# Patient Record
Sex: MALE | Race: WHITE | NOT HISPANIC OR LATINO | Employment: OTHER | URBAN - METROPOLITAN AREA
[De-identification: names, ages, dates, MRNs, and addresses within clinical notes are randomized per-mention and may not be internally consistent; named-entity substitution may affect disease eponyms.]

---

## 2017-03-24 ENCOUNTER — OFFICE VISIT (OUTPATIENT)
Dept: WOUND CARE | Facility: MEDICAL CENTER | Age: 82
End: 2017-03-24
Attending: UROLOGY
Payer: MEDICARE

## 2017-03-24 VITALS
HEART RATE: 84 BPM | DIASTOLIC BLOOD PRESSURE: 65 MMHG | OXYGEN SATURATION: 91 % | TEMPERATURE: 97.8 F | SYSTOLIC BLOOD PRESSURE: 131 MMHG

## 2017-03-24 DIAGNOSIS — N30.40 RADIATION CYSTITIS: ICD-10-CM

## 2017-03-24 DIAGNOSIS — E11.22 TYPE 2 DIABETES MELLITUS WITH STAGE 3 CHRONIC KIDNEY DISEASE, WITH LONG-TERM CURRENT USE OF INSULIN (HCC): ICD-10-CM

## 2017-03-24 DIAGNOSIS — Z90.79 HISTORY OF PROSTATECTOMY: ICD-10-CM

## 2017-03-24 DIAGNOSIS — L57.0 AK (ACTINIC KERATOSIS): ICD-10-CM

## 2017-03-24 DIAGNOSIS — R31.9 HEMATURIA: ICD-10-CM

## 2017-03-24 DIAGNOSIS — Z79.4 TYPE 2 DIABETES MELLITUS WITH STAGE 3 CHRONIC KIDNEY DISEASE, WITH LONG-TERM CURRENT USE OF INSULIN (HCC): ICD-10-CM

## 2017-03-24 DIAGNOSIS — N18.30 CHRONIC KIDNEY DISEASE (CKD), STAGE III (MODERATE) (HCC): ICD-10-CM

## 2017-03-24 DIAGNOSIS — N18.30 TYPE 2 DIABETES MELLITUS WITH STAGE 3 CHRONIC KIDNEY DISEASE, WITH LONG-TERM CURRENT USE OF INSULIN (HCC): ICD-10-CM

## 2017-03-24 DIAGNOSIS — Z85.46 HISTORY OF PROSTATE CANCER: ICD-10-CM

## 2017-03-24 DIAGNOSIS — N39.45 CONTINUOUS LEAKAGE OF URINE: ICD-10-CM

## 2017-03-24 DIAGNOSIS — K92.1 BLOOD IN STOOL: ICD-10-CM

## 2017-03-24 DIAGNOSIS — G62.9 NEUROPATHY: ICD-10-CM

## 2017-03-24 DIAGNOSIS — I10 ESSENTIAL HYPERTENSION: ICD-10-CM

## 2017-03-24 DIAGNOSIS — Z86.718 HISTORY OF DVT (DEEP VEIN THROMBOSIS): ICD-10-CM

## 2017-03-24 DIAGNOSIS — L59.8 OTHER SPECIFIED DISORDERS OF THE SKIN AND SUBCUTANEOUS TISSUE RELATED TO RADIATION: Primary | ICD-10-CM

## 2017-03-24 DIAGNOSIS — Z92.3 HISTORY OF RADIATION THERAPY: ICD-10-CM

## 2017-03-24 DIAGNOSIS — M48.061 SPINAL STENOSIS, LUMBAR REGION, WITHOUT NEUROGENIC CLAUDICATION: ICD-10-CM

## 2017-03-24 PROCEDURE — 1101F PT FALLS ASSESS-DOCD LE1/YR: CPT | Performed by: FAMILY MEDICINE

## 2017-03-24 PROCEDURE — 4040F PNEUMOC VAC/ADMIN/RCVD: CPT | Performed by: FAMILY MEDICINE

## 2017-03-24 PROCEDURE — G8432 DEP SCR NOT DOC, RNG: HCPCS | Performed by: FAMILY MEDICINE

## 2017-03-24 PROCEDURE — 99202 OFFICE O/P NEW SF 15 MIN: CPT | Performed by: FAMILY MEDICINE

## 2017-03-24 PROCEDURE — G8417 CALC BMI ABV UP PARAM F/U: HCPCS | Performed by: FAMILY MEDICINE

## 2017-03-24 PROCEDURE — G8482 FLU IMMUNIZE ORDER/ADMIN: HCPCS | Performed by: FAMILY MEDICINE

## 2017-03-24 PROCEDURE — 1036F TOBACCO NON-USER: CPT | Performed by: FAMILY MEDICINE

## 2017-03-24 PROCEDURE — 99205 OFFICE O/P NEW HI 60 MIN: CPT | Performed by: FAMILY MEDICINE

## 2017-03-24 ASSESSMENT — ENCOUNTER SYMPTOMS
BLOOD IN STOOL: 1
ABDOMINAL PAIN: 0
DEPRESSION: 1
SEIZURES: 0
DIARRHEA: 1
NAUSEA: 0
BRUISES/BLEEDS EASILY: 0
CONSTIPATION: 1
HEARTBURN: 1
ROS GI COMMENTS: +GERD
BACK PAIN: 1
SORE THROAT: 0
SPUTUM PRODUCTION: 0
SHORTNESS OF BREATH: 0
EYE DISCHARGE: 0
DOUBLE VISION: 0
POLYDIPSIA: 0
FEVER: 0
VOMITING: 0
CHILLS: 0
MYALGIAS: 0
COUGH: 0
FALLS: 1
PALPITATIONS: 0
NERVOUS/ANXIOUS: 0
FOCAL WEAKNESS: 0
TINGLING: 1
BLURRED VISION: 0

## 2017-03-24 ASSESSMENT — PAIN SCALES - GENERAL: PAINLEVEL_OUTOF13: 0

## 2017-03-24 NOTE — PATIENT INSTRUCTIONS
After hyperbaric oxygen therapy treatment, it is normal to experience some congestion to the ears, muffling of sounds, or abnormal sounds to one or both ears.    If you experience pain or discomfort to one or both ears that does not resolve spontaneously, please contact the hyperbaric department at [phone].

## 2017-03-24 NOTE — PROGRESS NOTES
Subjective:      Selvin Braga is a 81 y.o. male who presents with No chief complaint on file.            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      Patient's initial insult was a T3a adenocarcinoma of the prostate (Shannan 7-10), diagnosed in 2001.  For this, he underwent an open radical prostatectomy 2/17/2001, as well as 6.5 weeks of external beam radiation starting  9/17/2001.  He started on LHRH implant x 2 years total beginning 2/13/2003 .  He states that his PSA has been undetectable since surgery, and his urologist states that there is no recurrence of his cancer.  Last PSA noted to be <0.1 ng/ml on 3/2/2016      Due to a dilated urethral stricture on recent cystoscopy, the patient suffers from continual urinary incontinence.  For this, he had an artificial urinary sphincter placed in 2001, which was subsequently removed by Dr. Thomas in 2010 due to leakage and erosion, subsequently replaced by Dr. Car in 2011 and then finally removed again due to failure.    His hematuria, which started in the summer of 2016,  has been worked up by a CT Abdomen & Urogram on 7/2016 which was normal and Cytology at the time was negative for recurrent cancer.   To manage his urinary incontinence, He states that he has a Cunningham clamp behind the head of the penis 12 hours per day, and uses a condom catheter the other 12 hours of the day.    Additional complicating factors include hematochezia, which he attributes also to the radiation from his prostate cancer treatment.  He notes that his bowels are irregular, with alternating constipation and diarrhea.  Additionally, he did have a small bowel resection for obstruction and subsequent re-anastamosis.      Due to the chronic hematuria and  hematochezia, he states that he always has a low level of anemia, for which he takes iron supplements.     Patient's current complaints include hematuria with clots, urinary incontinence, incontinence and impotence.      DERM - He states that he also gets some itches on his back - for which he sees Dermatology - Dr. Stas CAPUTO - In terms of his diabetic control, he is maintained on nightly NPH insulin (10 units), as well as oral medications.  He notes that his Last A1c was 7.0  Blood sugars in the AM are generally > 120     GI - In terms of his GERD, he states that this is well managed with PPI        Consults:  PCP -Tia Ferguson MD - [address], [place] [address]  Urology - Tuan Marie MD - [facility] - [address], [place] [address]  Of note, the patient does have an additional PCP and [facility], although he does not remember who they are.  [facility]  Podiatrist - Zane Bar DPM - [address], [place] [address]  Optho - Kavon Mcintosh MD & Clayton Noonan MD - [facility] - [address] [place] [address]  GI - Alek Barker MD - [facility] - [address] [place] [address]      Radiology   7/13/16 CT ABD & PELVIS Urogram  Impression        There are no enhancing renal masses. There are too small to characterize left cortical hypodensities that statistically most likely represent cysts    Punctate left calyx calculus not excluded although I suspect this is more likely vascular    Bladder wall trabeculation following prostatectomy. This indicates chronic outlet obstruction although no abnormal dilatation is seen    Cholelithiasis without biliary dilatation     8/3/15 CXR  Impression        1.  No evidence of acute cardiopulmonary process.  2.  Elevated right hemidiaphragm is stable.       Laboratory:  Results for KARINA SKAGGS (MRN [id number]) as of 3/24/2017 13:57   Ref. Range 8/22/2016 07:00   WBC Latest Ref Range:  4.8-10.8 K/uL 7.3   RBC Latest Ref Range: 4.70-6.10 M/uL 4.02 (L)   Hemoglobin Latest Ref Range: 14.0-18.0 g/dL 11.9 (L)   Hematocrit Latest Ref Range: 42.0-52.0 % 36.8 (L)   MCV Latest Ref Range: 81.4-97.8 fL 91.5   MCH Latest Ref Range: 27.0-33.0 pg 29.6   MCHC Latest Ref Range: 33.7-35.3 g/dL 32.3 (L)   RDW Latest Ref Range: 35.9-50.0 fL 50.5 (H)   Platelet Count Latest Ref Range: 164-446 K/uL 171   MPV Latest Ref Range: 9.0-12.9 fL 9.9   Neutrophils-Polys Latest Ref Range: 44.00-72.00 % 65.30   Neutrophils (Absolute) Latest Ref Range: 1.82-7.42 K/uL 4.79   Lymphocytes Latest Ref Range: 22.00-41.00 % 19.50 (L)   Lymphs (Absolute) Latest Ref Range: 1.00-4.80 K/uL 1.43   Monocytes Latest Ref Range: 0.00-13.40 % 8.70   Monos (Absolute) Latest Ref Range: 0.00-0.85 K/uL 0.64   Eosinophils Latest Ref Range: 0.00-6.90 % 5.40   Eos (Absolute) Latest Ref Range: 0.00-0.51 K/uL 0.40   Basophils Latest Ref Range: 0.00-1.80 % 0.70   Baso (Absolute) Latest Ref Range: 0.00-0.12 K/uL 0.05   Immature Granulocytes Latest Ref Range: 0.00-0.90 % 0.40   Immature Granulocytes (abs) Latest Ref Range: 0.00-0.11 K/uL 0.03   Nucleated RBC Latest Units: /100 WBC 0.00   NRBC (Absolute) Latest Units: K/uL 0.00   Sodium Latest Ref Range: 135-145 mmol/L 138   Potassium Latest Ref Range: 3.6-5.5 mmol/L 4.0   Chloride Latest Ref Range:  mmol/L 105   Co2 Latest Ref Range: 20-33 mmol/L 26   Anion Gap Latest Ref Range: 0.0-11.9  7.0   Glucose Latest Ref Range: 65-99 mg/dL 161 (H)   Bun Latest Ref Range: 8-22 mg/dL 21   Creatinine Latest Ref Range: 0.50-1.40 mg/dL 1.16   GFR If  Latest Ref Range: >60 mL/min/1.73 m 2 >60   GFR If Non  Latest Ref Range: >60 mL/min/1.73 m 2 >60   Calcium Latest Ref Range: 8.5-10.5 mg/dL 9.9   AST(SGOT) Latest Ref Range: 12-45 U/L 12   ALT(SGPT) Latest Ref Range: 2-50 U/L 9   Alkaline Phosphatase Latest Ref Range: 30-99 U/L 76   Total Bilirubin Latest Ref Range: 0.1-1.5 mg/dL 0.6    Albumin Latest Ref Range: 3.2-4.9 g/dL 3.8   Total Protein Latest Ref Range: 6.0-8.2 g/dL 7.2   Globulin Latest Ref Range: 1.9-3.5 g/dL 3.4   A-G Ratio Latest Units: g/dL 1.1   Glycohemoglobin Latest Ref Range: 0.0-5.6 % 7.4 (H)   Estim. Avg Glu Latest Units: mg/dL 166   Cholesterol,Tot Latest Ref Range: 100-199 mg/dL 141   Triglycerides Latest Ref Range: 0-149 mg/dL 131   HDL Latest Ref Range: >=40 mg/dL 47   LDL Latest Ref Range: <100 mg/dL 68     Past Medical History   Diagnosis Date   • Heart burn    • Dental disorder      dentures   • Snoring    • Psychiatric problem      taking prozac   • Cancer (CMS-HCC) 2001     prostate   • CATARACT    • Arrhythmia      PVC's   • Indigestion    • Other specified disorder of intestines      alters between normal and diarrhea since radiation tx   • Arthritis    • Colon polyps    • Anemia    • Diabetes 1984   • Lombardi esophagus    • Personal history of venous thrombosis and embolism 2010/1995     leg   • GERD (gastroesophageal reflux disease)    • Cholesterol blood decreased    • Unspecified urinary incontinence      spill from artifical urinary sphincter   • Urinary bladder disorder      artificial urinary sphincter /catheter   • Sleep apnea      no cpap   • Hypertension      well controlled on meds   • Chronic kidney disease (CKD), stage III (moderate) 7/21/2016   • Neuropathy (CMS-[facility]) 7/21/2016   • Depression 7/21/2016   • AK (actinic keratosis) 7/21/2016   • Hyperlipidemia 7/21/2016     Past Surgical History   Procedure Laterality Date   • Angiogram  1/25/2010     Performed by PETER NORMAN at SURGERY [facility]   • Aortogram  1/25/2010     Performed by EPTER NORMAN at SURGERY [facility]   • Prostatectomy robotic  2/2001   • Pr insert,inflatable sphincter  2001   • Sphincter prosthesis removal  6/3/2010     Performed by JOSH APARICIO at SURGERY [facility]   • Cystoscopy  2/2/2011     Performed by GERSON SEGUNDO at [facility]   • Sphincter prosthesis placement  2/24/2011     Performed by GERSON CAR at SURGERY [facility]   • Cystoscopy  2/24/2011     Performed by GERSON CAR at SURGERY [facility]   • Other abdominal surgery     • Groin exploration  3/13/2012     Performed by DEEPA PATRICIA at [facility]   • Sphincter prosthesis placement  9/26/2012     Performed by GERSON Car M.D. at SURGERY [facility]   • Cataract phaco with iol  2/13/2013     Performed by Clayton Noonan M.D. at SURGERY [facility] DAY [facility]   • Cataract phaco with iol  2/27/2013     Performed by Clayton Noonan M.D. at SURGERY SAME DAY [facility]   • Colonoscopy with apc  6/5/2013     Performed by Deepa Vela M.D. at [facility]   • Bowel resection  5/21/2014     Performed by Graham Peña M.D. at SURGERY [facility]   • Laparoscopy  5/21/2014     Performed by Graham Peña M.D. at [facility]   • Lumbar fusion posterior  12/13/2012     Procedure: L5-S1 removal of Jennings Fragment NON-INSTRUMENTAL;  Surgeon: Wil Martinez M.D.;  Location: [facility];  Service:    • Lumbar decompression  12/13/2012     Procedure: 4-5;  Surgeon: Wil Martinez M.D.;  Location: [facility];  Service:    • Other orthopedic surgery       hip & femur fx   • Sphincter prosthesis removal  8/10/2015     Procedure: SPHINCTER PROSTHESIS REMOVAL;  Surgeon: Tuan Marie M.D.;  Location: [facility];  Service:    • Ankle orif Right 10/28/2015     Procedure: ANKLE ORIF;  Surgeon: Venu Elizabeth M.D.;  Location: [facility];  Service:      Allergies   Allergen Reactions   • Augmentin Unspecified     Bleeding  RXN=5 years ago   • Latex Unspecified     Blisters  RXN=ongoing   • Tape Rash     Rash-blisters-paper tape okay  RXN=ongoing       Current outpatient prescriptions:   •  insulin NPH (NOVOLIN N) 100 UNIT/ML Suspension, Inject 10 Units  as instructed Once. Before dinner, Disp: , Rfl:   •  Cyanocobalamin (B-12) 1000 MCG Tab CR, Take  by mouth., Disp: , Rfl:   •  clobetasol (TEMOVATE) 0.05 % Cream, , Disp: , Rfl:   •  pantoprazole (PROTONIX) 40 MG Tablet Delayed Response, Take 40 mg by mouth every day., Disp: , Rfl:   •  glipiZIDE (GLUCOTROL) 10 MG Tab, Take 10 mg by mouth 2 times a day. One po am, 1/2 po pm, Disp: , Rfl:   •  hydrochlorothiazide (HYDRODIURIL) 12.5 MG tablet, Take 12.5 mg by mouth every day., Disp: , Rfl:   •  Saxagliptin HCl 5 MG Tab, Take 5 mg by mouth every day., Disp: , Rfl:   •  lisinopril (PRINIVIL) 5 MG Tab, Take 2.5 mg by mouth every day. Indications: High Blood Pressure, Disp: , Rfl:   •  ferrous sulfate 325 (65 FE) MG tablet, Take 325 mg by mouth 2 Times a Day., Disp: , Rfl:   •  Cholecalciferol (VITAMIN D) 2000 UNIT TABS, Take 2,000 Units by mouth every day., Disp: , Rfl:   •  ascorbic acid (ASCORBIC ACID) 500 MG TABS, Take 500 mg by mouth every day., Disp: , Rfl:   •  metformin (GLUCOPHAGE) 500 MG TABS, Take 500 mg by mouth 2 times a day, with meals., Disp: , Rfl:   •  fluoxetine (PROZAC) 20 MG CAPS, Take 20 mg by mouth every day., Disp: , Rfl:   •  simvastatin (ZOCOR) 40 MG TABS, Take 40 mg by mouth every evening. Continue as per Dr. Mckeon. Continue as per Dr. Bobo., Disp: , Rfl:     Social History     Social History   • Marital Status:      Spouse Name: N/A   • Number of Children: 5   • Years of Education: N/A     Occupational History   • Not on file.     Social History Main Topics   • Smoking status: Former Smoker -- 2.00 packs/day for 30 years     Types: Cigarettes     Quit date: 03/05/1995   • Smokeless tobacco: Never Used      Comment: 1.5 ppd 25 yrs,quit 1995   • Alcohol Use: No   • Drug Use: No   • Sexual Activity: Not on file     Other Topics Concern   • Not on file     Social History Narrative     Family History   Problem Relation Age of Onset   • Hypertension     • Cancer       multiple cousins with  prostate cancer.    • Cancer Mother      ovarian   • Heart Disease Father    • Diabetes Father    • Diabetes Sister        Review of Systems   Constitutional: Negative for fever, chills and malaise/fatigue.   HENT: Negative for congestion, ear pain, nosebleeds, sore throat and tinnitus.    Eyes: Negative for blurred vision, double vision and discharge.   Respiratory: Negative for cough, sputum production and shortness of breath.    Cardiovascular: Negative for chest pain and palpitations.   Gastrointestinal: Positive for heartburn, diarrhea, constipation and blood in stool. Negative for nausea, vomiting and abdominal pain.        +GERD   Genitourinary: Positive for frequency.        Incontinence   Musculoskeletal: Positive for back pain, joint pain and falls. Negative for myalgias.        Chronic lumbago due to OA.  Prior surgery for lumbar spinal stenosis   Skin: Positive for itching and rash.        Patient does have occasional rashes to his upper back, and states that he cannot resist scratching and picking at them.  He does see a dermatologist and use clobetasol cream for this.    Neurological: Positive for tingling. Negative for focal weakness and seizures.   Endo/Heme/Allergies: Negative for polydipsia. Does not bruise/bleed easily.   Psychiatric/Behavioral: Positive for depression. The patient is not nervous/anxious.      Hyperbaric specific ROS    CLOTHING  I spoke to the patient about prohibited clothing and items within the chamber. Specifically, we discussed 100% cotton clothing only.  Patient will remove/omit cosmetics and hair products (including wigs, hair pieces and weaves), jewelry, prosthetics (such as dentures and hearing aides) and contact lenses, dentures, hearing aids, medication patches.    HEENT  The patient denies recent dental work or infected teeth.  In terms of dentures, he has a partial lower plate and a full upper plate. There is no history of recent sinus infection or surgeries.  The  "patient does not have a current URI.  The patient reports that they are able to clear their ears with pressure changes without difficulty.    PSYCH  The patient denies any history of confinement anxiety or claustrophobia in the past - and states that as long as he can see out, he'll be \"fine\".  He does have a history of depression.      GI  The patient will avoid carbonated beverages and caffeinated substances for 24 hours prior to HBOT treatment    SKIN  The patient has avoided the use of skin creams / lotions, cosmetics, hair products, and medication patches    PULMONARY  The patient denies history of untreated pneumothorax or spontaneous pneumothorax.      MEDICATIONS  Patient is not currently taking chemotherapy medications (Adriamycin, Bleomycin, Cis Platinum) or Antabuse / Disulfiram         Objective:     /65 mmHg  Pulse 84  Temp(Src) 36.6 °C (97.8 °F)  SpO2 91%     Physical Exam   Constitutional: He is oriented to person, place, and time. He appears well-developed and well-nourished. He is active and cooperative.  Non-toxic appearance. He does not have a sickly appearance. He does not appear ill. No distress. He is not intubated.   HENT:   Head: Normocephalic and atraumatic.   Right Ear: Tympanic membrane, external ear and ear canal normal.   Left Ear: Tympanic membrane, external ear and ear canal normal.   Nose: Nose normal. No rhinorrhea. Right sinus exhibits no maxillary sinus tenderness and no frontal sinus tenderness. Left sinus exhibits no maxillary sinus tenderness and no frontal sinus tenderness.   Mouth/Throat: Oropharynx is clear and moist and mucous membranes are normal. No oral lesions. No oropharyngeal exudate, posterior oropharyngeal edema, posterior oropharyngeal erythema or tonsillar abscesses.   Eyes: Conjunctivae and EOM are normal. Pupils are equal, round, and reactive to light. Right eye exhibits no discharge and no hordeolum. Left eye exhibits no discharge and no hordeolum. No " scleral icterus.   Neck: Normal range of motion. Carotid bruit is not present. No tracheal deviation present. No thyroid mass and no thyromegaly present.   Cardiovascular: Normal rate, regular rhythm, S1 normal, S2 normal and normal heart sounds.   No extrasystoles are present. Exam reveals no gallop.    No murmur heard.  Pulmonary/Chest: Effort normal and breath sounds normal. No accessory muscle usage. No apnea, no tachypnea and no bradypnea. He is not intubated. No respiratory distress. He has no decreased breath sounds. He has no wheezes. He has no rhonchi. He has no rales.   Abdominal: Soft. Normal appearance and bowel sounds are normal. He exhibits no pulsatile liver and no ascites. There is no hepatosplenomegaly, splenomegaly or hepatomegaly. There is no tenderness.   Genitourinary: Testes normal and penis normal. Circumcised.   Becerril clamp in place   Musculoskeletal: He exhibits no edema.   Lymphadenopathy:        Head (right side): No submental and no submandibular adenopathy present.        Head (left side): No submental and no submandibular adenopathy present.     He has no cervical adenopathy.        Right: No supraclavicular adenopathy present.        Left: No supraclavicular adenopathy present.   Neurological: He is alert and oriented to person, place, and time. He has normal reflexes. No cranial nerve deficit.   Skin: Skin is warm and dry. No rash noted. He is not diaphoretic. No erythema. No pallor.   Psychiatric: He has a normal mood and affect. His speech is normal and behavior is normal.   Vitals reviewed.              Assessment/Plan:   Patient is an appropriate candidate for HBOT, given his late effects of radiation manifesting in hematuria.      Encounter Diagnoses   Name Primary?   • Other specified disorders of the skin and subcutaneous tissue related to radiation - start with HBOT @ 2.4 HANNA of pressure, 90 minutes each treatment, 40 treatments on a 5x per week, M-F basis.  Q 30 minute  air breaks.  Yes   • Radiation cystitis - continue to follow with Dr. Marie    • Continuous leakage of urine - Cunningham clamp / condom cathether management    • Hematuria - should improve with HBOT - onm Fe replacement    • Blood in stool - should improve with HBOT - on Fe replacement    • History of prostate cancer - in remission per Dr. 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  They have been informed of the possible worsening of cataracts and of the transient myopia associated with long term hyperbaric treatment.  They have been informed of the possibility of O2 seizures and O2 toxicity. In addition, I have recommended Vitamin E, 400 IU PO daily for prophylaxis of O2 toxicity.   They have been informed of the possibility of Pulmonary toxicity and CNS toxicity, including N/V, Seizures, Muscle twitches, Vertigo, and other mental status changes.

## 2017-03-29 ENCOUNTER — OFFICE VISIT (OUTPATIENT)
Dept: WOUND CARE | Facility: MEDICAL CENTER | Age: 82
End: 2017-03-29
Attending: UROLOGY
Payer: MEDICARE

## 2017-03-29 VITALS
TEMPERATURE: 98.1 F | DIASTOLIC BLOOD PRESSURE: 72 MMHG | HEART RATE: 84 BPM | SYSTOLIC BLOOD PRESSURE: 160 MMHG | OXYGEN SATURATION: 95 %

## 2017-03-29 DIAGNOSIS — N18.30 TYPE 2 DIABETES MELLITUS WITH STAGE 3 CHRONIC KIDNEY DISEASE, WITH LONG-TERM CURRENT USE OF INSULIN (HCC): ICD-10-CM

## 2017-03-29 DIAGNOSIS — L59.8 OTHER SPECIFIED DISORDERS OF THE SKIN AND SUBCUTANEOUS TISSUE RELATED TO RADIATION: ICD-10-CM

## 2017-03-29 DIAGNOSIS — E11.22 TYPE 2 DIABETES MELLITUS WITH STAGE 3 CHRONIC KIDNEY DISEASE, WITH LONG-TERM CURRENT USE OF INSULIN (HCC): ICD-10-CM

## 2017-03-29 DIAGNOSIS — R31.9 HEMATURIA: ICD-10-CM

## 2017-03-29 DIAGNOSIS — G62.9 NEUROPATHY: ICD-10-CM

## 2017-03-29 DIAGNOSIS — N39.45 CONTINUOUS LEAKAGE OF URINE: ICD-10-CM

## 2017-03-29 DIAGNOSIS — I15.8 OTHER SECONDARY HYPERTENSION: ICD-10-CM

## 2017-03-29 DIAGNOSIS — Z79.4 TYPE 2 DIABETES MELLITUS WITH STAGE 3 CHRONIC KIDNEY DISEASE, WITH LONG-TERM CURRENT USE OF INSULIN (HCC): ICD-10-CM

## 2017-03-29 DIAGNOSIS — N30.40 RADIATION CYSTITIS: ICD-10-CM

## 2017-03-29 DIAGNOSIS — K92.1 BLOOD IN STOOL: ICD-10-CM

## 2017-03-29 DIAGNOSIS — N18.30 CHRONIC KIDNEY DISEASE (CKD), STAGE III (MODERATE) (HCC): ICD-10-CM

## 2017-03-29 LAB — GLUCOSE BLD-MCNC: 234 MG/DL (ref 70–100)

## 2017-03-29 PROCEDURE — 82948 REAGENT STRIP/BLOOD GLUCOSE: CPT | Performed by: FAMILY MEDICINE

## 2017-03-29 PROCEDURE — G8417 CALC BMI ABV UP PARAM F/U: HCPCS | Performed by: FAMILY MEDICINE

## 2017-03-29 PROCEDURE — 82962 GLUCOSE BLOOD TEST: CPT | Performed by: FAMILY MEDICINE

## 2017-03-29 PROCEDURE — G0277 HBOT, FULL BODY CHAMBER, 30M: HCPCS | Performed by: FAMILY MEDICINE

## 2017-03-29 PROCEDURE — 99183 HYPERBARIC OXYGEN THERAPY: CPT | Performed by: FAMILY MEDICINE

## 2017-03-29 ASSESSMENT — PAIN SCALES - GENERAL: PAINLEVEL_OUTOF13: 0

## 2017-03-29 ASSESSMENT — ENCOUNTER SYMPTOMS
TINGLING: 1
BLURRED VISION: 0
HEADACHES: 0
CONSTIPATION: 1
DOUBLE VISION: 0
BACK PAIN: 1
DIARRHEA: 1
PALPITATIONS: 0
CHILLS: 0
COUGH: 0
BLOOD IN STOOL: 1
HEARTBURN: 1
FEVER: 0
FALLS: 1
SHORTNESS OF BREATH: 0
DEPRESSION: 1

## 2017-03-29 NOTE — PATIENT INSTRUCTIONS
After hyperbaric oxygen therapy treatment, it is normal to experience some congestion to the ears, muffling of sounds, or abnormal sounds to one or both ears.    If you experience pain or discomfort to one or both ears that does not resolve spontaneously, please contact the hyperbaric department at [phone].

## 2017-03-29 NOTE — PROGRESS NOTES
Subjective:      Selvin Braga is a 81 y.o. 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      TOBIN Montalvo presents for his first HBOT treatment today.  He is being treated for late effects of radiation and   radiation cystitis, manifesting in hematuria and the passage of urinary clots.  He tolerated treatment well although he did have some issues clearing his left ear.    Pressure was staged and he was then able to clear his ear and complete the rest of the treatment without any issues.  On post-treatment exam his left ear did have some congestion and erythema on the entire TM.  Will stage pt's descent again tomorrow but may need myringotomy tubes if he continues to have issues.    His glucose was adequate to prevent hypoglycemia.    Review of Systems   Constitutional: Negative for fever and chills.   HENT: Negative for ear discharge, ear pain and hearing loss.    Eyes: Negative for blurred vision and double vision.   Respiratory: Negative for cough and shortness of breath.    Cardiovascular: Negative for chest pain and palpitations.   Gastrointestinal: Positive for heartburn, diarrhea, constipation and blood in stool.   Genitourinary: Positive for frequency.   Musculoskeletal: Positive for back pain, joint pain and falls.   Skin: Positive for itching and rash.   Neurological: Positive for tingling. Negative for headaches.   Psychiatric/Behavioral: Positive for depression.          Objective:   Blood pressure 160/72, pulse 84, temperature 36.7 °C (98.1 °F), SpO2 95 %.   Glucose 234    Physical Exam   Constitutional: He is oriented to person, place, and time. He appears well-developed and well-nourished.   HENT:   Head: Normocephalic and atraumatic.   Right Ear: Tympanic membrane, external ear and ear canal normal.   Left Ear: External ear and ear canal normal. There is swelling. Tympanic membrane is erythematous.    Mouth/Throat: He has dentures.   Post treatment- congestion and erythema on the entire TM.   Pulmonary/Chest: Effort normal.   Neurological: He is alert and oriented to person, place, and time.   Psychiatric: He has a normal mood and affect.   Vitals reviewed.      Assessment/Plan:     Encounter Diagnoses    Name  Primary?    •  Other specified disorders of the skin and subcutaneous tissue related to radiation -  Appropriate candidate for hyperbaric oxygen therapy. Patient completed his first HBOT treatment at a pressure of 2.0 HANNA x 90 minutes at a descent and ascent rate of 1.0 PSI/minute. Will continue the patient with HBOT at a pressure of 2.4 HANNA x 90 minutes x  40 treatments on a 5x per week, M-F basis.  Q 30 minute air breaks.   Yes    •  Radiation cystitis - continue to follow with Dr. Marie      •  Continuous leakage of urine - Cunningham clamp / condom cathether management      •  Hematuria - should improve with HBOT - onm Fe replacement      •  Blood in stool - should improve with HBOT - on Fe replacement      •  History of prostate cancer - in remission per Dr. 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

## 2017-03-29 NOTE — MR AVS SNAPSHOT
Selvin Braga   3/29/2017 9:30 AM   Office Visit   MRN: [id number]    Department:  [facility]   Dept Phone:  [phone]    Description:  Male : 1935   Provider:  Ofelia Reed M.D.           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   [7609412]       Radiation cystitis   [465081]       Continuous leakage of urine   [037034]       Hematuria   [8559677]       Blood in stool   [578.1.ICD-9-CM]       Type 2 diabetes mellitus with stage 3 chronic kidney disease, with long-term current use of insulin (CMS-HCC)   [9701898]       Other secondary hypertension   [1038753]       Neuropathy (CMS-HCC)   [447267]       Chronic kidney disease (CKD), stage III (moderate)   [988580]         Vital Signs     Blood Pressure Pulse Temperature Oxygen Saturation Smoking Status       160/72 mmHg 84 36.7 °C (98.1 °F) 95% Former Smoker       Basic Information     Date Of Birth Sex Race Ethnicity Preferred Language    1935 Male White Non- English      Your appointments     Mar 30, 2017  9:30 AM   HBOT Supervision with HYPERBARIC CHAMBER 2   [facility] ([address] [place] [address])    [address] [facility] [address]  [facility] [address]   [phone]            Mar 31, 2017  9:30 AM   HBOT Supervision with HYPERBARIC CHAMBER 2   [facility] ([address] [place] [address])    [address]  [facility] [address]   [phone]            2017  9:30 AM   HBOT Supervision with HYPERBARIC CHAMBER 2   [facility] ([address] [place] [address])    [address] [facility] [address]  [place] [address]   [phone]            2017  9:30 AM      HBOT Supervision with HYPERBARIC CHAMBER 2   [facility] ([address])    [address]  [place] [address]   [phone]            Apr 05, 2017  9:30 AM   HBOT Supervision with HYPERBARIC CHAMBER 2   [facility] ([address])    [address]  [place] [address]   [phone]            Apr 06, 2017  9:30 AM   HBOT Supervision with HYPERBARIC CHAMBER 2   [facility] ([address])    [address]  [place] [address]   [phone]            Apr 07, 2017  9:30 AM   HBOT Supervision with HYPERBARIC CHAMBER 2   [facility] ([address])    [address]  [place] [address]   [phone]            Apr 10, 2017  9:30 AM   HBOT Supervision with HYPERBARIC CHAMBER 2   [facility] ([address])    [address]  [place] [address]   [phone]            Apr 11, 2017  9:30 AM   HBOT Supervision with HYPERBARIC CHAMBER 2   [facility] ([address])    [address]  [place] [address]   [phone]            Apr 12, 2017  9:30 AM   HBOT Supervision with HYPERBARIC CHAMBER 2   [facility] ([address])    [address]  [place] [address]   [phone]            Apr 13, 2017  9:30 AM   HBOT Supervision with HYPERBARIC CHAMBER 2   [facility] ([address])    [address]  [place] [address]   [phone]            Apr 14, 2017  9:30 AM   HBOT Supervision with HYPERBARIC CHAMBER 2   [facility] ([address])    [address]  [place] [address]   [phone]            Apr 17, 2017  9:30 AM   HBOT Supervision with HYPERBARIC CHAMBER 2   [facility] ([address])    [address]  [place] [address]   [phone]            Apr 18, 2017  9:30 AM    HBOT Supervision with HYPERBARIC CHAMBER 2   [facility] ([address])    [address]  [place] [address]   [phone]            Apr 19, 2017  9:30 AM   HBOT Supervision with HYPERBARIC CHAMBER 2   [facility] ([address])    [address]  [place] [address]   [phone]            Apr 20, 2017  9:30 AM   HBOT Supervision with HYPERBARIC CHAMBER 2   [facility] ([address])    [address]  [place] [address]   [phone]            Apr 21, 2017  9:30 AM   HBOT Supervision with HYPERBARIC CHAMBER 2   [facility] ([address])    [address]  [place] [address]   [phone]            Apr 24, 2017  9:30 AM   HBOT Supervision with HYPERBARIC CHAMBER 2   [facility] ([address])    [address]  [place] [address]   [phone]            Apr 25, 2017  9:30 AM   HBOT Supervision with HYPERBARIC CHAMBER 2   [facility] ([address])    [address]  [place] [address]   [phone]            Apr 26, 2017  9:30 AM   HBOT Supervision with HYPERBARIC CHAMBER 2   [facility] ([address])    [address]  [place] [address]   [phone]            Apr 27, 2017  9:30 AM   HBOT Supervision with HYPERBARIC CHAMBER 2   [facility] ([address])    [address]  [place] [address]   [phone]            Apr 28, 2017  9:30 AM   HBOT Supervision with HYPERBARIC CHAMBER 2   [facility] ([address])    [address]  [place] [address]   [phone]            May 01, 2017  9:30 AM   HBOT Supervision with HYPERBARIC CHAMBER 2   [facility] ([address])    [address]  [place] [address]   [phone]            May 02, 2017  9:30 AM    HBOT Supervision with HYPERBARIC CHAMBER 2   [facility] ([address])    [address]  [place] [address]   [phone]            May 03, 2017  9:30 AM   HBOT Supervision with HYPERBARIC CHAMBER 2   [facility] ([address])    [address]  [place] [address]   [phone]            May 04, 2017  9:30 AM   HBOT Supervision with HYPERBARIC CHAMBER 2   [facility] ([address])    [address]  [place] [address]   [phone]            May 05, 2017  9:30 AM   HBOT Supervision with HYPERBARIC CHAMBER 2   [facility] ([address])    [address]  [place] [address]   [phone]            May 08, 2017  9:30 AM   HBOT Supervision with HYPERBARIC CHAMBER 2   [facility] ([address])    [address]  [place] [address]   [phone]            May 09, 2017  9:30 AM   HBOT Supervision with HYPERBARIC CHAMBER 2   [facility] ([address])    [address]  [place] [address]   [phone]            May 10, 2017  9:30 AM   HBOT Supervision with HYPERBARIC CHAMBER 2   [facility] ([address])    [address]  [place] [address]   [phone]            May 11, 2017  9:30 AM   HBOT Supervision with HYPERBARIC CHAMBER 2   [facility] ([address])    [address]  [place] [address]   [phone]            May 12, 2017  9:30 AM   HBOT Supervision with HYPERBARIC CHAMBER 2   [facility] ([address])    [address]  [place] [address]   [phone]            May 15, 2017  9:30 AM   HBOT Supervision with HYPERBARIC CHAMBER 2   [facility] ([address])    [address]  [place] [address]   [phone]            May 16, 2017  9:30 AM    HBOT Supervision with HYPERBARIC CHAMBER 2   [facility] ([address])    [address]  [place] [address]   [phone]            May 17, 2017  9:30 AM   HBOT Supervision with HYPERBARIC CHAMBER 2   [facility] ([address])    [address]  [place] [address]   [phone]            May 18, 2017  9:30 AM   HBOT Supervision with HYPERBARIC CHAMBER 2   [facility] ([address])    [address]  [place] [address]   [phone]            May 19, 2017  9:30 AM   HBOT Supervision with HYPERBARIC CHAMBER 2   [facility] ([address])    [address]  [place] [address]   [phone]            May 22, 2017  9:30 AM   HBOT Supervision with HYPERBARIC CHAMBER 2   [facility] ([address])    [address]  [place] [address]   [phone]            May 23, 2017  9:30 AM   HBOT Supervision with HYPERBARIC CHAMBER 2   [facility] ([address])    [address]  [place] [address]   [phone]              Problem List              ICD-10-CM Priority Class Noted - Resolved    DVT of lower extremity (deep venous thrombosis) (CMS-[facility]) I82.409   9/2/2010 - Present    DM (diabetes mellitus) (CMS-[facility]) E11.9 Medium  9/2/2010 - Present    HTN (hypertension) I10 Medium  9/2/2010 - Present    Spinal stenosis, lumbar region, without neurogenic claudication M48.06 Low  12/13/2012 - Present    Senile nuclear sclerosis H25.10   2/13/2013 - Present    Blood in stool K92.1   6/5/2013 - Present    Diarrhea following gastrointestinal surgery R19.7, Z98.890   6/23/2014 - Present    History of prostatectomy Z90.79   5/8/2015 - Present    Ankle fracture, right S82.891A   10/28/2015 - Present    Chronic kidney disease (CKD), stage III (moderate) N18.3   7/21/2016 - Present    Neuropathy (CMS-[facility]) G62.9    7/21/2016 - Present    Depression F32.9   7/21/2016 - Present    AK (actinic keratosis) L57.0   7/21/2016 - Present    Hyperlipidemia E78.5   7/21/2016 - Present    Health care maintenance Z00.00   8/30/2016 - Present    History of prostate cancer Z85.46   3/24/2017 - Present    History of radiation therapy Z92.3   3/24/2017 - Present    Type 2 diabetes mellitus with stage 3 chronic kidney disease, with long-term current use of insulin (HCC) E11.22, N18.3, Z79.4   3/24/2017 - Present    Continuous leakage of urine N39.45   3/24/2017 - Present    History of DVT (deep vein thrombosis) Z86.718   3/24/2017 - Present    Other specified disorders of the skin and subcutaneous tissue related to radiation L59.8   3/29/2017 - Present    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   3/29/2017 - Present    Hematuria R31.9   3/29/2017 - Present      Health Maintenance        Date Due Completion Dates    IMM ZOSTER VACCINE 11/28/1996 ---    IMM DTaP/Tdap/Td Vaccine (1 - Tdap) 8/8/2010 8/7/2010    URINE ACR / MICROALBUMIN 1/16/2013 1/16/2012    IMM PNEUMOCOCCAL 65+ (ADULT) LOW/MEDIUM RISK SERIES (2 of 2 - PCV13) 12/13/2013 12/13/2012    A1C SCREENING 2/22/2017 8/22/2016, 1/16/2015, 6/23/2014, 1/16/2012, 9/2/2010    FASTING LIPID PROFILE 8/22/2017 8/22/2016, 1/16/2012, 9/2/2010    SERUM CREATININE 8/22/2017 8/22/2016, 7/12/2016, 8/3/2015, 7/22/2015, 1/16/2015, 6/25/2014, 6/24/2014, 6/23/2014, 5/29/2014, 5/27/2014, 5/26/2014, 5/22/2014, 5/14/2014, 4/12/2014, 3/24/2014, 3/17/2014, 6/5/2013, 5/31/2013, 12/26/2012, 12/22/2012, 12/17/2012, 12/14/2012, 12/3/2012, 9/17/2012, 3/5/2012, 1/16/2012, 1/26/2011, 9/18/2010, 9/17/2010, 9/2/2010, 9/1/2010, 5/24/2010, 1/15/2010, 2/8/2005    DIABETES MONOFILAMENT / LE EXAM 8/30/2017 8/30/2016 (N/S)    Override on 8/30/2016: (N/S)    RETINAL SCREENING 10/10/2017 10/10/2016    COLONOSCOPY 6/5/2023 6/5/2013, 6/5/2013 (N/S)    Override on 6/5/2013: (N/S) ([facility]IC)            Current Immunizations     Influenza TIV (IM)  9/25/2016, 10/21/2013, 10/1/2012    Influenza Vaccine Quad Inj (Preserved) 9/21/2015    Pneumococcal polysaccharide vaccine (PPSV-23) 12/13/2012  7:30 PM    TD Vaccine 8/7/2010 12:38 PM      Below and/or attached are the medications your provider expects you to take. Review all of your home medications and newly ordered medications with your provider and/or pharmacist. Follow medication instructions as directed by your provider and/or pharmacist. Please keep your medication list with you and share with your provider. Update the information when medications are discontinued, doses are changed, or new medications (including over-the-counter products) are added; and carry medication information at all times in the event of emergency situations     Allergies:  AUGMENTIN - Unspecified     LATEX - Unspecified     TAPE - Rash               Medications  Valid as of: March 29, 2017 - 11:18 AM    Generic Name Brand Name Tablet Size Instructions for use    Ascorbic Acid (Tab) ascorbic acid 500 MG Take 500 mg by mouth every day.        Cholecalciferol (Tab) vitamin D 2000 UNIT Take 2,000 Units by mouth every day.        Clobetasol Propionate (Cream) TEMOVATE 0.05 %         Cyanocobalamin (Tab CR) B-12 1000 MCG Take  by mouth.        Ferrous Sulfate (Tab) ferrous sulfate 325 (65 FE) MG Take 325 mg by mouth 2 Times a Day.        FLUoxetine HCl (Cap) PROZAC 20 MG Take 20 mg by mouth every day.        GlipiZIDE (Tab) GLUCOTROL 10 MG Take 10 mg by mouth 2 times a day. One po am, 1/2 po pm        HydroCHLOROthiazide (Tab) HYDRODIURIL 12.5 MG Take 12.5 mg by mouth every day.        Insulin NPH Human (Isophane) (Suspension) HUMULIN,NOVOLIN 100 UNIT/ML Inject 10 Units as instructed Once. Before dinner        Lisinopril (Tab) PRINIVIL 5 MG Take 2.5 mg by mouth every day. Indications: High Blood Pressure        MetFORMIN HCl (Tab) GLUCOPHAGE 500 MG Take 500 mg by mouth 2 times a day, with meals.        Pantoprazole Sodium (Tablet Delayed  Response) PROTONIX 40 MG Take 40 mg by mouth every day.        SAXagliptin HCl (Tab) SAXagliptin HCl 5 MG Take 5 mg by mouth every day.        Simvastatin (Tab) ZOCOR 40 MG Take 40 mg by mouth every evening. Continue as per Dr. Mckeon. Continue as per Dr. Bobo.        .                 Medicines prescribed today were sent to:     [id number] - [place], [place] - [address] [facility]    [address] [facility] [place] [address]    Phone: [phone] Fax: [phone]    Open 24 Hours?: No      Medication refill instructions:       If your prescription bottle indicates you have medication refills left, it is not necessary to call your provider’s office. Please contact your pharmacy and they will refill your medication.    If your prescription bottle indicates you do not have any refills left, you may request refills at any time through one of the following ways: The online [employer] system (except Urgent Care), by calling your provider’s office, or by asking your pharmacy to contact your provider’s office with a refill request. Medication refills are processed only during regular business hours and may not be available until the next business day. Your provider may request additional information or to have a follow-up visit with you prior to refilling your medication.   *Please Note: Medication refills are assigned a new Rx number when refilled electronically. Your pharmacy may indicate that no refills were authorized even though a new prescription for the same medication is available at the pharmacy. Please request the medicine by name with the pharmacy before contacting your provider for a refill.        Instructions    After hyperbaric oxygen therapy treatment, it is normal to experience some congestion to the ears, muffling of sounds, or abnormal sounds to one or both ears.    If you experience pain or discomfort to one or both ears that does not resolve spontaneously, please contact the hyperbaric department at  [phone].            20lines Access Code: Activation code not generated  Current 20lines Status: Active

## 2017-03-30 ENCOUNTER — OFFICE VISIT (OUTPATIENT)
Dept: WOUND CARE | Facility: MEDICAL CENTER | Age: 82
End: 2017-03-30
Attending: UROLOGY
Payer: MEDICARE

## 2017-03-30 VITALS
OXYGEN SATURATION: 93 % | HEART RATE: 86 BPM | DIASTOLIC BLOOD PRESSURE: 64 MMHG | TEMPERATURE: 98.2 F | SYSTOLIC BLOOD PRESSURE: 151 MMHG

## 2017-03-30 DIAGNOSIS — Z79.4 TYPE 2 DIABETES MELLITUS WITH STAGE 3 CHRONIC KIDNEY DISEASE, WITH LONG-TERM CURRENT USE OF INSULIN (HCC): ICD-10-CM

## 2017-03-30 DIAGNOSIS — N18.30 TYPE 2 DIABETES MELLITUS WITH STAGE 3 CHRONIC KIDNEY DISEASE, WITH LONG-TERM CURRENT USE OF INSULIN (HCC): ICD-10-CM

## 2017-03-30 DIAGNOSIS — I15.8 OTHER SECONDARY HYPERTENSION: ICD-10-CM

## 2017-03-30 DIAGNOSIS — Z85.46 HISTORY OF PROSTATE CANCER: ICD-10-CM

## 2017-03-30 DIAGNOSIS — Z92.3 HISTORY OF RADIATION THERAPY: ICD-10-CM

## 2017-03-30 DIAGNOSIS — Z90.79 HISTORY OF PROSTATECTOMY: ICD-10-CM

## 2017-03-30 DIAGNOSIS — N39.45 CONTINUOUS LEAKAGE OF URINE: ICD-10-CM

## 2017-03-30 DIAGNOSIS — N30.40 RADIATION CYSTITIS: ICD-10-CM

## 2017-03-30 DIAGNOSIS — L59.8 OTHER SPECIFIED DISORDERS OF THE SKIN AND SUBCUTANEOUS TISSUE RELATED TO RADIATION: Primary | ICD-10-CM

## 2017-03-30 DIAGNOSIS — R31.9 HEMATURIA: ICD-10-CM

## 2017-03-30 DIAGNOSIS — E11.22 TYPE 2 DIABETES MELLITUS WITH STAGE 3 CHRONIC KIDNEY DISEASE, WITH LONG-TERM CURRENT USE OF INSULIN (HCC): ICD-10-CM

## 2017-03-30 PROCEDURE — 82962 GLUCOSE BLOOD TEST: CPT | Performed by: FAMILY MEDICINE

## 2017-03-30 PROCEDURE — G0277 HBOT, FULL BODY CHAMBER, 30M: HCPCS | Performed by: FAMILY MEDICINE

## 2017-03-30 PROCEDURE — 82948 REAGENT STRIP/BLOOD GLUCOSE: CPT | Performed by: FAMILY MEDICINE

## 2017-03-30 PROCEDURE — G8417 CALC BMI ABV UP PARAM F/U: HCPCS | Performed by: FAMILY MEDICINE

## 2017-03-30 PROCEDURE — 99183 HYPERBARIC OXYGEN THERAPY: CPT | Performed by: FAMILY MEDICINE

## 2017-03-30 ASSESSMENT — ENCOUNTER SYMPTOMS
HEADACHES: 0
FEVER: 0
DEPRESSION: 1
BLOOD IN STOOL: 1
COUGH: 0
PALPITATIONS: 0
FALLS: 1
TINGLING: 1
DOUBLE VISION: 0
DIARRHEA: 1
BLURRED VISION: 0
BACK PAIN: 1
SHORTNESS OF BREATH: 0
CONSTIPATION: 1
HEARTBURN: 1
CHILLS: 0

## 2017-03-30 ASSESSMENT — PAIN SCALES - GENERAL: PAINLEVEL_OUTOF13: 0

## 2017-03-30 NOTE — PATIENT INSTRUCTIONS
After hyperbaric oxygen therapy treatment, it is normal to experience some congestion to the ears, muffling of sounds, or abnormal sounds to one or both ears.    If you experience pain or discomfort to one or both ears that does not resolve spontaneously, please contact the hyperbaric department at [phone].

## 2017-03-30 NOTE — MR AVS SNAPSHOT
Selvin Braga   3/30/2017 9:30 AM   Office Visit   MRN: [id number]    Department:  [facility]   Dept Phone:  [phone]    Description:  Male : 1935   Provider:  Fernando Brown M.D.           Allergies as of 3/30/2017     Allergen Noted Reactions    Augmentin 10/28/2015   Unspecified    Bleeding  RXN=5 years ago    Latex 10/28/2015   Unspecified    Blisters  RXN=ongoing    Tape 10/28/2015   Rash    Rash-blisters-paper tape okay  RXN=ongoing      You were diagnosed with     Other specified disorders of the skin and subcutaneous tissue related to radiation   [4550543]  -  Primary     Radiation cystitis   [566733]       Hematuria   [4629307]       Continuous leakage of urine   [326139]       History of prostatectomy   [438956]       History of prostate cancer   [425280]       History of radiation therapy   [235028]       Other secondary hypertension   [0579725]       Type 2 diabetes mellitus with stage 3 chronic kidney disease, with long-term current use of insulin (CMS-[facility])   [2228433]         Vital Signs     Blood Pressure Pulse Temperature Oxygen Saturation Smoking Status       151/64 mmHg 86 36.8 °C (98.2 °F) 93% Former Smoker       Basic Information     Date Of Birth Sex Race Ethnicity Preferred Language    1935 Male White Non- English      Your appointments     Mar 31, 2017  9:30 AM   HBOT Supervision with HYPERBARIC CHAMBER 2   [facility] ([address] [place] [address])    [address]  [place] [address]   [phone]            2017  9:30 AM   HBOT Supervision with HYPERBARIC CHAMBER 2   [facility] ([address] [place] [address])    [address]  [place] [address]   [phone]            2017  9:30 AM   HBOT Supervision with HYPERBARIC CHAMBER 2   [facility] ([address] [place] [address])    [address]  [place] [address]   [phone]            2017  9:30 AM   HBOT  Supervision with HYPERBARIC CHAMBER 2   [facility] ([address])    [address]  [place] [address]   [phone]            Apr 06, 2017  9:30 AM   HBOT Supervision with HYPERBARIC CHAMBER 2   [facility] ([address])    [address]  [place] [address]   [phone]            Apr 07, 2017  9:30 AM   HBOT Supervision with HYPERBARIC CHAMBER 2   [facility] ([address])    [address]  [place] [address]   [phone]            Apr 10, 2017  9:30 AM   HBOT Supervision with HYPERBARIC CHAMBER 2   [facility] ([address])    [address]  [place] [address]   [phone]            Apr 11, 2017  9:30 AM   HBOT Supervision with HYPERBARIC CHAMBER 2   [facility] ([address])    [address]  [place] [address]   [phone]            Apr 12, 2017  9:30 AM   HBOT Supervision with HYPERBARIC CHAMBER 2   [facility] ([address])    [address]  [place] [address]   [phone]            Apr 13, 2017  9:30 AM   HBOT Supervision with HYPERBARIC CHAMBER 2   [facility] ([address])    [address]  [place] [address]   [phone]            Apr 14, 2017  9:30 AM   HBOT Supervision with HYPERBARIC CHAMBER 2   [facility] ([address])    [address]  [place] [address]   [phone]            Apr 17, 2017  9:30 AM   HBOT Supervision with HYPERBARIC CHAMBER 2   [facility] ([address])    [address]  [place] [address]   [phone]            Apr 18, 2017  9:30 AM   HBOT Supervision with HYPERBARIC CHAMBER 2   [facility] ([address])    [address]  [place] [address]   [phone]            Apr 19, 2017  9:30 AM   HBOT  Supervision with HYPERBARIC CHAMBER 2   [facility] ([address])    [address]  [place] [address]   [phone]            Apr 20, 2017  9:30 AM   HBOT Supervision with HYPERBARIC CHAMBER 2   [facility] ([address])    [address]  [place] [address]   [phone]            Apr 21, 2017  9:30 AM   HBOT Supervision with HYPERBARIC CHAMBER 2   [facility] ([address])    [address]  [place] [address]   [phone]            Apr 24, 2017  9:30 AM   HBOT Supervision with HYPERBARIC CHAMBER 2   [facility] ([address])    [address]  [place] [address]   [phone]            Apr 25, 2017  9:30 AM   HBOT Supervision with HYPERBARIC CHAMBER 2   [facility] ([address])    [address]  [place] [address]   [phone]            Apr 26, 2017  9:30 AM   HBOT Supervision with HYPERBARIC CHAMBER 2   [facility] ([address])    [address]  [place] [address]   [phone]            Apr 27, 2017  9:30 AM   HBOT Supervision with HYPERBARIC CHAMBER 2   [facility] ([address])    [address]  [place] [address]   [phone]            Apr 28, 2017  9:30 AM   HBOT Supervision with HYPERBARIC CHAMBER 2   [facility] ([address])    [address]  [place] [address]   [phone]            May 01, 2017  9:30 AM   HBOT Supervision with HYPERBARIC CHAMBER 2   [facility] ([address])    [address]  [place] [address]   [phone]            May 02, 2017  9:30 AM   HBOT Supervision with HYPERBARIC CHAMBER 2   [facility] ([address])    [address]  [place] [address]   [phone]            May 03, 2017  9:30 AM   HBOT  Supervision with HYPERBARIC CHAMBER 2   [facility] ([address])    [address]  [place] [address]   [phone]            May 04, 2017  9:30 AM   HBOT Supervision with HYPERBARIC CHAMBER 2   [facility] ([address])    [address]  [place] [address]   [phone]            May 05, 2017  9:30 AM   HBOT Supervision with HYPERBARIC CHAMBER 2   [facility] ([address])    [address]  [place] [address]   [phone]            May 08, 2017  9:30 AM   HBOT Supervision with HYPERBARIC CHAMBER 2   [facility] ([address])    [address]  [place] [address]   [phone]            May 09, 2017  9:30 AM   HBOT Supervision with HYPERBARIC CHAMBER 2   [facility] ([address])    [address]  [place] [address]   [phone]            May 10, 2017  9:30 AM   HBOT Supervision with HYPERBARIC CHAMBER 2   [facility] ([address])    [address]  [place] [address]   [phone]            May 11, 2017  9:30 AM   HBOT Supervision with HYPERBARIC CHAMBER 2   [facility] ([address])    [address]  [place] [address]   [phone]            May 12, 2017  9:30 AM   HBOT Supervision with HYPERBARIC CHAMBER 2   [facility] ([address])    [address]  [place] [address]   [phone]            May 15, 2017  9:30 AM   HBOT Supervision with HYPERBARIC CHAMBER 2   [facility] ([address])    [address]  [place] [address]   [phone]            May 16, 2017  9:30 AM   HBOT Supervision with HYPERBARIC CHAMBER 2   [facility] ([address])    [address]  [place] [address]   [phone]            May 17, 2017  9:30 AM   HBOT  Supervision with HYPERBARIC CHAMBER 2   [facility] ([address])    [address]  [place] [address]   [phone]            May 18, 2017  9:30 AM   HBOT Supervision with HYPERBARIC CHAMBER 2   [facility] ([address])    [address]  [place] [address]   [phone]            May 19, 2017  9:30 AM   HBOT Supervision with HYPERBARIC CHAMBER 2   [facility] ([facility] [address])    [address]  [place] [address]   [phone]            May 22, 2017  9:30 AM   HBOT Supervision with HYPERBARIC CHAMBER 2   [facility] ([facility] [address])    [address]  [place] [address]   [phone]            May 23, 2017  9:30 AM   HBOT Supervision with HYPERBARIC CHAMBER 2   [facility] ([facility] [address])    [address]  [place] [address]   [phone]              Problem List              ICD-10-CM Priority Class Noted - Resolved    DVT of lower extremity (deep venous thrombosis) (CMS-HCC) I82.409   9/2/2010 - Present    DM (diabetes mellitus) (CMS-HCC) E11.9 Medium  9/2/2010 - Present    HTN (hypertension) I10 Medium  9/2/2010 - Present    Spinal stenosis, lumbar region, without neurogenic claudication M48.06 Low  12/13/2012 - Present    Senile nuclear sclerosis H25.10   2/13/2013 - Present    Blood in stool K92.1   6/5/2013 - Present    Diarrhea following gastrointestinal surgery R19.7, Z98.890   6/23/2014 - Present    History of prostatectomy Z90.79   5/8/2015 - Present    Ankle fracture, right S82.891A   10/28/2015 - Present    Chronic kidney disease (CKD), stage III (moderate) N18.3   7/21/2016 - Present    Neuropathy (CMS-HCC) G62.9   7/21/2016 - Present    Depression F32.9   7/21/2016 - Present    AK (actinic keratosis) L57.0   7/21/2016 - Present    Hyperlipidemia E78.5   7/21/2016 - Present    Health care maintenance Z00.00   8/30/2016  - Present    History of prostate cancer Z85.46   3/24/2017 - Present    History of radiation therapy Z92.3   3/24/2017 - Present    Type 2 diabetes mellitus with stage 3 chronic kidney disease, with long-term current use of insulin (HCC) E11.22, N18.3, Z79.4   3/24/2017 - Present    Continuous leakage of urine N39.45   3/24/2017 - Present    History of DVT (deep vein thrombosis) Z86.718   3/24/2017 - Present    Other specified disorders of the skin and subcutaneous tissue related to radiation L59.8   3/29/2017 - Present    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   3/29/2017 - Present    Hematuria R31.9   3/29/2017 - Present      Health Maintenance        Date Due Completion Dates    IMM ZOSTER VACCINE 11/28/1996 ---    IMM DTaP/Tdap/Td Vaccine (1 - Tdap) 8/8/2010 8/7/2010    URINE ACR / MICROALBUMIN 1/16/2013 1/16/2012    IMM PNEUMOCOCCAL 65+ (ADULT) LOW/MEDIUM RISK SERIES (2 of 2 - PCV13) 12/13/2013 12/13/2012    A1C SCREENING 2/22/2017 8/22/2016, 1/16/2015, 6/23/2014, 1/16/2012, 9/2/2010    FASTING LIPID PROFILE 8/22/2017 8/22/2016, 1/16/2012, 9/2/2010    SERUM CREATININE 8/22/2017 8/22/2016, 7/12/2016, 8/3/2015, 7/22/2015, 1/16/2015, 6/25/2014, 6/24/2014, 6/23/2014, 5/29/2014, 5/27/2014, 5/26/2014, 5/22/2014, 5/14/2014, 4/12/2014, 3/24/2014, 3/17/2014, 6/5/2013, 5/31/2013, 12/26/2012, 12/22/2012, 12/17/2012, 12/14/2012, 12/3/2012, 9/17/2012, 3/5/2012, 1/16/2012, 1/26/2011, 9/18/2010, 9/17/2010, 9/2/2010, 9/1/2010, 5/24/2010, 1/15/2010, 2/8/2005    DIABETES MONOFILAMENT / LE EXAM 8/30/2017 8/30/2016 (N/S)    Override on 8/30/2016: (N/S)    RETINAL SCREENING 10/10/2017 10/10/2016    COLONOSCOPY 6/5/2023 6/5/2013, 6/5/2013 (N/S)    Override on 6/5/2013: (N/S) ([facility])            Current Immunizations     Influenza TIV (IM) 9/25/2016, 10/21/2013, 10/1/2012    Influenza Vaccine Quad Inj (Preserved) 9/21/2015    Pneumococcal polysaccharide vaccine (PPSV-23) 12/13/2012  7:30 PM    TD Vaccine 8/7/2010 12:38 PM      Below and/or attached  are the medications your provider expects you to take. Review all of your home medications and newly ordered medications with your provider and/or pharmacist. Follow medication instructions as directed by your provider and/or pharmacist. Please keep your medication list with you and share with your provider. Update the information when medications are discontinued, doses are changed, or new medications (including over-the-counter products) are added; and carry medication information at all times in the event of emergency situations     Allergies:  AUGMENTIN - Unspecified     LATEX - Unspecified     TAPE - Rash               Medications  Valid as of: March 30, 2017 - 12:10 PM    Generic Name Brand Name Tablet Size Instructions for use    Ascorbic Acid (Tab) ascorbic acid 500 MG Take 500 mg by mouth every day.        Cholecalciferol (Tab) vitamin D 2000 UNIT Take 2,000 Units by mouth every day.        Clobetasol Propionate (Cream) TEMOVATE 0.05 %         Cyanocobalamin (Tab CR) B-12 1000 MCG Take  by mouth.        Ferrous Sulfate (Tab) ferrous sulfate 325 (65 FE) MG Take 325 mg by mouth 2 Times a Day.        FLUoxetine HCl (Cap) PROZAC 20 MG Take 20 mg by mouth every day.        GlipiZIDE (Tab) GLUCOTROL 10 MG Take 10 mg by mouth 2 times a day. One po am, 1/2 po pm        HydroCHLOROthiazide (Tab) HYDRODIURIL 12.5 MG Take 12.5 mg by mouth every day.        Insulin NPH Human (Isophane) (Suspension) HUMULIN,NOVOLIN 100 UNIT/ML Inject 10 Units as instructed Once. Before dinner        Lisinopril (Tab) PRINIVIL 5 MG Take 2.5 mg by mouth every day. Indications: High Blood Pressure        MetFORMIN HCl (Tab) GLUCOPHAGE 500 MG Take 500 mg by mouth 2 times a day, with meals.        Pantoprazole Sodium (Tablet Delayed Response) PROTONIX 40 MG Take 40 mg by mouth every day.        SAXagliptin HCl (Tab) SAXagliptin HCl 5 MG Take 5 mg by mouth every day.        Simvastatin (Tab) ZOCOR 40 MG Take 40 mg by mouth every evening.  Continue as per Dr. Mckeon. Continue as per Dr. Bobo.        .                 Medicines prescribed today were sent to:     [id number] - [place], [place] - [address]    [address] [place] [address]    Phone: [phone] Fax: [phone]    Open 24 Hours?: No      Medication refill instructions:       If your prescription bottle indicates you have medication refills left, it is not necessary to call your provider’s office. Please contact your pharmacy and they will refill your medication.    If your prescription bottle indicates you do not have any refills left, you may request refills at any time through one of the following ways: The online [employer] system (except Urgent Care), by calling your provider’s office, or by asking your pharmacy to contact your provider’s office with a refill request. Medication refills are processed only during regular business hours and may not be available until the next business day. Your provider may request additional information or to have a follow-up visit with you prior to refilling your medication.   *Please Note: Medication refills are assigned a new Rx number when refilled electronically. Your pharmacy may indicate that no refills were authorized even though a new prescription for the same medication is available at the pharmacy. Please request the medicine by name with the pharmacy before contacting your provider for a refill.        Instructions    After hyperbaric oxygen therapy treatment, it is normal to experience some congestion to the ears, muffling of sounds, or abnormal sounds to one or both ears.    If you experience pain or discomfort to one or both ears that does not resolve spontaneously, please contact the hyperbaric department at [phone].              [employer] Access Code: Activation code not generated  Current [employer] Status: Active

## 2017-03-30 NOTE — PROGRESS NOTES
Subjective:      Selvin Braga is a 81 y.o. 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      HPI    Selvin returns to HBOT.  He did have some issues with L TM pressure changes yesterday.   Today he states that his hearing is not back to normal, but there is no pain to the L ear.  He is happy to retry HBOT.  No medication changes.    His glucose was adequate to prevent hypoglycemia.    Review of Systems   Constitutional: Negative for fever and chills.   HENT: Negative for ear discharge, ear pain and hearing loss.    Eyes: Negative for blurred vision and double vision.   Respiratory: Negative for cough and shortness of breath.    Cardiovascular: Negative for chest pain and palpitations.   Gastrointestinal: Positive for heartburn, diarrhea, constipation and blood in stool.   Genitourinary: Positive for frequency.   Musculoskeletal: Positive for back pain, joint pain and falls.   Skin: Positive for itching and rash.   Neurological: Positive for tingling. Negative for headaches.   Psychiatric/Behavioral: Positive for depression.          Objective:   Blood pressure 151/64, pulse 86, temperature 36.8 °C (98.2 °F), SpO2 93 %.     Physical Exam   Constitutional: He is oriented to person, place, and time. He appears well-developed and well-nourished.   HENT:   Head: Normocephalic and atraumatic.   Right Ear: Tympanic membrane, external ear and ear canal normal.   Left Ear: External ear and ear canal normal. There is swelling. Tympanic membrane is erythematous.   Mouth/Throat: He has dentures.   Pre and post treatment - slight erythema to TM - unchanged through treatment.    Pulmonary/Chest: Effort normal.   Neurological: He is alert and oriented to person, place, and time.   Psychiatric: He has a normal mood and affect.   Vitals reviewed.      Assessment/Plan:     Encounter Diagnoses    Name  Primary?    •  Other specified disorders  of the skin and subcutaneous tissue related to radiation -  Appropriate candidate for hyperbaric oxygen therapy. Patient completed his 2nd HBOT treatment at a pressure of 2.4 HANNA x 90 minutes at a descent rate of 1.0 PSI / min and ascent rate of 1.0 PSI/minute. Will continue the patient with HBOT at a pressure of 2.4 HANNA x 90 minutes x  40 treatments on a 5x per week, M-F basis.  Q 30 minute air breaks.   3/30 - held at a travel rate of 1.0 for ear discomfort.  No worsening of L ear during treatment Yes    •  Radiation cystitis - continue to follow with Dr. Marie      •  Continuous leakage of urine - Cunningham clamp / condom cathether management      •  Hematuria - should improve with HBOT - onm Fe replacement      •  Blood in stool - should improve with HBOT - on Fe replacement      •  History of prostate cancer - in remission per Dr. 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

## 2017-03-31 ENCOUNTER — OFFICE VISIT (OUTPATIENT)
Dept: WOUND CARE | Facility: MEDICAL CENTER | Age: 82
End: 2017-03-31
Attending: UROLOGY
Payer: MEDICARE

## 2017-03-31 VITALS
DIASTOLIC BLOOD PRESSURE: 76 MMHG | TEMPERATURE: 98.8 F | SYSTOLIC BLOOD PRESSURE: 160 MMHG | HEART RATE: 83 BPM | OXYGEN SATURATION: 97 %

## 2017-03-31 DIAGNOSIS — N18.30 TYPE 2 DIABETES MELLITUS WITH STAGE 3 CHRONIC KIDNEY DISEASE, WITH LONG-TERM CURRENT USE OF INSULIN (HCC): ICD-10-CM

## 2017-03-31 DIAGNOSIS — E11.22 TYPE 2 DIABETES MELLITUS WITH STAGE 3 CHRONIC KIDNEY DISEASE, WITH LONG-TERM CURRENT USE OF INSULIN (HCC): ICD-10-CM

## 2017-03-31 DIAGNOSIS — L59.8 OTHER SPECIFIED DISORDERS OF THE SKIN AND SUBCUTANEOUS TISSUE RELATED TO RADIATION: Primary | ICD-10-CM

## 2017-03-31 DIAGNOSIS — Z85.46 HISTORY OF PROSTATE CANCER: ICD-10-CM

## 2017-03-31 DIAGNOSIS — Z92.3 HISTORY OF RADIATION THERAPY: ICD-10-CM

## 2017-03-31 DIAGNOSIS — R31.9 HEMATURIA: ICD-10-CM

## 2017-03-31 DIAGNOSIS — T70.0XXD: ICD-10-CM

## 2017-03-31 DIAGNOSIS — N39.45 CONTINUOUS LEAKAGE OF URINE: ICD-10-CM

## 2017-03-31 DIAGNOSIS — Z79.4 TYPE 2 DIABETES MELLITUS WITH STAGE 3 CHRONIC KIDNEY DISEASE, WITH LONG-TERM CURRENT USE OF INSULIN (HCC): ICD-10-CM

## 2017-03-31 DIAGNOSIS — N30.40 RADIATION CYSTITIS: ICD-10-CM

## 2017-03-31 LAB
GLUCOSE BLD-MCNC: 235 MG/DL (ref 70–100)
GLUCOSE BLD-MCNC: 235 MG/DL (ref 70–100)

## 2017-03-31 PROCEDURE — G0277 HBOT, FULL BODY CHAMBER, 30M: HCPCS | Performed by: FAMILY MEDICINE

## 2017-03-31 PROCEDURE — 82948 REAGENT STRIP/BLOOD GLUCOSE: CPT | Performed by: FAMILY MEDICINE

## 2017-03-31 PROCEDURE — 99183 HYPERBARIC OXYGEN THERAPY: CPT | Performed by: FAMILY MEDICINE

## 2017-03-31 PROCEDURE — 82962 GLUCOSE BLOOD TEST: CPT | Performed by: FAMILY MEDICINE

## 2017-03-31 PROCEDURE — G8417 CALC BMI ABV UP PARAM F/U: HCPCS | Performed by: FAMILY MEDICINE

## 2017-03-31 ASSESSMENT — ENCOUNTER SYMPTOMS
HEARTBURN: 1
SHORTNESS OF BREATH: 0
FEVER: 0
BLURRED VISION: 0
BLOOD IN STOOL: 1
COUGH: 0
DEPRESSION: 1
BACK PAIN: 1
TINGLING: 1
FALLS: 1
DIARRHEA: 1
PALPITATIONS: 0
CHILLS: 0
HEADACHES: 0
DOUBLE VISION: 0
CONSTIPATION: 1

## 2017-03-31 ASSESSMENT — PAIN SCALES - GENERAL: PAINLEVEL_OUTOF13: 0

## 2017-03-31 NOTE — PROGRESS NOTES
Subjective:      Selvin Braga is a 81 y.o. 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      HPI    Selvin returns to HBOT.  He states that his L ear feels muffled / like there is water in it.  No ear pain.   No medication changes.  Tolerating HBOT otherwise well.     His glucose was adequate to prevent hypoglycemia.    Review of Systems   Constitutional: Negative for fever and chills.   HENT: Negative for ear discharge, ear pain and hearing loss.    Eyes: Negative for blurred vision and double vision.   Respiratory: Negative for cough and shortness of breath.    Cardiovascular: Negative for chest pain and palpitations.   Gastrointestinal: Positive for heartburn, diarrhea, constipation and blood in stool.   Genitourinary: Positive for frequency.   Musculoskeletal: Positive for back pain, joint pain and falls.   Skin: Positive for itching and rash.   Neurological: Positive for tingling. Negative for headaches.   Psychiatric/Behavioral: Positive for depression.          Objective:   Blood pressure 160/76, pulse 83, temperature 37.1 °C (98.8 °F), SpO2 97 %.   Glucose 235    Physical Exam   Constitutional: He is oriented to person, place, and time. He appears well-developed and well-nourished.   HENT:   Head: Normocephalic and atraumatic.   Right Ear: Tympanic membrane, external ear and ear canal normal.   Left Ear: External ear and ear canal normal. There is swelling. Tympanic membrane is erythematous.   Mouth/Throat: He has dentures.   Pre and post treatment - slight erythema to TM - unchanged through treatment.    Pulmonary/Chest: Effort normal.   Neurological: He is alert and oriented to person, place, and time.   Psychiatric: He has a normal mood and affect.   Vitals reviewed.      Assessment/Plan:     Encounter Diagnoses    Name  Primary?    •  Other specified disorders of the skin and subcutaneous tissue related to  radiation -  Appropriate candidate for hyperbaric oxygen therapy. Patient completed his 3rd HBOT treatment at a pressure of 2.4 HANNA x 90 minutes at a descent rate of 1.5 PSI / min and ascent rate of 2.0 PSI/minute. Will continue the patient with HBOT at a pressure of 2.4 HANNA x 90 minutes x  40 treatments on a 5x per week, M-F basis.  Q 30 minute air breaks.   3/30 - held at a travel rate of 1.0 for ear discomfort.  No worsening of L ear during treatment  3/31 - No significant change to L ear during treatment.  Still with Teed 1 erythema throughout L ear Yes    •  Radiation cystitis - continue to follow with Dr. Marie      •  Continuous leakage of urine - Cunningham clamp / condom cathether management      •  Hematuria - should improve with HBOT - onm Fe replacement      •  Blood in stool - should improve with HBOT - on Fe replacement      •  History of prostate cancer - in remission per Dr. Marie      •  History of radiation therapy       •  History of prostatectomy      •  Chronic kidney disease (CKD), stage III (moderate) - d/t DM, medication management only      •  Type 2 diabetes mellitus with stage 3 chronic kidney disease, with long-term current use of insulin (HCC) - on combined oral (glipizie and metformin) and insulin therapy      •  Essential hypertension - Stable on ACE-I - Lisinopril 40mg qd      •  Spinal stenosis, lumbar region, without neurogenic claudication - stable      •  Neuropathy (CMS-HCC) - due to DM, stable      •  AK (actinic keratosis) - on topical steroid cream, continue with Dermatology      •  History of DVT (deep vein thrombosis) - trauma related, not on chronic anticoagulation      • Pressure related ear pain - 3/29 patient developed teed 1-2 changes to L TM.  Has continued to experience muffling to that ear, but no further pain.  Will continue to monitor and refer to ENT as needed.

## 2017-03-31 NOTE — MR AVS SNAPSHOT
Selvin Braga   3/31/2017 9:30 AM   Office Visit   MRN: [id number]    Department:  [facility]   Dept Phone:  [phone]    Description:  Male : 1935   Provider:  Fernando Brown M.D.           Allergies as of 3/31/2017     Allergen Noted Reactions    Augmentin 10/28/2015   Unspecified    Bleeding  RXN=5 years ago    Latex 10/28/2015   Unspecified    Blisters  RXN=ongoing    Tape 10/28/2015   Rash    Rash-blisters-paper tape okay  RXN=ongoing      You were diagnosed with     Other specified disorders of the skin and subcutaneous tissue related to radiation   [7890462]  -  Primary     Radiation cystitis   [128213]       Type 2 diabetes mellitus with stage 3 chronic kidney disease, with long-term current use of insulin (CMS-[facility])   [7440421]       Hematuria   [7886575]       History of prostate cancer   [052966]       History of radiation therapy   [543110]       Continuous leakage of urine   [374089]       Pressure-related ear pain, subsequent encounter   [023379]         Vital Signs     Blood Pressure Pulse Temperature Oxygen Saturation Smoking Status       160/76 mmHg 83 37.1 °C (98.8 °F) 97% Former Smoker       Basic Information     Date Of Birth Sex Race Ethnicity Preferred Language    1935 Male White Non- English      Your appointments     2017  9:30 AM   HBOT Supervision with HYPERBARIC CHAMBER 2   [facility] ([address] [place] [address])    [address]  [place] [address]   [phone]            2017  9:30 AM   HBOT Supervision with HYPERBARIC CHAMBER 2   [facility] ([address] [place] [address])    [address]  [facility] [address]   [phone]            2017  9:30 AM   HBOT Supervision with HYPERBARIC CHAMBER 2   [facility] ([address] [place] [address])    [address]  [facility] [address]   [phone]            2017  9:30 AM   HBOT Supervision with HYPERBARIC  CHAMBER 2   [facility] ([address])    [address]  [place] [address]   [phone]            Apr 07, 2017  9:30 AM   HBOT Supervision with HYPERBARIC CHAMBER 2   [facility] ([address])    [address]  [place] [address]   [phone]            Apr 10, 2017  9:30 AM   HBOT Supervision with HYPERBARIC CHAMBER 2   [facility] ([address])    [address]  [place] [address]   [phone]            Apr 11, 2017  9:30 AM   HBOT Supervision with HYPERBARIC CHAMBER 2   [facility] ([address])    [address]  [place] [address]   [phone]            Apr 12, 2017  9:30 AM   HBOT Supervision with HYPERBARIC CHAMBER 2   [facility] ([address])    [address]  [place] [address]   [phone]            Apr 13, 2017  9:30 AM   HBOT Supervision with HYPERBARIC CHAMBER 2   [facility] ([address])    [address]  [place] [address]   [phone]            Apr 14, 2017  9:30 AM   HBOT Supervision with HYPERBARIC CHAMBER 2   [facility] ([address])    [address]  [place] [address]   [phone]            Apr 17, 2017  9:30 AM   HBOT Supervision with HYPERBARIC CHAMBER 2   [facility] ([address])    [address]  [place] [address]   [phone]            Apr 18, 2017  9:30 AM   HBOT Supervision with HYPERBARIC CHAMBER 2   [facility] ([address])    [address]  [place] [address]   [phone]            Apr 19, 2017  9:30 AM   HBOT Supervision with HYPERBARIC CHAMBER 2   [facility] ([address])    [address]  [place] [address]   [phone]            Apr 20, 2017  9:30 AM   HBOT Supervision with HYPERBARIC  CHAMBER 2   [facility] ([address])    [address]  [place] [address]   [phone]            Apr 21, 2017  9:30 AM   HBOT Supervision with HYPERBARIC CHAMBER 2   [facility] ([address])    [address]  [place] [address]   [phone]            Apr 24, 2017  9:30 AM   HBOT Supervision with HYPERBARIC CHAMBER 2   [facility] ([address])    [address]  [place] [address]   [phone]            Apr 25, 2017  9:30 AM   HBOT Supervision with HYPERBARIC CHAMBER 2   [facility] ([address])    [address]  [place] [address]   [phone]            Apr 26, 2017  9:30 AM   HBOT Supervision with HYPERBARIC CHAMBER 2   [facility] ([address])    [address]  [place] [address]   [phone]            Apr 27, 2017  9:30 AM   HBOT Supervision with HYPERBARIC CHAMBER 2   [facility] ([address])    [address]  [place] [address]   [phone]            Apr 28, 2017  9:30 AM   HBOT Supervision with HYPERBARIC CHAMBER 2   [facility] ([address])    [address]  [place] [address]   [phone]            May 01, 2017  9:30 AM   HBOT Supervision with HYPERBARIC CHAMBER 2   [facility] ([address])    [address]  [place] [address]   [phone]            May 02, 2017  9:30 AM   HBOT Supervision with HYPERBARIC CHAMBER 2   [facility] ([address])    [address]  [place] [address]   [phone]            May 03, 2017  9:30 AM   HBOT Supervision with HYPERBARIC CHAMBER 2   [facility] ([address])    [address]  [place] [address]   [phone]            May 04, 2017  9:30 AM   HBOT Supervision with HYPERBARIC  CHAMBER 2   [facility] ([address])    [address]  [place] [address]   [phone]            May 05, 2017  9:30 AM   HBOT Supervision with HYPERBARIC CHAMBER 2   [facility] ([address])    [address]  [place] [address]   [phone]            May 08, 2017  9:30 AM   HBOT Supervision with HYPERBARIC CHAMBER 2   [facility] ([address])    [address]  [place] [address]   [phone]            May 09, 2017  9:30 AM   HBOT Supervision with HYPERBARIC CHAMBER 2   [facility] ([address])    [address]  [place] [address]   [phone]            May 10, 2017  9:30 AM   HBOT Supervision with HYPERBARIC CHAMBER 2   [facility] ([address])    [address]  [place] [address]   [phone]            May 11, 2017  9:30 AM   HBOT Supervision with HYPERBARIC CHAMBER 2   [facility] ([address])    [address]  [place] [address]   [phone]            May 12, 2017  9:30 AM   HBOT Supervision with HYPERBARIC CHAMBER 2   [facility] ([address])    [address]  [place] [address]   [phone]            May 15, 2017  9:30 AM   HBOT Supervision with HYPERBARIC CHAMBER 2   [facility] ([address])    [address]  [place] [address]   [phone]            May 16, 2017  9:30 AM   HBOT Supervision with HYPERBARIC CHAMBER 2   [facility] ([address])    [address]  [place] [address]   [phone]            May 17, 2017  9:30 AM   HBOT Supervision with HYPERBARIC CHAMBER 2   [facility] ([address])    [address]  [place] [address]   [phone]            May 18, 2017  9:30 AM   HBOT Supervision with HYPERBARIC  CHAMBER 2   [facility] ([facility] [address])    [address]  [place] [address]   [phone]            May 19, 2017  9:30 AM   HBOT Supervision with HYPERBARIC CHAMBER 2   [facility] ([facility] [address])    [address]  [place] [address]   [phone]            May 22, 2017  9:30 AM   HBOT Supervision with HYPERBARIC CHAMBER 2   [facility] ([facility] [address])    [address]  [place] [address]   [phone]            May 23, 2017  9:30 AM   HBOT Supervision with HYPERBARIC CHAMBER 2   [facility] ([facility] [address])    [address]  [place] [address]   [phone]              Problem List              ICD-10-CM Priority Class Noted - Resolved    DVT of lower extremity (deep venous thrombosis) (CMS-HCC) I82.409   9/2/2010 - Present    DM (diabetes mellitus) (CMS-HCC) E11.9 Medium  9/2/2010 - Present    HTN (hypertension) I10 Medium  9/2/2010 - Present    Spinal stenosis, lumbar region, without neurogenic claudication M48.06 Low  12/13/2012 - Present    Senile nuclear sclerosis H25.10   2/13/2013 - Present    Blood in stool K92.1   6/5/2013 - Present    Diarrhea following gastrointestinal surgery R19.7, Z98.890   6/23/2014 - Present    History of prostatectomy Z90.79   5/8/2015 - Present    Ankle fracture, right S82.891A   10/28/2015 - Present    Chronic kidney disease (CKD), stage III (moderate) N18.3   7/21/2016 - Present    Neuropathy (CMS-HCC) G62.9   7/21/2016 - Present    Depression F32.9   7/21/2016 - Present    AK (actinic keratosis) L57.0   7/21/2016 - Present    Hyperlipidemia E78.5   7/21/2016 - Present    Health care maintenance Z00.00   8/30/2016 - Present    History of prostate cancer Z85.46   3/24/2017 - Present    History of radiation therapy Z92.3   3/24/2017 - Present    Type 2 diabetes mellitus with stage 3 chronic kidney disease, with long-term current use of  insulin (HCC) E11.22, N18.3, Z79.4   3/24/2017 - Present    Continuous leakage of urine N39.45   3/24/2017 - Present    History of DVT (deep vein thrombosis) Z86.718   3/24/2017 - Present    Other specified disorders of the skin and subcutaneous tissue related to radiation L59.8   3/29/2017 - Present    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   3/29/2017 - Present    Hematuria R31.9   3/29/2017 - Present      Health Maintenance        Date Due Completion Dates    IMM ZOSTER VACCINE 11/28/1996 ---    IMM DTaP/Tdap/Td Vaccine (1 - Tdap) 8/8/2010 8/7/2010    URINE ACR / MICROALBUMIN 1/16/2013 1/16/2012    IMM PNEUMOCOCCAL 65+ (ADULT) LOW/MEDIUM RISK SERIES (2 of 2 - PCV13) 12/13/2013 12/13/2012    A1C SCREENING 2/22/2017 8/22/2016, 1/16/2015, 6/23/2014, 1/16/2012, 9/2/2010    FASTING LIPID PROFILE 8/22/2017 8/22/2016, 1/16/2012, 9/2/2010    SERUM CREATININE 8/22/2017 8/22/2016, 7/12/2016, 8/3/2015, 7/22/2015, 1/16/2015, 6/25/2014, 6/24/2014, 6/23/2014, 5/29/2014, 5/27/2014, 5/26/2014, 5/22/2014, 5/14/2014, 4/12/2014, 3/24/2014, 3/17/2014, 6/5/2013, 5/31/2013, 12/26/2012, 12/22/2012, 12/17/2012, 12/14/2012, 12/3/2012, 9/17/2012, 3/5/2012, 1/16/2012, 1/26/2011, 9/18/2010, 9/17/2010, 9/2/2010, 9/1/2010, 5/24/2010, 1/15/2010, 2/8/2005    DIABETES MONOFILAMENT / LE EXAM 8/30/2017 8/30/2016 (N/S)    Override on 8/30/2016: (N/S)    RETINAL SCREENING 10/10/2017 10/10/2016    COLONOSCOPY 6/5/2023 6/5/2013, 6/5/2013 (N/S)    Override on 6/5/2013: (N/S) (GIC)            Results     POCT Glucose      Component Value Standard Range & Units    Glucose - Accu-Ck 235 70 - 100 mg/dL                        Current Immunizations     Influenza TIV (IM) 9/25/2016, 10/21/2013, 10/1/2012    Influenza Vaccine Quad Inj (Preserved) 9/21/2015    Pneumococcal polysaccharide vaccine (PPSV-23) 12/13/2012  7:30 PM    TD Vaccine 8/7/2010 12:38 PM      Below and/or attached are the medications your provider expects you to take. Review all of your home medications  and newly ordered medications with your provider and/or pharmacist. Follow medication instructions as directed by your provider and/or pharmacist. Please keep your medication list with you and share with your provider. Update the information when medications are discontinued, doses are changed, or new medications (including over-the-counter products) are added; and carry medication information at all times in the event of emergency situations     Allergies:  AUGMENTIN - Unspecified     LATEX - Unspecified     TAPE - Rash               Medications  Valid as of: March 31, 2017 -  2:07 PM    Generic Name Brand Name Tablet Size Instructions for use    Ascorbic Acid (Tab) ascorbic acid 500 MG Take 500 mg by mouth every day.        Cholecalciferol (Tab) vitamin D 2000 UNIT Take 2,000 Units by mouth every day.        Clobetasol Propionate (Cream) TEMOVATE 0.05 %         Cyanocobalamin (Tab CR) B-12 1000 MCG Take  by mouth.        Ferrous Sulfate (Tab) ferrous sulfate 325 (65 FE) MG Take 325 mg by mouth 2 Times a Day.        FLUoxetine HCl (Cap) PROZAC 20 MG Take 20 mg by mouth every day.        GlipiZIDE (Tab) GLUCOTROL 10 MG Take 10 mg by mouth 2 times a day. One po am, 1/2 po pm        HydroCHLOROthiazide (Tab) HYDRODIURIL 12.5 MG Take 12.5 mg by mouth every day.        Insulin NPH Human (Isophane) (Suspension) HUMULIN,NOVOLIN 100 UNIT/ML Inject 10 Units as instructed Once. Before dinner        Lisinopril (Tab) PRINIVIL 5 MG Take 2.5 mg by mouth every day. Indications: High Blood Pressure        MetFORMIN HCl (Tab) GLUCOPHAGE 500 MG Take 500 mg by mouth 2 times a day, with meals.        Pantoprazole Sodium (Tablet Delayed Response) PROTONIX 40 MG Take 40 mg by mouth every day.        SAXagliptin HCl (Tab) SAXagliptin HCl 5 MG Take 5 mg by mouth every day.        Simvastatin (Tab) ZOCOR 40 MG Take 40 mg by mouth every evening. Continue as per Dr. Mckeon. Continue as per Dr. Bobo.        .                 Medicines  prescribed today were sent to:     [id number] - [place], [place] - [address]    [address] [place] [address]    Phone: [phone] Fax: [phone]    Open 24 Hours?: No      Medication refill instructions:       If your prescription bottle indicates you have medication refills left, it is not necessary to call your provider’s office. Please contact your pharmacy and they will refill your medication.    If your prescription bottle indicates you do not have any refills left, you may request refills at any time through one of the following ways: The online [facility] system (except Urgent Care), by calling your provider’s office, or by asking your pharmacy to contact your provider’s office with a refill request. Medication refills are processed only during regular business hours and may not be available until the next business day. Your provider may request additional information or to have a follow-up visit with you prior to refilling your medication.   *Please Note: Medication refills are assigned a new Rx number when refilled electronically. Your pharmacy may indicate that no refills were authorized even though a new prescription for the same medication is available at the pharmacy. Please request the medicine by name with the pharmacy before contacting your provider for a refill.        Instructions    After hyperbaric oxygen therapy treatment, it is normal to experience some congestion to the ears, muffling of sounds, or abnormal sounds to one or both ears.    If you experience pain or discomfort to one or both ears that does not resolve spontaneously, please contact the hyperbaric department at [phone].              [facility] Access Code: Activation code not generated  Current [facility] Status: Active

## 2017-03-31 NOTE — PATIENT INSTRUCTIONS
After hyperbaric oxygen therapy treatment, it is normal to experience some congestion to the ears, muffling of sounds, or abnormal sounds to one or both ears.    If you experience pain or discomfort to one or both ears that does not resolve spontaneously, please contact the hyperbaric department at [phone].

## 2017-04-03 ENCOUNTER — OFFICE VISIT (OUTPATIENT)
Dept: WOUND CARE | Facility: MEDICAL CENTER | Age: 82
End: 2017-04-03
Attending: UROLOGY
Payer: MEDICARE

## 2017-04-03 VITALS
DIASTOLIC BLOOD PRESSURE: 77 MMHG | OXYGEN SATURATION: 95 % | HEART RATE: 80 BPM | TEMPERATURE: 98.3 F | SYSTOLIC BLOOD PRESSURE: 138 MMHG

## 2017-04-03 DIAGNOSIS — N30.40 RADIATION CYSTITIS: ICD-10-CM

## 2017-04-03 DIAGNOSIS — R31.9 HEMATURIA: ICD-10-CM

## 2017-04-03 DIAGNOSIS — Z79.4 TYPE 2 DIABETES MELLITUS WITH STAGE 3 CHRONIC KIDNEY DISEASE, WITH LONG-TERM CURRENT USE OF INSULIN (HCC): ICD-10-CM

## 2017-04-03 DIAGNOSIS — N18.30 TYPE 2 DIABETES MELLITUS WITH STAGE 3 CHRONIC KIDNEY DISEASE, WITH LONG-TERM CURRENT USE OF INSULIN (HCC): ICD-10-CM

## 2017-04-03 DIAGNOSIS — E11.22 TYPE 2 DIABETES MELLITUS WITH STAGE 3 CHRONIC KIDNEY DISEASE, WITH LONG-TERM CURRENT USE OF INSULIN (HCC): ICD-10-CM

## 2017-04-03 DIAGNOSIS — L59.8 OTHER SPECIFIED DISORDERS OF THE SKIN AND SUBCUTANEOUS TISSUE RELATED TO RADIATION: ICD-10-CM

## 2017-04-03 DIAGNOSIS — G62.9 NEUROPATHY: ICD-10-CM

## 2017-04-03 DIAGNOSIS — N39.45 CONTINUOUS LEAKAGE OF URINE: ICD-10-CM

## 2017-04-03 LAB — GLUCOSE BLD-MCNC: 155 MG/DL (ref 70–100)

## 2017-04-03 PROCEDURE — 82948 REAGENT STRIP/BLOOD GLUCOSE: CPT | Performed by: FAMILY MEDICINE

## 2017-04-03 PROCEDURE — G0277 HBOT, FULL BODY CHAMBER, 30M: HCPCS | Performed by: FAMILY MEDICINE

## 2017-04-03 PROCEDURE — 82962 GLUCOSE BLOOD TEST: CPT | Performed by: FAMILY MEDICINE

## 2017-04-03 PROCEDURE — 99183 HYPERBARIC OXYGEN THERAPY: CPT | Performed by: FAMILY MEDICINE

## 2017-04-03 PROCEDURE — G8417 CALC BMI ABV UP PARAM F/U: HCPCS | Performed by: FAMILY MEDICINE

## 2017-04-03 ASSESSMENT — ENCOUNTER SYMPTOMS
FALLS: 1
CHILLS: 0
FEVER: 0
BLOOD IN STOOL: 1
DIARRHEA: 1
HEARTBURN: 1
TINGLING: 1
DEPRESSION: 1
BACK PAIN: 1
CONSTIPATION: 1

## 2017-04-03 ASSESSMENT — PAIN SCALES - GENERAL: PAINLEVEL_OUTOF13: 0

## 2017-04-03 NOTE — MR AVS SNAPSHOT
Selvin Braga   4/3/2017 9:30 AM   Office Visit   MRN: [id number]    Department:  [facility]   Dept Phone:  [phone]    Description:  Male : 1935   Provider:  Ofelia Reed M.D.           Allergies as of 4/3/2017     Allergen Noted Reactions    Augmentin 10/28/2015   Unspecified    Bleeding  RXN=5 years ago    Latex 10/28/2015   Unspecified    Blisters  RXN=ongoing    Tape 10/28/2015   Rash    Rash-blisters-paper tape okay  RXN=ongoing      You were diagnosed with     Other specified disorders of the skin and subcutaneous tissue related to radiation   [6290691]       Radiation cystitis   [141447]       Hematuria   [7686797]       Continuous leakage of urine   [202688]       Type 2 diabetes mellitus with stage 3 chronic kidney disease, with long-term current use of insulin (CMS-HCC)   [7385244]       Neuropathy (CMS-HCC)   [493740]         Vital Signs     Blood Pressure Pulse Temperature Oxygen Saturation Smoking Status       138/77 mmHg 80 36.8 °C (98.3 °F) 95% Former Smoker       Basic Information     Date Of Birth Sex Race Ethnicity Preferred Language    1935 Male White Non- English      Your appointments     2017  9:30 AM   HBOT Supervision with HYPERBARIC CHAMBER 2   [facility] ([address] [place] [address])    [address] [facility] [facility] [address] [facility] [address]  [place] [address]   [phone]            2017  9:30 AM   HBOT Supervision with HYPERBARIC CHAMBER 2   [facility] ([address] [place] [address])    [address]  [place] [address]   [phone]            2017  9:30 AM   HBOT Supervision with HYPERBARIC CHAMBER 2   [facility] ([address] [place] [address])    [address]  [place] [address]   [phone]            2017  9:30 AM   HBOT Supervision with HYPERBARIC CHAMBER 2   [facility] ([address] [place] [address])    [address] [facility] [address]  [place] [address]      [phone]            Apr 10, 2017  9:30 AM   HBOT Supervision with HYPERBARIC CHAMBER 2   [facility] ([address])    [address]  [place] [address]   [phone]            Apr 11, 2017  9:30 AM   HBOT Supervision with HYPERBARIC CHAMBER 2   [facility] ([address])    [address]  [place] [address]   [phone]            Apr 12, 2017  9:30 AM   HBOT Supervision with HYPERBARIC CHAMBER 2   [facility] ([address])    [address]  [place] [address]   [phone]            Apr 13, 2017  9:30 AM   HBOT Supervision with HYPERBARIC CHAMBER 2   [facility] ([address])    [address]  [place] [address]   [phone]            Apr 14, 2017  9:30 AM   HBOT Supervision with HYPERBARIC CHAMBER 2   [facility] ([address])    [address]  [place] [address]   [phone]            Apr 17, 2017  9:30 AM   HBOT Supervision with HYPERBARIC CHAMBER 2   [facility] ([address])    [address]  [place] [address]   [phone]            Apr 18, 2017  9:30 AM   HBOT Supervision with HYPERBARIC CHAMBER 2   [facility] ([address])    [address]  [place] [address]   [phone]            Apr 19, 2017  9:30 AM   HBOT Supervision with HYPERBARIC CHAMBER 2   [facility] ([address])    [address]  [place] [address]   [phone]            Apr 20, 2017  9:30 AM   HBOT Supervision with HYPERBARIC CHAMBER 2   [facility] ([address])    [address]  [place] [address]   [phone]            Apr 21, 2017  9:30 AM   HBOT Supervision with HYPERBARIC CHAMBER 2   [facility] ([address])    [address]  [place] [address]    [phone]            Apr 24, 2017  9:30 AM   HBOT Supervision with HYPERBARIC CHAMBER 2   [facility] ([address])    [address]  [place] [address]   [phone]            Apr 25, 2017  9:30 AM   HBOT Supervision with HYPERBARIC CHAMBER 2   [facility] ([address])    [address]  [place] [address]   [phone]            Apr 26, 2017  9:30 AM   HBOT Supervision with HYPERBARIC CHAMBER 2   [facility] ([address])    [address]  [place] [address]   [phone]            Apr 27, 2017  9:30 AM   HBOT Supervision with HYPERBARIC CHAMBER 2   [facility] ([address])    [address]  [place] [address]   [phone]            Apr 28, 2017  9:30 AM   HBOT Supervision with HYPERBARIC CHAMBER 2   [facility] ([address])    [address]  [place] [address]   [phone]            May 01, 2017  9:30 AM   HBOT Supervision with HYPERBARIC CHAMBER 2   [facility] ([address])    [address]  [place] [address]   [phone]            May 02, 2017  9:30 AM   HBOT Supervision with HYPERBARIC CHAMBER 2   [facility] ([address])    [address]  [place] [address]   [phone]            May 03, 2017  9:30 AM   HBOT Supervision with HYPERBARIC CHAMBER 2   [facility] ([address])    [address]  [place] [address]   [phone]            May 04, 2017  9:30 AM   HBOT Supervision with HYPERBARIC CHAMBER 2   [facility] ([address])    [address]  [place] [address]   [phone]            May 05, 2017  9:30 AM   HBOT Supervision with HYPERBARIC CHAMBER 2   [facility] ([address])    [address]  [place] [address]    [phone]            May 08, 2017  9:30 AM   HBOT Supervision with HYPERBARIC CHAMBER 2   [facility] ([address])    [address]  [place] [address]   [phone]            May 09, 2017  9:30 AM   HBOT Supervision with HYPERBARIC CHAMBER 2   [facility] ([address])    [address]  [place] [address]   [phone]            May 10, 2017  9:30 AM   HBOT Supervision with HYPERBARIC CHAMBER 2   [facility] ([address])    [address]  [place] [address]   [phone]            May 11, 2017  9:30 AM   HBOT Supervision with HYPERBARIC CHAMBER 2   [facility] ([address])    [address]  [place] [address]   [phone]            May 12, 2017  9:30 AM   HBOT Supervision with HYPERBARIC CHAMBER 2   [facility] ([address])    [address]  [place] [address]   [phone]            May 15, 2017  9:30 AM   HBOT Supervision with HYPERBARIC CHAMBER 2   [facility] ([address])    [address]  [place] [address]   [phone]            May 16, 2017  9:30 AM   HBOT Supervision with HYPERBARIC CHAMBER 2   [facility] ([address])    [address]  [place] [address]   [phone]            May 17, 2017  9:30 AM   HBOT Supervision with HYPERBARIC CHAMBER 2   [facility] ([address])    [address]  [place] [address]   [phone]            May 18, 2017  9:30 AM   HBOT Supervision with HYPERBARIC CHAMBER 2   [facility] ([address])    [address]  [place] [address]   [phone]            May 19, 2017  9:30 AM   HBOT Supervision with HYPERBARIC CHAMBER 2   [facility] ([address])    [address]  [place] [address]    [phone]            May 22, 2017  9:30 AM   HBOT Supervision with HYPERBARIC CHAMBER 2   [facility] ([address])    [address]  [place] [address]   [phone]            May 23, 2017  9:30 AM   HBOT Supervision with HYPERBARIC CHAMBER 2   [facility] ([address])    [address]  [place] [address]   [phone]              Problem List              ICD-10-CM Priority Class Noted - Resolved    DVT of lower extremity (deep venous thrombosis) (CMS-HCC) I82.409   9/2/2010 - Present    DM (diabetes mellitus) (CMS-HCC) E11.9 Medium  9/2/2010 - Present    HTN (hypertension) I10 Medium  9/2/2010 - Present    Spinal stenosis, lumbar region, without neurogenic claudication M48.06 Low  12/13/2012 - Present    Senile nuclear sclerosis H25.10   2/13/2013 - Present    Blood in stool K92.1   6/5/2013 - Present    Diarrhea following gastrointestinal surgery R19.7, Z98.890   6/23/2014 - Present    History of prostatectomy Z90.79   5/8/2015 - Present    Ankle fracture, right S82.891A   10/28/2015 - Present    Chronic kidney disease (CKD), stage III (moderate) N18.3   7/21/2016 - Present    Neuropathy (CMS-HCC) G62.9   7/21/2016 - Present    Depression F32.9   7/21/2016 - Present    AK (actinic keratosis) L57.0   7/21/2016 - Present    Hyperlipidemia E78.5   7/21/2016 - Present    Health care maintenance Z00.00   8/30/2016 - Present    History of prostate cancer Z85.46   3/24/2017 - Present    History of radiation therapy Z92.3   3/24/2017 - Present    Type 2 diabetes mellitus with stage 3 chronic kidney disease, with long-term current use of insulin (HCC) E11.22, N18.3, Z79.4   3/24/2017 - Present    Continuous leakage of urine N39.45   3/24/2017 - Present    History of DVT (deep vein thrombosis) Z86.718   3/24/2017 - Present    Other specified disorders of the skin and subcutaneous tissue related to radiation L59.8   3/29/2017 - Present     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   3/29/2017 - Present    Hematuria R31.9   3/29/2017 - Present      Health Maintenance        Date Due Completion Dates    IMM ZOSTER VACCINE 11/28/1996 ---    IMM DTaP/Tdap/Td Vaccine (1 - Tdap) 8/8/2010 8/7/2010    URINE ACR / MICROALBUMIN 1/16/2013 1/16/2012    IMM PNEUMOCOCCAL 65+ (ADULT) LOW/MEDIUM RISK SERIES (2 of 2 - PCV13) 12/13/2013 12/13/2012    A1C SCREENING 2/22/2017 8/22/2016, 1/16/2015, 6/23/2014, 1/16/2012, 9/2/2010    FASTING LIPID PROFILE 8/22/2017 8/22/2016, 1/16/2012, 9/2/2010    SERUM CREATININE 8/22/2017 8/22/2016, 7/12/2016, 8/3/2015, 7/22/2015, 1/16/2015, 6/25/2014, 6/24/2014, 6/23/2014, 5/29/2014, 5/27/2014, 5/26/2014, 5/22/2014, 5/14/2014, 4/12/2014, 3/24/2014, 3/17/2014, 6/5/2013, 5/31/2013, 12/26/2012, 12/22/2012, 12/17/2012, 12/14/2012, 12/3/2012, 9/17/2012, 3/5/2012, 1/16/2012, 1/26/2011, 9/18/2010, 9/17/2010, 9/2/2010, 9/1/2010, 5/24/2010, 1/15/2010, 2/8/2005    DIABETES MONOFILAMENT / LE EXAM 8/30/2017 8/30/2016 (N/S)    Override on 8/30/2016: (N/S)    RETINAL SCREENING 10/10/2017 10/10/2016    COLONOSCOPY 6/5/2023 6/5/2013, 6/5/2013 (N/S)    Override on 6/5/2013: (N/S) (GIC)            Results     POCT Glucose      Component Value Standard Range & Units    Glucose - Accu-Ck 155 70 - 100 mg/dL                        Current Immunizations     Influenza TIV (IM) 9/25/2016, 10/21/2013, 10/1/2012    Influenza Vaccine Quad Inj (Preserved) 9/21/2015    Pneumococcal polysaccharide vaccine (PPSV-23) 12/13/2012  7:30 PM    TD Vaccine 8/7/2010 12:38 PM      Below and/or attached are the medications your provider expects you to take. Review all of your home medications and newly ordered medications with your provider and/or pharmacist. Follow medication instructions as directed by your provider and/or pharmacist. Please keep your medication list with you and share with your provider. Update the information when medications are discontinued, doses are changed, or new  medications (including over-the-counter products) are added; and carry medication information at all times in the event of emergency situations     Allergies:  AUGMENTIN - Unspecified     LATEX - Unspecified     TAPE - Rash               Medications  Valid as of: April 03, 2017 -  3:01 PM    Generic Name Brand Name Tablet Size Instructions for use    Ascorbic Acid (Tab) ascorbic acid 500 MG Take 500 mg by mouth every day.        Cholecalciferol (Tab) vitamin D 2000 UNIT Take 2,000 Units by mouth every day.        Clobetasol Propionate (Cream) TEMOVATE 0.05 %         Cyanocobalamin (Tab CR) B-12 1000 MCG Take  by mouth.        Ferrous Sulfate (Tab) ferrous sulfate 325 (65 FE) MG Take 325 mg by mouth 2 Times a Day.        FLUoxetine HCl (Cap) PROZAC 20 MG Take 20 mg by mouth every day.        GlipiZIDE (Tab) GLUCOTROL 10 MG Take 10 mg by mouth 2 times a day. One po am, 1/2 po pm        HydroCHLOROthiazide (Tab) HYDRODIURIL 12.5 MG Take 12.5 mg by mouth every day.        Insulin NPH Human (Isophane) (Suspension) HUMULIN,NOVOLIN 100 UNIT/ML Inject 10 Units as instructed Once. Before dinner        Lisinopril (Tab) PRINIVIL 5 MG Take 2.5 mg by mouth every day. Indications: High Blood Pressure        MetFORMIN HCl (Tab) GLUCOPHAGE 500 MG Take 500 mg by mouth 2 times a day, with meals.        Pantoprazole Sodium (Tablet Delayed Response) PROTONIX 40 MG Take 40 mg by mouth every day.        SAXagliptin HCl (Tab) SAXagliptin HCl 5 MG Take 5 mg by mouth every day.        Simvastatin (Tab) ZOCOR 40 MG Take 40 mg by mouth every evening. Continue as per Dr. Mckeon. Continue as per Dr. Bobo.        .                 Medicines prescribed today were sent to:     [id number] - [place] - [address]    [address] [place] [address]    Phone: [phone] Fax: [phone]    Open 24 Hours?: No      Medication refill instructions:       If your prescription bottle indicates you have medication refills left, it  is not necessary to call your provider’s office. Please contact your pharmacy and they will refill your medication.    If your prescription bottle indicates you do not have any refills left, you may request refills at any time through one of the following ways: The online [employer] system (except Urgent Care), by calling your provider’s office, or by asking your pharmacy to contact your provider’s office with a refill request. Medication refills are processed only during regular business hours and may not be available until the next business day. Your provider may request additional information or to have a follow-up visit with you prior to refilling your medication.   *Please Note: Medication refills are assigned a new Rx number when refilled electronically. Your pharmacy may indicate that no refills were authorized even though a new prescription for the same medication is available at the pharmacy. Please request the medicine by name with the pharmacy before contacting your provider for a refill.           E[employer] Access Code: Activation code not generated  Current E[employer] Status: Active

## 2017-04-03 NOTE — PROGRESS NOTES
Subjective:      Selvin Braga is a 81 y.o. 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     HPI  Selvin presents HBOT treatment today. He tolerated treatment well and was able to clear his ears.   On post-treatment exam his left ear did have mild congestion and erythema but improved from previous exam.    His glucose was adequate to prevent hypoglycemia.    Review of Systems   Constitutional: Negative for fever and chills.   Gastrointestinal: Positive for heartburn, diarrhea, constipation and blood in stool.   Genitourinary: Positive for frequency.   Musculoskeletal: Positive for back pain, joint pain and falls.   Skin: Positive for itching and rash.   Neurological: Positive for tingling.   Psychiatric/Behavioral: Positive for depression.          Objective:     /77 mmHg  Pulse 80  Temp(Src) 36.8 °C (98.3 °F)  SpO2 95%     Physical Exam   Constitutional: He is oriented to person, place, and time. He appears well-developed and well-nourished.   HENT:   Head: Normocephalic and atraumatic.   Right Ear: Tympanic membrane, external ear and ear canal normal.   Left Ear: External ear and ear canal normal. Tympanic membrane is erythematous.   Mouth/Throat: He has dentures.   Pulmonary/Chest: Effort normal.   Neurological: He is alert and oriented to person, place, and time.   Vitals reviewed.      Assessment/Plan:     •   Other specified disorders of the skin and subcutaneous tissue related to radiation -  Appropriate candidate for hyperbaric oxygen therapy. Patient completed his 4th HBOT treatment at a pressure of 2.4 HANNA x 90 minutes at a descent rate of 1.5 PSI / min and ascent rate of 3.0 PSI/minute. Will continue the patient with HBOT at a pressure of 2.4 HANNA x 90 minutes x  40 treatments on a 5x per week, M-F basis.  Q 30 minute air breaks.    3/30 - held at a travel rate of 1.0 for ear discomfort.  No worsening of L  ear during treatment  3/31 - No significant change to L ear during treatment.  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 Marie        •   Continuous leakage of urine - Cunningham clamp / condom cathether management        •   Hematuria - should improve with HBOT - onm Fe replacement        •   Blood in stool - should improve with HBOT - on Fe replacement        •   History of prostate cancer - in remission per Dr. Marie        •   History of radiation therapy         •   History of prostatectomy        •   Chronic kidney disease (CKD), stage III (moderate) - d/t DM, medication management only        •   Type 2 diabetes mellitus with stage 3 chronic kidney disease, with long-term current use of insulin (HCC) - on combined oral (glipizie and metformin) and insulin therapy        •   Essential hypertension - Stable on ACE-I - Lisinopril 40mg qd        •   Spinal stenosis, lumbar region, without neurogenic claudication - stable        •   Neuropathy (CMS-HCC) - due to DM, stable        •   AK (actinic keratosis) - on topical steroid cream, continue with Dermatology        •   History of DVT (deep vein thrombosis) - trauma related, not on chronic anticoagulation        •  Pressure related ear pain - 3/29 patient developed teed 1-2 changes to L TM.  Has continued to experience muffling to that ear, but no further pain.  Will continue to monitor and refer to ENT as needed.

## 2017-04-04 ENCOUNTER — OFFICE VISIT (OUTPATIENT)
Dept: WOUND CARE | Facility: MEDICAL CENTER | Age: 82
End: 2017-04-04
Attending: UROLOGY
Payer: MEDICARE

## 2017-04-04 VITALS
TEMPERATURE: 98.5 F | DIASTOLIC BLOOD PRESSURE: 68 MMHG | HEART RATE: 81 BPM | OXYGEN SATURATION: 96 % | SYSTOLIC BLOOD PRESSURE: 129 MMHG

## 2017-04-04 DIAGNOSIS — E11.22 TYPE 2 DIABETES MELLITUS WITH STAGE 3 CHRONIC KIDNEY DISEASE, WITH LONG-TERM CURRENT USE OF INSULIN (HCC): ICD-10-CM

## 2017-04-04 DIAGNOSIS — K92.1 BLOOD IN STOOL: ICD-10-CM

## 2017-04-04 DIAGNOSIS — L59.8 OTHER SPECIFIED DISORDERS OF THE SKIN AND SUBCUTANEOUS TISSUE RELATED TO RADIATION: ICD-10-CM

## 2017-04-04 DIAGNOSIS — N18.30 TYPE 2 DIABETES MELLITUS WITH STAGE 3 CHRONIC KIDNEY DISEASE, WITH LONG-TERM CURRENT USE OF INSULIN (HCC): ICD-10-CM

## 2017-04-04 DIAGNOSIS — T70.0XXS: ICD-10-CM

## 2017-04-04 DIAGNOSIS — N39.45 CONTINUOUS LEAKAGE OF URINE: ICD-10-CM

## 2017-04-04 DIAGNOSIS — R31.9 HEMATURIA: ICD-10-CM

## 2017-04-04 DIAGNOSIS — G62.9 NEUROPATHY: ICD-10-CM

## 2017-04-04 DIAGNOSIS — N30.40 RADIATION CYSTITIS: ICD-10-CM

## 2017-04-04 DIAGNOSIS — Z79.4 TYPE 2 DIABETES MELLITUS WITH STAGE 3 CHRONIC KIDNEY DISEASE, WITH LONG-TERM CURRENT USE OF INSULIN (HCC): ICD-10-CM

## 2017-04-04 PROBLEM — T70.0XXA PRESSURE-RELATED EAR PAIN: Status: ACTIVE | Noted: 2017-04-04

## 2017-04-04 LAB — GLUCOSE BLD-MCNC: 163 MG/DL (ref 70–100)

## 2017-04-04 PROCEDURE — 82962 GLUCOSE BLOOD TEST: CPT | Performed by: FAMILY MEDICINE

## 2017-04-04 PROCEDURE — G0277 HBOT, FULL BODY CHAMBER, 30M: HCPCS | Performed by: FAMILY MEDICINE

## 2017-04-04 PROCEDURE — G8417 CALC BMI ABV UP PARAM F/U: HCPCS | Performed by: FAMILY MEDICINE

## 2017-04-04 PROCEDURE — 82948 REAGENT STRIP/BLOOD GLUCOSE: CPT | Performed by: FAMILY MEDICINE

## 2017-04-04 PROCEDURE — 99183 HYPERBARIC OXYGEN THERAPY: CPT | Performed by: FAMILY MEDICINE

## 2017-04-04 ASSESSMENT — ENCOUNTER SYMPTOMS
CONSTIPATION: 1
BLOOD IN STOOL: 1
DEPRESSION: 1
FEVER: 0
HEADACHES: 0
FALLS: 1
BACK PAIN: 1
TINGLING: 1
DIARRHEA: 1
HEARTBURN: 1
CHILLS: 0

## 2017-04-04 ASSESSMENT — PAIN SCALES - GENERAL: PAINLEVEL_OUTOF13: 0

## 2017-04-04 NOTE — PATIENT INSTRUCTIONS
After hyperbaric oxygen therapy treatment, it is normal to experience some congestion to the ears, muffling of sounds, or abnormal sounds to one or both ears.    If you experience pain or discomfort to one or both ears that does not resolve spontaneously, please contact the hyperbaric department at [phone].

## 2017-04-04 NOTE — PROGRESS NOTES
Subjective:   Selvin Braga is a 81 y.o. 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       HPI  Selvin presents HBOT treatment today. He tolerated treatment well and was able to clear his ears, however he complains of pressure in the left ear.  On post-treatment exam his left ear did has increased congestion,erythema and visible ear bubbles. Given these findings we will hold his HBOT treatments until he is seen by ENT.  He has an appt with Dr. Sanchez on 4/6/17.    His glucose was adequate to prevent hypoglycemia.    Review of Systems   Constitutional: Negative for fever and chills.   HENT: Positive for ear pain (Left ear pressure). Negative for hearing loss.    Gastrointestinal: Positive for heartburn, diarrhea, constipation and blood in stool.   Genitourinary: Positive for frequency.   Musculoskeletal: Positive for back pain, joint pain and falls.   Skin: Positive for itching and rash.   Neurological: Positive for tingling. Negative for headaches.   Psychiatric/Behavioral: Positive for depression.          Objective:     /68 mmHg  Pulse 81  Temp(Src) 36.9 °C (98.5 °F)  SpO2 96%     Physical Exam   Constitutional: He is oriented to person, place, and time. He appears well-developed and well-nourished.   HENT:   Head: Normocephalic and atraumatic.   Right Ear: Tympanic membrane, external ear and ear canal normal.   Left Ear: External ear normal. There is swelling. Tympanic membrane is erythematous.   TEED, Grade 3 with congestion and erythema of the enitire TM. +blood bubbles visible behind TM.   Pulmonary/Chest: Effort normal.   Neurological: He is alert and oriented to person, place, and time.   Psychiatric: He has a normal mood and affect.   Vitals reviewed.         Assessment/Plan:     Other specified disorders of the skin and subcutaneous tissue related to radiation -   Appropriate candidate for hyperbaric oxygen  therapy. Patient completed his 5th HBOT treatment at a pressure of 2.4 HANNA x 90 minutes at a descent rate of 1.5 PSI / min and ascent rate of 3.0 PSI/minute. Will continue the patient with HBOT at a pressure of 2.4 HANNA x 90 minutes x 40 treatments on a 5x per week, M-F basis. Q 30 minute air breaks.   3/30 - held at a travel rate of 1.0 for ear discomfort. No worsening of L ear during treatment   3/31 - No significant change to L ear during treatment. 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 Has continued to experience muffling to that ear, but no further pain. Will continue to monitor and refer to ENT as needed.

## 2017-04-04 NOTE — MR AVS SNAPSHOT
Selvin Braga   2017 9:30 AM   Office Visit   MRN: [id number]    Department:  [facility]   Dept Phone:  [phone]    Description:  Male : 1935   Provider:  Ofelia Reed M.D.           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   [4258823]       Radiation cystitis   [455130]       Hematuria   [6605225]       Type 2 diabetes mellitus with stage 3 chronic kidney disease, with long-term current use of insulin (CMS-HCC)   [1929500]       Continuous leakage of urine   [172432]       Neuropathy (CMS-HCC)   [987584]       Blood in stool   [578.1.ICD-9-CM]       Pressure-related ear pain, sequela   [118340]         Vital Signs     Blood Pressure Pulse Temperature Oxygen Saturation Smoking Status       129/68 mmHg 81 36.9 °C (98.5 °F) 96% Former Smoker       Basic Information     Date Of Birth Sex Race Ethnicity Preferred Language    1935 Male White Non- English      Your appointments     2017  9:30 AM   HBOT Supervision with HYPERBARIC CHAMBER 2   [facility] ([address] [place] [address])    [address]  [facility] [address]   [phone]            2017  9:30 AM   HBOT Supervision with HYPERBARIC CHAMBER 2   [facility] ([address] [place] [address])    [address]  [facility] [address]   [phone]            2017  9:30 AM   HBOT Supervision with HYPERBARIC CHAMBER 2   [facility] ([address] [place] [address])    [address]  [facility] [address]   [phone]            Apr 10, 2017  9:30 AM   HBOT Supervision with HYPERBARIC CHAMBER 2   [facility] ([address])    [address]  [place] [address]   [phone]            Apr 11, 2017  9:30 AM   HBOT Supervision with HYPERBARIC CHAMBER 2   [facility] ([address])    [address]  [place] [address]   [phone]            Apr 12, 2017  9:30 AM   HBOT Supervision with HYPERBARIC CHAMBER 2   [facility] ([address])    [address]  [place] [address]   [phone]            Apr 13, 2017  9:30 AM   HBOT Supervision with HYPERBARIC CHAMBER 2   [facility] ([address])    [address]  [place] [address]   [phone]            Apr 14, 2017  9:30 AM   HBOT Supervision with HYPERBARIC CHAMBER 2   [facility] ([address])    [address]  [place] [address]   [phone]            Apr 17, 2017  9:30 AM   HBOT Supervision with HYPERBARIC CHAMBER 2   [facility] ([address])    [address]  [place] [address]   [phone]            Apr 18, 2017  9:30 AM   HBOT Supervision with HYPERBARIC CHAMBER 2   [facility] ([address])    [address]  [place] [address]   [phone]            Apr 19, 2017  9:30 AM   HBOT Supervision with HYPERBARIC CHAMBER 2   [facility] ([address])    [address]  [place] [address]   [phone]            Apr 20, 2017  9:30 AM   HBOT Supervision with HYPERBARIC CHAMBER 2   [facility] ([address])    [address]  [place] [address]   [phone]            Apr 21, 2017  9:30 AM   HBOT Supervision with HYPERBARIC CHAMBER 2   [facility] ([address])    [address]  [place] [address]   [phone]            Apr 24, 2017  9:30 AM   HBOT Supervision with HYPERBARIC CHAMBER 2   [facility] ([address])    [address]  [place] [address]   [phone]            Apr 25, 2017  9:30 AM   HBOT Supervision with HYPERBARIC CHAMBER 2   [facility] ([address])    [address]  [place] [address]   [phone]            Apr 26, 2017  9:30 AM   HBOT Supervision with HYPERBARIC CHAMBER 2   [facility] ([address])    [address]  [place] [address]   [phone]            Apr 27, 2017  9:30 AM   HBOT Supervision with HYPERBARIC CHAMBER 2   [facility] ([address])    [address]  [place] [address]   [phone]            Apr 28, 2017  9:30 AM   HBOT Supervision with HYPERBARIC CHAMBER 2   [facility] ([address])    [address]  [place] [address]   [phone]            May 01, 2017  9:30 AM   HBOT Supervision with HYPERBARIC CHAMBER 2   [facility] ([address])    [address]  [place] [address]   [phone]            May 02, 2017  9:30 AM   HBOT Supervision with HYPERBARIC CHAMBER 2   [facility] ([address])    [address]  [place] [address]   [phone]            May 03, 2017  9:30 AM   HBOT Supervision with HYPERBARIC CHAMBER 2   [facility] ([address])    [address]  [place] [address]   [phone]            May 04, 2017  9:30 AM   HBOT Supervision with HYPERBARIC CHAMBER 2   [facility] ([address])    [address]  [place] [address]   [phone]            May 05, 2017  9:30 AM   HBOT Supervision with HYPERBARIC CHAMBER 2   [facility] ([address])    [address]  [place] [address]   [phone]            May 08, 2017  9:30 AM   HBOT Supervision with HYPERBARIC CHAMBER 2   [facility] ([address])    [address]  [place] [address]   [phone]            May 09, 2017  9:30 AM   HBOT Supervision with HYPERBARIC CHAMBER 2   [facility] ([address])    [address]  [place] [address]   [phone]            May 10, 2017  9:30 AM   HBOT Supervision with HYPERBARIC CHAMBER 2   [facility] ([address])    [address]  [place] [address]   [phone]            May 11, 2017  9:30 AM   HBOT Supervision with HYPERBARIC CHAMBER 2   [facility] ([address])    [address]  [place] [address]   [phone]            May 12, 2017  9:30 AM   HBOT Supervision with HYPERBARIC CHAMBER 2   [facility] ([address])    [address]  [place] [address]   [phone]            May 15, 2017  9:30 AM   HBOT Supervision with HYPERBARIC CHAMBER 2   [facility] ([address])    [address]  [place] [address]   [phone]            May 16, 2017  9:30 AM   HBOT Supervision with HYPERBARIC CHAMBER 2   [facility] ([address])    [address]  [place] [address]   [phone]            May 17, 2017  9:30 AM   HBOT Supervision with HYPERBARIC CHAMBER 2   [facility] ([address])    [address]  [place] [address]   [phone]            May 18, 2017  9:30 AM   HBOT Supervision with HYPERBARIC CHAMBER 2   [facility] ([address])    [address]  [place] [address]   [phone]            May 19, 2017  9:30 AM   HBOT Supervision with HYPERBARIC CHAMBER 2   [facility] ([address])    [address]  [place] [address]   [phone]            May 22, 2017  9:30 AM   HBOT Supervision with HYPERBARIC CHAMBER 2   [facility] ([address] [place] [address])    [address]  [place] [address]   [phone]            May 23, 2017  9:30 AM   HBOT Supervision with HYPERBARIC CHAMBER 2   [facility] ([address] [place] [address])    [address]  [place] [address]   [phone]              Problem List              ICD-10-CM Priority Class Noted - Resolved    DVT of lower extremity (deep venous thrombosis) (CMS-HCC) I82.409   9/2/2010 - Present    DM (diabetes mellitus) (CMS-HCC) E11.9 Medium  9/2/2010 - Present    HTN (hypertension) I10 Medium  9/2/2010 - Present    Spinal stenosis, lumbar region, without neurogenic claudication M48.06 Low  12/13/2012 - Present    Senile nuclear sclerosis H25.10   2/13/2013 - Present    Blood in stool K92.1   6/5/2013 - Present    Diarrhea following gastrointestinal surgery R19.7, Z98.890   6/23/2014 - Present    History of prostatectomy Z90.79   5/8/2015 - Present    Ankle fracture, right S82.891A   10/28/2015 - Present    Chronic kidney disease (CKD), stage III (moderate) N18.3   7/21/2016 - Present    Neuropathy (CMS-HCC) G62.9   7/21/2016 - Present    Depression F32.9   7/21/2016 - Present    AK (actinic keratosis) L57.0   7/21/2016 - Present    Hyperlipidemia E78.5   7/21/2016 - Present    Health care maintenance Z00.00   8/30/2016 - Present    History of prostate cancer Z85.46   3/24/2017 - Present    History of radiation therapy Z92.3   3/24/2017 - Present    Type 2 diabetes mellitus with stage 3 chronic kidney disease, with long-term current use of insulin ([facility]) E11.22, N18.3, Z79.4   3/24/2017 - Present    Continuous leakage of urine N39.45   3/24/2017 - Present    History of DVT (deep vein thrombosis) Z86.718   3/24/2017 - Present    Other specified disorders of the skin and subcutaneous tissue related to radiation L59.8   3/29/2017 - Present    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   3/29/2017 - Present    Hematuria R31.9   3/29/2017 - Present    Pressure-related ear  pain T70.0XXA   4/4/2017 - Present      Health Maintenance        Date Due Completion Dates    IMM ZOSTER VACCINE 11/28/1996 ---    IMM DTaP/Tdap/Td Vaccine (1 - Tdap) 8/8/2010 8/7/2010    URINE ACR / MICROALBUMIN 1/16/2013 1/16/2012    IMM PNEUMOCOCCAL 65+ (ADULT) LOW/MEDIUM RISK SERIES (2 of 2 - PCV13) 12/13/2013 12/13/2012    A1C SCREENING 2/22/2017 8/22/2016, 1/16/2015, 6/23/2014, 1/16/2012, 9/2/2010    FASTING LIPID PROFILE 8/22/2017 8/22/2016, 1/16/2012, 9/2/2010    SERUM CREATININE 8/22/2017 8/22/2016, 7/12/2016, 8/3/2015, 7/22/2015, 1/16/2015, 6/25/2014, 6/24/2014, 6/23/2014, 5/29/2014, 5/27/2014, 5/26/2014, 5/22/2014, 5/14/2014, 4/12/2014, 3/24/2014, 3/17/2014, 6/5/2013, 5/31/2013, 12/26/2012, 12/22/2012, 12/17/2012, 12/14/2012, 12/3/2012, 9/17/2012, 3/5/2012, 1/16/2012, 1/26/2011, 9/18/2010, 9/17/2010, 9/2/2010, 9/1/2010, 5/24/2010, 1/15/2010, 2/8/2005    DIABETES MONOFILAMENT / LE EXAM 8/30/2017 8/30/2016 (N/S)    Override on 8/30/2016: (N/S)    RETINAL SCREENING 10/10/2017 10/10/2016    COLONOSCOPY 6/5/2023 6/5/2013, 6/5/2013 (N/S)    Override on 6/5/2013: (N/S) ([facility])            Current Immunizations     Influenza TIV (IM) 9/25/2016, 10/21/2013, 10/1/2012    Influenza Vaccine Quad Inj (Preserved) 9/21/2015    Pneumococcal polysaccharide vaccine (PPSV-23) 12/13/2012  7:30 PM    TD Vaccine 8/7/2010 12:38 PM      Below and/or attached are the medications your provider expects you to take. Review all of your home medications and newly ordered medications with your provider and/or pharmacist. Follow medication instructions as directed by your provider and/or pharmacist. Please keep your medication list with you and share with your provider. Update the information when medications are discontinued, doses are changed, or new medications (including over-the-counter products) are added; and carry medication information at all times in the event of emergency situations     Allergies:  AUGMENTIN - Unspecified      LATEX - Unspecified     TAPE - Rash               Medications  Valid as of: April 04, 2017 - 12:31 PM    Generic Name Brand Name Tablet Size Instructions for use    Ascorbic Acid (Tab) ascorbic acid 500 MG Take 500 mg by mouth every day.        Cholecalciferol (Tab) vitamin D 2000 UNIT Take 2,000 Units by mouth every day.        Clobetasol Propionate (Cream) TEMOVATE 0.05 %         Cyanocobalamin (Tab CR) B-12 1000 MCG Take  by mouth.        Ferrous Sulfate (Tab) ferrous sulfate 325 (65 FE) MG Take 325 mg by mouth 2 Times a Day.        FLUoxetine HCl (Cap) PROZAC 20 MG Take 20 mg by mouth every day.        GlipiZIDE (Tab) GLUCOTROL 10 MG Take 10 mg by mouth 2 times a day. One po am, 1/2 po pm        HydroCHLOROthiazide (Tab) HYDRODIURIL 12.5 MG Take 12.5 mg by mouth every day.        Insulin NPH Human (Isophane) (Suspension) HUMULIN,NOVOLIN 100 UNIT/ML Inject 10 Units as instructed Once. Before dinner        Lisinopril (Tab) PRINIVIL 5 MG Take 2.5 mg by mouth every day. Indications: High Blood Pressure        MetFORMIN HCl (Tab) GLUCOPHAGE 500 MG Take 500 mg by mouth 2 times a day, with meals.        Pantoprazole Sodium (Tablet Delayed Response) PROTONIX 40 MG Take 40 mg by mouth every day.        SAXagliptin HCl (Tab) SAXagliptin HCl 5 MG Take 5 mg by mouth every day.        Simvastatin (Tab) ZOCOR 40 MG Take 40 mg by mouth every evening. Continue as per Dr. Mckeon. Continue as per Dr. Bobo.        .                 Medicines prescribed today were sent to:     [id number] - [place] - [address]    [address] [place] [address]    Phone: [phone] Fax: [phone]    Open 24 Hours?: No      Medication refill instructions:       If your prescription bottle indicates you have medication refills left, it is not necessary to call your provider’s office. Please contact your pharmacy and they will refill your medication.    If your prescription bottle indicates you do not have any refills  left, you may request refills at any time through one of the following ways: The online Gr[facility] system (except Urgent Care), by calling your provider’s office, or by asking your pharmacy to contact your provider’s office with a refill request. Medication refills are processed only during regular business hours and may not be available until the next business day. Your provider may request additional information or to have a follow-up visit with you prior to refilling your medication.   *Please Note: Medication refills are assigned a new Rx number when refilled electronically. Your pharmacy may indicate that no refills were authorized even though a new prescription for the same medication is available at the pharmacy. Please request the medicine by name with the pharmacy before contacting your provider for a refill.        Instructions    After hyperbaric oxygen therapy treatment, it is normal to experience some congestion to the ears, muffling of sounds, or abnormal sounds to one or both ears.    If you experience pain or discomfort to one or both ears that does not resolve spontaneously, please contact the hyperbaric department at [phone].            [facility] Access Code: Activation code not generated  Current [facility] Status: Active

## 2017-04-05 ENCOUNTER — APPOINTMENT (OUTPATIENT)
Dept: WOUND CARE | Facility: MEDICAL CENTER | Age: 82
End: 2017-04-05
Attending: UROLOGY
Payer: MEDICARE

## 2017-04-06 ENCOUNTER — APPOINTMENT (OUTPATIENT)
Dept: WOUND CARE | Facility: MEDICAL CENTER | Age: 82
End: 2017-04-06
Attending: UROLOGY
Payer: MEDICARE

## 2017-04-07 ENCOUNTER — APPOINTMENT (OUTPATIENT)
Dept: WOUND CARE | Facility: MEDICAL CENTER | Age: 82
End: 2017-04-07
Attending: UROLOGY
Payer: MEDICARE

## 2017-04-10 ENCOUNTER — OFFICE VISIT (OUTPATIENT)
Dept: WOUND CARE | Facility: MEDICAL CENTER | Age: 82
End: 2017-04-10
Attending: UROLOGY
Payer: MEDICARE

## 2017-04-10 VITALS
SYSTOLIC BLOOD PRESSURE: 155 MMHG | HEART RATE: 83 BPM | TEMPERATURE: 98.2 F | DIASTOLIC BLOOD PRESSURE: 74 MMHG | OXYGEN SATURATION: 96 %

## 2017-04-10 DIAGNOSIS — E11.22 TYPE 2 DIABETES MELLITUS WITH STAGE 3 CHRONIC KIDNEY DISEASE, WITH LONG-TERM CURRENT USE OF INSULIN (HCC): ICD-10-CM

## 2017-04-10 DIAGNOSIS — N18.30 TYPE 2 DIABETES MELLITUS WITH STAGE 3 CHRONIC KIDNEY DISEASE, WITH LONG-TERM CURRENT USE OF INSULIN (HCC): ICD-10-CM

## 2017-04-10 DIAGNOSIS — Z79.4 TYPE 2 DIABETES MELLITUS WITH STAGE 3 CHRONIC KIDNEY DISEASE, WITH LONG-TERM CURRENT USE OF INSULIN (HCC): ICD-10-CM

## 2017-04-10 DIAGNOSIS — M48.061 SPINAL STENOSIS, LUMBAR REGION, WITHOUT NEUROGENIC CLAUDICATION: ICD-10-CM

## 2017-04-10 DIAGNOSIS — K92.1 BLOOD IN STOOL: ICD-10-CM

## 2017-04-10 DIAGNOSIS — L59.8 OTHER SPECIFIED DISORDERS OF THE SKIN AND SUBCUTANEOUS TISSUE RELATED TO RADIATION: ICD-10-CM

## 2017-04-10 DIAGNOSIS — N30.40 RADIATION CYSTITIS: ICD-10-CM

## 2017-04-10 DIAGNOSIS — R31.9 HEMATURIA: ICD-10-CM

## 2017-04-10 DIAGNOSIS — N39.45 CONTINUOUS LEAKAGE OF URINE: ICD-10-CM

## 2017-04-10 LAB — GLUCOSE BLD-MCNC: 173 MG/DL (ref 70–100)

## 2017-04-10 PROCEDURE — 99183 HYPERBARIC OXYGEN THERAPY: CPT | Performed by: FAMILY MEDICINE

## 2017-04-10 PROCEDURE — G0277 HBOT, FULL BODY CHAMBER, 30M: HCPCS | Performed by: FAMILY MEDICINE

## 2017-04-10 PROCEDURE — G8417 CALC BMI ABV UP PARAM F/U: HCPCS | Performed by: FAMILY MEDICINE

## 2017-04-10 PROCEDURE — 82962 GLUCOSE BLOOD TEST: CPT | Performed by: FAMILY MEDICINE

## 2017-04-10 ASSESSMENT — ENCOUNTER SYMPTOMS
CONSTIPATION: 1
FALLS: 1
DOUBLE VISION: 0
DEPRESSION: 1
COUGH: 0
HEARTBURN: 1
BLURRED VISION: 0
DIARRHEA: 1
BLOOD IN STOOL: 1
SHORTNESS OF BREATH: 0
CHILLS: 0
HEADACHES: 0
FEVER: 0
BACK PAIN: 1

## 2017-04-10 ASSESSMENT — PAIN SCALES - GENERAL: PAINLEVEL_OUTOF13: 0

## 2017-04-10 NOTE — PROGRESS NOTES
Subjective:   Selvin Braga is a 81 y.o. 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     TOBIN Montalvo presents HBOT treatment today. He had a left ear tube placed by Dr. Sanchez on 4/7.    He tolerated the procedure well and tolerated treatment well and today.    He was able to clear his ears and denied any pain or pressure.    His glucose was adequate to prevent hypoglycemia.    Review of Systems   Constitutional: Negative for fever and chills.   HENT: Negative for ear discharge, ear pain and hearing loss.    Eyes: Negative for blurred vision and double vision.   Respiratory: Negative for cough and shortness of breath.    Cardiovascular: Negative for chest pain and leg swelling.   Gastrointestinal: Positive for heartburn, diarrhea, constipation and blood in stool.   Genitourinary: Positive for frequency.   Musculoskeletal: Positive for back pain, joint pain and falls.   Skin: Positive for itching and rash.   Neurological: Negative for headaches.   Psychiatric/Behavioral: Positive for depression.          Objective:     /74 mmHg  Pulse 83  Temp(Src) 36.8 °C (98.2 °F)  SpO2 96%     Physical Exam   Constitutional: He is oriented to person, place, and time. He appears well-developed and well-nourished.   HENT:   Right Ear: Tympanic membrane and ear canal normal.   Left Ear: Tympanic membrane and ear canal normal.   Left myringotomy tube.   Pulmonary/Chest: Effort normal.   Neurological: He is alert and oriented to person, place, and time.   Psychiatric: He has a normal mood and affect.   Vitals reviewed.      Assessment/Plan:     Other specified disorders of the skin and subcutaneous tissue related to radiation -    Appropriate candidate for hyperbaric oxygen therapy. Patient completed his 6th HBOT treatment at a pressure of 2.4 HANNA x 90 minutes at a descent rate of 1.5 PSI / min and ascent rate of 3.0 PSI/minute. Will  continue the patient with HBOT at a pressure of 2.4 HANNA x 90 minutes x 40 treatments on a 5x per week, M-F basis. Q 30 minute air breaks.    3/30 - held at a travel rate of 1.0 for ear discomfort. No worsening of L ear during treatment    3/31 - No significant change to L ear during treatment. 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 Marie     History of radiation therapy     History of prostatectomy     Chronic kidney disease (CKD), stage III (moderate) - d/t DM, medication management only     Type 2 diabetes mellitus with stage 3 chronic kidney disease, with long-term current use of insulin (HCC) - on combined oral (glipizie and metformin) and insulin therapy     Essential hypertension - Stable on ACE-I - Lisinopril 40mg qd     Spinal stenosis, lumbar region, without neurogenic claudication - stable     Neuropathy (CMS-HCC) - due to DM, stable     AK (actinic keratosis) - on topical steroid cream, continue with Dermatology     History of DVT (deep vein thrombosis) - trauma related, not on chronic anticoagulation     Pressure related ear pain - 3/29 patient developed teed 1-2 changes to L TM. Has continued to experience muffling to that ear, but no further pain. Will continue to monitor and refer to ENT as needed.

## 2017-04-10 NOTE — MR AVS SNAPSHOT
Selvin Braga   4/10/2017 9:30 AM   Office Visit   MRN: [id number]    Department:  [facility]   Dept Phone:  [phone]    Description:  Male : 1935   Provider:  Ofelia Reed M.D.           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   [9190744]       Radiation cystitis   [247394]       Continuous leakage of urine   [256702]       Hematuria   [8410278]       Blood in stool   [578.1.ICD-9-CM]       Type 2 diabetes mellitus with stage 3 chronic kidney disease, with long-term current use of insulin (CMS-HCC)   [3521800]       Spinal stenosis, lumbar region, without neurogenic claudication   [724.02.ICD-9-CM]         Vital Signs     Blood Pressure Pulse Temperature Oxygen Saturation Smoking Status       155/74 mmHg 83 36.8 °C (98.2 °F) 96% Former Smoker       Basic Information     Date Of Birth Sex Race Ethnicity Preferred Language    1935 Male White Non- English      Your appointments     2017  9:30 AM   HBOT Supervision with HYPERBARIC CHAMBER 2   [facility] ([address] [place] [address])    [address]  [facility] [address]   [phone]            2017  9:30 AM   HBOT Supervision with HYPERBARIC CHAMBER 2   [facility] ([address] [place] [address])    [address]  [facility] [address]   [phone]            2017  9:30 AM   HBOT Supervision with HYPERBARIC CHAMBER 2   [facility] ([address] [place] [address])    [address]  [facility] [address]   [phone]            2017  9:30 AM   HBOT Supervision with HYPERBARIC CHAMBER 2   [facility] ([address])    [address]  [place] [address]   [phone]            Apr 17, 2017  9:30 AM   HBOT Supervision with HYPERBARIC CHAMBER 2   [facility] ([address])    [address]  [place] [address]   [phone]            Apr 18, 2017  9:30 AM   HBOT Supervision with HYPERBARIC CHAMBER 2   [facility] ([address])    [address]  [place] [address]   [phone]            Apr 19, 2017  9:30 AM   HBOT Supervision with HYPERBARIC CHAMBER 2   [facility] ([address])    [address]  [place] [address]   [phone]            Apr 20, 2017  9:30 AM   HBOT Supervision with HYPERBARIC CHAMBER 2   [facility] ([address])    [address]  [place] [address]   [phone]            Apr 21, 2017  9:30 AM   HBOT Supervision with HYPERBARIC CHAMBER 2   [facility] ([address])    [address]  [place] [address]   [phone]            Apr 24, 2017  9:30 AM   HBOT Supervision with HYPERBARIC CHAMBER 2   [facility] ([address])    [address]  [place] [address]   [phone]            Apr 25, 2017  9:30 AM   HBOT Supervision with HYPERBARIC CHAMBER 2   [facility] ([address])    [address]  [place] [address]   [phone]            Apr 26, 2017  9:30 AM   HBOT Supervision with HYPERBARIC CHAMBER 2   [facility] ([address])    [address]  [place] [address]   [phone]            Apr 27, 2017  9:30 AM   HBOT Supervision with HYPERBARIC CHAMBER 2   [facility] ([address])    [address]  [place] [address]   [phone]            Apr 28, 2017  9:30 AM   HBOT Supervision with HYPERBARIC CHAMBER 2   [facility] ([address])    [address]  [place] [address]   [phone]            May 01, 2017  9:30 AM   HBOT Supervision with HYPERBARIC CHAMBER 2   [facility] ([address])    [address]  [place] [address]   [phone]            May 02, 2017  9:30 AM   HBOT Supervision with HYPERBARIC CHAMBER 2   [facility] ([address])    [address]  [place] [address]   [phone]            May 03, 2017  9:30 AM   HBOT Supervision with HYPERBARIC CHAMBER 2   [facility] ([address])    [address]  [place] [address]   [phone]            May 04, 2017  9:30 AM   HBOT Supervision with HYPERBARIC CHAMBER 2   [facility] ([address])    [address]  [place] [address]   [phone]            May 05, 2017  9:30 AM   HBOT Supervision with HYPERBARIC CHAMBER 2   [facility] ([address])    [address]  [place] [address]   [phone]            May 08, 2017  9:30 AM   HBOT Supervision with HYPERBARIC CHAMBER 2   [facility] ([address])    [address]  [place] [address]   [phone]            May 09, 2017  9:30 AM   HBOT Supervision with HYPERBARIC CHAMBER 2   [facility] ([address])    [address]  [place] [address]   [phone]            May 10, 2017  9:30 AM   HBOT Supervision with HYPERBARIC CHAMBER 2   [facility] ([address])    [address]  [place] [address]   [phone]            May 11, 2017  9:30 AM   HBOT Supervision with HYPERBARIC CHAMBER 2   [facility] ([address])    [address]  [place] [address]   [phone]            May 12, 2017  9:30 AM   HBOT Supervision with HYPERBARIC CHAMBER 2   [facility] ([address])    [address]  [place] [address]   [phone]            May 15, 2017  9:30 AM   HBOT Supervision with HYPERBARIC CHAMBER 2   [facility] ([address])    [address]  [place] [address]   [phone]            May 16, 2017  9:30 AM   HBOT Supervision with HYPERBARIC CHAMBER 2   [facility] ([address])    [address]  [place] [address]   [phone]            May 17, 2017  9:30 AM   HBOT Supervision with HYPERBARIC CHAMBER 2   [facility] ([address])    [address]  [place] [address]   [phone]            May 18, 2017  9:30 AM   HBOT Supervision with HYPERBARIC CHAMBER 2   [facility] ([address])    [address]  [place] [address]   [phone]            May 19, 2017  9:30 AM   HBOT Supervision with HYPERBARIC CHAMBER 2   [facility] ([address])    [address]  [place] [address]   [phone]            May 22, 2017  9:30 AM   HBOT Supervision with HYPERBARIC CHAMBER 2   [facility] ([address])    [address]  [place] [address]   [phone]            May 23, 2017  9:30 AM   HBOT Supervision with HYPERBARIC CHAMBER 2   [facility] ([address])    [address]  [place] [address]   [phone]            May 24, 2017  8:00 AM   HBOT Supervision with HYPERBARIC CHAMBER 1   [facility] ([address])    [address]  [place] [address]   [phone]            May 25, 2017  8:00 AM   HBOT Supervision with HYPERBARIC CHAMBER 1   [facility] ([address])    [address]  [place] [address]   [phone]            May 26, 2017  8:00 AM   HBOT Supervision with HYPERBARIC CHAMBER 1   [facility] ([address])    [address]  [place] [address]   [phone]              Problem List              ICD-10-CM Priority Class Noted - Resolved    DVT of lower extremity (deep venous thrombosis) (CMS-HCC) I82.409   9/2/2010 - Present    DM (diabetes mellitus) (CMS-HCC) E11.9 Medium  9/2/2010 - Present    HTN (hypertension) I10 Medium  9/2/2010 - Present    Spinal stenosis, lumbar region, without neurogenic claudication M48.06 Low  12/13/2012 - Present    Senile nuclear sclerosis H25.10   2/13/2013 - Present    Blood in stool K92.1   6/5/2013 - Present    Diarrhea following gastrointestinal surgery R19.7, Z98.890   6/23/2014 - Present    History of prostatectomy Z90.79   5/8/2015 - Present    Ankle fracture, right S82.891A   10/28/2015 - Present    Chronic kidney disease (CKD), stage III (moderate) N18.3   7/21/2016 - Present    Neuropathy (CMS-HCC) G62.9   7/21/2016 - Present    Depression F32.9   7/21/2016 - Present    AK (actinic keratosis) L57.0   7/21/2016 - Present    Hyperlipidemia E78.5   7/21/2016 - Present    Health care maintenance Z00.00   8/30/2016 - Present    History of prostate cancer Z85.46   3/24/2017 - Present    History of radiation therapy Z92.3   3/24/2017 - Present    Type 2 diabetes mellitus with stage 3 chronic kidney disease, with long-term current use of insulin ([facility], [facility]) E11.22, N18.3, Z79.4   3/24/2017 - Present    Continuous leakage of urine N39.45   3/24/2017 - Present    History of DVT (deep vein thrombosis) Z86.718   3/24/2017 - Present    Other specified disorders of the skin and subcutaneous tissue related to radiation L59.8   3/29/2017 - Present    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   3/29/2017 - Present    Hematuria R31.9   3/29/2017 - Present    Pressure-related ear pain T70.0XXA   4/4/2017 - Present      Health Maintenance        Date Due Completion Dates    IMM ZOSTER VACCINE 11/28/1996 ---    IMM DTaP/Tdap/Td Vaccine (1 - Tdap) 8/8/2010 8/7/2010    URINE  ACR / MICROALBUMIN 1/16/2013 1/16/2012    IMM PNEUMOCOCCAL 65+ (ADULT) LOW/MEDIUM RISK SERIES (2 of 2 - PCV13) 12/13/2013 12/13/2012    A1C SCREENING 2/22/2017 8/22/2016, 1/16/2015, 6/23/2014, 1/16/2012, 9/2/2010    FASTING LIPID PROFILE 8/22/2017 8/22/2016, 1/16/2012, 9/2/2010    SERUM CREATININE 8/22/2017 8/22/2016, 7/12/2016, 8/3/2015, 7/22/2015, 1/16/2015, 6/25/2014, 6/24/2014, 6/23/2014, 5/29/2014, 5/27/2014, 5/26/2014, 5/22/2014, 5/14/2014, 4/12/2014, 3/24/2014, 3/17/2014, 6/5/2013, 5/31/2013, 12/26/2012, 12/22/2012, 12/17/2012, 12/14/2012, 12/3/2012, 9/17/2012, 3/5/2012, 1/16/2012, 1/26/2011, 9/18/2010, 9/17/2010, 9/2/2010, 9/1/2010, 5/24/2010, 1/15/2010, 2/8/2005    DIABETES MONOFILAMENT / LE EXAM 8/30/2017 8/30/2016 (N/S)    Override on 8/30/2016: (N/S)    RETINAL SCREENING 10/10/2017 10/10/2016    COLONOSCOPY 6/5/2023 6/5/2013, 6/5/2013 (N/S)    Override on 6/5/2013: (N/S) (GIC)            Results     POCT Glucose      Component Value Standard Range & Units    Glucose - Accu-Ck 173 70 - 100 mg/dL                        Current Immunizations     Influenza TIV (IM) 9/25/2016, 10/21/2013, 10/1/2012    Influenza Vaccine Quad Inj (Preserved) 9/21/2015    Pneumococcal polysaccharide vaccine (PPSV-23) 12/13/2012  7:30 PM    TD Vaccine 8/7/2010 12:38 PM      Below and/or attached are the medications your provider expects you to take. Review all of your home medications and newly ordered medications with your provider and/or pharmacist. Follow medication instructions as directed by your provider and/or pharmacist. Please keep your medication list with you and share with your provider. Update the information when medications are discontinued, doses are changed, or new medications (including over-the-counter products) are added; and carry medication information at all times in the event of emergency situations     Allergies:  AUGMENTIN - Unspecified     LATEX - Unspecified     TAPE - Rash               Medications   Valid as of: April 10, 2017 -  5:16 PM    Generic Name Brand Name Tablet Size Instructions for use    Ascorbic Acid (Tab) ascorbic acid 500 MG Take 500 mg by mouth every day.        Cholecalciferol (Tab) vitamin D 2000 UNIT Take 2,000 Units by mouth every day.        Clobetasol Propionate (Cream) TEMOVATE 0.05 %         Cyanocobalamin (Tab CR) B-12 1000 MCG Take  by mouth.        Ferrous Sulfate (Tab) ferrous sulfate 325 (65 FE) MG Take 325 mg by mouth 2 Times a Day.        FLUoxetine HCl (Cap) PROZAC 20 MG Take 20 mg by mouth every day.        GlipiZIDE (Tab) GLUCOTROL 10 MG Take 10 mg by mouth 2 times a day. One po am, 1/2 po pm        HydroCHLOROthiazide (Tab) HYDRODIURIL 12.5 MG Take 12.5 mg by mouth every day.        Insulin NPH Human (Isophane) (Suspension) HUMULIN,NOVOLIN 100 UNIT/ML Inject 10 Units as instructed Once. Before dinner        Lisinopril (Tab) PRINIVIL 5 MG Take 2.5 mg by mouth every day. Indications: High Blood Pressure        MetFORMIN HCl (Tab) GLUCOPHAGE 500 MG Take 500 mg by mouth 2 times a day, with meals.        Pantoprazole Sodium (Tablet Delayed Response) PROTONIX 40 MG Take 40 mg by mouth every day.        SAXagliptin HCl (Tab) SAXagliptin HCl 5 MG Take 5 mg by mouth every day.        Simvastatin (Tab) ZOCOR 40 MG Take 40 mg by mouth every evening. Continue as per Dr. Mckeon. Continue as per Dr. Bobo.        .                 Medicines prescribed today were sent to:     [id number] - [place] - [address]    [address] [place] [address]    Phone: [phone] Fax: [phone]    Open 24 Hours?: No      Medication refill instructions:       If your prescription bottle indicates you have medication refills left, it is not necessary to call your provider’s office. Please contact your pharmacy and they will refill your medication.    If your prescription bottle indicates you do not have any refills left, you may request refills at any time through one of the  following ways: The online [facility] ([facility]) system (except Urgent Care), by calling your provider’s office, or by asking your pharmacy to contact your provider’s office with a refill request. Medication refills are processed only during regular business hours and may not be available until the next business day. Your provider may request additional information or to have a follow-up visit with you prior to refilling your medication.   *Please Note: Medication refills are assigned a new Rx number when refilled electronically. Your pharmacy may indicate that no refills were authorized even though a new prescription for the same medication is available at the pharmacy. Please request the medicine by name with the pharmacy before contacting your provider for a refill.        Instructions    After hyperbaric oxygen therapy treatment, it is normal to experience some congestion to the ears, muffling of sounds, or abnormal sounds to one or both ears.    If you experience pain or discomfort to one or both ears that does not resolve spontaneously, please contact the hyperbaric department at [phone].            [facility] ([facility]) Access Code: Activation code not generated  Current [facility] ([facility]) Status: Active

## 2017-04-11 ENCOUNTER — OFFICE VISIT (OUTPATIENT)
Dept: WOUND CARE | Facility: MEDICAL CENTER | Age: 82
End: 2017-04-11
Attending: UROLOGY
Payer: MEDICARE

## 2017-04-11 VITALS
DIASTOLIC BLOOD PRESSURE: 71 MMHG | TEMPERATURE: 98 F | SYSTOLIC BLOOD PRESSURE: 159 MMHG | HEART RATE: 91 BPM | OXYGEN SATURATION: 92 %

## 2017-04-11 DIAGNOSIS — E11.22 TYPE 2 DIABETES MELLITUS WITH STAGE 3 CHRONIC KIDNEY DISEASE, WITH LONG-TERM CURRENT USE OF INSULIN (HCC): ICD-10-CM

## 2017-04-11 DIAGNOSIS — Z79.4 TYPE 2 DIABETES MELLITUS WITH STAGE 3 CHRONIC KIDNEY DISEASE, WITH LONG-TERM CURRENT USE OF INSULIN (HCC): ICD-10-CM

## 2017-04-11 DIAGNOSIS — I15.8 OTHER SECONDARY HYPERTENSION: ICD-10-CM

## 2017-04-11 DIAGNOSIS — N39.45 CONTINUOUS LEAKAGE OF URINE: ICD-10-CM

## 2017-04-11 DIAGNOSIS — K92.1 BLOOD IN STOOL: ICD-10-CM

## 2017-04-11 DIAGNOSIS — N18.30 TYPE 2 DIABETES MELLITUS WITH STAGE 3 CHRONIC KIDNEY DISEASE, WITH LONG-TERM CURRENT USE OF INSULIN (HCC): ICD-10-CM

## 2017-04-11 DIAGNOSIS — R31.9 HEMATURIA: ICD-10-CM

## 2017-04-11 DIAGNOSIS — N30.40 RADIATION CYSTITIS: ICD-10-CM

## 2017-04-11 DIAGNOSIS — L59.8 OTHER SPECIFIED DISORDERS OF THE SKIN AND SUBCUTANEOUS TISSUE RELATED TO RADIATION: ICD-10-CM

## 2017-04-11 LAB — GLUCOSE BLD-MCNC: 173 MG/DL (ref 70–100)

## 2017-04-11 PROCEDURE — 82948 REAGENT STRIP/BLOOD GLUCOSE: CPT | Performed by: FAMILY MEDICINE

## 2017-04-11 PROCEDURE — 82962 GLUCOSE BLOOD TEST: CPT | Performed by: FAMILY MEDICINE

## 2017-04-11 PROCEDURE — 99183 HYPERBARIC OXYGEN THERAPY: CPT | Performed by: FAMILY MEDICINE

## 2017-04-11 PROCEDURE — G0277 HBOT, FULL BODY CHAMBER, 30M: HCPCS | Performed by: FAMILY MEDICINE

## 2017-04-11 PROCEDURE — G8417 CALC BMI ABV UP PARAM F/U: HCPCS | Performed by: FAMILY MEDICINE

## 2017-04-11 ASSESSMENT — ENCOUNTER SYMPTOMS
PALPITATIONS: 0
CONSTIPATION: 1
HEARTBURN: 1
COUGH: 0
BLOOD IN STOOL: 1
DIARRHEA: 1
BLURRED VISION: 0
CHILLS: 0
FALLS: 1
DOUBLE VISION: 0
FEVER: 0
HEADACHES: 0
BACK PAIN: 1
DEPRESSION: 1

## 2017-04-11 ASSESSMENT — PAIN SCALES - GENERAL: PAINLEVEL_OUTOF13: 0

## 2017-04-11 NOTE — MR AVS SNAPSHOT
Selvin Braga   2017 9:30 AM   Office Visit   MRN: [id number]    Department:  [facility]   Dept Phone:  [phone]    Description:  Male : 1935   Provider:  Ofelia Reed M.D.           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   [7396345]       Radiation cystitis   [661271]       Hematuria   [9541760]       Continuous leakage of urine   [173114]       Blood in stool   [578.1.ICD-9-CM]       Type 2 diabetes mellitus with stage 3 chronic kidney disease, with long-term current use of insulin ([facility], [facility])   [3126818]       Other secondary hypertension   [9534242]         Vital Signs     Blood Pressure Pulse Temperature Oxygen Saturation Smoking Status       159/71 mmHg 91 36.7 °C (98 °F) 92% Former Smoker       Basic Information     Date Of Birth Sex Race Ethnicity Preferred Language    1935 Male White Non- English      Your appointments     2017  9:30 AM   HBOT Supervision with HYPERBARIC CHAMBER 2   [facility] ([address] [place] [address])    [address]  [place] [address]   [phone]            2017  9:30 AM   HBOT Supervision with HYPERBARIC CHAMBER 2   [facility] ([address] [place] [address])    [address]  [place] [address]   [phone]            2017  9:30 AM   HBOT Supervision with HYPERBARIC CHAMBER 2   [facility] ([address] [place] [address])    [address]  [facility] [address]   [phone]            2017  9:30 AM   HBOT Supervision with HYPERBARIC CHAMBER 2   [facility] ([address] [place] [address])    [address]  [place] [address]   [phone]            Apr 18, 2017  9:30 AM   HBOT Supervision with HYPERBARIC CHAMBER 2   [facility] ([address])    [address]  [place] [address]   [phone]            Apr 19, 2017  9:30 AM   HBOT Supervision with HYPERBARIC CHAMBER 2   [facility] ([address])    [address]  [place] [address]   [phone]            Apr 20, 2017  9:30 AM   HBOT Supervision with HYPERBARIC CHAMBER 2   [facility] ([address])    [address]  [place] [address]   [phone]            Apr 21, 2017  9:30 AM   HBOT Supervision with HYPERBARIC CHAMBER 2   [facility] ([address])    [address]  [place] [address]   [phone]            Apr 24, 2017  9:30 AM   HBOT Supervision with HYPERBARIC CHAMBER 2   [facility] ([address])    [address]  [place] [address]   [phone]            Apr 25, 2017  9:30 AM   HBOT Supervision with HYPERBARIC CHAMBER 2   [facility] ([address])    [address]  [place] [address]   [phone]            Apr 26, 2017  9:30 AM   HBOT Supervision with HYPERBARIC CHAMBER 2   [facility] ([address])    [address]  [place] [address]   [phone]            Apr 27, 2017  9:30 AM   HBOT Supervision with HYPERBARIC CHAMBER 2   [facility] ([address])    [address]  [place] [address]   [phone]            Apr 28, 2017  9:30 AM   HBOT Supervision with HYPERBARIC CHAMBER 2   [facility] ([address])    [address]  [place] [address]   [phone]            May 01, 2017  9:30 AM   HBOT Supervision with HYPERBARIC CHAMBER 2   [facility] ([address])    [address]  [place] [address]   [phone]            May 02, 2017  9:30 AM   HBOT Supervision with HYPERBARIC CHAMBER 2   [facility] ([address])    [address]  [place] [address]   [phone]            May 03, 2017  9:30 AM   HBOT Supervision with HYPERBARIC CHAMBER 2   [facility] ([address])    [address]  [place] [address]   [phone]            May 04, 2017  9:30 AM   HBOT Supervision with HYPERBARIC CHAMBER 2   [facility] ([address])    [address]  [place] [address]   [phone]            May 05, 2017  9:30 AM   HBOT Supervision with HYPERBARIC CHAMBER 2   [facility] ([address])    [address]  [place] [address]   [phone]            May 08, 2017  9:30 AM   HBOT Supervision with HYPERBARIC CHAMBER 2   [facility] ([address])    [address]  [place] [address]   [phone]            May 09, 2017  9:30 AM   HBOT Supervision with HYPERBARIC CHAMBER 2   [facility] ([address])    [address]  [place] [address]   [phone]            May 10, 2017  9:30 AM   HBOT Supervision with HYPERBARIC CHAMBER 2   [facility] ([address])    [address]  [place] [address]   [phone]            May 11, 2017  9:30 AM   HBOT Supervision with HYPERBARIC CHAMBER 2   [facility] ([address])    [address]  [place] [address]   [phone]            May 12, 2017  9:30 AM   HBOT Supervision with HYPERBARIC CHAMBER 2   [facility] ([address])    [address]  [place] [address]   [phone]            May 15, 2017  9:30 AM   HBOT Supervision with HYPERBARIC CHAMBER 2   [facility] ([address])    [address]  [place] [address]   [phone]            May 16, 2017  9:30 AM   HBOT Supervision with HYPERBARIC CHAMBER 2   [facility] ([facility] [address])    [address]  [place] [address]   [phone]            May 17, 2017  9:30 AM   HBOT Supervision with HYPERBARIC CHAMBER 2   [facility] ([facility] [address])    [address]  [place] [address]   [phone]            May 18, 2017  9:30 AM   HBOT Supervision with HYPERBARIC CHAMBER 2   [facility] ([facility] [address])    [address]  [place] [address]   [phone]            May 19, 2017  9:30 AM   HBOT Supervision with HYPERBARIC CHAMBER 2   [facility] ([facility] [address])    [address]  [place] [address]   [phone]            May 22, 2017  9:30 AM   HBOT Supervision with HYPERBARIC CHAMBER 2   [facility] ([facility] [address])    [address]  [place] [address]   [phone]            May 23, 2017  9:30 AM   HBOT Supervision with HYPERBARIC CHAMBER 2   [facility] ([facility] [address])    [address]  [place] [address]   [phone]            May 24, 2017  8:00 AM   HBOT Supervision with HYPERBARIC CHAMBER 1   [facility] ([facility] [address])    [address]  [place] [address]   [phone]            May 25, 2017  8:00 AM   HBOT Supervision with HYPERBARIC CHAMBER 1   [facility] ([facility] [address])    [address]  [place] [address]   [phone]            May 26, 2017  8:00 AM   HBOT Supervision with HYPERBARIC CHAMBER 1   [facility] ([facility] [address])    [address]  [place] [address]   [phone]              Problem List              ICD-10-CM Priority Class Noted - Resolved    DVT of lower extremity (deep venous thrombosis) (CMS-HCC)  I82.409   9/2/2010 - Present    DM (diabetes mellitus) (CMS-HCC) E11.9 Medium  9/2/2010 - Present    HTN (hypertension) I10 Medium  9/2/2010 - Present    Spinal stenosis, lumbar region, without neurogenic claudication M48.06 Low  12/13/2012 - Present    Senile nuclear sclerosis H25.10   2/13/2013 - Present    Blood in stool K92.1   6/5/2013 - Present    Diarrhea following gastrointestinal surgery R19.7, Z98.890   6/23/2014 - Present    History of prostatectomy Z90.79   5/8/2015 - Present    Ankle fracture, right S82.891A   10/28/2015 - Present    Chronic kidney disease (CKD), stage III (moderate) N18.3   7/21/2016 - Present    Neuropathy (CMS-HCC) G62.9   7/21/2016 - Present    Depression F32.9   7/21/2016 - Present    AK (actinic keratosis) L57.0   7/21/2016 - Present    Hyperlipidemia E78.5   7/21/2016 - Present    Health care maintenance Z00.00   8/30/2016 - Present    History of prostate cancer Z85.46   3/24/2017 - Present    History of radiation therapy Z92.3   3/24/2017 - Present    Type 2 diabetes mellitus with stage 3 chronic kidney disease, with long-term current use of insulin ([facility]) E11.22, N18.3, Z79.4   3/24/2017 - Present    Continuous leakage of urine N39.45   3/24/2017 - Present    History of DVT (deep vein thrombosis) Z86.718   3/24/2017 - Present    Other specified disorders of the skin and subcutaneous tissue related to radiation L59.8   3/29/2017 - Present    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   3/29/2017 - Present    Hematuria R31.9   3/29/2017 - Present    Pressure-related ear pain T70.0XXA   4/4/2017 - Present      Health Maintenance        Date Due Completion Dates    IMM ZOSTER VACCINE 11/28/1996 ---    IMM DTaP/Tdap/Td Vaccine (1 - Tdap) 8/8/2010 8/7/2010    URINE ACR / MICROALBUMIN 1/16/2013 1/16/2012    IMM PNEUMOCOCCAL 65+ (ADULT) LOW/MEDIUM RISK SERIES (2 of 2 - PCV13) 12/13/2013 12/13/2012    A1C SCREENING 2/22/2017 8/22/2016, 1/16/2015, 6/23/2014, 1/16/2012, 9/2/2010    FASTING LIPID PROFILE  8/22/2017 8/22/2016, 1/16/2012, 9/2/2010    SERUM CREATININE 8/22/2017 8/22/2016, 7/12/2016, 8/3/2015, 7/22/2015, 1/16/2015, 6/25/2014, 6/24/2014, 6/23/2014, 5/29/2014, 5/27/2014, 5/26/2014, 5/22/2014, 5/14/2014, 4/12/2014, 3/24/2014, 3/17/2014, 6/5/2013, 5/31/2013, 12/26/2012, 12/22/2012, 12/17/2012, 12/14/2012, 12/3/2012, 9/17/2012, 3/5/2012, 1/16/2012, 1/26/2011, 9/18/2010, 9/17/2010, 9/2/2010, 9/1/2010, 5/24/2010, 1/15/2010, 2/8/2005    DIABETES MONOFILAMENT / LE EXAM 8/30/2017 8/30/2016 (N/S)    Override on 8/30/2016: (N/S)    RETINAL SCREENING 10/10/2017 10/10/2016    COLONOSCOPY 6/5/2023 6/5/2013, 6/5/2013 (N/S)    Override on 6/5/2013: (N/S) (GIC)            Results     POCT Glucose      Component Value Standard Range & Units    Glucose - Accu-Ck 173 70 - 100 mg/dL                        Current Immunizations     Influenza TIV (IM) 9/25/2016, 10/21/2013, 10/1/2012    Influenza Vaccine Quad Inj (Preserved) 9/21/2015    Pneumococcal polysaccharide vaccine (PPSV-23) 12/13/2012  7:30 PM    TD Vaccine 8/7/2010 12:38 PM      Below and/or attached are the medications your provider expects you to take. Review all of your home medications and newly ordered medications with your provider and/or pharmacist. Follow medication instructions as directed by your provider and/or pharmacist. Please keep your medication list with you and share with your provider. Update the information when medications are discontinued, doses are changed, or new medications (including over-the-counter products) are added; and carry medication information at all times in the event of emergency situations     Allergies:  AUGMENTIN - Unspecified     LATEX - Unspecified     TAPE - Rash               Medications  Valid as of: April 11, 2017 - 11:56 AM    Generic Name Brand Name Tablet Size Instructions for use    Ascorbic Acid (Tab) ascorbic acid 500 MG Take 500 mg by mouth every day.        Cholecalciferol (Tab) vitamin D 2000 UNIT Take 2,000  Units by mouth every day.        Clobetasol Propionate (Cream) TEMOVATE 0.05 %         Cyanocobalamin (Tab CR) B-12 1000 MCG Take  by mouth.        Ferrous Sulfate (Tab) ferrous sulfate 325 (65 FE) MG Take 325 mg by mouth 2 Times a Day.        FLUoxetine HCl (Cap) PROZAC 20 MG Take 20 mg by mouth every day.        GlipiZIDE (Tab) GLUCOTROL 10 MG Take 10 mg by mouth 2 times a day. One po am, 1/2 po pm        HydroCHLOROthiazide (Tab) HYDRODIURIL 12.5 MG Take 12.5 mg by mouth every day.        Insulin NPH Human (Isophane) (Suspension) HUMULIN,NOVOLIN 100 UNIT/ML Inject 10 Units as instructed Once. Before dinner        Lisinopril (Tab) PRINIVIL 5 MG Take 2.5 mg by mouth every day. Indications: High Blood Pressure        MetFORMIN HCl (Tab) GLUCOPHAGE 500 MG Take 500 mg by mouth 2 times a day, with meals.        Pantoprazole Sodium (Tablet Delayed Response) PROTONIX 40 MG Take 40 mg by mouth every day.        SAXagliptin HCl (Tab) SAXagliptin HCl 5 MG Take 5 mg by mouth every day.        Simvastatin (Tab) ZOCOR 40 MG Take 40 mg by mouth every evening. Continue as per Dr. Mckeon. Continue as per Dr. Bobo.        .                 Medicines prescribed today were sent to:     [id number] - [place] - [address] [facility]    [address] [place] [address]    Phone: [phone] Fax: [phone]    Open 24 Hours?: No      Medication refill instructions:       If your prescription bottle indicates you have medication refills left, it is not necessary to call your provider’s office. Please contact your pharmacy and they will refill your medication.    If your prescription bottle indicates you do not have any refills left, you may request refills at any time through one of the following ways: The online [employer] system (except Urgent Care), by calling your provider’s office, or by asking your pharmacy to contact your provider’s office with a refill request. Medication refills are processed only during regular  business hours and may not be available until the next business day. Your provider may request additional information or to have a follow-up visit with you prior to refilling your medication.   *Please Note: Medication refills are assigned a new Rx number when refilled electronically. Your pharmacy may indicate that no refills were authorized even though a new prescription for the same medication is available at the pharmacy. Please request the medicine by name with the pharmacy before contacting your provider for a refill.        Instructions    After hyperbaric oxygen therapy treatment, it is normal to experience some congestion to the ears, muffling of sounds, or abnormal sounds to one or both ears.    If you experience pain or discomfort to one or both ears that does not resolve spontaneously, please contact the hyperbaric department at [phone].            Referly Access Code: Activation code not generated  Current Referly Status: Active

## 2017-04-11 NOTE — PATIENT INSTRUCTIONS
After hyperbaric oxygen therapy treatment, it is normal to experience some congestion to the ears, muffling of sounds, or abnormal sounds to one or both ears.    If you experience pain or discomfort to one or both ears that does not resolve spontaneously, please contact the hyperbaric department at [phone].

## 2017-04-11 NOTE — PATIENT INSTRUCTIONS
After hyperbaric oxygen therapy treatment, it is normal to experience some congestion to the ears, muffling of sounds, or abnormal sounds to one or both ears.    If you experience pain or discomfort to one or both ears that does not resolve spontaneously, please contact the hyperbaric department at [phone].

## 2017-04-11 NOTE — PROGRESS NOTES
Subjective:   Selvin Braga is a 81 y.o. 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 He is tolerating treatments well now with his left myringotomy tube.  He was able to clear his ears and denied any pain or pressure.     His glucose was adequate to prevent hypoglycemia.    Review of Systems   Constitutional: Negative for fever and chills.   HENT: Negative for ear discharge, ear pain and hearing loss.    Eyes: Negative for blurred vision and double vision.   Respiratory: Negative for cough.    Cardiovascular: Negative for chest pain and palpitations.   Gastrointestinal: Positive for heartburn, diarrhea, constipation and blood in stool.   Genitourinary: Positive for frequency.   Musculoskeletal: Positive for back pain, joint pain and falls.   Skin: Positive for itching and rash.   Neurological: Negative for headaches.   Psychiatric/Behavioral: Positive for depression.          Objective:     /71 mmHg  Pulse 91  Temp(Src) 36.7 °C (98 °F)  SpO2 92%     Physical Exam   Constitutional: He is oriented to person, place, and time. He appears well-developed and well-nourished.   HENT:   Head: Normocephalic and atraumatic.   Right Ear: Tympanic membrane, external ear and ear canal normal.   Left Ear: Tympanic membrane, external ear and ear canal normal.   Left myringotomy tube.    Pulmonary/Chest: Effort normal.   Neurological: He is alert and oriented to person, place, and time.   Psychiatric: He has a normal mood and affect.   Vitals reviewed.         Assessment/Plan:     Other specified disorders of the skin and subcutaneous tissue related to radiation -   Appropriate candidate for hyperbaric oxygen therapy. Patient completed his 7th HBOT treatment at a pressure of 2.4 HANNA x 90 minutes at a descent rate of 1.5 PSI / min and ascent rate of 3.0 PSI/minute. Will continue the  patient with HBOT at a pressure of 2.4 HANNA x 90 minutes x 40 treatments on a 5x per week, M-F basis. Q 30 minute air breaks.   3/30 - held at a travel rate of 1.0 for ear discomfort. No worsening of L ear during treatment   3/31 - No significant change to L ear during treatment. 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 Has continued to experience muffling to that ear, but no further pain. Will continue to monitor and refer to ENT as needed.

## 2017-04-12 ENCOUNTER — OFFICE VISIT (OUTPATIENT)
Dept: WOUND CARE | Facility: MEDICAL CENTER | Age: 82
End: 2017-04-12
Attending: UROLOGY
Payer: MEDICARE

## 2017-04-12 VITALS
TEMPERATURE: 98.3 F | HEART RATE: 82 BPM | DIASTOLIC BLOOD PRESSURE: 70 MMHG | SYSTOLIC BLOOD PRESSURE: 138 MMHG | OXYGEN SATURATION: 96 %

## 2017-04-12 DIAGNOSIS — K92.1 BLOOD IN STOOL: ICD-10-CM

## 2017-04-12 DIAGNOSIS — E11.22 TYPE 2 DIABETES MELLITUS WITH STAGE 3 CHRONIC KIDNEY DISEASE, WITH LONG-TERM CURRENT USE OF INSULIN (HCC): ICD-10-CM

## 2017-04-12 DIAGNOSIS — G62.9 NEUROPATHY: ICD-10-CM

## 2017-04-12 DIAGNOSIS — R31.9 HEMATURIA: ICD-10-CM

## 2017-04-12 DIAGNOSIS — N18.30 TYPE 2 DIABETES MELLITUS WITH STAGE 3 CHRONIC KIDNEY DISEASE, WITH LONG-TERM CURRENT USE OF INSULIN (HCC): ICD-10-CM

## 2017-04-12 DIAGNOSIS — L59.8 OTHER SPECIFIED DISORDERS OF THE SKIN AND SUBCUTANEOUS TISSUE RELATED TO RADIATION: ICD-10-CM

## 2017-04-12 DIAGNOSIS — N39.45 CONTINUOUS LEAKAGE OF URINE: ICD-10-CM

## 2017-04-12 DIAGNOSIS — I15.8 OTHER SECONDARY HYPERTENSION: ICD-10-CM

## 2017-04-12 DIAGNOSIS — N30.40 RADIATION CYSTITIS: ICD-10-CM

## 2017-04-12 DIAGNOSIS — Z79.4 TYPE 2 DIABETES MELLITUS WITH STAGE 3 CHRONIC KIDNEY DISEASE, WITH LONG-TERM CURRENT USE OF INSULIN (HCC): ICD-10-CM

## 2017-04-12 LAB
GLUCOSE BLD-MCNC: 187 MG/DL (ref 70–100)
GLUCOSE BLD-MCNC: 187 MG/DL (ref 70–100)

## 2017-04-12 PROCEDURE — 82962 GLUCOSE BLOOD TEST: CPT | Performed by: FAMILY MEDICINE

## 2017-04-12 PROCEDURE — 99183 HYPERBARIC OXYGEN THERAPY: CPT | Performed by: FAMILY MEDICINE

## 2017-04-12 PROCEDURE — 82948 REAGENT STRIP/BLOOD GLUCOSE: CPT | Performed by: FAMILY MEDICINE

## 2017-04-12 PROCEDURE — G0277 HBOT, FULL BODY CHAMBER, 30M: HCPCS | Performed by: FAMILY MEDICINE

## 2017-04-12 PROCEDURE — G8417 CALC BMI ABV UP PARAM F/U: HCPCS | Performed by: FAMILY MEDICINE

## 2017-04-12 ASSESSMENT — ENCOUNTER SYMPTOMS
FALLS: 1
DOUBLE VISION: 0
DEPRESSION: 1
HEADACHES: 0
CHILLS: 0
BACK PAIN: 1
COUGH: 0
HEARTBURN: 1
DIARRHEA: 1
FEVER: 0
PALPITATIONS: 0
SHORTNESS OF BREATH: 0
CONSTIPATION: 1
BLURRED VISION: 0

## 2017-04-12 ASSESSMENT — PAIN SCALES - GENERAL: PAINLEVEL_OUTOF13: 0

## 2017-04-12 NOTE — PATIENT INSTRUCTIONS
After hyperbaric oxygen therapy treatment, it is normal to experience some congestion to the ears, muffling of sounds, or abnormal sounds to one or both ears.    If you experience pain or discomfort to one or both ears that does not resolve spontaneously, please contact the hyperbaric department at [phone].

## 2017-04-12 NOTE — PROGRESS NOTES
Subjective:   Selvin Braga is a 81 y.o. 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 He continues to tolerate treatment   well and is able to clear his ears and denied any pain or pressure.     His glucose was adequate to prevent hypoglycemia.    Review of Systems   Constitutional: Negative for fever and chills.   HENT: Negative for ear discharge, ear pain and hearing loss.    Eyes: Negative for blurred vision and double vision.   Respiratory: Negative for cough and shortness of breath.    Cardiovascular: Negative for chest pain and palpitations.   Gastrointestinal: Positive for heartburn, diarrhea and constipation.   Genitourinary: Positive for frequency.   Musculoskeletal: Positive for back pain, joint pain and falls.   Skin: Positive for itching and rash.   Neurological: Negative for headaches.   Psychiatric/Behavioral: Positive for depression.          Objective:     /70 mmHg  Pulse 82  Temp(Src) 36.8 °C (98.3 °F)  SpO2 96%     Physical Exam   Constitutional: He is oriented to person, place, and time. He appears well-developed and well-nourished.   HENT:   Head: Normocephalic and atraumatic.   Right Ear: Tympanic membrane and ear canal normal.   Left Ear: Tympanic membrane and ear canal normal.   Left myringotomy tube.    Pulmonary/Chest: Effort normal.   Neurological: He is oriented to person, place, and time.   Psychiatric: He has a normal mood and affect.   Vitals reviewed.        Assessment/Plan:     Other specified disorders of the skin and subcutaneous tissue related to radiation -   Appropriate candidate for hyperbaric oxygen therapy. Patient completed his 8th HBOT treatment at a pressure of 2.4 HANNA x 90 minutes at a descent rate of 1.5 PSI / min and ascent rate of 3.0 PSI/minute. Will continue the patient with HBOT at a pressure of 2.4 HANNA x 90 minutes x 40  treatments on a 5x per week, M-F basis. Q 30 minute air breaks.     3/30 - held at a travel rate of 1.0 for ear discomfort. No worsening of L ear during treatment   3/31 - No significant change to L ear during treatment. 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 Has continued to experience muffling to that ear, but no further pain. Will continue to monitor and refer to ENT as needed.

## 2017-04-12 NOTE — MR AVS SNAPSHOT
Selvin Braga   2017 9:30 AM   Office Visit   MRN: [id number]    Department:  [facility]   Dept Phone:  [phone]    Description:  Male : 1935   Provider:  Ofelia Reed M.D.           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   [4776056]       Radiation cystitis   [095185]       Hematuria   [8482394]       Continuous leakage of urine   [331131]       Blood in stool   [578.1.ICD-9-CM]       Type 2 diabetes mellitus with stage 3 chronic kidney disease, with long-term current use of insulin (CMS-HCC)   [1957557]       Neuropathy (CMS-HCC)   [164569]       Other secondary hypertension   [5565480]         Vital Signs     Blood Pressure Pulse Temperature Oxygen Saturation Smoking Status       138/70 mmHg 82 36.8 °C (98.3 °F) 96% Former Smoker       Basic Information     Date Of Birth Sex Race Ethnicity Preferred Language    1935 Male White Non- English      Your appointments     2017  9:30 AM   HBOT Supervision with HYPERBARIC CHAMBER 2   [facility] ([address] [place] [address])    [address]  [facility] [address]   [phone]            2017  9:30 AM   HBOT Supervision with HYPERBARIC CHAMBER 2   [facility] ([address] [place] [address])    [address]  [facility] [address]   [phone]            2017  9:30 AM   HBOT Supervision with HYPERBARIC CHAMBER 2   [facility] ([address] [place] [address])    [address]  [facility] [address]   [phone]            2017  9:30 AM   HBOT Supervision with Ofelia Reed M.D.   [facility] ([address])    [address]  [place] [address]   [phone]            Apr 19, 2017  9:30 AM   HBOT Supervision with HYPERBARIC CHAMBER 2   [facility] ([address])    [address]  [place] [address]   [phone]            Apr 20, 2017  9:30 AM   HBOT Supervision with HYPERBARIC CHAMBER 2   [facility] ([address])    [address]  [place] [address]   [phone]            Apr 21, 2017  9:30 AM   HBOT Supervision with HYPERBARIC CHAMBER 2   [facility] ([address])    [address]  [place] [address]   [phone]            Apr 24, 2017  9:30 AM   HBOT Supervision with HYPERBARIC CHAMBER 2   [facility] ([address])    [address]  [place] [address]   [phone]            Apr 25, 2017  9:30 AM   HBOT Supervision with HYPERBARIC CHAMBER 2   [facility] ([address])    [address]  [place] [address]   [phone]            Apr 26, 2017  9:30 AM   HBOT Supervision with HYPERBARIC CHAMBER 2   [facility] ([address])    [address]  [place] [address]   [phone]            Apr 27, 2017  9:30 AM   HBOT Supervision with HYPERBARIC CHAMBER 2   [facility] ([address])    [address]  [place] [address]   [phone]            Apr 28, 2017  9:30 AM   HBOT Supervision with HYPERBARIC CHAMBER 2   [facility] ([address])    [address]  [place] [address]   [phone]            May 01, 2017  9:30 AM   HBOT Supervision with HYPERBARIC CHAMBER 2   [facility] ([address])    [address]  [place] [address]   [phone]            May 02, 2017  9:30 AM   HBOT Supervision with HYPERBARIC CHAMBER 2   [facility] ([address])    [address]  [place] [address]   [phone]            May 03, 2017  9:30 AM   HBOT Supervision with HYPERBARIC CHAMBER 2   [facility] ([address])    [address]  [place] [address]   [phone]            May 04, 2017  9:30 AM   HBOT Supervision with HYPERBARIC CHAMBER 2   [facility] ([address])    [address]  [place] [address]   [phone]            May 05, 2017  9:30 AM   HBOT Supervision with HYPERBARIC CHAMBER 2   [facility] ([address])    [address]  [place] [address]   [phone]            May 08, 2017  9:30 AM   HBOT Supervision with HYPERBARIC CHAMBER 2   [facility] ([address])    [address]  [place] [address]   [phone]            May 09, 2017  9:30 AM   HBOT Supervision with HYPERBARIC CHAMBER 2   [facility] ([address])    [address]  [place] [address]   [phone]            May 10, 2017  9:30 AM   HBOT Supervision with HYPERBARIC CHAMBER 2   [facility] ([address])    [address]  [place] [address]   [phone]            May 11, 2017  9:30 AM   HBOT Supervision with HYPERBARIC CHAMBER 2   [facility] ([address])    [address]  [place] [address]   [phone]            May 12, 2017  9:30 AM   HBOT Supervision with HYPERBARIC CHAMBER 2   [facility] ([address])    [address]  [place] [address]   [phone]            May 15, 2017  9:30 AM   HBOT Supervision with HYPERBARIC CHAMBER 2   [facility] ([address])    [address]  [place] [address]   [phone]            May 16, 2017  9:30 AM   HBOT Supervision with HYPERBARIC CHAMBER 2   [facility] ([facility] [address] [facility] [address])    [address]  [place] [address]   [phone]            May 17, 2017  9:30 AM   HBOT Supervision with HYPERBARIC CHAMBER 2   [facility] ([facility] [address])    [address]  [place] [address]   [phone]            May 18, 2017  9:30 AM   HBOT Supervision with HYPERBARIC CHAMBER 2   [facility] ([facility] [address])    [address]  [place] [address]   [phone]            May 19, 2017  9:30 AM   HBOT Supervision with HYPERBARIC CHAMBER 2   [facility] ([facility] [address])    [address]  [place] [address]   [phone]            May 22, 2017  9:30 AM   HBOT Supervision with HYPERBARIC CHAMBER 2   [facility] ([facility] [address])    [address]  [place] [address]   [phone]            May 23, 2017  9:30 AM   HBOT Supervision with HYPERBARIC CHAMBER 2   [facility] ([facility] [address])    [address]  [place] [address]   [phone]            May 24, 2017  8:00 AM   HBOT Supervision with HYPERBARIC CHAMBER 1   [facility] ([facility] [address])    [address]  [place] [address]   [phone]            May 25, 2017  8:00 AM   HBOT Supervision with HYPERBARIC CHAMBER 1   [facility] ([facility] [address])    [address]  [place] [address]   [phone]            May 26, 2017  8:00 AM   HBOT Supervision with HYPERBARIC CHAMBER 1   [facility] ([facility] [address])    [address]  [place] [address]   [phone]              Problem List              ICD-10-CM Priority Class Noted - Resolved    DVT of lower extremity (deep venous thrombosis) (CMS-HCC) I82.409   9/2/2010 - Present    DM (diabetes mellitus) (CMS-HCC) E11.9 Medium  9/2/2010 - Present    HTN (hypertension) I10 Medium  9/2/2010 - Present     Spinal stenosis, lumbar region, without neurogenic claudication M48.06 Low  12/13/2012 - Present    Senile nuclear sclerosis H25.10   2/13/2013 - Present    Blood in stool K92.1   6/5/2013 - Present    Diarrhea following gastrointestinal surgery R19.7, Z98.890   6/23/2014 - Present    History of prostatectomy Z90.79   5/8/2015 - Present    Ankle fracture, right S82.891A   10/28/2015 - Present    Chronic kidney disease (CKD), stage III (moderate) N18.3   7/21/2016 - Present    Neuropathy (CMS-HCC) G62.9   7/21/2016 - Present    Depression F32.9   7/21/2016 - Present    AK (actinic keratosis) L57.0   7/21/2016 - Present    Hyperlipidemia E78.5   7/21/2016 - Present    Health care maintenance Z00.00   8/30/2016 - Present    History of prostate cancer Z85.46   3/24/2017 - Present    History of radiation therapy Z92.3   3/24/2017 - Present    Type 2 diabetes mellitus with stage 3 chronic kidney disease, with long-term current use of insulin ([facility]) E11.22, N18.3, Z79.4   3/24/2017 - Present    Continuous leakage of urine N39.45   3/24/2017 - Present    History of DVT (deep vein thrombosis) Z86.718   3/24/2017 - Present    Other specified disorders of the skin and subcutaneous tissue related to radiation L59.8   3/29/2017 - Present    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   3/29/2017 - Present    Hematuria R31.9   3/29/2017 - Present    Pressure-related ear pain T70.0XXA   4/4/2017 - Present      Health Maintenance        Date Due Completion Dates    IMM ZOSTER VACCINE 11/28/1996 ---    IMM DTaP/Tdap/Td Vaccine (1 - Tdap) 8/8/2010 8/7/2010    URINE ACR / MICROALBUMIN 1/16/2013 1/16/2012    IMM PNEUMOCOCCAL 65+ (ADULT) LOW/MEDIUM RISK SERIES (2 of 2 - PCV13) 12/13/2013 12/13/2012    A1C SCREENING 2/22/2017 8/22/2016, 1/16/2015, 6/23/2014, 1/16/2012, 9/2/2010    FASTING LIPID PROFILE 8/22/2017 8/22/2016, 1/16/2012, 9/2/2010    SERUM CREATININE 8/22/2017 8/22/2016, 7/12/2016, 8/3/2015, 7/22/2015, 1/16/2015, 6/25/2014, 6/24/2014,  6/23/2014, 5/29/2014, 5/27/2014, 5/26/2014, 5/22/2014, 5/14/2014, 4/12/2014, 3/24/2014, 3/17/2014, 6/5/2013, 5/31/2013, 12/26/2012, 12/22/2012, 12/17/2012, 12/14/2012, 12/3/2012, 9/17/2012, 3/5/2012, 1/16/2012, 1/26/2011, 9/18/2010, 9/17/2010, 9/2/2010, 9/1/2010, 5/24/2010, 1/15/2010, 2/8/2005    DIABETES MONOFILAMENT / LE EXAM 8/30/2017 8/30/2016 (N/S)    Override on 8/30/2016: (N/S)    RETINAL SCREENING 10/10/2017 10/10/2016    COLONOSCOPY 6/5/2023 6/5/2013, 6/5/2013 (N/S)    Override on 6/5/2013: (N/S) (GIC)            Results     POCT Glucose      Component Value Standard Range & Units    Glucose - Accu-Ck 187 70 - 100 mg/dL                        Current Immunizations     Influenza TIV (IM) 9/25/2016, 10/21/2013, 10/1/2012    Influenza Vaccine Quad Inj (Preserved) 9/21/2015    Pneumococcal polysaccharide vaccine (PPSV-23) 12/13/2012  7:30 PM    TD Vaccine 8/7/2010 12:38 PM      Below and/or attached are the medications your provider expects you to take. Review all of your home medications and newly ordered medications with your provider and/or pharmacist. Follow medication instructions as directed by your provider and/or pharmacist. Please keep your medication list with you and share with your provider. Update the information when medications are discontinued, doses are changed, or new medications (including over-the-counter products) are added; and carry medication information at all times in the event of emergency situations     Allergies:  AUGMENTIN - Unspecified     LATEX - Unspecified     TAPE - Rash               Medications  Valid as of: April 12, 2017 - 11:34 AM    Generic Name Brand Name Tablet Size Instructions for use    Ascorbic Acid (Tab) ascorbic acid 500 MG Take 500 mg by mouth every day.        Cholecalciferol (Tab) vitamin D 2000 UNIT Take 2,000 Units by mouth every day.        Clobetasol Propionate (Cream) TEMOVATE 0.05 %         Cyanocobalamin (Tab CR) B-12 1000 MCG Take  by mouth.         Ferrous Sulfate (Tab) ferrous sulfate 325 (65 FE) MG Take 325 mg by mouth 2 Times a Day.        FLUoxetine HCl (Cap) PROZAC 20 MG Take 20 mg by mouth every day.        GlipiZIDE (Tab) GLUCOTROL 10 MG Take 10 mg by mouth 2 times a day. One po am, 1/2 po pm        HydroCHLOROthiazide (Tab) HYDRODIURIL 12.5 MG Take 12.5 mg by mouth every day.        Insulin NPH Human (Isophane) (Suspension) HUMULIN,NOVOLIN 100 UNIT/ML Inject 10 Units as instructed Once. Before dinner        Lisinopril (Tab) PRINIVIL 5 MG Take 2.5 mg by mouth every day. Indications: High Blood Pressure        MetFORMIN HCl (Tab) GLUCOPHAGE 500 MG Take 500 mg by mouth 2 times a day, with meals.        Pantoprazole Sodium (Tablet Delayed Response) PROTONIX 40 MG Take 40 mg by mouth every day.        SAXagliptin HCl (Tab) SAXagliptin HCl 5 MG Take 5 mg by mouth every day.        Simvastatin (Tab) ZOCOR 40 MG Take 40 mg by mouth every evening. Continue as per Dr. Mckeon. Continue as per Dr. Bobo.        .                 Medicines prescribed today were sent to:     [id number] - [place], [place] - [address]    [address] [place] [address]    Phone: [phone] Fax: [phone]    Open 24 Hours?: No      Medication refill instructions:       If your prescription bottle indicates you have medication refills left, it is not necessary to call your provider’s office. Please contact your pharmacy and they will refill your medication.    If your prescription bottle indicates you do not have any refills left, you may request refills at any time through one of the following ways: The online [employer] system (except Urgent Care), by calling your provider’s office, or by asking your pharmacy to contact your provider’s office with a refill request. Medication refills are processed only during regular business hours and may not be available until the next business day. Your provider may request additional information or to have a follow-up visit with  you prior to refilling your medication.   *Please Note: Medication refills are assigned a new Rx number when refilled electronically. Your pharmacy may indicate that no refills were authorized even though a new prescription for the same medication is available at the pharmacy. Please request the medicine by name with the pharmacy before contacting your provider for a refill.        Instructions    After hyperbaric oxygen therapy treatment, it is normal to experience some congestion to the ears, muffling of sounds, or abnormal sounds to one or both ears.    If you experience pain or discomfort to one or both ears that does not resolve spontaneously, please contact the hyperbaric department at [phone].            [employer] Mobile Access Code: Activation code not generated  Current [employer] Mobile Status: Active

## 2017-04-13 ENCOUNTER — OFFICE VISIT (OUTPATIENT)
Dept: WOUND CARE | Facility: MEDICAL CENTER | Age: 82
End: 2017-04-13
Attending: UROLOGY
Payer: MEDICARE

## 2017-04-13 VITALS
TEMPERATURE: 98.5 F | DIASTOLIC BLOOD PRESSURE: 87 MMHG | OXYGEN SATURATION: 97 % | SYSTOLIC BLOOD PRESSURE: 146 MMHG | HEART RATE: 83 BPM

## 2017-04-13 DIAGNOSIS — Z85.46 HISTORY OF PROSTATE CANCER: ICD-10-CM

## 2017-04-13 DIAGNOSIS — I15.8 OTHER SECONDARY HYPERTENSION: ICD-10-CM

## 2017-04-13 DIAGNOSIS — T70.0XXS: ICD-10-CM

## 2017-04-13 DIAGNOSIS — R31.9 HEMATURIA: ICD-10-CM

## 2017-04-13 DIAGNOSIS — N18.30 TYPE 2 DIABETES MELLITUS WITH STAGE 3 CHRONIC KIDNEY DISEASE, WITH LONG-TERM CURRENT USE OF INSULIN (HCC): ICD-10-CM

## 2017-04-13 DIAGNOSIS — Z79.4 TYPE 2 DIABETES MELLITUS WITH STAGE 3 CHRONIC KIDNEY DISEASE, WITH LONG-TERM CURRENT USE OF INSULIN (HCC): ICD-10-CM

## 2017-04-13 DIAGNOSIS — Z90.79 HISTORY OF PROSTATECTOMY: ICD-10-CM

## 2017-04-13 DIAGNOSIS — N30.40 RADIATION CYSTITIS: ICD-10-CM

## 2017-04-13 DIAGNOSIS — E11.22 TYPE 2 DIABETES MELLITUS WITH STAGE 3 CHRONIC KIDNEY DISEASE, WITH LONG-TERM CURRENT USE OF INSULIN (HCC): ICD-10-CM

## 2017-04-13 DIAGNOSIS — Z92.3 HISTORY OF RADIATION THERAPY: ICD-10-CM

## 2017-04-13 DIAGNOSIS — L59.8 OTHER SPECIFIED DISORDERS OF THE SKIN AND SUBCUTANEOUS TISSUE RELATED TO RADIATION: Primary | ICD-10-CM

## 2017-04-13 DIAGNOSIS — K92.1 BLOOD IN STOOL: ICD-10-CM

## 2017-04-13 DIAGNOSIS — N39.45 CONTINUOUS LEAKAGE OF URINE: ICD-10-CM

## 2017-04-13 LAB — GLUCOSE BLD-MCNC: 167 MG/DL (ref 70–100)

## 2017-04-13 PROCEDURE — 99183 HYPERBARIC OXYGEN THERAPY: CPT | Performed by: FAMILY MEDICINE

## 2017-04-13 PROCEDURE — 82962 GLUCOSE BLOOD TEST: CPT | Performed by: FAMILY MEDICINE

## 2017-04-13 PROCEDURE — 82948 REAGENT STRIP/BLOOD GLUCOSE: CPT | Performed by: FAMILY MEDICINE

## 2017-04-13 PROCEDURE — G8417 CALC BMI ABV UP PARAM F/U: HCPCS | Performed by: FAMILY MEDICINE

## 2017-04-13 PROCEDURE — G0277 HBOT, FULL BODY CHAMBER, 30M: HCPCS | Performed by: FAMILY MEDICINE

## 2017-04-13 ASSESSMENT — ENCOUNTER SYMPTOMS
CONSTIPATION: 1
CHILLS: 0
DEPRESSION: 1
COUGH: 0
FEVER: 0
HEARTBURN: 1
HEADACHES: 0
BLURRED VISION: 0
BACK PAIN: 1
SHORTNESS OF BREATH: 0
FALLS: 1
DOUBLE VISION: 0
PALPITATIONS: 0
DIARRHEA: 1

## 2017-04-13 ASSESSMENT — PAIN SCALES - GENERAL: PAINLEVEL_OUTOF13: 0

## 2017-04-13 NOTE — MR AVS SNAPSHOT
Selvin Braga   2017 9:30 AM   Office Visit   MRN: [id number]    Department:  [facility]   Dept Phone:  [phone]    Description:  Male : 1935   Provider:  Fernando Brown M.D.           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   [5065735]       Radiation cystitis   [342457]       Hematuria   [8716785]       Continuous leakage of urine   [325499]       Blood in stool   [578.1.ICD-9-CM]       History of prostatectomy   [662085]       History of radiation therapy   [994112]       History of prostate cancer   [784029]       Pressure-related ear pain, sequela   [185731]       Type 2 diabetes mellitus with stage 3 chronic kidney disease, with long-term current use of insulin (CMS-[facility])   [6066901]       Other secondary hypertension   [9463864]         Vital Signs     Blood Pressure Pulse Temperature Oxygen Saturation Smoking Status       146/87 mmHg 83 36.9 °C (98.5 °F) 97% Former Smoker       Basic Information     Date Of Birth Sex Race Ethnicity Preferred Language    1935 Male White Non- English      Your appointments     2017  9:30 AM   HBOT Supervision with HYPERBARIC CHAMBER 2   [facility] ([address] [place] [address])    [address] [facility] [address]  [facility] [address]   [phone]            2017  9:30 AM   HBOT Supervision with HYPERBARIC CHAMBER 2   [facility] ([address] [place] [address])    [address]  [facility] [address]   [phone]            2017  9:30 AM   HBOT Supervision with Ofelia Reed M.D.   [facility] ([address] [place] [address])    [address] [facility]  [address]  [place] [address]   [phone]            Apr 19, 2017  9:30 AM   HBOT Supervision with HYPERBARIC CHAMBER 2   [facility] ([address])    [address]  [place] [address]   [phone]            Apr 20, 2017  9:30 AM   HBOT Supervision with HYPERBARIC CHAMBER 2   [facility] ([address])    [address]  [place] [address]   [phone]            Apr 21, 2017  9:30 AM   HBOT Supervision with HYPERBARIC CHAMBER 2   [facility] ([address])    [address]  [place] [address]   [phone]            Apr 24, 2017  9:30 AM   HBOT Supervision with HYPERBARIC CHAMBER 2   [facility] ([address])    [address]  [place] [address]   [phone]            Apr 25, 2017  9:30 AM   HBOT Supervision with HYPERBARIC CHAMBER 2   [facility] ([address])    [address]  [place] [address]   [phone]            Apr 26, 2017  9:30 AM   HBOT Supervision with HYPERBARIC CHAMBER 2   [facility] ([address])    [address]  [place] [address]   [phone]            Apr 27, 2017  9:30 AM   HBOT Supervision with HYPERBARIC CHAMBER 2   [facility] ([address])    [address]  [place] [address]   [phone]            Apr 28, 2017  9:30 AM   HBOT Supervision with HYPERBARIC CHAMBER 2   [facility] ([address])    [address]  [place] [address]   [phone]            May 01, 2017  9:30 AM   HBOT Supervision with HYPERBARIC CHAMBER 2   [facility] ([address])    [address]  [place] [address]   [phone]            May 02, 2017  9:30 AM   HBOT Supervision with HYPERBARIC CHAMBER 2   [facility] ([address])    [address]  [address]  [place] [address]   [phone]            May 03, 2017  9:30 AM   HBOT Supervision with HYPERBARIC CHAMBER 2   [facility] ([address])    [address]  [place] [address]   [phone]            May 04, 2017  9:30 AM   HBOT Supervision with HYPERBARIC CHAMBER 2   [facility] ([address])    [address]  [place] [address]   [phone]            May 05, 2017  9:30 AM   HBOT Supervision with HYPERBARIC CHAMBER 2   [facility] ([address])    [address]  [place] [address]   [phone]            May 08, 2017  9:30 AM   HBOT Supervision with HYPERBARIC CHAMBER 2   [facility] ([address])    [address]  [place] [address]   [phone]            May 09, 2017  9:30 AM   HBOT Supervision with HYPERBARIC CHAMBER 2   [facility] ([address])    [address]  [place] [address]   [phone]            May 10, 2017  9:30 AM   HBOT Supervision with HYPERBARIC CHAMBER 2   [facility] ([address])    [address]  [place] [address]   [phone]            May 11, 2017  9:30 AM   HBOT Supervision with HYPERBARIC CHAMBER 2   [facility] ([address])    [address]  [place] [address]   [phone]            May 12, 2017  9:30 AM   HBOT Supervision with HYPERBARIC CHAMBER 2   [facility] ([address])    [address]  [place] [address]   [phone]            May 15, 2017  9:30 AM   HBOT Supervision with HYPERBARIC CHAMBER 2   [facility] ([address])    [address]  [place] [address]   [phone]            May 16, 2017  9:30 AM   HBOT Supervision with HYPERBARIC CHAMBER 2   [facility] ([address])    [address]  [address]  [place] [address]   [phone]            May 17, 2017  9:30 AM   HBOT Supervision with HYPERBARIC CHAMBER 2   [facility] ([facility] [address])    [address]  [place] [address]   [phone]            May 18, 2017  9:30 AM   HBOT Supervision with HYPERBARIC CHAMBER 2   [facility] ([facility] [address])    [address]  [place] [address]   [phone]            May 19, 2017  9:30 AM   HBOT Supervision with HYPERBARIC CHAMBER 2   [facility] ([facility] [address])    [address] [facility] [address]  [place] [address]   [phone]            May 22, 2017  9:30 AM   HBOT Supervision with HYPERBARIC CHAMBER 2   [facility] ([facility] [address])    [address] [facility] [address]  [place] [address]   [phone]            May 23, 2017  9:30 AM   HBOT Supervision with HYPERBARIC CHAMBER 2   [facility] ([facility] [address])    [address] [facility] [address]  [place] [address]   [phone]            May 24, 2017  8:00 AM   HBOT Supervision with HYPERBARIC CHAMBER 1   [facility] ([facility] [address])    [address] [facility] [address]  [place] [address]   [phone]            May 25, 2017  8:00 AM   HBOT Supervision with HYPERBARIC CHAMBER 1   [facility] ([facility] [address])    [address] [facility] [address]  [place] [address]   [phone]            May 26, 2017  8:00 AM   HBOT Supervision with HYPERBARIC CHAMBER 1   [facility] ([facility] [address])    [address] [facility] [address]  [place] [address]   [phone]              Problem List              ICD-10-CM Priority Class Noted - Resolved    DVT of lower extremity (deep venous thrombosis) (CMS-HCC) I82.409   9/2/2010 - Present    DM (diabetes mellitus) (CMS-HCC) E11.9 Medium  9/2/2010 - Present    HTN (hypertension) I10 Medium  9/2/2010 - Present    Spinal stenosis, lumbar region, without  neurogenic claudication M48.06 Low  12/13/2012 - Present    Senile nuclear sclerosis H25.10   2/13/2013 - Present    Blood in stool K92.1   6/5/2013 - Present    Diarrhea following gastrointestinal surgery R19.7, Z98.890   6/23/2014 - Present    History of prostatectomy Z90.79   5/8/2015 - Present    Ankle fracture, right S82.891A   10/28/2015 - Present    Chronic kidney disease (CKD), stage III (moderate) N18.3   7/21/2016 - Present    Neuropathy (CMS-HCC) G62.9   7/21/2016 - Present    Depression F32.9   7/21/2016 - Present    AK (actinic keratosis) L57.0   7/21/2016 - Present    Hyperlipidemia E78.5   7/21/2016 - Present    Health care maintenance Z00.00   8/30/2016 - Present    History of prostate cancer Z85.46   3/24/2017 - Present    History of radiation therapy Z92.3   3/24/2017 - Present    Type 2 diabetes mellitus with stage 3 chronic kidney disease, with long-term current use of insulin (HCC) E11.22, N18.3, Z79.4   3/24/2017 - Present    Continuous leakage of urine N39.45   3/24/2017 - Present    History of DVT (deep vein thrombosis) Z86.718   3/24/2017 - Present    Other specified disorders of the skin and subcutaneous tissue related to radiation L59.8   3/29/2017 - Present    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   3/29/2017 - Present    Hematuria R31.9   3/29/2017 - Present    Pressure-related ear pain T70.0XXA   4/4/2017 - Present      Health Maintenance        Date Due Completion Dates    IMM ZOSTER VACCINE 11/28/1996 ---    IMM DTaP/Tdap/Td Vaccine (1 - Tdap) 8/8/2010 8/7/2010    URINE ACR / MICROALBUMIN 1/16/2013 1/16/2012    IMM PNEUMOCOCCAL 65+ (ADULT) LOW/MEDIUM RISK SERIES (2 of 2 - PCV13) 12/13/2013 12/13/2012    A1C SCREENING 2/22/2017 8/22/2016, 1/16/2015, 6/23/2014, 1/16/2012, 9/2/2010    FASTING LIPID PROFILE 8/22/2017 8/22/2016, 1/16/2012, 9/2/2010    SERUM CREATININE 8/22/2017 8/22/2016, 7/12/2016, 8/3/2015, 7/22/2015, 1/16/2015, 6/25/2014, 6/24/2014, 6/23/2014, 5/29/2014, 5/27/2014, 5/26/2014,  5/22/2014, 5/14/2014, 4/12/2014, 3/24/2014, 3/17/2014, 6/5/2013, 5/31/2013, 12/26/2012, 12/22/2012, 12/17/2012, 12/14/2012, 12/3/2012, 9/17/2012, 3/5/2012, 1/16/2012, 1/26/2011, 9/18/2010, 9/17/2010, 9/2/2010, 9/1/2010, 5/24/2010, 1/15/2010, 2/8/2005    DIABETES MONOFILAMENT / LE EXAM 8/30/2017 8/30/2016 (N/S)    Override on 8/30/2016: (N/S)    RETINAL SCREENING 10/10/2017 10/10/2016    COLONOSCOPY 6/5/2023 6/5/2013, 6/5/2013 (N/S)    Override on 6/5/2013: (N/S) ([facility])            Results     POCT Glucose      Component Value Standard Range & Units    Glucose - Accu-Ck 167 70 - 100 mg/dL                        Current Immunizations     Influenza TIV (IM) 9/25/2016, 10/21/2013, 10/1/2012    Influenza Vaccine Quad Inj (Preserved) 9/21/2015    Pneumococcal polysaccharide vaccine (PPSV-23) 12/13/2012  7:30 PM    TD Vaccine 8/7/2010 12:38 PM      Below and/or attached are the medications your provider expects you to take. Review all of your home medications and newly ordered medications with your provider and/or pharmacist. Follow medication instructions as directed by your provider and/or pharmacist. Please keep your medication list with you and share with your provider. Update the information when medications are discontinued, doses are changed, or new medications (including over-the-counter products) are added; and carry medication information at all times in the event of emergency situations     Allergies:  AUGMENTIN - Unspecified     LATEX - Unspecified     TAPE - Rash               Medications  Valid as of: April 13, 2017 -  2:26 PM    Generic Name Brand Name Tablet Size Instructions for use    Ascorbic Acid (Tab) ascorbic acid 500 MG Take 500 mg by mouth every day.        Cholecalciferol (Tab) vitamin D 2000 UNIT Take 2,000 Units by mouth every day.        Clobetasol Propionate (Cream) TEMOVATE 0.05 %         Cyanocobalamin (Tab CR) B-12 1000 MCG Take  by mouth.        Ferrous Sulfate (Tab) ferrous sulfate 325 (70  FE) MG Take 325 mg by mouth 2 Times a Day.        FLUoxetine HCl (Cap) PROZAC 20 MG Take 20 mg by mouth every day.        GlipiZIDE (Tab) GLUCOTROL 10 MG Take 10 mg by mouth 2 times a day. One po am, 1/2 po pm        HydroCHLOROthiazide (Tab) HYDRODIURIL 12.5 MG Take 12.5 mg by mouth every day.        Insulin NPH Human (Isophane) (Suspension) HUMULIN,NOVOLIN 100 UNIT/ML Inject 10 Units as instructed Once. Before dinner        Lisinopril (Tab) PRINIVIL 5 MG Take 2.5 mg by mouth every day. Indications: High Blood Pressure        MetFORMIN HCl (Tab) GLUCOPHAGE 500 MG Take 500 mg by mouth 2 times a day, with meals.        Pantoprazole Sodium (Tablet Delayed Response) PROTONIX 40 MG Take 40 mg by mouth every day.        SAXagliptin HCl (Tab) SAXagliptin HCl 5 MG Take 5 mg by mouth every day.        Simvastatin (Tab) ZOCOR 40 MG Take 40 mg by mouth every evening. Continue as per Dr. Mckeon. Continue as per Dr. Bobo.        .                 Medicines prescribed today were sent to:     [facility] #[id number] - [place] - [address] [facility]    [address] [facility] [place] [address]    Phone: [phone] Fax: [phone]    Open 24 Hours?: No      Medication refill instructions:       If your prescription bottle indicates you have medication refills left, it is not necessary to call your provider’s office. Please contact your pharmacy and they will refill your medication.    If your prescription bottle indicates you do not have any refills left, you may request refills at any time through one of the following ways: The online [employer] system (except Urgent Care), by calling your provider’s office, or by asking your pharmacy to contact your provider’s office with a refill request. Medication refills are processed only during regular business hours and may not be available until the next business day. Your provider may request additional information or to have a follow-up visit with you prior to refilling your medication.      *Please Note: Medication refills are assigned a new Rx number when refilled electronically. Your pharmacy may indicate that no refills were authorized even though a new prescription for the same medication is available at the pharmacy. Please request the medicine by name with the pharmacy before contacting your provider for a refill.        Instructions    After hyperbaric oxygen therapy treatment, it is normal to experience some congestion to the ears, muffling of sounds, or abnormal sounds to one or both ears.    If you experience pain or discomfort to one or both ears that does not resolve spontaneously, please contact the hyperbaric department at [phone].              [employer]uc[employer] Access Code: Activation code not generated  Current Blucarat Status: Active

## 2017-04-13 NOTE — PROGRESS NOTES
Subjective:   Selvin Braga is a 81 y.o. 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  NO ear pain - ear feels much better since PE tube L was placed.  No medication changes.  Tolerating HBOT well.  No hematuria recently    Review of Systems   Constitutional: Negative for fever and chills.   HENT: Negative for ear discharge, ear pain and hearing loss.    Eyes: Negative for blurred vision and double vision.   Respiratory: Negative for cough and shortness of breath.    Cardiovascular: Negative for chest pain and palpitations.   Gastrointestinal: Positive for heartburn, diarrhea and constipation.   Genitourinary: Positive for frequency.   Musculoskeletal: Positive for back pain, joint pain and falls.   Skin: Positive for itching and rash.   Neurological: Negative for headaches.   Psychiatric/Behavioral: Positive for depression.          Objective:     /87 mmHg  Pulse 83  Temp(Src) 36.9 °C (98.5 °F)  SpO2 97%   Blood Glucose 167  Physical Exam   Constitutional: He is oriented to person, place, and time. He appears well-developed and well-nourished.   HENT:   Head: Normocephalic and atraumatic.   Right Ear: Tympanic membrane and ear canal normal.   Left Ear: Tympanic membrane and ear canal normal.   Left myringotomy tube.    Pulmonary/Chest: Effort normal.   Neurological: He is oriented to person, place, and time.   Psychiatric: He has a normal mood and affect.   Vitals reviewed.        Assessment/Plan:         •    Other specified disorders of the skin and subcutaneous tissue related to radiation -  Appropriate candidate for hyperbaric oxygen therapy. Patient completed his 9th HBOT treatment at a pressure of 2.4 HANNA x 90 minutes at a descent rate of 1.5 PSI / min and ascent rate of 3.0 PSI/minute. Will continue the patient with HBOT at a pressure of 2.4 HANNA x 90 minutes x  40 treatments  on a 5x per week, M-F basis.  Q 30 minute air breaks.    3/30 - held at a travel rate of 1.0 for ear discomfort.  No worsening of L ear during treatment  3/31 - No significant change to L ear during treatment.  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 Marie          •    Continuous leakage of urine - Cunningham clamp / condom cathether management          •    Hematuria - should improve with HBOT - on Fe replacement          •    Blood in stool - should improve with HBOT - on Fe replacement          •    History of prostate cancer - in remission per Dr. Marie          •    History of radiation therapy           •    History of prostatectomy          •    Chronic kidney disease (CKD), stage III (moderate) - d/t DM, medication management only          •    Type 2 diabetes mellitus with stage 3 chronic kidney disease, with long-term current use of insulin (HCC) - on combined oral (glipizie and metformin) and insulin therapy          •    Essential hypertension - Stable on ACE-I - Lisinopril 40mg qd          •    Spinal stenosis, lumbar region, without neurogenic claudication - stable          •    Neuropathy (CMS-HCC) - due to DM, stable          •    AK (actinic keratosis) - on topical steroid cream, continue with Dermatology          •    History of DVT (deep vein thrombosis) - trauma related, not on chronic anticoagulation          •   Pressure related ear pain - 3/29 patient developed teed 1-2 changes to L TM. Received PE tube to L ear on 4/7

## 2017-04-14 ENCOUNTER — OFFICE VISIT (OUTPATIENT)
Dept: WOUND CARE | Facility: MEDICAL CENTER | Age: 82
End: 2017-04-14
Attending: UROLOGY
Payer: MEDICARE

## 2017-04-14 VITALS
HEART RATE: 81 BPM | OXYGEN SATURATION: 100 % | SYSTOLIC BLOOD PRESSURE: 152 MMHG | DIASTOLIC BLOOD PRESSURE: 69 MMHG | TEMPERATURE: 98.3 F

## 2017-04-14 DIAGNOSIS — L59.8 OTHER SPECIFIED DISORDERS OF THE SKIN AND SUBCUTANEOUS TISSUE RELATED TO RADIATION: Primary | ICD-10-CM

## 2017-04-14 DIAGNOSIS — Z85.46 HISTORY OF PROSTATE CANCER: ICD-10-CM

## 2017-04-14 DIAGNOSIS — Z79.4 TYPE 2 DIABETES MELLITUS WITH STAGE 3 CHRONIC KIDNEY DISEASE, WITH LONG-TERM CURRENT USE OF INSULIN (HCC): ICD-10-CM

## 2017-04-14 DIAGNOSIS — T70.0XXS: ICD-10-CM

## 2017-04-14 DIAGNOSIS — N30.40 RADIATION CYSTITIS: ICD-10-CM

## 2017-04-14 DIAGNOSIS — R31.9 HEMATURIA: ICD-10-CM

## 2017-04-14 DIAGNOSIS — K92.1 BLOOD IN STOOL: ICD-10-CM

## 2017-04-14 DIAGNOSIS — Z92.3 HISTORY OF RADIATION THERAPY: ICD-10-CM

## 2017-04-14 DIAGNOSIS — N18.30 TYPE 2 DIABETES MELLITUS WITH STAGE 3 CHRONIC KIDNEY DISEASE, WITH LONG-TERM CURRENT USE OF INSULIN (HCC): ICD-10-CM

## 2017-04-14 DIAGNOSIS — I15.8 OTHER SECONDARY HYPERTENSION: ICD-10-CM

## 2017-04-14 DIAGNOSIS — N18.30 CHRONIC KIDNEY DISEASE (CKD), STAGE III (MODERATE) (HCC): ICD-10-CM

## 2017-04-14 DIAGNOSIS — E11.22 TYPE 2 DIABETES MELLITUS WITH STAGE 3 CHRONIC KIDNEY DISEASE, WITH LONG-TERM CURRENT USE OF INSULIN (HCC): ICD-10-CM

## 2017-04-14 LAB — GLUCOSE BLD-MCNC: 165 MG/DL (ref 70–100)

## 2017-04-14 PROCEDURE — 99183 HYPERBARIC OXYGEN THERAPY: CPT | Performed by: FAMILY MEDICINE

## 2017-04-14 PROCEDURE — G0277 HBOT, FULL BODY CHAMBER, 30M: HCPCS | Performed by: FAMILY MEDICINE

## 2017-04-14 PROCEDURE — 82962 GLUCOSE BLOOD TEST: CPT | Performed by: FAMILY MEDICINE

## 2017-04-14 PROCEDURE — 82948 REAGENT STRIP/BLOOD GLUCOSE: CPT | Performed by: FAMILY MEDICINE

## 2017-04-14 PROCEDURE — G8417 CALC BMI ABV UP PARAM F/U: HCPCS | Performed by: FAMILY MEDICINE

## 2017-04-14 ASSESSMENT — ENCOUNTER SYMPTOMS
BLURRED VISION: 0
DEPRESSION: 1
CHILLS: 0
FALLS: 1
FEVER: 0
PALPITATIONS: 0
BACK PAIN: 1
HEADACHES: 0
DOUBLE VISION: 0
CONSTIPATION: 1
DIARRHEA: 1
SHORTNESS OF BREATH: 0
COUGH: 0
HEARTBURN: 1

## 2017-04-14 ASSESSMENT — PAIN SCALES - GENERAL: PAINLEVEL_OUTOF13: 0

## 2017-04-14 NOTE — PROGRESS NOTES
Subjective:   Selvin Braga is a 81 y.o. 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  He did see Dr. Sanchez yesterday who examined his ears.  No issues with the PE L tube, although his L ear pops a bit with treatment.   No medication changes.  Tolerating HBOT well.  No medication changes.    Review of Systems   Constitutional: Negative for fever and chills.   HENT: Negative for ear discharge, ear pain and hearing loss.    Eyes: Negative for blurred vision and double vision.   Respiratory: Negative for cough and shortness of breath.    Cardiovascular: Negative for chest pain and palpitations.   Gastrointestinal: Positive for heartburn, diarrhea and constipation.   Genitourinary: Positive for frequency.   Musculoskeletal: Positive for back pain, joint pain and falls.   Skin: Positive for itching and rash.   Neurological: Negative for headaches.   Psychiatric/Behavioral: Positive for depression.          Objective:     /69 mmHg  Pulse 81  Temp(Src) 36.8 °C (98.3 °F)  SpO2 100%   Blood Glucose 165  Physical Exam   Constitutional: He is oriented to person, place, and time. He appears well-developed and well-nourished.   HENT:   Head: Normocephalic and atraumatic.   Right Ear: Tympanic membrane and ear canal normal.   Left Ear: Tympanic membrane and ear canal normal.   Left myringotomy tube, intact   Pulmonary/Chest: Effort normal.   Neurological: He is oriented to person, place, and time.   Psychiatric: He has a normal mood and affect.   Vitals reviewed.        Assessment/Plan:         •    Other specified disorders of the skin and subcutaneous tissue related to radiation -  Appropriate candidate for hyperbaric oxygen therapy. Patient completed his 10th HBOT treatment at a pressure of 2.4 HANNA x 90 minutes at a descent rate of 1.5 PSI / min and ascent rate of 3.0 PSI/minute. Will continue  the patient with HBOT at a pressure of 2.4 HANNA x 90 minutes x  40 treatments on a 5x per week, M-F basis.  Q 30 minute air breaks.    3/30 - held at a travel rate of 1.0 for ear discomfort.  No worsening of L ear during treatment  3/31 - No significant change to L ear during treatment.  Still with Teed 1 erythema throughout L ear    4/5-4/9 - Off HBOT due to ENT visit and L PE tube placed.  No change to treatment count Yes      •    Radiation cystitis - continue to follow with Dr. Marie          •    Continuous leakage of urine - Cunningham clamp / condom cathether management          •    Hematuria - should improve with HBOT - on Fe replacement          •    Blood in stool - should improve with HBOT - on Fe replacement          •    History of prostate cancer - in remission per Dr. Marie          •    History of radiation therapy           •    History of prostatectomy          •    Chronic kidney disease (CKD), stage III (moderate) - d/t DM, medication management only          •    Type 2 diabetes mellitus with stage 3 chronic kidney disease, with long-term current use of insulin (HCC) - on combined oral (glipizie and metformin) and insulin therapy          •    Essential hypertension - Stable on ACE-I - Lisinopril 40mg qd          •    Spinal stenosis, lumbar region, without neurogenic claudication - stable          •    Neuropathy (CMS-HCC) - due to DM, stable          •    AK (actinic keratosis) - on topical steroid cream, continue with Dermatology          •    History of DVT (deep vein thrombosis) - trauma related, not on chronic anticoagulation          •   Pressure related ear pain - 3/29 patient developed teed 1-2 changes to L TM. Received PE tube to L ear on 4/7

## 2017-04-14 NOTE — MR AVS SNAPSHOT
Selvin Braga   2017 9:30 AM   Office Visit   MRN: [id number]    Department:  [facility]   Dept Phone:  [phone]    Description:  Male : 1935   Provider:  Fernando Brown M.D.           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   [8554374]  -  Primary     Radiation cystitis   [635559]       Hematuria   [5987313]       History of prostate cancer   [588615]       History of radiation therapy   [160146]       Type 2 diabetes mellitus with stage 3 chronic kidney disease, with long-term current use of insulin (CMS-[facility])   [9761495]       Other secondary hypertension   [9300710]       Chronic kidney disease (CKD), stage III (moderate)   [939970]       Pressure-related ear pain, sequela   [217715]       Blood in stool   [578.1.ICD-9-CM]         Vital Signs     Blood Pressure Pulse Temperature Oxygen Saturation Smoking Status       152/69 mmHg 81 36.8 °C (98.3 °F) 100% Former Smoker       Basic Information     Date Of Birth Sex Race Ethnicity Preferred Language    1935 Male White Non- English      Your appointments     2017  9:30 AM   HBOT Supervision with [facility] 2   [facility] ([address] [place] [address])    [address]  [facility] [address]   [phone]            2017  9:30 AM   HBOT Supervision with Ofelia Reed M.D.   [facility] ( [place])    [address]  [facility] [address]   [phone]            2017  9:30 AM   HBOT Supervision with HYPERBARIC CHAMBER 2   [facility] ([address] [place] [address])    [address]  [place] [address]   [phone]            Apr 20, 2017  9:30 AM   HBOT Supervision with HYPERBARIC CHAMBER 2   [facility] ([address])    [address]  [place] [address]   [phone]            Apr 21, 2017  9:30 AM   HBOT Supervision with HYPERBARIC CHAMBER 2   [facility] ([address])    [address]  [place] [address]   [phone]            Apr 24, 2017  9:30 AM   HBOT Supervision with HYPERBARIC CHAMBER 2   [facility] ([address])    [address]  [place] [address]   [phone]            Apr 25, 2017  9:30 AM   HBOT Supervision with HYPERBARIC CHAMBER 2   [facility] ([address])    [address]  [place] [address]   [phone]            Apr 26, 2017  9:30 AM   HBOT Supervision with HYPERBARIC CHAMBER 2   [facility] ([address])    [address]  [place] [address]   [phone]            Apr 27, 2017  9:30 AM   HBOT Supervision with HYPERBARIC CHAMBER 2   [facility] ([address])    [address]  [place] [address]   [phone]            Apr 28, 2017  9:30 AM   HBOT Supervision with HYPERBARIC CHAMBER 2   [facility] ([address])    [address]  [place] [address]   [phone]            May 01, 2017  9:30 AM   HBOT Supervision with HYPERBARIC CHAMBER 2   [facility] ([address])    [address]  [place] [address]   [phone]            May 02, 2017  9:30 AM   HBOT Supervision with HYPERBARIC CHAMBER 2   [facility] ([address])    [address]  [place] [address]   [phone]            May 03, 2017  9:30 AM   HBOT Supervision with HYPERBARIC CHAMBER 2   [facility] ([address])    [address]  [place] [address]   [phone]            May 04, 2017  9:30 AM   HBOT Supervision with HYPERBARIC CHAMBER 2   [facility] ([address])    [address]  [place] [address]   [phone]            May 05, 2017  9:30 AM   HBOT Supervision with HYPERBARIC CHAMBER 2   [facility] ([address])    [address]  [place] [address]   [phone]            May 08, 2017  9:30 AM   HBOT Supervision with HYPERBARIC CHAMBER 2   [facility] ([address])    [address]  [place] [address]   [phone]            May 09, 2017  9:30 AM   HBOT Supervision with HYPERBARIC CHAMBER 2   [facility] ([address])    [address]  [place] [address]   [phone]            May 10, 2017  9:30 AM   HBOT Supervision with HYPERBARIC CHAMBER 2   [facility] ([address])    [address]  [place] [address]   [phone]            May 11, 2017  9:30 AM   HBOT Supervision with HYPERBARIC CHAMBER 2   [facility] ([address])    [address]  [place] [address]   [phone]            May 12, 2017  9:30 AM   HBOT Supervision with HYPERBARIC CHAMBER 2   [facility] ([address])    [address]  [place] [address]   [phone]            May 15, 2017  9:30 AM   HBOT Supervision with HYPERBARIC CHAMBER 2   [facility] ([address])    [address]  [place] [address]   [phone]            May 16, 2017  9:30 AM   HBOT Supervision with HYPERBARIC CHAMBER 2   [facility] ([address])    [address]  [place] [address]   [phone]            May 17, 2017  9:30 AM   HBOT Supervision with HYPERBARIC CHAMBER 2   [facility] ([address])    [address]  [place] [address]   [phone]            May 18, 2017  9:30 AM   HBOT Supervision with HYPERBARIC CHAMBER 2   [facility] ([address])    [address]  [place] [address]   [phone]            May 19, 2017  9:30 AM   HBOT Supervision with HYPERBARIC CHAMBER 2   [facility] ([facility] [address])    [address]  [place] [address]   [phone]            May 22, 2017  9:30 AM   HBOT Supervision with HYPERBARIC CHAMBER 2   [facility] ([facility] [address])    [address]  [place] [address]   [phone]            May 23, 2017  9:30 AM   HBOT Supervision with HYPERBARIC CHAMBER 2   [facility] ([facility] [address])    [address]  [place] [address]   [phone]            May 24, 2017  8:00 AM   HBOT Supervision with HYPERBARIC CHAMBER 1   [facility] ([facility] [address])    [address]  [place] [address]   [phone]            May 25, 2017  8:00 AM   HBOT Supervision with HYPERBARIC CHAMBER 1   [facility] ([facility] [address])    [address]  [place] [address]   [phone]            May 26, 2017  8:00 AM   HBOT Supervision with HYPERBARIC CHAMBER 1   [facility] ([facility] [address] [address])    [address]  [place] [address]   [phone]              Problem List              ICD-10-CM Priority Class Noted - Resolved    DVT of lower extremity (deep venous thrombosis) (CMS-HCC) I82.409   9/2/2010 - Present    DM (diabetes mellitus) (CMS-HCC) E11.9 Medium  9/2/2010 - Present    HTN (hypertension) I10 Medium  9/2/2010 - Present    Spinal stenosis, lumbar region, without neurogenic claudication M48.06 Low  12/13/2012 - Present    Senile nuclear sclerosis H25.10   2/13/2013 - Present    Blood in stool K92.1   6/5/2013 - Present    Diarrhea following gastrointestinal surgery  R19.7, Z98.890   6/23/2014 - Present    History of prostatectomy Z90.79   5/8/2015 - Present    Ankle fracture, right S82.891A   10/28/2015 - Present    Chronic kidney disease (CKD), stage III (moderate) N18.3   7/21/2016 - Present    Neuropathy (CMS-HCC) G62.9   7/21/2016 - Present    Depression F32.9   7/21/2016 - Present    AK (actinic keratosis) L57.0   7/21/2016 - Present    Hyperlipidemia E78.5   7/21/2016 - Present    Health care maintenance Z00.00   8/30/2016 - Present    History of prostate cancer Z85.46   3/24/2017 - Present    History of radiation therapy Z92.3   3/24/2017 - Present    Type 2 diabetes mellitus with stage 3 chronic kidney disease, with long-term current use of insulin (HCC) E11.22, N18.3, Z79.4   3/24/2017 - Present    Continuous leakage of urine N39.45   3/24/2017 - Present    History of DVT (deep vein thrombosis) Z86.718   3/24/2017 - Present    Other specified disorders of the skin and subcutaneous tissue related to radiation L59.8   3/29/2017 - Present    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   3/29/2017 - Present    Hematuria R31.9   3/29/2017 - Present    Pressure-related ear pain T70.0XXA   4/4/2017 - Present      Health Maintenance        Date Due Completion Dates    IMM ZOSTER VACCINE 11/28/1996 ---    IMM DTaP/Tdap/Td Vaccine (1 - Tdap) 8/8/2010 8/7/2010    URINE ACR / MICROALBUMIN 1/16/2013 1/16/2012    IMM PNEUMOCOCCAL 65+ (ADULT) LOW/MEDIUM RISK SERIES (2 of 2 - PCV13) 12/13/2013 12/13/2012    A1C SCREENING 2/22/2017 8/22/2016, 1/16/2015, 6/23/2014, 1/16/2012, 9/2/2010    FASTING LIPID PROFILE 8/22/2017 8/22/2016, 1/16/2012, 9/2/2010    SERUM CREATININE 8/22/2017 8/22/2016, 7/12/2016, 8/3/2015, 7/22/2015, 1/16/2015, 6/25/2014, 6/24/2014, 6/23/2014, 5/29/2014, 5/27/2014, 5/26/2014, 5/22/2014, 5/14/2014, 4/12/2014, 3/24/2014, 3/17/2014, 6/5/2013, 5/31/2013, 12/26/2012, 12/22/2012, 12/17/2012, 12/14/2012, 12/3/2012, 9/17/2012, 3/5/2012, 1/16/2012, 1/26/2011, 9/18/2010, 9/17/2010, 9/2/2010,  9/1/2010, 5/24/2010, 1/15/2010, 2/8/2005    DIABETES MONOFILAMENT / LE EXAM 8/30/2017 8/30/2016 (N/S)    Override on 8/30/2016: (N/S)    RETINAL SCREENING 10/10/2017 10/10/2016    COLONOSCOPY 6/5/2023 6/5/2013, 6/5/2013 (N/S)    Override on 6/5/2013: (N/S) (GIC)            Results     POCT Glucose      Component Value Standard Range & Units    Glucose - Accu-Ck 165 70 - 100 mg/dL                        Current Immunizations     Influenza TIV (IM) 9/25/2016, 10/21/2013, 10/1/2012    Influenza Vaccine Quad Inj (Preserved) 9/21/2015    Pneumococcal polysaccharide vaccine (PPSV-23) 12/13/2012  7:30 PM    TD Vaccine 8/7/2010 12:38 PM      Below and/or attached are the medications your provider expects you to take. Review all of your home medications and newly ordered medications with your provider and/or pharmacist. Follow medication instructions as directed by your provider and/or pharmacist. Please keep your medication list with you and share with your provider. Update the information when medications are discontinued, doses are changed, or new medications (including over-the-counter products) are added; and carry medication information at all times in the event of emergency situations     Allergies:  AUGMENTIN - Unspecified     LATEX - Unspecified     TAPE - Rash               Medications  Valid as of: April 14, 2017 - 11:35 AM    Generic Name Brand Name Tablet Size Instructions for use    Ascorbic Acid (Tab) ascorbic acid 500 MG Take 500 mg by mouth every day.        Cholecalciferol (Tab) vitamin D 2000 UNIT Take 2,000 Units by mouth every day.        Clobetasol Propionate (Cream) TEMOVATE 0.05 %         Cyanocobalamin (Tab CR) B-12 1000 MCG Take  by mouth.        Ferrous Sulfate (Tab) ferrous sulfate 325 (65 FE) MG Take 325 mg by mouth 2 Times a Day.        FLUoxetine HCl (Cap) PROZAC 20 MG Take 20 mg by mouth every day.        GlipiZIDE (Tab) GLUCOTROL 10 MG Take 10 mg by mouth 2 times a day. One po am, 1/2 po pm           HydroCHLOROthiazide (Tab) HYDRODIURIL 12.5 MG Take 12.5 mg by mouth every day.        Insulin NPH Human (Isophane) (Suspension) HUMULIN,NOVOLIN 100 UNIT/ML Inject 10 Units as instructed Once. Before dinner        Lisinopril (Tab) PRINIVIL 5 MG Take 2.5 mg by mouth every day. Indications: High Blood Pressure        MetFORMIN HCl (Tab) GLUCOPHAGE 500 MG Take 500 mg by mouth 2 times a day, with meals.        Pantoprazole Sodium (Tablet Delayed Response) PROTONIX 40 MG Take 40 mg by mouth every day.        SAXagliptin HCl (Tab) SAXagliptin HCl 5 MG Take 5 mg by mouth every day.        Simvastatin (Tab) ZOCOR 40 MG Take 40 mg by mouth every evening. Continue as per Dr. Mckeon. Continue as per Dr. Bobo.        .                 Medicines prescribed today were sent to:     [id number] - [place], [place] - [address]    [address] [place] [address]    Phone: [phone] Fax: [phone]    Open 24 Hours?: No      Medication refill instructions:       If your prescription bottle indicates you have medication refills left, it is not necessary to call your provider’s office. Please contact your pharmacy and they will refill your medication.    If your prescription bottle indicates you do not have any refills left, you may request refills at any time through one of the following ways: The online [employer] system (except Urgent Care), by calling your provider’s office, or by asking your pharmacy to contact your provider’s office with a refill request. Medication refills are processed only during regular business hours and may not be available until the next business day. Your provider may request additional information or to have a follow-up visit with you prior to refilling your medication.   *Please Note: Medication refills are assigned a new Rx number when refilled electronically. Your pharmacy may indicate that no refills were authorized even though a new prescription for the same medication is  available at the pharmacy. Please request the medicine by name with the pharmacy before contacting your provider for a refill.        Instructions    After hyperbaric oxygen therapy treatment, it is normal to experience some congestion to the ears, muffling of sounds, or abnormal sounds to one or both ears.    If you experience pain or discomfort to one or both ears that does not resolve spontaneously, please contact the hyperbaric department at [phone].              [employer] Access Code: Activation code not generated  Current [employer] Status: Active

## 2017-04-14 NOTE — PROGRESS NOTES
Subjective:   Selvin Braga is a 81 y.o. 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  NO ear pain - ear feels much better since PE tube L was placed.  No medication changes.  Tolerating HBOT well.  No hematuria recently    Review of Systems   Constitutional: Negative for fever and chills.   HENT: Negative for ear discharge, ear pain and hearing loss.    Eyes: Negative for blurred vision and double vision.   Respiratory: Negative for cough and shortness of breath.    Cardiovascular: Negative for chest pain and palpitations.   Gastrointestinal: Positive for heartburn, diarrhea and constipation.   Genitourinary: Positive for frequency.   Musculoskeletal: Positive for back pain, joint pain and falls.   Skin: Positive for itching and rash.   Neurological: Negative for headaches.   Psychiatric/Behavioral: Positive for depression.          Objective:     There were no vitals taken for this visit.   Blood Glucose 167  Physical Exam   Constitutional: He is oriented to person, place, and time. He appears well-developed and well-nourished.   HENT:   Head: Normocephalic and atraumatic.   Right Ear: Tympanic membrane and ear canal normal.   Left Ear: Tympanic membrane and ear canal normal.   Left myringotomy tube.    Pulmonary/Chest: Effort normal.   Neurological: He is oriented to person, place, and time.   Psychiatric: He has a normal mood and affect.   Vitals reviewed.        Assessment/Plan:         •    Other specified disorders of the skin and subcutaneous tissue related to radiation -  Appropriate candidate for hyperbaric oxygen therapy. Patient completed his 10th HBOT treatment at a pressure of 2.4 HANNA x 90 minutes at a descent rate of 1.5 PSI / min and ascent rate of 3.0 PSI/minute. Will continue the patient with HBOT at a pressure of 2.4 HANNA x 90 minutes x  40 treatments on a 5x per week, M-F  basis.  Q 30 minute air breaks.    3/30 - held at a travel rate of 1.0 for ear discomfort.  No worsening of L ear during treatment  3/31 - No significant change to L ear during treatment.  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 Marie          •    Continuous leakage of urine - Cunningham clamp / condom cathether management          •    Hematuria - should improve with HBOT - on Fe replacement          •    Blood in stool - should improve with HBOT - on Fe replacement          •    History of prostate cancer - in remission per Dr. Marie          •    History of radiation therapy           •    History of prostatectomy          •    Chronic kidney disease (CKD), stage III (moderate) - d/t DM, medication management only          •    Type 2 diabetes mellitus with stage 3 chronic kidney disease, with long-term current use of insulin (HCC) - on combined oral (glipizie and metformin) and insulin therapy          •    Essential hypertension - Stable on ACE-I - Lisinopril 40mg qd          •    Spinal stenosis, lumbar region, without neurogenic claudication - stable          •    Neuropathy (CMS-HCC) - due to DM, stable          •    AK (actinic keratosis) - on topical steroid cream, continue with Dermatology          •    History of DVT (deep vein thrombosis) - trauma related, not on chronic anticoagulation          •   Pressure related ear pain - 3/29 patient developed teed 1-2 changes to L TM. Received PE tube to L ear on 4/7

## 2017-04-14 NOTE — PATIENT INSTRUCTIONS
After hyperbaric oxygen therapy treatment, it is normal to experience some congestion to the ears, muffling of sounds, or abnormal sounds to one or both ears.    If you experience pain or discomfort to one or both ears that does not resolve spontaneously, please contact the hyperbaric department at [phone].

## 2017-04-17 ENCOUNTER — OFFICE VISIT (OUTPATIENT)
Dept: WOUND CARE | Facility: MEDICAL CENTER | Age: 82
End: 2017-04-17
Attending: UROLOGY
Payer: MEDICARE

## 2017-04-17 VITALS
DIASTOLIC BLOOD PRESSURE: 77 MMHG | HEART RATE: 80 BPM | TEMPERATURE: 97.8 F | SYSTOLIC BLOOD PRESSURE: 141 MMHG | OXYGEN SATURATION: 97 %

## 2017-04-17 DIAGNOSIS — K92.1 BLOOD IN STOOL: ICD-10-CM

## 2017-04-17 DIAGNOSIS — G62.9 NEUROPATHY: ICD-10-CM

## 2017-04-17 DIAGNOSIS — N18.30 TYPE 2 DIABETES MELLITUS WITH STAGE 3 CHRONIC KIDNEY DISEASE, WITH LONG-TERM CURRENT USE OF INSULIN (HCC): ICD-10-CM

## 2017-04-17 DIAGNOSIS — N30.40 RADIATION CYSTITIS: ICD-10-CM

## 2017-04-17 DIAGNOSIS — R31.9 HEMATURIA: ICD-10-CM

## 2017-04-17 DIAGNOSIS — N39.45 CONTINUOUS LEAKAGE OF URINE: ICD-10-CM

## 2017-04-17 DIAGNOSIS — L59.8 OTHER SPECIFIED DISORDERS OF THE SKIN AND SUBCUTANEOUS TISSUE RELATED TO RADIATION: ICD-10-CM

## 2017-04-17 DIAGNOSIS — Z79.4 TYPE 2 DIABETES MELLITUS WITH STAGE 3 CHRONIC KIDNEY DISEASE, WITH LONG-TERM CURRENT USE OF INSULIN (HCC): ICD-10-CM

## 2017-04-17 DIAGNOSIS — I15.8 OTHER SECONDARY HYPERTENSION: ICD-10-CM

## 2017-04-17 DIAGNOSIS — E11.22 TYPE 2 DIABETES MELLITUS WITH STAGE 3 CHRONIC KIDNEY DISEASE, WITH LONG-TERM CURRENT USE OF INSULIN (HCC): ICD-10-CM

## 2017-04-17 LAB — GLUCOSE BLD-MCNC: 189 MG/DL (ref 70–100)

## 2017-04-17 PROCEDURE — 82948 REAGENT STRIP/BLOOD GLUCOSE: CPT | Performed by: FAMILY MEDICINE

## 2017-04-17 PROCEDURE — G8417 CALC BMI ABV UP PARAM F/U: HCPCS | Performed by: FAMILY MEDICINE

## 2017-04-17 PROCEDURE — 82962 GLUCOSE BLOOD TEST: CPT | Performed by: FAMILY MEDICINE

## 2017-04-17 PROCEDURE — 99183 HYPERBARIC OXYGEN THERAPY: CPT | Performed by: FAMILY MEDICINE

## 2017-04-17 PROCEDURE — G0277 HBOT, FULL BODY CHAMBER, 30M: HCPCS | Performed by: FAMILY MEDICINE

## 2017-04-17 ASSESSMENT — ENCOUNTER SYMPTOMS
CONSTIPATION: 1
SHORTNESS OF BREATH: 0
DEPRESSION: 1
PALPITATIONS: 0
DIARRHEA: 1
BACK PAIN: 1
FEVER: 0
FALLS: 1
COUGH: 0
CHILLS: 0
BLURRED VISION: 0
HEADACHES: 0
DOUBLE VISION: 0
HEARTBURN: 1

## 2017-04-17 ASSESSMENT — PAIN SCALES - GENERAL: PAINLEVEL_OUTOF13: 0

## 2017-04-17 NOTE — PATIENT INSTRUCTIONS
After hyperbaric oxygen therapy treatment, it is normal to experience some congestion to the ears, muffling of sounds, or abnormal sounds to one or both ears.    If you experience pain or discomfort to one or both ears that does not resolve spontaneously, please contact the hyperbaric department at [phone].

## 2017-04-17 NOTE — MR AVS SNAPSHOT
Selvin Braga   2017 9:30 AM   Office Visit   MRN: [id number]    Department:  [facility]   Dept Phone:  [phone]    Description:  Male : 1935   Provider:  Ofelia Reed M.D.           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   [2756838]       Radiation cystitis   [513599]       Type 2 diabetes mellitus with stage 3 chronic kidney disease, with long-term current use of insulin (CMS-HCC)   [5149365]       Other secondary hypertension   [8840181]       Hematuria   [9320981]       Continuous leakage of urine   [447298]       Neuropathy (CMS-HCC)   [137982]       Blood in stool   [578.1.ICD-9-CM]         Vital Signs     Blood Pressure Pulse Temperature Oxygen Saturation Smoking Status       141/77 mmHg 80 36.6 °C (97.8 °F) 97% Former Smoker       Basic Information     Date Of Birth Sex Race Ethnicity Preferred Language    1935 Male White Non- English      Your appointments     2017  9:30 AM   HBOT Supervision with Ofelia Reed M.D.   [facility] ([address] [place] [address])    [address] [facility] [address]  [facility] [address]   [phone]            2017  9:30 AM   HBOT Supervision with HYPERBARIC CHAMBER 2   [facility] ([address] [place] [address])    [address] [facility] [address]  [facility] [address]   [phone]            2017  9:30 AM   HBOT Supervision with HYPERBARIC CHAMBER 2   [facility] ([address] [place] [address])    [address] [facility] [address]  [facility] [address]   [phone]            2017  9:30 AM   HBOT Supervision with HYPERBARIC CHAMBER 2   [facility] ([address])    [address]  [place] [address]   [phone]            Apr 24, 2017  9:30 AM   HBOT Supervision with HYPERBARIC CHAMBER 2   [facility] ([address])    [address]  [place] [address]   [phone]            Apr 25, 2017  9:30 AM   HBOT Supervision with HYPERBARIC CHAMBER 2   [facility] ([address])    [address]  [place] [address]   [phone]            Apr 26, 2017  9:30 AM   HBOT Supervision with HYPERBARIC CHAMBER 2   [facility] ([address])    [address]  [place] [address]   [phone]            Apr 27, 2017  9:30 AM   HBOT Supervision with HYPERBARIC CHAMBER 2   [facility] ([address])    [address]  [place] [address]   [phone]            Apr 28, 2017  9:30 AM   HBOT Supervision with HYPERBARIC CHAMBER 2   [facility] ([address])    [address]  [place] [address]   [phone]            May 01, 2017  9:30 AM   HBOT Supervision with HYPERBARIC CHAMBER 2   [facility] ([address])    [address]  [place] [address]   [phone]            May 02, 2017  9:30 AM   HBOT Supervision with HYPERBARIC CHAMBER 2   [facility] ([address])    [address]  [place] [address]   [phone]            May 03, 2017  9:30 AM   HBOT Supervision with HYPERBARIC CHAMBER 2   [facility] ([address])    [address]  [place] [address]   [phone]            May 04, 2017  9:30 AM   HBOT Supervision with HYPERBARIC CHAMBER 2   [facility] ([address])    [address]  [place] [address]   [phone]            May 05, 2017  9:30 AM   HBOT Supervision with HYPERBARIC CHAMBER 2   [facility] ([address])    [address]  [place] [address]   [phone]            May 08, 2017  9:30 AM   HBOT Supervision with HYPERBARIC CHAMBER 2   [facility] ([address])    [address]  [place] [address]   [phone]            May 09, 2017  9:30 AM   HBOT Supervision with HYPERBARIC CHAMBER 2   [facility] ([address])    [address]  [place] [address]   [phone]            May 10, 2017  9:30 AM   HBOT Supervision with HYPERBARIC CHAMBER 2   [facility] ([address])    [address]  [place] [address]   [phone]            May 11, 2017  9:30 AM   HBOT Supervision with HYPERBARIC CHAMBER 2   [facility] ([address])    [address]  [place] [address]   [phone]            May 12, 2017  9:30 AM   HBOT Supervision with HYPERBARIC CHAMBER 2   [facility] ([address])    [address]  [place] [address]   [phone]            May 15, 2017  9:30 AM   HBOT Supervision with HYPERBARIC CHAMBER 2   [facility] ([address])    [address]  [place] [address]   [phone]            May 16, 2017  9:30 AM   HBOT Supervision with HYPERBARIC CHAMBER 2   [facility] ([address])    [address]  [place] [address]   [phone]            May 17, 2017  9:30 AM   HBOT Supervision with HYPERBARIC CHAMBER 2   [facility] ([address])    [address]  [place] [address]   [phone]            May 18, 2017  9:30 AM   HBOT Supervision with HYPERBARIC CHAMBER 2   [facility] ([address])    [address]  [place] [address]   [phone]            May 19, 2017  9:30 AM   HBOT Supervision with HYPERBARIC CHAMBER 2   [facility] ([address])    [address]  [place] [address]   [phone]            May 22, 2017  9:30 AM   HBOT Supervision with HYPERBARIC CHAMBER 2   [facility] ([facility] [address])    [address]  [place] [address]   [phone]            May 23, 2017  9:30 AM   HBOT Supervision with HYPERBARIC CHAMBER 2   [facility] ([facility] [address])    [address]  [place] [address]   [phone]            May 24, 2017  8:00 AM   HBOT Supervision with HYPERBARIC CHAMBER 1   [facility] ([facility] [address])    [address]  [place] [address]   [phone]            May 25, 2017  8:00 AM   HBOT Supervision with HYPERBARIC CHAMBER 1   [facility] ([facility] [address])    [address]  [place] [address]   [phone]            May 26, 2017  8:00 AM   HBOT Supervision with HYPERBARIC CHAMBER 1   [facility] ([facility] [address])    [address]  [place] [address]   [phone]              Problem List              ICD-10-CM Priority Class Noted - Resolved    DVT of lower extremity (deep venous thrombosis) (CMS-HCC) I82.409   9/2/2010 - Present    DM (diabetes mellitus) (CMS-HCC) E11.9 Medium  9/2/2010 - Present    HTN (hypertension) I10 Medium  9/2/2010 - Present    Spinal stenosis, lumbar region, without neurogenic claudication M48.06 Low  12/13/2012 - Present    Senile nuclear sclerosis H25.10   2/13/2013 - Present    Blood in stool K92.1   6/5/2013 - Present    Diarrhea following gastrointestinal surgery R19.7, Z98.890   6/23/2014 - Present    History of prostatectomy Z90.79   5/8/2015 - Present    Ankle fracture, right S82.891A   10/28/2015 - Present    Chronic kidney disease (CKD), stage III (moderate) N18.3   7/21/2016 - Present    Neuropathy (CMS-[facility]) G62.9   7/21/2016 - Present    Depression F32.9   7/21/2016 - Present    AK  (actinic keratosis) L57.0   7/21/2016 - Present    Hyperlipidemia E78.5   7/21/2016 - Present    Health care maintenance Z00.00   8/30/2016 - Present    History of prostate cancer Z85.46   3/24/2017 - Present    History of radiation therapy Z92.3   3/24/2017 - Present    Type 2 diabetes mellitus with stage 3 chronic kidney disease, with long-term current use of insulin (HCC) E11.22, N18.3, Z79.4   3/24/2017 - Present    Continuous leakage of urine N39.45   3/24/2017 - Present    History of DVT (deep vein thrombosis) Z86.718   3/24/2017 - Present    Other specified disorders of the skin and subcutaneous tissue related to radiation L59.8   3/29/2017 - Present    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   3/29/2017 - Present    Hematuria R31.9   3/29/2017 - Present    Pressure-related ear pain T70.0XXA   4/4/2017 - Present      Health Maintenance        Date Due Completion Dates    IMM ZOSTER VACCINE 11/28/1996 ---    IMM DTaP/Tdap/Td Vaccine (1 - Tdap) 8/8/2010 8/7/2010    URINE ACR / MICROALBUMIN 1/16/2013 1/16/2012    IMM PNEUMOCOCCAL 65+ (ADULT) LOW/MEDIUM RISK SERIES (2 of 2 - PCV13) 12/13/2013 12/13/2012    A1C SCREENING 2/22/2017 8/22/2016, 1/16/2015, 6/23/2014, 1/16/2012, 9/2/2010    FASTING LIPID PROFILE 8/22/2017 8/22/2016, 1/16/2012, 9/2/2010    SERUM CREATININE 8/22/2017 8/22/2016, 7/12/2016, 8/3/2015, 7/22/2015, 1/16/2015, 6/25/2014, 6/24/2014, 6/23/2014, 5/29/2014, 5/27/2014, 5/26/2014, 5/22/2014, 5/14/2014, 4/12/2014, 3/24/2014, 3/17/2014, 6/5/2013, 5/31/2013, 12/26/2012, 12/22/2012, 12/17/2012, 12/14/2012, 12/3/2012, 9/17/2012, 3/5/2012, 1/16/2012, 1/26/2011, 9/18/2010, 9/17/2010, 9/2/2010, 9/1/2010, 5/24/2010, 1/15/2010, 2/8/2005    DIABETES MONOFILAMENT / LE EXAM 8/30/2017 8/30/2016 (N/S)    Override on 8/30/2016: (N/S)    RETINAL SCREENING 10/10/2017 10/10/2016    COLONOSCOPY 6/5/2023 6/5/2013, 6/5/2013 (N/S)    Override on 6/5/2013: (N/S) (GIC)            Results     POCT Glucose      Component Value Standard  Range & Units    Glucose - Accu-Ck 189 70 - 100 mg/dL                        Current Immunizations     Influenza TIV (IM) 9/25/2016, 10/21/2013, 10/1/2012    Influenza Vaccine Quad Inj (Preserved) 9/21/2015    Pneumococcal polysaccharide vaccine (PPSV-23) 12/13/2012  7:30 PM    TD Vaccine 8/7/2010 12:38 PM      Below and/or attached are the medications your provider expects you to take. Review all of your home medications and newly ordered medications with your provider and/or pharmacist. Follow medication instructions as directed by your provider and/or pharmacist. Please keep your medication list with you and share with your provider. Update the information when medications are discontinued, doses are changed, or new medications (including over-the-counter products) are added; and carry medication information at all times in the event of emergency situations     Allergies:  AUGMENTIN - Unspecified     LATEX - Unspecified     TAPE - Rash               Medications  Valid as of: April 17, 2017 - 11:56 AM    Generic Name Brand Name Tablet Size Instructions for use    Ascorbic Acid (Tab) ascorbic acid 500 MG Take 500 mg by mouth every day.        Cholecalciferol (Tab) vitamin D 2000 UNIT Take 2,000 Units by mouth every day.        Clobetasol Propionate (Cream) TEMOVATE 0.05 %         Cyanocobalamin (Tab CR) B-12 1000 MCG Take  by mouth.        Ferrous Sulfate (Tab) ferrous sulfate 325 (65 FE) MG Take 325 mg by mouth 2 Times a Day.        FLUoxetine HCl (Cap) PROZAC 20 MG Take 20 mg by mouth every day.        GlipiZIDE (Tab) GLUCOTROL 10 MG Take 10 mg by mouth 2 times a day. One po am, 1/2 po pm        HydroCHLOROthiazide (Tab) HYDRODIURIL 12.5 MG Take 12.5 mg by mouth every day.        Insulin NPH Human (Isophane) (Suspension) HUMULIN,NOVOLIN 100 UNIT/ML Inject 10 Units as instructed Once. Before dinner        Lisinopril (Tab) PRINIVIL 5 MG Take 2.5 mg by mouth every day. Indications: High Blood Pressure         MetFORMIN HCl (Tab) GLUCOPHAGE 500 MG Take 500 mg by mouth 2 times a day, with meals.        Pantoprazole Sodium (Tablet Delayed Response) PROTONIX 40 MG Take 40 mg by mouth every day.        SAXagliptin HCl (Tab) SAXagliptin HCl 5 MG Take 5 mg by mouth every day.        Simvastatin (Tab) ZOCOR 40 MG Take 40 mg by mouth every evening. Continue as per Dr. Mckeon. Continue as per Dr. Bobo.        .                 Medicines prescribed today were sent to:     [facility] #[id number] - [place], [place] - [address] [facility]    [address] [facility] [place] [address]    Phone: [phone] Fax: [phone]    Open 24 Hours?: No      Medication refill instructions:       If your prescription bottle indicates you have medication refills left, it is not necessary to call your provider’s office. Please contact your pharmacy and they will refill your medication.    If your prescription bottle indicates you do not have any refills left, you may request refills at any time through one of the following ways: The online [employer] system (except Urgent Care), by calling your provider’s office, or by asking your pharmacy to contact your provider’s office with a refill request. Medication refills are processed only during regular business hours and may not be available until the next business day. Your provider may request additional information or to have a follow-up visit with you prior to refilling your medication.   *Please Note: Medication refills are assigned a new Rx number when refilled electronically. Your pharmacy may indicate that no refills were authorized even though a new prescription for the same medication is available at the pharmacy. Please request the medicine by name with the pharmacy before contacting your provider for a refill.        Instructions    After hyperbaric oxygen therapy treatment, it is normal to experience some congestion to the ears, muffling of sounds, or abnormal sounds to one or both ears.    If you  experience pain or discomfort to one or both ears that does not resolve spontaneously, please contact the hyperbaric department at [phone].            [employer] Access Code: Activation code not generated  Current [employer] Status: Active

## 2017-04-17 NOTE — PROGRESS NOTES
Subjective:   Selvin Braga is a 81 y.o. 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 He continues to tolerate treatment   well and is able to clear his ears and denied any pain or pressure.     His glucose was adequate to prevent hypoglycemia.    Review of Systems   Constitutional: Negative for fever and chills.   HENT: Negative for ear discharge, ear pain and hearing loss.    Eyes: Negative for blurred vision and double vision.   Respiratory: Negative for cough and shortness of breath.    Cardiovascular: Negative for chest pain and palpitations.   Gastrointestinal: Positive for heartburn, diarrhea and constipation.   Genitourinary: Positive for frequency.   Musculoskeletal: Positive for back pain, joint pain and falls.   Skin: Positive for itching and rash.   Neurological: Negative for headaches.   Psychiatric/Behavioral: Positive for depression.          Objective:     /77 mmHg  Pulse 80  Temp(Src) 36.6 °C (97.8 °F)  SpO2 97%     Physical Exam   Constitutional: He is oriented to person, place, and time. He appears well-developed and well-nourished.   HENT:   Head: Normocephalic and atraumatic.   Right Ear: Tympanic membrane, external ear and ear canal normal.   Left Ear: Tympanic membrane, external ear and ear canal normal.   Mouth/Throat: He has dentures.   Left myringotomy tube.   Pulmonary/Chest: Effort normal.   Neurological: He is alert and oriented to person, place, and time.   Psychiatric: He has a normal mood and affect.   Vitals reviewed.      Assessment/Plan:       •     Other specified disorders of the skin and subcutaneous tissue related to radiation -  Appropriate candidate for hyperbaric oxygen therapy. Patient completed his 11th HBOT treatment at a pressure of 2.4 HANNA x 90 minutes at a descent rate of 1.5 PSI / min and ascent rate of 3.0 PSI/minute. Will  continue the patient with HBOT at a pressure of 2.4 HANNA x 90 minutes x  40 treatments on a 5x per week, M-F basis.  Q 30 minute air breaks.    3/30 - held at a travel rate of 1.0 for ear discomfort.  No worsening of L ear during treatment  3/31 - No significant change to L ear during treatment.  Still with Teed 1 erythema throughout L ear    4/5-4/9 - Off HBOT due to ENT visit and L PE tube placed.  No change to treatment count  Yes       •     Radiation cystitis - continue to follow with Dr. Marie            •     Continuous leakage of urine - Cunningham clamp / condom cathether management            •     Hematuria - should improve with HBOT - on Fe replacement            •     Blood in stool - should improve with HBOT - on Fe replacement            •     History of prostate cancer - in remission per Dr. Marie            •     History of radiation therapy             •     History of prostatectomy            •     Chronic kidney disease (CKD), stage III (moderate) - d/t DM, medication management only            •     Type 2 diabetes mellitus with stage 3 chronic kidney disease, with long-term current use of insulin (HCC) - on combined oral (glipizie and metformin) and insulin therapy            •     Essential hypertension - Stable on ACE-I - Lisinopril 40mg qd            •     Spinal stenosis, lumbar region, without neurogenic claudication - stable            •     Neuropathy (CMS-HCC) - due to DM, stable            •     AK (actinic keratosis) - on topical steroid cream, continue with Dermatology            •     History of DVT (deep vein thrombosis) - trauma related, not on chronic anticoagulation            •    Pressure related ear pain - 3/29 patient developed teed 1-2 changes to L TM. Received PE tube to L ear on 4/7

## 2017-04-18 ENCOUNTER — OFFICE VISIT (OUTPATIENT)
Dept: WOUND CARE | Facility: MEDICAL CENTER | Age: 82
End: 2017-04-18
Attending: UROLOGY
Payer: MEDICARE

## 2017-04-18 VITALS
OXYGEN SATURATION: 96 % | TEMPERATURE: 98.2 F | SYSTOLIC BLOOD PRESSURE: 140 MMHG | DIASTOLIC BLOOD PRESSURE: 73 MMHG | HEART RATE: 84 BPM

## 2017-04-18 DIAGNOSIS — K92.1 BLOOD IN STOOL: ICD-10-CM

## 2017-04-18 DIAGNOSIS — R31.9 HEMATURIA: ICD-10-CM

## 2017-04-18 DIAGNOSIS — I15.8 OTHER SECONDARY HYPERTENSION: ICD-10-CM

## 2017-04-18 DIAGNOSIS — Z90.79 HISTORY OF PROSTATECTOMY: ICD-10-CM

## 2017-04-18 DIAGNOSIS — Z85.46 HISTORY OF PROSTATE CANCER: ICD-10-CM

## 2017-04-18 DIAGNOSIS — M48.061 SPINAL STENOSIS, LUMBAR REGION, WITHOUT NEUROGENIC CLAUDICATION: ICD-10-CM

## 2017-04-18 DIAGNOSIS — N18.30 TYPE 2 DIABETES MELLITUS WITH STAGE 3 CHRONIC KIDNEY DISEASE, WITH LONG-TERM CURRENT USE OF INSULIN (HCC): ICD-10-CM

## 2017-04-18 DIAGNOSIS — L57.0 AK (ACTINIC KERATOSIS): ICD-10-CM

## 2017-04-18 DIAGNOSIS — G62.9 NEUROPATHY: ICD-10-CM

## 2017-04-18 DIAGNOSIS — Z86.718 HISTORY OF DVT (DEEP VEIN THROMBOSIS): ICD-10-CM

## 2017-04-18 DIAGNOSIS — Z79.4 TYPE 2 DIABETES MELLITUS WITH STAGE 3 CHRONIC KIDNEY DISEASE, WITH LONG-TERM CURRENT USE OF INSULIN (HCC): ICD-10-CM

## 2017-04-18 DIAGNOSIS — L59.8 OTHER SPECIFIED DISORDERS OF THE SKIN AND SUBCUTANEOUS TISSUE RELATED TO RADIATION: Primary | ICD-10-CM

## 2017-04-18 DIAGNOSIS — N39.45 CONTINUOUS LEAKAGE OF URINE: ICD-10-CM

## 2017-04-18 DIAGNOSIS — Z92.3 HISTORY OF RADIATION THERAPY: ICD-10-CM

## 2017-04-18 DIAGNOSIS — E11.22 TYPE 2 DIABETES MELLITUS WITH STAGE 3 CHRONIC KIDNEY DISEASE, WITH LONG-TERM CURRENT USE OF INSULIN (HCC): ICD-10-CM

## 2017-04-18 DIAGNOSIS — N30.40 RADIATION CYSTITIS: ICD-10-CM

## 2017-04-18 LAB
GLUCOSE BLD-MCNC: 179 MG/DL (ref 70–100)
GLUCOSE BLD-MCNC: 179 MG/DL (ref 70–100)

## 2017-04-18 PROCEDURE — 99183 HYPERBARIC OXYGEN THERAPY: CPT | Performed by: NURSE PRACTITIONER

## 2017-04-18 PROCEDURE — 82962 GLUCOSE BLOOD TEST: CPT | Performed by: NURSE PRACTITIONER

## 2017-04-18 PROCEDURE — 82948 REAGENT STRIP/BLOOD GLUCOSE: CPT | Performed by: NURSE PRACTITIONER

## 2017-04-18 PROCEDURE — G8417 CALC BMI ABV UP PARAM F/U: HCPCS | Performed by: NURSE PRACTITIONER

## 2017-04-18 PROCEDURE — G0277 HBOT, FULL BODY CHAMBER, 30M: HCPCS | Performed by: NURSE PRACTITIONER

## 2017-04-18 ASSESSMENT — ENCOUNTER SYMPTOMS
HEADACHES: 0
BLURRED VISION: 0
FEVER: 0
BACK PAIN: 1
CHILLS: 0
CONSTIPATION: 1
DIARRHEA: 1
FALLS: 1
HEARTBURN: 1
SHORTNESS OF BREATH: 0
DEPRESSION: 1
PALPITATIONS: 0
COUGH: 0
DOUBLE VISION: 0

## 2017-04-18 ASSESSMENT — PAIN SCALES - GENERAL: PAINLEVEL_OUTOF13: 0

## 2017-04-18 NOTE — PATIENT INSTRUCTIONS
After hyperbaric oxygen therapy treatment, it is normal to experience some congestion to the ears, muffling of sounds, or abnormal sounds to one or both ears.    If you experience pain or discomfort to one or both ears that does not resolve spontaneously, please contact the hyperbaric department at [phone].

## 2017-04-18 NOTE — PROGRESS NOTES
Subjective:   Selvin Braga is a 81 y.o. male who presents with late effects of radiation and radiation cystitis,   manifesting in hematuria and the passage of urinary clots    HPI    Selvin presents HBOT treatment today. He continues to tolerate his treatments well and is able to clear his ears. He  denied any pain or pressure. His glucose was adequate to prevent hypoglycemia.    Review of Systems   Constitutional: Negative for fever and chills.   HENT: Negative for ear discharge, ear pain and hearing loss.    Eyes: Negative for blurred vision and double vision.   Respiratory: Negative for cough and shortness of breath.    Cardiovascular: Negative for chest pain and palpitations.   Gastrointestinal: Positive for heartburn, diarrhea and constipation.   Genitourinary: Positive for frequency.   Musculoskeletal: Positive for back pain, joint pain and falls.   Skin: Positive for itching and rash.   Neurological: Negative for headaches.   Psychiatric/Behavioral: Positive for depression.          Objective:     /73 mmHg  Pulse 84  Temp(Src) 36.8 °C (98.2 °F)  SpO2 96%   Blood glucose 179    Physical Exam   Constitutional: He is oriented to person, place, and time. He appears well-developed and well-nourished.   HENT:   Head: Normocephalic and atraumatic.   Right Ear: Tympanic membrane, external ear and ear canal normal.   Left Ear: Tympanic membrane, external ear and ear canal normal.   Mouth/Throat: He has dentures.   Left myringotomy tube.   Pulmonary/Chest: Effort normal.   Neurological: He is alert and oriented to person, place, and time.   Psychiatric: He has a normal mood and affect.   Vitals reviewed.      Assessment/Plan:       •     Other specified disorders of the skin and subcutaneous tissue related to radiation -  Appropriate candidate for hyperbaric oxygen therapy. Patient completed his 12th HBOT treatment at a pressure of 2.4 HANNA x 90 minutes at a descent rate of 1.5 PSI / min and ascent rate of  3.0 PSI/minute. Will continue the patient with HBOT at a pressure of 2.4 HANNA x 90 minutes x  40 treatments on a 5x per week, M-F basis.  Q 30 minute air breaks.    3/30 - held at a travel rate of 1.0 for ear discomfort.  No worsening of L ear during treatment  3/31 - No significant change to L ear during treatment.  Still with Teed 1 erythema throughout L ear    4/5-4/9 - Off HBOT due to ENT visit and L PE tube placed.  No change to treatment count  Yes       •     Radiation cystitis - continue to follow with Dr. Marie            •     Continuous leakage of urine - Cunningham clamp / condom cathether management            •     Hematuria - should improve with HBOT - on Fe replacement            •     Blood in stool - should improve with HBOT - on Fe replacement            •     History of prostate cancer - in remission per Dr. Marie            •     History of radiation therapy             •     History of prostatectomy            •     Chronic kidney disease (CKD), stage III (moderate) - d/t DM, medication management only            •     Type 2 diabetes mellitus with stage 3 chronic kidney disease, with long-term current use of insulin (HCC) - on combined oral (glipizie and metformin) and insulin therapy            •     Essential hypertension - Stable on ACE-I - Lisinopril 40mg qd            •     Spinal stenosis, lumbar region, without neurogenic claudication - stable            •     Neuropathy (CMS-HCC) - due to DM, stable            •     AK (actinic keratosis) - on topical steroid cream, continue with Dermatology            •     History of DVT (deep vein thrombosis) - trauma related, not on chronic anticoagulation            •    Pressure related ear pain - 3/29 patient developed teed 1-2 changes to L TM. Received PE tube to L ear on 4/7

## 2017-04-19 ENCOUNTER — RESOLUTE PROFESSIONAL BILLING HOSPITAL PROF FEE (OUTPATIENT)
Dept: HOSPITALIST | Facility: MEDICAL CENTER | Age: 82
End: 2017-04-19
Payer: MEDICARE

## 2017-04-19 ENCOUNTER — HOSPITAL ENCOUNTER (INPATIENT)
Facility: MEDICAL CENTER | Age: 82
LOS: 2 days | DRG: 872 | End: 2017-04-22
Attending: EMERGENCY MEDICINE | Admitting: FAMILY MEDICINE
Payer: MEDICARE

## 2017-04-19 ENCOUNTER — APPOINTMENT (OUTPATIENT)
Dept: RADIOLOGY | Facility: MEDICAL CENTER | Age: 82
DRG: 872 | End: 2017-04-19
Attending: EMERGENCY MEDICINE
Payer: MEDICARE

## 2017-04-19 ENCOUNTER — OFFICE VISIT (OUTPATIENT)
Dept: WOUND CARE | Facility: MEDICAL CENTER | Age: 82
End: 2017-04-19
Attending: UROLOGY
Payer: MEDICARE

## 2017-04-19 VITALS
OXYGEN SATURATION: 100 % | SYSTOLIC BLOOD PRESSURE: 124 MMHG | DIASTOLIC BLOOD PRESSURE: 63 MMHG | HEART RATE: 93 BPM | TEMPERATURE: 99.5 F

## 2017-04-19 DIAGNOSIS — E11.22 TYPE 2 DIABETES MELLITUS WITH STAGE 3 CHRONIC KIDNEY DISEASE, WITH LONG-TERM CURRENT USE OF INSULIN (HCC): ICD-10-CM

## 2017-04-19 DIAGNOSIS — R09.02 HYPOXIA: ICD-10-CM

## 2017-04-19 DIAGNOSIS — L59.8 OTHER SPECIFIED DISORDERS OF THE SKIN AND SUBCUTANEOUS TISSUE RELATED TO RADIATION: ICD-10-CM

## 2017-04-19 DIAGNOSIS — Z79.4 TYPE 2 DIABETES MELLITUS WITH STAGE 3 CHRONIC KIDNEY DISEASE, WITH LONG-TERM CURRENT USE OF INSULIN (HCC): ICD-10-CM

## 2017-04-19 DIAGNOSIS — K92.1 BLOOD IN STOOL: ICD-10-CM

## 2017-04-19 DIAGNOSIS — N39.45 CONTINUOUS LEAKAGE OF URINE: ICD-10-CM

## 2017-04-19 DIAGNOSIS — N39.0 ACUTE UTI: ICD-10-CM

## 2017-04-19 DIAGNOSIS — R79.89 TROPONIN LEVEL ELEVATED: ICD-10-CM

## 2017-04-19 DIAGNOSIS — N30.40 RADIATION CYSTITIS: ICD-10-CM

## 2017-04-19 DIAGNOSIS — N18.30 TYPE 2 DIABETES MELLITUS WITH STAGE 3 CHRONIC KIDNEY DISEASE, WITH LONG-TERM CURRENT USE OF INSULIN (HCC): ICD-10-CM

## 2017-04-19 DIAGNOSIS — R31.9 HEMATURIA: ICD-10-CM

## 2017-04-19 LAB
ALBUMIN SERPL BCP-MCNC: 3.3 G/DL (ref 3.2–4.9)
ALBUMIN/GLOB SERPL: 1.2 G/DL
ALP SERPL-CCNC: 70 U/L (ref 30–99)
ALT SERPL-CCNC: 11 U/L (ref 2–50)
ANION GAP SERPL CALC-SCNC: 9 MMOL/L (ref 0–11.9)
APPEARANCE UR: ABNORMAL
AST SERPL-CCNC: 15 U/L (ref 12–45)
BACTERIA #/AREA URNS HPF: ABNORMAL /HPF
BASOPHILS # BLD AUTO: 0.4 % (ref 0–1.8)
BASOPHILS # BLD: 0.04 K/UL (ref 0–0.12)
BILIRUB SERPL-MCNC: 0.9 MG/DL (ref 0.1–1.5)
BILIRUB UR QL STRIP.AUTO: NEGATIVE
BUN SERPL-MCNC: 33 MG/DL (ref 8–22)
CALCIUM SERPL-MCNC: 8.9 MG/DL (ref 8.4–10.2)
CHLORIDE SERPL-SCNC: 103 MMOL/L (ref 96–112)
CO2 SERPL-SCNC: 23 MMOL/L (ref 20–33)
COLOR UR: YELLOW
CREAT SERPL-MCNC: 1.41 MG/DL (ref 0.5–1.4)
CULTURE IF INDICATED INDCX: YES UA CULTURE
EOSINOPHIL # BLD AUTO: 0.07 K/UL (ref 0–0.51)
EOSINOPHIL NFR BLD: 0.7 % (ref 0–6.9)
EPI CELLS #/AREA URNS HPF: ABNORMAL /HPF
ERYTHROCYTE [DISTWIDTH] IN BLOOD BY AUTOMATED COUNT: 49.7 FL (ref 35.9–50)
GFR SERPL CREATININE-BSD FRML MDRD: 48 ML/MIN/1.73 M 2
GLOBULIN SER CALC-MCNC: 2.7 G/DL (ref 1.9–3.5)
GLUCOSE BLD-MCNC: 208 MG/DL (ref 70–100)
GLUCOSE SERPL-MCNC: 250 MG/DL (ref 65–99)
GLUCOSE UR STRIP.AUTO-MCNC: NEGATIVE MG/DL
HCT VFR BLD AUTO: 31.8 % (ref 42–52)
HGB BLD-MCNC: 10.8 G/DL (ref 14–18)
IMM GRANULOCYTES # BLD AUTO: 0.05 K/UL (ref 0–0.11)
IMM GRANULOCYTES NFR BLD AUTO: 0.5 % (ref 0–0.9)
KETONES UR STRIP.AUTO-MCNC: NEGATIVE MG/DL
LEUKOCYTE ESTERASE UR QL STRIP.AUTO: ABNORMAL
LYMPHOCYTES # BLD AUTO: 0.19 K/UL (ref 1–4.8)
LYMPHOCYTES NFR BLD: 2 % (ref 22–41)
MCH RBC QN AUTO: 30.1 PG (ref 27–33)
MCHC RBC AUTO-ENTMCNC: 34 G/DL (ref 33.7–35.3)
MCV RBC AUTO: 88.6 FL (ref 81.4–97.8)
MICRO URNS: ABNORMAL
MONOCYTES # BLD AUTO: 0.4 K/UL (ref 0–0.85)
MONOCYTES NFR BLD AUTO: 4.2 % (ref 0–13.4)
MUCOUS THREADS #/AREA URNS HPF: ABNORMAL /HPF
NEUTROPHILS # BLD AUTO: 8.82 K/UL (ref 1.82–7.42)
NEUTROPHILS NFR BLD: 92.2 % (ref 44–72)
NITRITE UR QL STRIP.AUTO: POSITIVE
NRBC # BLD AUTO: 0 K/UL
NRBC BLD AUTO-RTO: 0 /100 WBC
PH UR STRIP.AUTO: 5 [PH]
PLATELET # BLD AUTO: 129 K/UL (ref 164–446)
PMV BLD AUTO: 9.4 FL (ref 9–12.9)
POTASSIUM SERPL-SCNC: 4 MMOL/L (ref 3.6–5.5)
PROT SERPL-MCNC: 6 G/DL (ref 6–8.2)
PROT UR QL STRIP: 30 MG/DL
RBC # BLD AUTO: 3.59 M/UL (ref 4.7–6.1)
RBC # URNS HPF: ABNORMAL /HPF
RBC UR QL AUTO: ABNORMAL
SODIUM SERPL-SCNC: 135 MMOL/L (ref 135–145)
SP GR UR REFRACTOMETRY: 1.02
SP GR UR STRIP.AUTO: >=1.03
TROPONIN I SERPL-MCNC: 0.05 NG/ML (ref 0–0.04)
WBC # BLD AUTO: 9.6 K/UL (ref 4.8–10.8)
WBC #/AREA URNS HPF: ABNORMAL /HPF

## 2017-04-19 PROCEDURE — A9270 NON-COVERED ITEM OR SERVICE: HCPCS | Performed by: EMERGENCY MEDICINE

## 2017-04-19 PROCEDURE — 99220 PR INITIAL OBSERVATION CARE,LEVL III: CPT | Performed by: FAMILY MEDICINE

## 2017-04-19 PROCEDURE — 84484 ASSAY OF TROPONIN QUANT: CPT

## 2017-04-19 PROCEDURE — 87077 CULTURE AEROBIC IDENTIFY: CPT

## 2017-04-19 PROCEDURE — 96366 THER/PROPH/DIAG IV INF ADDON: CPT

## 2017-04-19 PROCEDURE — G0277 HBOT, FULL BODY CHAMBER, 30M: HCPCS | Performed by: FAMILY MEDICINE

## 2017-04-19 PROCEDURE — 82948 REAGENT STRIP/BLOOD GLUCOSE: CPT | Performed by: FAMILY MEDICINE

## 2017-04-19 PROCEDURE — 85025 COMPLETE CBC W/AUTO DIFF WBC: CPT

## 2017-04-19 PROCEDURE — 87186 SC STD MICRODIL/AGAR DIL: CPT

## 2017-04-19 PROCEDURE — 36415 COLL VENOUS BLD VENIPUNCTURE: CPT

## 2017-04-19 PROCEDURE — 700111 HCHG RX REV CODE 636 W/ 250 OVERRIDE (IP): Performed by: EMERGENCY MEDICINE

## 2017-04-19 PROCEDURE — 87040 BLOOD CULTURE FOR BACTERIA: CPT

## 2017-04-19 PROCEDURE — 81001 URINALYSIS AUTO W/SCOPE: CPT

## 2017-04-19 PROCEDURE — G8417 CALC BMI ABV UP PARAM F/U: HCPCS | Performed by: FAMILY MEDICINE

## 2017-04-19 PROCEDURE — 304561 HCHG STAT O2

## 2017-04-19 PROCEDURE — 71010 DX-CHEST-LIMITED (1 VIEW): CPT

## 2017-04-19 PROCEDURE — 700105 HCHG RX REV CODE 258: Performed by: EMERGENCY MEDICINE

## 2017-04-19 PROCEDURE — 82962 GLUCOSE BLOOD TEST: CPT | Performed by: FAMILY MEDICINE

## 2017-04-19 PROCEDURE — 99183 HYPERBARIC OXYGEN THERAPY: CPT | Performed by: FAMILY MEDICINE

## 2017-04-19 PROCEDURE — 99285 EMERGENCY DEPT VISIT HI MDM: CPT

## 2017-04-19 PROCEDURE — 94760 N-INVAS EAR/PLS OXIMETRY 1: CPT

## 2017-04-19 PROCEDURE — 96365 THER/PROPH/DIAG IV INF INIT: CPT

## 2017-04-19 PROCEDURE — G0378 HOSPITAL OBSERVATION PER HR: HCPCS

## 2017-04-19 PROCEDURE — 87086 URINE CULTURE/COLONY COUNT: CPT

## 2017-04-19 PROCEDURE — 700102 HCHG RX REV CODE 250 W/ 637 OVERRIDE(OP): Performed by: EMERGENCY MEDICINE

## 2017-04-19 PROCEDURE — 80053 COMPREHEN METABOLIC PANEL: CPT

## 2017-04-19 RX ORDER — LISINOPRIL 5 MG/1
2.5 TABLET ORAL DAILY
Status: DISCONTINUED | OUTPATIENT
Start: 2017-04-20 | End: 2017-04-22 | Stop reason: HOSPADM

## 2017-04-19 RX ORDER — SODIUM CHLORIDE 9 MG/ML
500 INJECTION, SOLUTION INTRAVENOUS ONCE
Status: COMPLETED | OUTPATIENT
Start: 2017-04-19 | End: 2017-04-19

## 2017-04-19 RX ORDER — HYDROCHLOROTHIAZIDE 25 MG/1
12.5 TABLET ORAL DAILY
Status: DISCONTINUED | OUTPATIENT
Start: 2017-04-20 | End: 2017-04-20

## 2017-04-19 RX ORDER — OMEPRAZOLE 20 MG/1
20 CAPSULE, DELAYED RELEASE ORAL DAILY
Status: DISCONTINUED | OUTPATIENT
Start: 2017-04-20 | End: 2017-04-20

## 2017-04-19 RX ORDER — CHOLECALCIFEROL (VITAMIN D3) 125 MCG
1000 CAPSULE ORAL DAILY
Status: DISCONTINUED | OUTPATIENT
Start: 2017-04-20 | End: 2017-04-22 | Stop reason: HOSPADM

## 2017-04-19 RX ORDER — ONDANSETRON 2 MG/ML
4 INJECTION INTRAMUSCULAR; INTRAVENOUS EVERY 4 HOURS PRN
Status: DISCONTINUED | OUTPATIENT
Start: 2017-04-19 | End: 2017-04-22 | Stop reason: HOSPADM

## 2017-04-19 RX ORDER — FERROUS SULFATE 325(65) MG
325 TABLET ORAL 2 TIMES DAILY
Status: DISCONTINUED | OUTPATIENT
Start: 2017-04-19 | End: 2017-04-22 | Stop reason: HOSPADM

## 2017-04-19 RX ORDER — GLIPIZIDE 5 MG/1
10 TABLET ORAL
Status: DISCONTINUED | OUTPATIENT
Start: 2017-04-20 | End: 2017-04-20

## 2017-04-19 RX ORDER — LABETALOL HYDROCHLORIDE 5 MG/ML
10 INJECTION, SOLUTION INTRAVENOUS EVERY 4 HOURS PRN
Status: DISCONTINUED | OUTPATIENT
Start: 2017-04-19 | End: 2017-04-22 | Stop reason: HOSPADM

## 2017-04-19 RX ORDER — DEXTROSE MONOHYDRATE 25 G/50ML
25 INJECTION, SOLUTION INTRAVENOUS
Status: DISCONTINUED | OUTPATIENT
Start: 2017-04-19 | End: 2017-04-20

## 2017-04-19 RX ORDER — SODIUM CHLORIDE 9 MG/ML
INJECTION, SOLUTION INTRAVENOUS CONTINUOUS
Status: DISCONTINUED | OUTPATIENT
Start: 2017-04-19 | End: 2017-04-20

## 2017-04-19 RX ORDER — ACETAMINOPHEN 325 MG/1
650 TABLET ORAL EVERY 6 HOURS PRN
Status: DISCONTINUED | OUTPATIENT
Start: 2017-04-19 | End: 2017-04-22 | Stop reason: HOSPADM

## 2017-04-19 RX ORDER — CIPROFLOXACIN 2 MG/ML
200 INJECTION, SOLUTION INTRAVENOUS EVERY 12 HOURS
Status: DISCONTINUED | OUTPATIENT
Start: 2017-04-20 | End: 2017-04-20

## 2017-04-19 RX ORDER — FLUOXETINE HYDROCHLORIDE 20 MG/1
20 CAPSULE ORAL DAILY
Status: DISCONTINUED | OUTPATIENT
Start: 2017-04-20 | End: 2017-04-22 | Stop reason: HOSPADM

## 2017-04-19 RX ORDER — ZOLPIDEM TARTRATE 5 MG/1
5 TABLET ORAL
Status: DISCONTINUED | OUTPATIENT
Start: 2017-04-19 | End: 2017-04-22 | Stop reason: HOSPADM

## 2017-04-19 RX ORDER — ACETAMINOPHEN 325 MG/1
650 TABLET ORAL ONCE
Status: COMPLETED | OUTPATIENT
Start: 2017-04-19 | End: 2017-04-19

## 2017-04-19 RX ORDER — CIPROFLOXACIN 2 MG/ML
400 INJECTION, SOLUTION INTRAVENOUS ONCE
Status: COMPLETED | OUTPATIENT
Start: 2017-04-19 | End: 2017-04-19

## 2017-04-19 RX ORDER — ASCORBIC ACID 500 MG
500 TABLET ORAL DAILY
Status: DISCONTINUED | OUTPATIENT
Start: 2017-04-20 | End: 2017-04-22 | Stop reason: HOSPADM

## 2017-04-19 RX ORDER — SIMVASTATIN 20 MG
40 TABLET ORAL NIGHTLY
Status: DISCONTINUED | OUTPATIENT
Start: 2017-04-19 | End: 2017-04-22 | Stop reason: HOSPADM

## 2017-04-19 RX ADMIN — ACETAMINOPHEN 650 MG: 325 TABLET, FILM COATED ORAL at 18:46

## 2017-04-19 RX ADMIN — SODIUM CHLORIDE 500 ML: 9 INJECTION, SOLUTION INTRAVENOUS at 20:50

## 2017-04-19 RX ADMIN — CIPROFLOXACIN 400 MG: 2 INJECTION, SOLUTION INTRAVENOUS at 20:50

## 2017-04-19 ASSESSMENT — ENCOUNTER SYMPTOMS
DIARRHEA: 1
CONSTIPATION: 1
BLURRED VISION: 0
HEADACHES: 0
BACK PAIN: 1
DEPRESSION: 1
CHILLS: 0
FEVER: 0
HEARTBURN: 1
FALLS: 1
DOUBLE VISION: 0
PALPITATIONS: 0

## 2017-04-19 ASSESSMENT — LIFESTYLE VARIABLES
DO YOU DRINK ALCOHOL: NO
EVER_SMOKED: YES

## 2017-04-19 ASSESSMENT — PAIN SCALES - GENERAL
PAINLEVEL_OUTOF10: 0
PAINLEVEL_OUTOF10: 0
PAINLEVEL_OUTOF13: 2

## 2017-04-19 NOTE — IP AVS SNAPSHOT
" Home Care Instructions                                                                                                                  Name:Selvin Braga  Medical Record Number:[id number]  CSN: [id number]    YOB: 1935   Age: 81 y.o.  Sex: male  HT:1.803 m (5' 11\") WT: 89.8 kg (197 lb 15.6 oz)          Admit Date: 4/19/2017     Discharge Date:   Today's Date: 4/22/2017  Attending Doctor:  Pearl Hendrix M.D.                  Allergies:  Augmentin; Latex; and Tape            Discharge Instructions       Discharge Instructions    Discharged to home by car with relative. Discharged via wheelchair, hospital escort: Yes.  Special equipment needed: Not Applicable    Be sure to schedule a follow-up appointment with your primary care doctor or any specialists as instructed.     Discharge Plan:   Diet Plan: Discussed  Activity Level: Discussed  Confirmed Follow up Appointment: Patient to Call and Schedule Appointment  Confirmed Symptoms Management: Discussed  Medication Reconciliation Updated: Yes  Influenza Vaccine Indication: Patient Refuses    I understand that a diet low in cholesterol, fat, and sodium is recommended for good health. Unless I have been given specific instructions below for another diet, I accept this instruction as my diet prescription.   Other diet: none    Special Instructions:     1) Take omnicef / Cefdinir abx as prescribed for the next five days. This has been send over to your pharmacy as requested.   2) Follow up with PCP within one week of hospital discharge. jscript:void(0)   3) Follow up with cardiology / heart doctors within one week of hospital discharge.   4) Take baby aspirin and metoprolol as prescribed until you see the heart doctors. Discuss with them need to continue this further.    · Is patient discharged on Warfarin / Coumadin?   No     · Is patient Post Blood Transfusion?  No    Depression / Suicide Risk    As you are discharged from this Critical access hospital facility, it is " important to learn how to keep safe from harming yourself.    Recognize the warning signs:  · Abrupt changes in personality, positive or negative- including increase in energy   · Giving away possessions  · Change in eating patterns- significant weight changes-  positive or negative  · Change in sleeping patterns- unable to sleep or sleeping all the time   · Unwillingness or inability to communicate  · Depression  · Unusual sadness, discouragement and loneliness  · Talk of wanting to die  · Neglect of personal appearance   · Rebelliousness- reckless behavior  · Withdrawal from people/activities they love  · Confusion- inability to concentrate     If you or a loved one observes any of these behaviors or has concerns about self-harm, here's what you can do:  · Talk about it- your feelings and reasons for harming yourself  · Remove any means that you might use to hurt yourself (examples: pills, rope, extension cords, firearm)  · Get professional help from the community (Mental Health, Substance Abuse, psychological counseling)  · Do not be alone:Call your Safe Contact- someone whom you trust who will be there for you.  · Call your local CRISIS HOTLINE 030-5309 or 500-713-9992  · Call your local Children's Mobile Crisis Response Team [place] (055) 076-7066 or www.[employer]  · Call the toll free National Suicide Prevention Hotlines   · National Suicide Prevention Lifeline 038-541-VIUP (6665)  · National Hope Line Network 800-SUICIDE (962-8828)    Urinary Tract Infection  Urinary tract infections (UTIs) can develop anywhere along your urinary tract. Your urinary tract is your body's drainage system for removing wastes and extra water. Your urinary tract includes two kidneys, two ureters, a bladder, and a urethra. Your kidneys are a pair of bean-shaped organs. Each kidney is about the size of your fist. They are located below your ribs, one on each side of your spine.  CAUSES  Infections are caused by  microbes, which are microscopic organisms, including fungi, viruses, and bacteria. These organisms are so small that they can only be seen through a microscope. Bacteria are the microbes that most commonly cause UTIs.  SYMPTOMS   Symptoms of UTIs may vary by age and gender of the patient and by the location of the infection. Symptoms in young women typically include a frequent and intense urge to urinate and a painful, burning feeling in the bladder or urethra during urination. Older women and men are more likely to be tired, shaky, and weak and have muscle aches and abdominal pain. A fever may mean the infection is in your kidneys. Other symptoms of a kidney infection include pain in your back or sides below the ribs, nausea, and vomiting.  DIAGNOSIS  To diagnose a UTI, your caregiver will ask you about your symptoms. Your caregiver also will ask to provide a urine sample. The urine sample will be tested for bacteria and white blood cells. White blood cells are made by your body to help fight infection.  TREATMENT   Typically, UTIs can be treated with medication. Because most UTIs are caused by a bacterial infection, they usually can be treated with the use of antibiotics. The choice of antibiotic and length of treatment depend on your symptoms and the type of bacteria causing your infection.  HOME CARE INSTRUCTIONS  · If you were prescribed antibiotics, take them exactly as your caregiver instructs you. Finish the medication even if you feel better after you have only taken some of the medication.  · Drink enough water and fluids to keep your urine clear or pale yellow.  · Avoid caffeine, tea, and carbonated beverages. They tend to irritate your bladder.  · Empty your bladder often. Avoid holding urine for long periods of time.  · Empty your bladder before and after sexual intercourse.  · After a bowel movement, women should cleanse from front to back. Use each tissue only once.  SEEK MEDICAL CARE IF:   2. You  have back pain.  3. You develop a fever.  4. Your symptoms do not begin to resolve within 3 days.  SEEK IMMEDIATE MEDICAL CARE IF:   · You have severe back pain or lower abdominal pain.  · You develop chills.  · You have nausea or vomiting.  · You have continued burning or discomfort with urination.  MAKE SURE YOU:   · Understand these instructions.  · Will watch your condition.  · Will get help right away if you are not doing well or get worse.     This information is not intended to replace advice given to you by your health care provider. Make sure you discuss any questions you have with your health care provider.     Document Released: 09/27/2006 Document Revised: 01/08/2016 Document Reviewed: 01/25/2013  [employer] Interactive Patient Education ©2016 [employer]    Infection Control in the Home  If you have an infection or you are taking care of someone who has an infection, it is important to know how to keep the infection from spreading. Follow these guidelines to help stop the spread of infection, and talk to your health care provider.  HOW ARE INFECTIONS SPREAD?  In order for an infection to spread, the following must be present:  · A germ. This may be a virus, bacteria, fungus, or parasite.  · A place for the germ to live. This may be:  ¨ On or in a person, animal, plant, or food.  ¨ In soil or water.  ¨ On surfaces, such as a door handle.  · A susceptible host. This is a person or animal who does not have resistance (immunity) to the germ.  · A way for the germ to enter the host. This may occur by:  ¨ Direct contact. This may happen by making contact--such as shaking hands or hugging--with an infected person or animal. Some germs can also travel through the air and spread to you if an infected person coughs or sneezes on you or near you.  ¨ Indirect contact. This is when the germ enters the host through contact with an infected object. Examples include eating contaminated food, drinking contaminated  water, or touching a contaminated surface with your hands and then touching your face, nose, or mouth soon after that.  HOW CAN I HELP TO PREVENT INFECTION FROM SPREADING?  There are several things that you can do to help prevent infection from spreading.  Hand Washing  It is very important to wash your hands correctly, following these steps:  5. Wet your hands with clean, running water.  6. Apply soap to your hands. Liquid soap is better than bar soap.  7. Rub your hands together quickly to create lather.  8. Keep rubbing your hands together for at least 20 seconds. Thoroughly scrub all parts of your hands, including under your fingernails and between your fingers.  9. Rinse your hands with clean, running water until all of the soap is gone.  10. Dry your hands with an air dryer or a clean paper or cloth towel, or let your hands air-dry. Do not use your clothing or a soiled towel to dry your hands.  11. If you are in a public restroom, use your towel to turn off the water faucet and to open the bathroom door.  Make sure to wash your hands:  · Before:  ¨ Visiting a baby or anyone with a weakened or lowered defense (immune) system.  ¨ Putting in and taking out any contact lenses.  · After:  ¨ Working or playing outside.  ¨ Touching an animal or its toys or leash.  ¨ Handling livestock.  ¨ Using the bathroom or helping a child or adult to use the bathroom.  ¨ Using household  or toxic chemicals.  ¨ Touching or taking out the garbage.  ¨ Touching anything dirty around your home.  ¨ Handling soiled clothes or rags.  ¨ Taking care of a sick child. This includes touching used tissues, toys, and clothes.  ¨ Sneezing, coughing, or blowing your nose.  ¨ Using public transportation.  ¨ Shaking hands.  ¨ Using a phone, including your mobile phone.  ¨ Touching money.  · Before and after:  ¨ Preparing food.  ¨ Preparing a bottle for a baby.  ¨ Feeding a baby or a young child.  ¨ Eating.  ¨ Visiting or taking care of  someone who is sick.  ¨ Changing a diaper.  ¨ Changing a bandage (dressing) or taking care of an injury or wound.  ¨ Giving or taking medicine.  Taking Care of Your Home  · Make sure that you have enough cleaning supplies at all times. These include:  ¨ Disinfectants.  ¨ Reusable cleaning cloths. Wash these after each use.  ¨ Paper towels.  ¨ Utility gloves. Replace your gloves if they are cracked or torn or if they start to peel.  · Use bleach safely. Never mix it with other cleaning products, especially those that contain ammonia. This mixture can create a dangerous gas that may be deadly.  · Take care of your cleaning supplies. Toilet brushes, mops, and sponges can breed germs. Soak them in bleach and water for 5 minutes after each use.  · Do not pour used mop water down the sink. Pour it down the toilet instead.  · Maintain proper ventilation in your home.  · If you have a pet, ensure that your pet stays clean. Do not let people with weak immune systems touch bird droppings, fish tank water, or a litter box.  ¨ If you have a cat, be sure to change the litter every day.  · In the bathroom, make sure you:  ¨ Provide liquid soap.  ¨ Change towels and washcloths frequently. Avoid sharing towels and washcloths.  ¨ Change toothbrushes often and store them separately in a clean, dry place.  ¨ Disinfect the toilet.  ¨ Clean the tub, shower, and sink with standard cleaning products.    ¨ Mop the floor with a standard .  ¨ Do not share personal items, such as razors, toothbrushes, drinking glasses, deodorant, white, brushes, towels, and washcloths.    · In the kitchen, make sure you:  ¨ Store food carefully.  ¨ Refrigerate leftovers promptly in covered containers.  ¨ Throw out stale or spoiled food.  ¨ Clean the inside of your refrigerator each week.  ¨ Keep your refrigerator set at 40°F (4°C) or less, and set your freezer at 0°F (-18°C) or less.  ¨ Thaw foods in the refrigerator or microwave, not at room  temperature.  ¨ Serve foods at the proper temperature. Do not eat raw meat. Make sure it is cooked to the appropriate temperature. Cook eggs until they are firm.  ¨ Wash fruits and vegetables under running water.  ¨ Use separate cutting boards, plates, and utensils for raw foods and cooked foods.  ¨ Keep work surfaces clean.  ¨ Use a clean spoon each time you sample food while cooking.  ¨ Wash your dishes in hot, soapy water. Air-dry your dishes or use a .  ¨ Do not share forks, cups, or spoons during meals.  · Wear gloves if laundry is visibly soiled.  · Change linens each week or whenever they are soiled.  · Do not shake soiled linens. Doing that may send germs into the air. Put dressings, sanitary or incontinence pads, diapers, and gloves in plastic garbage bags for disposal.     This information is not intended to replace advice given to you by your health care provider. Make sure you discuss any questions you have with your health care provider.     Document Released: 09/26/2009 Document Revised: 01/08/2016 Document Reviewed: 08/20/2015  [employer] Interactive Patient Education ©2016 [employer]      Your appointments     Apr 24, 2017  9:30 AM   HBOT Supervision with HYPERBARIC CHAMBER 2   [facility] ([address] [place] [address])    [address] [facility] [address] [facility] [address]  [facility] [address]   [phone]            Apr 25, 2017  9:30 AM   HBOT Supervision with HYPERBARIC CHAMBER 2   [facility] ([address] [place] [address])    [address] [facility] [address]  [facility] [address]   [phone]            Apr 26, 2017  9:30 AM   HBOT Supervision with HYPERBARIC CHAMBER 2   [facility] ([address] [place] [address])    [address] [facility] [address]  [facility] [address]   [phone]            Apr 27, 2017  9:30 AM   HBOT Supervision with HYPERBARIC CHAMBER 2   [facility] ([address] [place] [address])    [address] [facility] [address]  [facility] [address]   [phone]            Apr 28, 2017   9:30 AM   HBOT Supervision with HYPERBARIC CHAMBER 2   [facility] ([address])    [address]  [place] [address]   [phone]            May 01, 2017  8:00 AM   Established Patient with Zack Rico M.D.   [facility] / [facility] Medicine (--)    [address]  [address]  [place] [address]   [phone]           You will be receiving a confirmation call a few days before your appointment from our automated call confirmation system.            May 01, 2017  9:30 AM   HBOT Supervision with HYPERBARIC CHAMBER 2   [facility] ([address])    [address]  [place] [address]   [phone]            May 02, 2017  9:30 AM   HBOT Supervision with HYPERBARIC CHAMBER 2   [facility] ([address])    [address]  [place] [address]   [phone]            May 03, 2017  9:30 AM   HBOT Supervision with HYPERBARIC CHAMBER 2   [facility] ([address])    [address]  [place] [address]   [phone]            May 04, 2017  9:30 AM   HBOT Supervision with HYPERBARIC CHAMBER 2   [facility] ([address])    [address]  [place] [address]   [phone]            May 05, 2017  9:30 AM   HBOT Supervision with HYPERBARIC CHAMBER 2   [facility] ([address])    [address]  [place] [address]   [phone]            May 08, 2017  9:30 AM   HBOT Supervision with HYPERBARIC CHAMBER 2   [facility] ([address])    [address]  [place] [address]   [phone]            May 09, 2017  9:30 AM   HBOT Supervision with HYPERBARIC CHAMBER 2   [facility] ([address])    [address]  [place] [address]   [phone]            May 10, 2017  9:30 AM   HBOT Supervision with HYPERBARIC  CHAMBER 2   [facility] ([facility] [address])    [address]  [place] [address]   [phone]            May 11, 2017  9:30 AM   HBOT Supervision with HYPERBARIC CHAMBER 2   [facility] ([facility] [address])    [address]  [place] [address]   [phone]            May 12, 2017  9:30 AM   HBOT Supervision with HYPERBARIC CHAMBER 2   [facility] ([facility] [address])    [address]  [place] [address]   [phone]            May 15, 2017  9:30 AM   HBOT Supervision with HYPERBARIC CHAMBER 2   [facility] ([facility] [address])    [address]  [place] [address]   [phone]            May 15, 2017  2:00 PM   NEW PATIENT with Beth Huston MD,[facility] (--)    [address]  [address]  [place] [address]   [phone]            May 16, 2017  9:30 AM   HBOT Supervision with HYPERBARIC CHAMBER 2   [facility] ([facility] [address])    [address]  [place] [address]   [phone]            May 17, 2017  9:30 AM   HBOT Supervision with HYPERBARIC CHAMBER 2   [facility] ([facility] [address])    [address]  [place] [address]   [phone]            May 18, 2017  9:30 AM   HBOT Supervision with HYPERBARIC CHAMBER 2   [facility] ([facility] [address])    [address]  [place] [address]   [phone]            May 19, 2017  9:30 AM   HBOT Supervision with HYPERBARIC CHAMBER 2   [facility] ([facility] [address])    [address]  [place] [address]   [phone]            May 22, 2017  9:30 AM   HBOT Supervision with HYPERBARIC CHAMBER 2   [facility] ([facility] [address])    [address]  [place] [address]   [phone]            May 23, 2017  9:30 AM   [facility]   [facility] ([facility] [address])    [address]  [place] [address]   [phone]            May 24, 2017  8:00 AM   HBOT Supervision with HYPERBARIC CHAMBER 1   [facility] ([address] [place] [address])    [address]  [place] [address]   [phone]            May 25, 2017  8:00 AM   HBOT Supervision with HYPERBARIC CHAMBER 1   [facility] ([address] [place] [address])    [address] [facility] [address]  [place] [address]   [phone]            May 26, 2017  8:00 AM   HBOT Supervision with HYPERBARIC CHAMBER 1   [facility] ([address] [place] [address])    [address] [facility] [address]  [place] [address]   [phone]                 Discharge Medication Instructions:    Below are the medications your physician expects you to take upon discharge:    Review all your home medications and newly ordered medications with your doctor and/or pharmacist. Follow medication instructions as directed by your doctor and/or pharmacist.    Please keep your medication list with you and share with your physician.               Medication List      START taking these medications        Instructions    Morning Afternoon Evening Bedtime    aspirin EC 81 MG Tbec   Commonly known as:  ECOTRIN        Take 1 Tab by mouth every day.   Dose:  81 mg                        cefdinir 300 MG Caps   Last time this was given:  300 mg on 4/22/2017  3:33 PM   Commonly known as:  OMNICEF        Take 1 Cap by mouth every 12 hours for 5 days.   Dose:  300 mg                        metoprolol 25 MG Tabs   Commonly known as:  LOPRESSOR        Take 0.5 Tabs by mouth 2 times a day.   Dose:  12.5 mg                          CONTINUE taking these medications        Instructions    Morning Afternoon Evening Bedtime    ascorbic acid 500 MG Tabs   Last time this was given:  500 mg on 4/22/2017  8:43 AM   Commonly known as:  ascorbic acid        Take  500 mg by mouth every day.   Dose:  500 mg                        B-12 1000 MCG Tbcr        Take  by mouth.                        clobetasol 0.05 % Crea   Commonly known as:  TEMOVATE                             ferrous sulfate 325 (65 FE) MG tablet   Last time this was given:  325 mg on 4/22/2017  8:44 AM        Take 325 mg by mouth 2 Times a Day.   Dose:  325 mg                        fluoxetine 20 MG Caps   Last time this was given:  20 mg on 4/22/2017  8:44 AM   Commonly known as:  PROZAC        Take 20 mg by mouth every day.   Dose:  20 mg                        glipiZIDE 10 MG Tabs   Last time this was given:  10 mg on 4/20/2017  6:38 AM   Commonly known as:  GLUCOTROL        Take 10 mg by mouth 2 times a day. One po am, 1/2 po pm   Dose:  10 mg                        hydrochlorothiazide 12.5 MG tablet   Last time this was given:  12.5 mg on 4/20/2017  9:40 AM   Commonly known as:  HYDRODIURIL        Take 12.5 mg by mouth every day.   Dose:  12.5 mg                        lisinopril 5 MG Tabs   Last time this was given:  2.5 mg on 4/22/2017  8:45 AM   Commonly known as:  PRINIVIL        Take 2.5 mg by mouth every day. Indications: High Blood Pressure   Dose:  2.5 mg                        metformin 500 MG Tabs   Last time this was given:  500 mg on 4/20/2017  6:38 AM   Commonly known as:  GLUCOPHAGE        Take 500 mg by mouth 2 times a day, with meals.   Dose:  500 mg                        NOVOLIN N 100 UNIT/ML Susp   Generic drug:  insulin NPH        Inject 10 Units as instructed Once. Before dinner   Dose:  10 Units                        pantoprazole 40 MG Tbec   Commonly known as:  PROTONIX        Take 40 mg by mouth every day.   Dose:  40 mg                        SAXagliptin HCl 5 MG Tabs        Take 5 mg by mouth every day.   Dose:  5 mg                        simvastatin 40 MG Tabs   Last time this was given:  40 mg on 4/21/2017  8:07 PM   Commonly known as:  ZOCOR        Take 40 mg by mouth every  evening. Continue as per Dr. Mckeon. Continue as per Dr. Bobo.   Dose:  40 mg                        vitamin D 2000 UNIT Tabs   Last time this was given:  2,000 Units on 4/22/2017  8:43 AM        Take 2,000 Units by mouth every day.   Dose:  2000 Units                             Where to Get Your Medications      These medications were sent to [id number] - [place] [place] [facility] [address] [facility] [address] [facility] [address] [place] [address]     Phone:  [phone]    - aspirin EC 81 MG Tbec  - cefdinir 300 MG Caps  - metoprolol 25 MG Tabs            Instructions           Diet / Nutrition:    Follow any diet instructions given to you by your doctor or the dietician, including how much salt (sodium) you are allowed each day.    If you are overweight, talk to your doctor about a weight reduction plan.    Activity:    Remain physically active following your doctor's instructions about exercise and activity.    Rest often.     Any time you become even a little tired or short of breath, SIT DOWN and rest.    Worsening Symptoms:    Report any of the following signs and symptoms to the doctor's office immediately:    *Pain of jaw, arm, or neck  *Chest pain not relieved by medication                               *Dizziness or loss of consciousness  *Difficulty breathing even when at rest   *More tired than usual                                       *Bleeding drainage or swelling of surgical site  *Swelling of feet, ankles, legs or stomach                 *Fever (>100ºF)  *Pink or blood tinged sputum  *Weight gain (3lbs/day or 5lbs /week)           *Shock from internal defibrillator (if applicable)  *Palpitations or irregular heartbeats                *Cool and/or numb extremities    Stroke Awareness    Common Risk Factors for Stroke include:    Age  Atrial Fibrillation  Carotid Artery Stenosis  Diabetes Mellitus  Excessive alcohol consumption  High blood pressure  Overweight   Physical  inactivity  Smoking    Warning signs and symptoms of a stroke include:    *Sudden numbness or weakness of the face, arm or leg (especially on one side of the body).  *Sudden confusion, trouble speaking or understanding.  *Sudden trouble seeing in one or both eyes.  *Sudden trouble walking, dizziness, loss of balance or coordination.Sudden severe headache with no known cause.    It is very important to get treatment quickly when a stroke occurs. If you experience any of the above warning signs, call 911 immediately.                   Disclaimer         Quit Smoking / Tobacco Use:    I understand the use of any tobacco products increases my chance of suffering from future heart disease or stroke and could cause other illnesses which may shorten my life. Quitting the use of tobacco products is the single most important thing I can do to improve my health. For further information on smoking / tobacco cessation call a Toll Free Quit Line at 1-190.821.4365 (*[employer] [place]) or 1-467.728.4067 (American Lung Association) or you can access the web based program at www.lungStockpulse.org.    [place] Tobacco Users Help Line:  (295) 385-6331       Toll Free: 1-913.203.9563  Quit Tobacco Program [facility] (727)943-4172    Crisis Hotline:    [place] Crisis Hotline:  0-703-EPYPBDS or 1-149.105.4391    [place] Crisis Hotline:    1-554.679.2578 or 151-668-0370    Discharge Survey:   Thank you for choosing [facility]. We hope we did everything we could to make your hospital stay a pleasant one. You may be receiving a phone survey and we would appreciate your time and participation in answering the questions. Your input is very valuable to us in our efforts to improve our service to our patients and their families.        My signature on this form indicates that:    1. I have reviewed and understand the above information.  2. My questions regarding this information have been answered to my  satisfaction.  3. I have formulated a plan with my discharge nurse to obtain my prescribed medications for home.                  Disclaimer         __________________________________                     __________       ________                       Patient Signature                                                 Date                    Time

## 2017-04-19 NOTE — IP AVS SNAPSHOT
" <p align=\"LEFT\"><IMG SRC=\"//[email or web address]\" alt=\"Image\" WIDTH=\"50%\" HEIGHT=\"200\" BORDER=\"\"></p>                   Name:Selvin Braga  Medical Record Number:[id number]  CSN: [id number]    YOB: 1935   Age: 81 y.o.  Sex: male  HT:1.803 m (5' 11\") WT: 89.8 kg (197 lb 15.6 oz)          Admit Date: 4/19/2017     Discharge Date:   Today's Date: 4/22/2017  Attending Doctor:  Pearl Hendrix M.D.                  Allergies:  Augmentin; Latex; and Tape          Your appointments     Apr 24, 2017  9:30 AM   HBOT Supervision with HYPERBARIC CHAMBER 2   [facility] ([address] [place] [address])    [address]  [place] [address]   [phone]            Apr 25, 2017  9:30 AM   HBOT Supervision with HYPERBARIC CHAMBER 2   [facility] ([facility] [address])    [address]  [place] [address]   [phone]            Apr 26, 2017  9:30 AM   HBOT Supervision with HYPERBARIC CHAMBER 2   [facility] ([facility] [address])    [address]  [place] [address]   [phone]            Apr 27, 2017  9:30 AM   HBOT Supervision with HYPERBARIC CHAMBER 2   [facility] ([address] [place] [address])    [address]  [place] [address]   [phone]            Apr 28, 2017  9:30 AM   HBOT Supervision with HYPERBARIC CHAMBER 2   [facility] ([facility] [address])    [address]  [place] [address]   [phone]            May 01, 2017  8:00 AM   Established Patient with Zack Rico M.D.   [facility] [place] [facility] / [facility] Medicine (--)    [address]  [address]  [place] [address]   [phone]           You will be receiving a confirmation call a few days before your appointment from our automated call confirmation system.            May 01, 2017  9:30 AM   HBOT Supervision with HYPERBARIC CHAMBER 2   [facility] ([facility] [address])    [address]  [place] [address]   [phone]            May 02, 2017  9:30 AM   HBOT Supervision with HYPERBARIC CHAMBER 2   [facility] ([address])    [address]  [place] [address]   [phone]            May 03, 2017  9:30 AM   HBOT Supervision with HYPERBARIC CHAMBER 2   [facility] ([address])    [address]  [place] [address]   [phone]            May 04, 2017  9:30 AM   HBOT Supervision with HYPERBARIC CHAMBER 2   [facility] ([address])    [address]  [place] [address]   [phone]            May 05, 2017  9:30 AM   HBOT Supervision with HYPERBARIC CHAMBER 2   [facility] ([address])    [address]  [place] [address]   [phone]            May 08, 2017  9:30 AM   HBOT Supervision with HYPERBARIC CHAMBER 2   [facility] ([address])    [address]  [place] [address]   [phone]            May 09, 2017  9:30 AM   HBOT Supervision with HYPERBARIC CHAMBER 2   [facility] ([address])    [address]  [place] [address]   [phone]            May 10, 2017  9:30 AM   HBOT Supervision with HYPERBARIC CHAMBER 2   [facility] ([address])    [address]  [place] [address]   [phone]            May 11, 2017  9:30 AM   HBOT Supervision with HYPERBARIC CHAMBER 2   [facility] ([address])    [address]  [place] [address]   [phone]            May 12, 2017  9:30 AM   HBOT Supervision with HYPERBARIC CHAMBER 2   [facility] ([address])    [address]  [place] [address]   [phone]            May 15, 2017  9:30 AM   HBOT Supervision with HYPERBARIC CHAMBER 2   [facility] ([address])    [address]  [place] [address]   [phone]            May 15, 2017  2:00 PM   NEW PATIENT with Beth Huston MD,[facility] (--)    [address]  [address]  [place] [address]   [phone]            May 16, 2017  9:30 AM   HBOT Supervision with HYPERBARIC CHAMBER 2   [facility] ([address])    [address]  [place] [address]   [phone]            May 17, 2017  9:30 AM   HBOT Supervision with HYPERBARIC CHAMBER 2   [facility] ([facility] [address])    [address]  [place] [address]   [phone]            May 18, 2017  9:30 AM   HBOT Supervision with HYPERBARIC CHAMBER 2   [facility] ([facility] [address])    [address]  [place] [address]   [phone]            May 19, 2017  9:30 AM   HBOT Supervision with HYPERBARIC CHAMBER 2   [facility] ([facility] [address])    [address]  [place] [address]   [phone]            May 22, 2017  9:30 AM   HBOT Supervision with HYPERBARIC CHAMBER 2   [facility] ([address])    [address]  [place] [address]   [phone]            May 23, 2017  9:30 AM   HBOT Supervision with HYPERBARIC CHAMBER 2   [facility] ([facility] [address])    [address]  [place] [address]   [phone]            May 24, 2017  8:00 AM   HBOT Supervision with HYPERBARIC CHAMBER 1   [facility] ([address])    [address]  [place] [address]   [phone]            May 25, 2017  8:00 AM   HBOT Supervision with HYPERBARIC CHAMBER 1   [facility] ([facility] [address])    [address]  [place] [address]   [phone]            May 26, 2017  8:00 AM   HBOT Supervision with HYPERBARIC CHAMBER 1   [facility] ([facility] [address] [facility] [address])    [address]  [place] [address]   [phone]                 Medication List      Take these Medications        Instructions    ascorbic acid 500 MG Tabs   Commonly known as:  ascorbic acid    Take 500 mg by mouth every day.   Dose:  500 mg       aspirin EC 81 MG Tbec   Commonly known as:  ECOTRIN    Take 1 Tab by mouth every day.   Dose:  81 mg       B-12 1000 MCG Tbcr    Take  by mouth.       cefdinir 300 MG Caps   Commonly known as:  OMNICEF    Take 1 Cap by mouth every 12 hours for 5 days.   Dose:  300 mg       clobetasol 0.05 % Crea   Commonly known as:  TEMOVATE        ferrous sulfate 325 (65 FE) MG tablet    Take 325 mg by mouth 2 Times a Day.   Dose:  325 mg       fluoxetine 20 MG Caps   Commonly known as:  PROZAC    Take 20 mg by mouth every day.   Dose:  20 mg       glipiZIDE 10 MG Tabs   Commonly known as:  GLUCOTROL    Take 10 mg by mouth 2 times a day. One po am, 1/2 po pm   Dose:  10 mg       hydrochlorothiazide 12.5 MG tablet   Commonly known as:  HYDRODIURIL    Take 12.5 mg by mouth every day.   Dose:  12.5 mg       lisinopril 5 MG Tabs   Commonly known as:  PRINIVIL    Take 2.5 mg by mouth every day. Indications: High Blood Pressure   Dose:  2.5 mg       metformin 500 MG Tabs   Commonly known as:  GLUCOPHAGE    Take 500 mg by mouth 2 times a day, with meals.   Dose:  500 mg       metoprolol 25 MG Tabs   Commonly known as:  LOPRESSOR    Take 0.5 Tabs by mouth 2 times a day.   Dose:  12.5 mg       NOVOLIN N 100 UNIT/ML Susp   Generic drug:  insulin NPH    Inject 10 Units as instructed Once. Before dinner   Dose:  10 Units       pantoprazole 40 MG Tbec   Commonly known as:  PROTONIX    Take 40 mg by mouth every day.   Dose:  40 mg       SAXagliptin HCl 5 MG Tabs    Take 5 mg by mouth every day.   Dose:  5 mg       simvastatin 40 MG Tabs   Commonly known as:  ZOCOR    Take 40 mg by mouth every evening. Continue as per Dr. Mckeon. Continue as per Dr. Bobo.    Dose:  40 mg       vitamin D 2000 UNIT Tabs    Take 2,000 Units by mouth every day.   Dose:  2000 Units

## 2017-04-19 NOTE — PROGRESS NOTES
Subjective:   Selvin Braga is a 81 y.o. 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 He continues to tolerate treatment   well and is able to clear his ears and denied any pain or pressure.     His glucose was adequate to prevent hypoglycemia.    Review of Systems   Constitutional: Negative for fever and chills.   HENT: Negative for ear discharge, ear pain and hearing loss.    Eyes: Negative for blurred vision and double vision.   Cardiovascular: Negative for chest pain and palpitations.   Gastrointestinal: Positive for heartburn, diarrhea and constipation.   Genitourinary: Positive for frequency.   Musculoskeletal: Positive for back pain, joint pain and falls.   Skin: Positive for itching and rash.   Neurological: Negative for headaches.   Psychiatric/Behavioral: Positive for depression.          Objective:     /63 mmHg  Pulse 93  Temp(Src) 37.5 °C (99.5 °F)  SpO2 100%     Physical Exam   Constitutional: He is oriented to person, place, and time. He appears well-developed and well-nourished.   HENT:   Head: Normocephalic and atraumatic.   Right Ear: Tympanic membrane, external ear and ear canal normal.   Left Ear: Tympanic membrane, external ear and ear canal normal.   Mouth/Throat: He has dentures.   Pulmonary/Chest: Effort normal.   Neurological: He is alert and oriented to person, place, and time.   Psychiatric: He has a normal mood and affect.   Vitals reviewed.        Assessment/Plan:     •  Other specified disorders of the skin and subcutaneous tissue related to radiation -   Appropriate candidate for hyperbaric oxygen therapy. Patient completed his 13th HBOT treatment at a pressure of 2.4 HANNA x 90 minutes at a descent rate of 1.5 PSI / min and ascent rate of 3.0 PSI/minute. Will continue the patient with HBOT at a pressure of 2.4 HANNA x 90 minutes x 40 treatments  on a 5x per week, M-F basis. Q 30 minute air breaks.   3/30 - held at a travel rate of 1.0 for ear discomfort. No worsening of L ear during treatment   3/31 - No significant change to L ear during treatment. Still with Teed 1 erythema throughout L ear   4/5-4/9 - Off HBOT due to ENT visit and L PE tube placed. 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 Marie     •  Continuous leakage of urine - Cunningham clamp / condom cathether management     •  Hematuria - should improve with HBOT - on Fe replacement     •  Blood in stool - should improve with HBOT - on Fe replacement     •  History of prostate cancer - in remission per Dr. 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 Received PE tube to L ear on 4/7

## 2017-04-19 NOTE — PATIENT INSTRUCTIONS
After hyperbaric oxygen therapy treatment, it is normal to experience some congestion to the ears, muffling of sounds, or abnormal sounds to one or both ears.    If you experience pain or discomfort to one or both ears that does not resolve spontaneously, please contact the hyperbaric department at [phone].

## 2017-04-19 NOTE — IP AVS SNAPSHOT
4/22/2017    Selvin Braga  [address]  [address]  [place] [address]    Dear Selvin:    [facility] wants to ensure your discharge home is safe and you or your loved ones have had all of your questions answered regarding your care after you leave the hospital.    Below is a list of resources and contact information should you have any questions regarding your hospital stay, follow-up instructions, or active medical symptoms.    Questions or Concerns Regarding… Contact   Medical Questions Related to Your Discharge  (7 days a week, 8am-5pm) Contact a Nurse Care Coordinator   [phone]   Medical Questions Not Related to Your Discharge  (24 hours a day / 7 days a week)  Contact the Nurse Health Line   [phone]    Medications or Discharge Instructions Refer to your discharge packet   or contact your [facility] Primary Care Provider   [phone]   Follow-up Appointment(s) Schedule your appointment via [email or web address]   or contact Scheduling [phone]   Billing Review your statement via [email or web address]  or contact Billing [phone]   Medical Records Review your records via [email or web address]   or contact Medical Records [phone]     You may receive a telephone call within two days of discharge. This call is to make certain you understand your discharge instructions and have the opportunity to have any questions answered. You can also easily access your medical information, test results and upcoming appointments via the [email or web address] free online health management tool. You can learn more and sign up at [email or web address]. For assistance setting up your [email or web address] account, please call [phone].    Once again, we want to ensure your discharge home is safe and that you have a clear understanding of any next steps in your care. If you have any questions or concerns, please do not hesitate to contact us, we are here for you. Thank you for choosing [facility] for your healthcare needs.    Sincerely,    Your [facility]  Team

## 2017-04-19 NOTE — MR AVS SNAPSHOT
Selvin Braga   2017 9:30 AM   Office Visit   MRN: [id number]    Department:  [facility]   Dept Phone:  [phone]    Description:  Male : 1935   Provider:  Ofelia Reed M.D.           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   [6302481]       Radiation cystitis   [885739]       Hematuria   [6819304]       Blood in stool   [578.1.ICD-9-CM]       Continuous leakage of urine   [113567]       Type 2 diabetes mellitus with stage 3 chronic kidney disease, with long-term current use of insulin (CMS-[facility])   [0348024]         Vital Signs     Blood Pressure Pulse Temperature Oxygen Saturation Smoking Status       124/63 mmHg 93 37.5 °C (99.5 °F) 100% Former Smoker       Basic Information     Date Of Birth Sex Race Ethnicity Preferred Language    1935 Male White Non- English      Your appointments     2017  9:30 AM   HBOT Supervision with HYPERBARIC CHAMBER 2   [facility] ([address] [place] [address])    [address] [facility]  [facility] [address]   [phone]            2017  9:30 AM   HBOT Supervision with HYPERBARIC CHAMBER 2   [facility] ([address] [place] [address])    [address]  [facility] [address]   [phone]            2017  9:30 AM   HBOT Supervision with HYPERBARIC CHAMBER 2   [facility] ([address] [place] [address])    [address]  [facility] [address]   [phone]            2017  9:30 AM   HBOT Supervision with HYPERBARIC CHAMBER 2   [facility] ([address] [place] [address])    [address] [facility] [address]  [facility] [address]      [phone]            Apr 26, 2017  9:30 AM   HBOT Supervision with HYPERBARIC CHAMBER 2   [facility] ([address])    [address]  [place] [address]   [phone]            Apr 27, 2017  9:30 AM   HBOT Supervision with HYPERBARIC CHAMBER 2   [facility] ([address])    [address]  [place] [address]   [phone]            Apr 28, 2017  9:30 AM   HBOT Supervision with HYPERBARIC CHAMBER 2   [facility] ([address])    [address]  [place] [address]   [phone]            May 01, 2017  9:30 AM   HBOT Supervision with HYPERBARIC CHAMBER 2   [facility] ([address])    [address]  [place] [address]   [phone]            May 02, 2017  9:30 AM   HBOT Supervision with HYPERBARIC CHAMBER 2   [facility] ([address])    [address]  [place] [address]   [phone]            May 03, 2017  9:30 AM   HBOT Supervision with HYPERBARIC CHAMBER 2   [facility] ([address])    [address]  [place] [address]   [phone]            May 04, 2017  9:30 AM   HBOT Supervision with HYPERBARIC CHAMBER 2   [facility] ([address])    [address]  [place] [address]   [phone]            May 05, 2017  9:30 AM   HBOT Supervision with HYPERBARIC CHAMBER 2   [facility] ([address])    [address]  [place] [address]   [phone]            May 08, 2017  9:30 AM   HBOT Supervision with HYPERBARIC CHAMBER 2   [facility] ([address])    [address]  [place] [address]   [phone]            May 09, 2017  9:30 AM   HBOT Supervision with HYPERBARIC CHAMBER 2   [facility] ([address])    [address]  [place] [address]    [phone]            May 10, 2017  9:30 AM   HBOT Supervision with HYPERBARIC CHAMBER 2   [facility] ([address])    [address]  [place] [address]   [phone]            May 11, 2017  9:30 AM   HBOT Supervision with HYPERBARIC CHAMBER 2   [facility] ([address])    [address]  [place] [address]   [phone]            May 12, 2017  9:30 AM   HBOT Supervision with HYPERBARIC CHAMBER 2   [facility] ([address])    [address]  [place] [address]   [phone]            May 15, 2017  9:30 AM   HBOT Supervision with HYPERBARIC CHAMBER 2   [facility] ([address])    [address]  [place] [address]   [phone]            May 16, 2017  9:30 AM   HBOT Supervision with HYPERBARIC CHAMBER 2   [facility] ([address])    [address]  [place] [address]   [phone]            May 17, 2017  9:30 AM   HBOT Supervision with HYPERBARIC CHAMBER 2   [facility] ([address])    [address]  [place] [address]   [phone]            May 18, 2017  9:30 AM   HBOT Supervision with HYPERBARIC CHAMBER 2   [facility] ([address])    [address]  [place] [address]   [phone]            May 19, 2017  9:30 AM   HBOT Supervision with HYPERBARIC CHAMBER 2   [facility] ([address])    [address]  [place] [address]   [phone]            May 22, 2017  9:30 AM   HBOT Supervision with HYPERBARIC CHAMBER 2   [facility] ([address])    [address]  [place] [address]   [phone]            May 23, 2017  9:30 AM   HBOT Supervision with HYPERBARIC CHAMBER 2   [facility] ([address])    [address]  [place] [address]    [phone]            May 24, 2017  8:00 AM   HBOT Supervision with HYPERBARIC CHAMBER 1   [facility] ([facility] [address])    [address]  [place] [address]   [phone]            May 25, 2017  8:00 AM   HBOT Supervision with HYPERBARIC CHAMBER 1   [facility] ([facility] [address])    [address]  [place] [address]   [phone]            May 26, 2017  8:00 AM   HBOT Supervision with HYPERBARIC CHAMBER 1   [facility] ([facility] [address])    [address]  [place] [address]   [phone]              Problem List              ICD-10-CM Priority Class Noted - Resolved    DVT of lower extremity (deep venous thrombosis) (CMS-HCC) I82.409   9/2/2010 - Present    DM (diabetes mellitus) (CMS-HCC) E11.9 Medium  9/2/2010 - Present    HTN (hypertension) I10 Medium  9/2/2010 - Present    Spinal stenosis, lumbar region, without neurogenic claudication M48.06 Low  12/13/2012 - Present    Senile nuclear sclerosis H25.10   2/13/2013 - Present    Blood in stool K92.1   6/5/2013 - Present    Diarrhea following gastrointestinal surgery R19.7, Z98.890   6/23/2014 - Present    History of prostatectomy Z90.79   5/8/2015 - Present    Ankle fracture, right S82.891A   10/28/2015 - Present    Chronic kidney disease (CKD), stage III (moderate) N18.3   7/21/2016 - Present    Neuropathy (CMS-HCC) G62.9   7/21/2016 - Present    Depression F32.9   7/21/2016 - Present    AK (actinic keratosis) L57.0   7/21/2016 - Present    Hyperlipidemia E78.5   7/21/2016 - Present    Health care maintenance Z00.00   8/30/2016 - Present    History of prostate cancer Z85.46   3/24/2017 - Present    History of radiation therapy Z92.3   3/24/2017 - Present    Type 2 diabetes mellitus with stage 3 chronic kidney disease, with long-term current use of insulin ([facility]) E11.22, N18.3, Z79.4   3/24/2017 - Present    Continuous leakage of urine N39.45   3/24/2017 -  Present    History of DVT (deep vein thrombosis) Z86.718   3/24/2017 - Present    Other specified disorders of the skin and subcutaneous tissue related to radiation L59.8   3/29/2017 - Present    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   3/29/2017 - Present    Hematuria R31.9   3/29/2017 - Present    Pressure-related ear pain T70.0XXA   4/4/2017 - Present      Health Maintenance        Date Due Completion Dates    IMM ZOSTER VACCINE 11/28/1996 ---    IMM DTaP/Tdap/Td Vaccine (1 - Tdap) 8/8/2010 8/7/2010    URINE ACR / MICROALBUMIN 1/16/2013 1/16/2012    IMM PNEUMOCOCCAL 65+ (ADULT) LOW/MEDIUM RISK SERIES (2 of 2 - PCV13) 12/13/2013 12/13/2012    A1C SCREENING 2/22/2017 8/22/2016, 1/16/2015, 6/23/2014, 1/16/2012, 9/2/2010    FASTING LIPID PROFILE 8/22/2017 8/22/2016, 1/16/2012, 9/2/2010    SERUM CREATININE 8/22/2017 8/22/2016, 7/12/2016, 8/3/2015, 7/22/2015, 1/16/2015, 6/25/2014, 6/24/2014, 6/23/2014, 5/29/2014, 5/27/2014, 5/26/2014, 5/22/2014, 5/14/2014, 4/12/2014, 3/24/2014, 3/17/2014, 6/5/2013, 5/31/2013, 12/26/2012, 12/22/2012, 12/17/2012, 12/14/2012, 12/3/2012, 9/17/2012, 3/5/2012, 1/16/2012, 1/26/2011, 9/18/2010, 9/17/2010, 9/2/2010, 9/1/2010, 5/24/2010, 1/15/2010, 2/8/2005    DIABETES MONOFILAMENT / LE EXAM 8/30/2017 8/30/2016 (N/S)    Override on 8/30/2016: (N/S)    RETINAL SCREENING 10/10/2017 10/10/2016    COLONOSCOPY 6/5/2023 6/5/2013, 6/5/2013 (N/S)    Override on 6/5/2013: (N/S) (GIC)            Results     POCT Glucose      Component Value Standard Range & Units    Glucose - Accu-Ck 208 70 - 100 mg/dL                        Current Immunizations     Influenza TIV (IM) 9/25/2016, 10/21/2013, 10/1/2012    Influenza Vaccine Quad Inj (Preserved) 9/21/2015    Pneumococcal polysaccharide vaccine (PPSV-23) 12/13/2012  7:30 PM    TD Vaccine 8/7/2010 12:38 PM      Below and/or attached are the medications your provider expects you to take. Review all of your home medications and newly ordered medications with your provider  and/or pharmacist. Follow medication instructions as directed by your provider and/or pharmacist. Please keep your medication list with you and share with your provider. Update the information when medications are discontinued, doses are changed, or new medications (including over-the-counter products) are added; and carry medication information at all times in the event of emergency situations     Allergies:  AUGMENTIN - Unspecified     LATEX - Unspecified     TAPE - Rash               Medications  Valid as of: April 19, 2017 -  4:12 PM    Generic Name Brand Name Tablet Size Instructions for use    Ascorbic Acid (Tab) ascorbic acid 500 MG Take 500 mg by mouth every day.        Cholecalciferol (Tab) vitamin D 2000 UNIT Take 2,000 Units by mouth every day.        Clobetasol Propionate (Cream) TEMOVATE 0.05 %         Cyanocobalamin (Tab CR) B-12 1000 MCG Take  by mouth.        Ferrous Sulfate (Tab) ferrous sulfate 325 (65 FE) MG Take 325 mg by mouth 2 Times a Day.        FLUoxetine HCl (Cap) PROZAC 20 MG Take 20 mg by mouth every day.        GlipiZIDE (Tab) GLUCOTROL 10 MG Take 10 mg by mouth 2 times a day. One po am, 1/2 po pm        HydroCHLOROthiazide (Tab) HYDRODIURIL 12.5 MG Take 12.5 mg by mouth every day.        Insulin NPH Human (Isophane) (Suspension) HUMULIN,NOVOLIN 100 UNIT/ML Inject 10 Units as instructed Once. Before dinner        Lisinopril (Tab) PRINIVIL 5 MG Take 2.5 mg by mouth every day. Indications: High Blood Pressure        MetFORMIN HCl (Tab) GLUCOPHAGE 500 MG Take 500 mg by mouth 2 times a day, with meals.        Pantoprazole Sodium (Tablet Delayed Response) PROTONIX 40 MG Take 40 mg by mouth every day.        SAXagliptin HCl (Tab) SAXagliptin HCl 5 MG Take 5 mg by mouth every day.        Simvastatin (Tab) ZOCOR 40 MG Take 40 mg by mouth every evening. Continue as per Dr. Mckeon. Continue as per Dr. Bobo.        .                 Medicines prescribed today were sent to:     [facility]ARMACY  #[id number] - [place], [place] - [address]    [address] [place] [address]    Phone: [phone] Fax: [phone]    Open 24 Hours?: No      Medication refill instructions:       If your prescription bottle indicates you have medication refills left, it is not necessary to call your provider’s office. Please contact your pharmacy and they will refill your medication.    If your prescription bottle indicates you do not have any refills left, you may request refills at any time through one of the following ways: The online [employer] system (except Urgent Care), by calling your provider’s office, or by asking your pharmacy to contact your provider’s office with a refill request. Medication refills are processed only during regular business hours and may not be available until the next business day. Your provider may request additional information or to have a follow-up visit with you prior to refilling your medication.   *Please Note: Medication refills are assigned a new Rx number when refilled electronically. Your pharmacy may indicate that no refills were authorized even though a new prescription for the same medication is available at the pharmacy. Please request the medicine by name with the pharmacy before contacting your provider for a refill.        Instructions    After hyperbaric oxygen therapy treatment, it is normal to experience some congestion to the ears, muffling of sounds, or abnormal sounds to one or both ears.    If you experience pain or discomfort to one or both ears that does not resolve spontaneously, please contact the hyperbaric department at [phone].            [employer] Access Code: Activation code not generated  Current [employer] Status: Active

## 2017-04-19 NOTE — IP AVS SNAPSHOT
[facility] [facility] Access Code: Activation code not generated  Current [facility] Status: Active    [employer]  A secure, online tool to manage your health information     [employer]’s [facility]® is a secure, online tool that connects you to your personalized health information from the privacy of your home -- day or night - making it very easy for you to manage your healthcare. Once the activation process is completed, you can even access your medical information using the [facility] sonia, which is available for free in the [employer] Sonia store or Google Play store.     [facility] [facility] provides the following levels of access (as shown below):   My Chart Features   [facility] Primary Care Doctor [facility]  Specialists [facility]  Urgent  Care Non-[facility]  Primary Care  Doctor   Email your healthcare team securely and privately 24/7 X X X X   Manage appointments: schedule your next appointment; view details of past/upcoming appointments X      Request prescription refills. X      View recent personal medical records, including lab and immunizations X X X X   View health record, including health history, allergies, medications X X X X   Read reports about your outpatient visits, procedures, consult and ER notes X X X X   See your discharge summary, which is a recap of your hospital and/or ER visit that includes your diagnosis, lab results, and care plan. X X       How to register for [facility] [facility]:  1. Go to  [email or web address].  2. Click on the Sign Up Now box, which takes you to the New Member Sign Up page. You will need to provide the following information:  a. Enter your [facility] [facility] Access Code exactly as it appears at the top of this page. (You will not need to use this code after you’ve completed the sign-up process. If you do not sign up before the expiration date, you must request a new code.)   b. Enter your date of birth.   c. Enter your home email address.   d. Click Submit, and follow the next screen’s instructions.  3. Create a [facility] ID. This will  be your [employer] login ID and cannot be changed, so think of one that is secure and easy to remember.  4. Create a [employer] password. You can change your password at any time.  5. Enter your Password Reset Question and Answer. This can be used at a later time if you forget your password.   6. Enter your e-mail address. This allows you to receive e-mail notifications when new information is available in [employer].  7. Click Sign Up. You can now view your health information.    For assistance activating your [employer] account, call [phone]

## 2017-04-20 ENCOUNTER — APPOINTMENT (OUTPATIENT)
Dept: RADIOLOGY | Facility: MEDICAL CENTER | Age: 82
DRG: 872 | End: 2017-04-20
Attending: INTERNAL MEDICINE
Payer: MEDICARE

## 2017-04-20 ENCOUNTER — APPOINTMENT (OUTPATIENT)
Dept: WOUND CARE | Facility: MEDICAL CENTER | Age: 82
End: 2017-04-20
Attending: UROLOGY
Payer: MEDICARE

## 2017-04-20 PROBLEM — N18.2 CKD (CHRONIC KIDNEY DISEASE), STAGE II: Status: ACTIVE | Noted: 2017-04-20

## 2017-04-20 PROBLEM — D69.6 THROMBOCYTOPENIA (HCC): Status: ACTIVE | Noted: 2017-04-20

## 2017-04-20 PROBLEM — K21.9 GERD (GASTROESOPHAGEAL REFLUX DISEASE): Status: ACTIVE | Noted: 2017-04-20

## 2017-04-20 PROBLEM — D64.9 ANEMIA: Status: ACTIVE | Noted: 2017-04-20

## 2017-04-20 PROBLEM — A41.9 SEPSIS (HCC): Status: ACTIVE | Noted: 2017-04-20

## 2017-04-20 LAB
GLUCOSE BLD-MCNC: 159 MG/DL (ref 65–99)
GLUCOSE BLD-MCNC: 190 MG/DL (ref 65–99)
GLUCOSE BLD-MCNC: 206 MG/DL (ref 65–99)
GLUCOSE BLD-MCNC: 208 MG/DL (ref 65–99)
GLUCOSE BLD-MCNC: 235 MG/DL (ref 65–99)
LACTATE BLD-SCNC: 1.91 MMOL/L (ref 0.5–2)
SPECIMEN DRAWN FROM PATIENT: NORMAL
TROPONIN I SERPL-MCNC: 0.12 NG/ML (ref 0–0.04)
TROPONIN I SERPL-MCNC: 0.15 NG/ML (ref 0–0.04)

## 2017-04-20 PROCEDURE — 700111 HCHG RX REV CODE 636 W/ 250 OVERRIDE (IP): Performed by: INTERNAL MEDICINE

## 2017-04-20 PROCEDURE — G8978 MOBILITY CURRENT STATUS: HCPCS | Mod: CJ

## 2017-04-20 PROCEDURE — 770020 HCHG ROOM/CARE - TELE (206)

## 2017-04-20 PROCEDURE — 700102 HCHG RX REV CODE 250 W/ 637 OVERRIDE(OP)

## 2017-04-20 PROCEDURE — 74176 CT ABD & PELVIS W/O CONTRAST: CPT

## 2017-04-20 PROCEDURE — 83605 ASSAY OF LACTIC ACID: CPT

## 2017-04-20 PROCEDURE — G8979 MOBILITY GOAL STATUS: HCPCS | Mod: CJ

## 2017-04-20 PROCEDURE — 97161 PT EVAL LOW COMPLEX 20 MIN: CPT

## 2017-04-20 PROCEDURE — G8980 MOBILITY D/C STATUS: HCPCS | Mod: CJ

## 2017-04-20 PROCEDURE — A9270 NON-COVERED ITEM OR SERVICE: HCPCS | Performed by: FAMILY MEDICINE

## 2017-04-20 PROCEDURE — 93970 EXTREMITY STUDY: CPT

## 2017-04-20 PROCEDURE — 71020 DX-CHEST-2 VIEWS: CPT

## 2017-04-20 PROCEDURE — 99233 SBSQ HOSP IP/OBS HIGH 50: CPT | Performed by: INTERNAL MEDICINE

## 2017-04-20 PROCEDURE — 700105 HCHG RX REV CODE 258: Performed by: INTERNAL MEDICINE

## 2017-04-20 PROCEDURE — 700105 HCHG RX REV CODE 258: Performed by: FAMILY MEDICINE

## 2017-04-20 PROCEDURE — 84484 ASSAY OF TROPONIN QUANT: CPT | Mod: 91

## 2017-04-20 PROCEDURE — 82962 GLUCOSE BLOOD TEST: CPT

## 2017-04-20 PROCEDURE — 700111 HCHG RX REV CODE 636 W/ 250 OVERRIDE (IP): Performed by: FAMILY MEDICINE

## 2017-04-20 PROCEDURE — 700102 HCHG RX REV CODE 250 W/ 637 OVERRIDE(OP): Performed by: INTERNAL MEDICINE

## 2017-04-20 PROCEDURE — 700102 HCHG RX REV CODE 250 W/ 637 OVERRIDE(OP): Performed by: FAMILY MEDICINE

## 2017-04-20 RX ORDER — DEXTROSE MONOHYDRATE 25 G/50ML
25 INJECTION, SOLUTION INTRAVENOUS
Status: DISCONTINUED | OUTPATIENT
Start: 2017-04-20 | End: 2017-04-22 | Stop reason: HOSPADM

## 2017-04-20 RX ORDER — SODIUM CHLORIDE 9 MG/ML
INJECTION, SOLUTION INTRAVENOUS CONTINUOUS
Status: DISCONTINUED | OUTPATIENT
Start: 2017-04-20 | End: 2017-04-22 | Stop reason: HOSPADM

## 2017-04-20 RX ADMIN — ENOXAPARIN SODIUM 40 MG: 100 INJECTION SUBCUTANEOUS at 16:18

## 2017-04-20 RX ADMIN — INSULIN LISPRO 3 UNITS: 100 INJECTION, SOLUTION INTRAVENOUS; SUBCUTANEOUS at 06:40

## 2017-04-20 RX ADMIN — GLIPIZIDE 10 MG: 5 TABLET ORAL at 06:38

## 2017-04-20 RX ADMIN — OXYCODONE HYDROCHLORIDE AND ACETAMINOPHEN 500 MG: 500 TABLET ORAL at 09:38

## 2017-04-20 RX ADMIN — SIMVASTATIN 40 MG: 20 TABLET, FILM COATED ORAL at 00:11

## 2017-04-20 RX ADMIN — FERROUS SULFATE TAB 325 MG (65 MG ELEMENTAL FE) 325 MG: 325 (65 FE) TAB at 20:32

## 2017-04-20 RX ADMIN — SODIUM CHLORIDE: 9 INJECTION, SOLUTION INTRAVENOUS at 16:18

## 2017-04-20 RX ADMIN — LISINOPRIL 2.5 MG: 5 TABLET ORAL at 09:39

## 2017-04-20 RX ADMIN — OMEPRAZOLE 20 MG: 20 CAPSULE, DELAYED RELEASE ORAL at 09:38

## 2017-04-20 RX ADMIN — CEFTRIAXONE 2 G: 2 INJECTION, POWDER, FOR SOLUTION INTRAMUSCULAR; INTRAVENOUS at 16:17

## 2017-04-20 RX ADMIN — METFORMIN HYDROCHLORIDE 500 MG: 500 TABLET, FILM COATED ORAL at 06:38

## 2017-04-20 RX ADMIN — VITAMIN D, TAB 1000IU (100/BT) 2000 UNITS: 25 TAB at 09:39

## 2017-04-20 RX ADMIN — INSULIN LISPRO 3 UNITS: 100 INJECTION, SOLUTION INTRAVENOUS; SUBCUTANEOUS at 17:51

## 2017-04-20 RX ADMIN — SITAGLIPTIN 50 MG: 50 TABLET, FILM COATED ORAL at 09:38

## 2017-04-20 RX ADMIN — SODIUM CHLORIDE: 9 INJECTION, SOLUTION INTRAVENOUS at 09:44

## 2017-04-20 RX ADMIN — FLUOXETINE 20 MG: 20 CAPSULE ORAL at 09:38

## 2017-04-20 RX ADMIN — HYDROCHLOROTHIAZIDE 12.5 MG: 25 TABLET ORAL at 09:40

## 2017-04-20 RX ADMIN — FERROUS SULFATE TAB 325 MG (65 MG ELEMENTAL FE) 325 MG: 325 (65 FE) TAB at 09:38

## 2017-04-20 RX ADMIN — SIMVASTATIN 40 MG: 20 TABLET, FILM COATED ORAL at 20:32

## 2017-04-20 RX ADMIN — SODIUM CHLORIDE: 9 INJECTION, SOLUTION INTRAVENOUS at 00:19

## 2017-04-20 RX ADMIN — INSULIN LISPRO 2 UNITS: 100 INJECTION, SOLUTION INTRAVENOUS; SUBCUTANEOUS at 11:38

## 2017-04-20 RX ADMIN — FERROUS SULFATE TAB 325 MG (65 MG ELEMENTAL FE) 325 MG: 325 (65 FE) TAB at 00:11

## 2017-04-20 RX ADMIN — Medication 1000 MCG: at 09:38

## 2017-04-20 RX ADMIN — INSULIN LISPRO 4 UNITS: 100 INJECTION, SOLUTION INTRAVENOUS; SUBCUTANEOUS at 22:45

## 2017-04-20 RX ADMIN — CIPROFLOXACIN 200 MG: 2 INJECTION, SOLUTION INTRAVENOUS at 09:44

## 2017-04-20 ASSESSMENT — PAIN SCALES - GENERAL
PAINLEVEL_OUTOF10: 0

## 2017-04-20 ASSESSMENT — ENCOUNTER SYMPTOMS
COUGH: 0
BACK PAIN: 0
ABDOMINAL PAIN: 0
HEARTBURN: 0
NAUSEA: 0
SPUTUM PRODUCTION: 0
NECK PAIN: 0
BLURRED VISION: 0
SHORTNESS OF BREATH: 1
DEPRESSION: 1
MYALGIAS: 0
SENSORY CHANGE: 0
SPEECH CHANGE: 0
DOUBLE VISION: 0
CHILLS: 0
VOMITING: 0
DIZZINESS: 0
FOCAL WEAKNESS: 0
FEVER: 0
HEADACHES: 0

## 2017-04-20 ASSESSMENT — GAIT ASSESSMENTS
DISTANCE (FEET): 150
DEVIATION: STEP TO
GAIT LEVEL OF ASSIST: STAND BY ASSIST
ASSISTIVE DEVICE: SINGLE POINT CANE

## 2017-04-20 NOTE — PROGRESS NOTES
2345: Pt arrived to the floor via gurney in stable condition. Able to ambulate to the bed without issue. Pt A&Ox4. Admit profile complete and pt assessed. Bia RN at the bedside to assess skin as second RN. Pt has abraisions to the R upper back. Pt states that he picks and scratches his back. Photo taken and uploaded to chart. Pt states that he is also completely incontinent of urine since his prostate cancer surgery. Pt wears a Corrales clamp to prevent incontinence during the day and a condom cath at night. Condom cath provided and applied. No evidence of trauma or skin breakdown on penis from the clamp. Unit routine, MD orders, and medications reviewed. All questions and concerns addressed. Fall precautions in place with call light in reach.   0210: Updated Dr. Tierney on elevated trops. Orders received to continue to monitor trend and follow up with next draw if it continues to elevate. Pt sleeping with no s/s of distress.   0400: Pt sleeping with no s/s of distress.   0630: Pt medicated per MD order. No further needs at this time. Lab at the bedside.

## 2017-04-20 NOTE — ED NOTES
Pt updated on status, educated waiting for Hospitalist to come and assess and place orders as well as waiting on a room at this time, pt verbalized back understanding. No  More needs stated at this time, will cont. Monitoring.

## 2017-04-20 NOTE — PROGRESS NOTES
Hospital Medicine Progress Note, Adult, Complex               Author: Pearl Hendrix Date & Time created: 4/20/2017  2:38 PM     Interval History:  81 y.o. male admitted 4/19/2017 with a CC of Weakness, fevers, chills, cough, some SOB and admitting dx of Elevated troponin and UTI.   Patient known to have PMH significant for Prostate cancer s/p radiation therapy and prostatectomy in the past c/b radiation cystitis chronically.   Admitted. Found to have sepsis evident on presentation 2/2 UTI.   Feels better today. Troponin leak noted. Minor. TTE ordered and pending. Source evaluation underway.   Patient eager to be discharged home as soon as possible.     Review of Systems:  Review of Systems   Constitutional: Positive for malaise/fatigue. Negative for fever and chills.   HENT: Negative for hearing loss.    Eyes: Negative for blurred vision and double vision.   Respiratory: Positive for shortness of breath. Negative for cough and sputum production.    Cardiovascular: Positive for leg swelling. Negative for chest pain.   Gastrointestinal: Negative for heartburn, nausea, vomiting and abdominal pain.   Genitourinary: Negative for dysuria, urgency and frequency.   Musculoskeletal: Negative for myalgias, back pain and neck pain.   Skin: Negative for itching and rash.   Neurological: Negative for dizziness, sensory change, speech change, focal weakness and headaches.   Psychiatric/Behavioral: Positive for depression. Negative for suicidal ideas.       Physical Exam:  Physical Exam   Constitutional: He is oriented to person, place, and time. No distress.   HENT:   Head: Normocephalic.   Mouth/Throat: Oropharynx is clear and moist. No oropharyngeal exudate.   Eyes: Conjunctivae are normal. Pupils are equal, round, and reactive to light. No scleral icterus.   Neck: No JVD present.   Cardiovascular: Normal rate and regular rhythm.    Murmur heard.  Pulmonary/Chest: No stridor. No respiratory distress. He has no wheezes. He has  rales.   Abdominal: Soft. Bowel sounds are normal. He exhibits no distension. There is no tenderness.   Musculoskeletal: He exhibits edema. He exhibits no tenderness.   Neurological: He is alert and oriented to person, place, and time.   Skin: Skin is warm and dry. He is not diaphoretic.   Psychiatric: He has a normal mood and affect. His behavior is normal. Judgment and thought content normal.       Labs:  Recent Labs      17   0030   ISTATSPEC  Venous     Recent Labs      17   0030  17   0642   TROPONINI  0.05*  0.15*  0.12*     Recent Labs      17   SODIUM  135   POTASSIUM  4.0   CHLORIDE  103   CO2  23   BUN  33*   CREATININE  1.41*   CALCIUM  8.9     Recent Labs      17   ALTSGPT  11   ASTSGOT  15   ALKPHOSPHAT  70   TBILIRUBIN  0.9   GLUCOSE  250*     Recent Labs      17   RBC  3.59*   HEMOGLOBIN  10.8*   HEMATOCRIT  31.8*   PLATELETCT  129*     Recent Labs      17   WBC  9.6   NEUTSPOLYS  92.20*   LYMPHOCYTES  2.00*   MONOCYTES  4.20   EOSINOPHILS  0.70   BASOPHILS  0.40   ASTSGOT  15   ALTSGPT  11   ALKPHOSPHAT  70   TBILIRUBIN  0.9           Hemodynamics:  Temp (24hrs), Av °C (98.6 °F), Min:36.4 °C (97.6 °F), Max:38.1 °C (100.6 °F)  Temperature: 37.3 °C (99.1 °F)  Pulse  Av.6  Min: 84  Max: 104Heart Rate (Monitored): 90  Blood Pressure : 130/48 mmHg, NIBP: (!) 92/41 mmHg     Respiratory:    Respiration: 18, Pulse Oximetry: 98 %        RUL Breath Sounds: Clear, RML Breath Sounds: Diminished, RLL Breath Sounds: Diminished, DAWOOD Breath Sounds: Clear, LLL Breath Sounds: Diminished  Fluids:    Intake/Output Summary (Last 24 hours) at 17 1438  Last data filed at 17 0930   Gross per 24 hour   Intake    240 ml   Output     50 ml   Net    190 ml     Weight: 89.8 kg (197 lb 15.6 oz)  GI/Nutrition:  Orders Placed This Encounter   Procedures   • Diet Order     Standing Status: Standing      Number of Occurrences:  1      Standing Expiration Date:      Order Specific Question:  Diet:     Answer:  Diabetic [3]     Order Specific Question:  Diet:     Answer:  Cardiac [6]     Medical Decision Making, by Problem:  Active Hospital Problems    Diagnosis   • Sepsis (CMS-HCC) [A41.9]  - POA, SIRS given fever and tachycardia. Source . No evidence of severe sepsis.   - Source evaluation to continue. Repeat CXRAY. Obtain CT renal protocol.   - Improved clinically. Continue to monitor.    • UTI (urinary tract infection) [N39.0]  - POA  - Transition ciprofloxacin to ceftriaxone.   - Obtain CT renal to r/o nephrolithiasis    • Elevated troponin [R79.89]  - POA  - Monitor on telemetry.   - Obtain TTE  - Check DVT duplex to r/o VTE as an etiology.     • Thrombocytopenia (CMS-HCC) [D69.6]  - Appears chronic. Monitor.    • Anemia [D64.9]  - Chronic disease related  - Monitor Hb / Restrictive transfusion strategy   • CKD (chronic kidney disease), stage II [N18.2]  - Monitor renal function / Avoid nephrotoxins and dose medications renally   • GERD (gastroesophageal reflux disease) [K21.9]  - Avoid PPI during hospital stay while abx are used   • Radiation cystitis [N30.40]  - Continue outpatient management    • History of prostate cancer [Z85.46]   • History of DVT (deep vein thrombosis) [Z86.718]   • Neuropathy (CMS-HCC) [G62.9]   • Hyperlipidemia [E78.5]  - Simvastatin    • Depression [F32.9]  - Prozac    • History of prostatectomy [Z90.79]   • HTN (hypertension) [I10]  - Lisinopril    • DM (diabetes mellitus) (CMS-HCC) [E11.9]  - Type II  - Controlled. Manifestations neuropathy and nephropathy  - Hold oral HG agents during hospital stay. SSI.      Patient to have > two midnights stay in the hospital. He is appropriate for acute inpatient stay given underlying medical condition.     Labs reviewed, Medications reviewed and Radiology images reviewed  Laguerre catheter: No Laguerre      DVT Prophylaxis: Enoxaparin (Lovenox)  DVT prophylaxis -  mechanical: SCDs  Ulcer prophylaxis: Not indicated  Antibiotics: Treating active infection/contamination beyond 24 hours perioperative coverage  Assessed for rehab: Patient returned to prior level of function, rehabilitation not indicated at this time

## 2017-04-20 NOTE — ED NOTES
Pt up to bathroom and back to bed; U/A collected and sent. Pt denies pain or discomfort, will continue to montior

## 2017-04-20 NOTE — ED NOTES
Summoned EMS for feeling shakey tonight, he feels better now but lives alone and did not feel he could drive himself for an evaluation. He does hyperbaric chamber everyday for bladder and rectal bleeding. EMS sugar over 200

## 2017-04-20 NOTE — PROGRESS NOTES
Tele summary  Rhythm: SR  Rate: 80's  IA: 0.16  QRS: 0.08  QT: 0.34    Ectopy: Frequent PAC. Rare PVC/ Coup. PSVT up to 170's    Telemetry strips placed in pt chart.

## 2017-04-20 NOTE — DISCHARGE PLANNING
Medical Social Work    Referral: Pt discussed at IDT rounds this AM.    Intervention: Per flowsheet, pt lives alone and expects to d.c home.  Pt states safe to go home.  No SS needs identified nor any requests for MELANIA Banuelos during rounds.      Plan: MELANIA available for any assistance with d.c planning.

## 2017-04-20 NOTE — CARE PLAN
Problem: Safety  Goal: Will remain free from falls  High fall risk precautions in place. Pt encouraged to use call light before getting out of bed. Pt verbalized understanding and able to return demonstrate how to use call light.     Problem: Knowledge Deficit  Goal: Knowledge of disease process/condition, treatment plan, diagnostic tests, and medications will improve  Diagnosis and POC discussed. Unit routine, MD orders, and medications reviewed. All questions and concerns addressed. Pt verbalized understanding.

## 2017-04-20 NOTE — ED PROVIDER NOTES
ED Provider Note    CHIEF COMPLAINT  Chief Complaint   Patient presents with   • Weakness     described as shakey       HPI  Selvin Braga is a 81 y.o. male here for evaluation of fever, shaking, and cough. Patient states that when he was watching TV earlier today, he began to shake and have a fever. He denies any chest pain, but does complain of some cough and shortness of breath. He also states that over the last day he has been increasingly dizzy. He has no lightheadedness, but states that the room is spinning. He did not fall. He denies any medication changes. He complains of generalized weakness of the legs as well. No dysuria.    PAST MEDICAL HISTORY   has a past medical history of Heart burn; Dental disorder; Snoring; Psychiatric problem; Cancer (CMS-[facility]) (2001); CATARACT; Arrhythmia; Indigestion; Other specified disorder of intestines; Arthritis; Colon polyps; Anemia; Diabetes (1984); Lombardi esophagus; Personal history of venous thrombosis and embolism (2010/1995); GERD (gastroesophageal reflux disease); Cholesterol blood decreased; Unspecified urinary incontinence; Urinary bladder disorder; Sleep apnea; Hypertension; Chronic kidney disease (CKD), stage III (moderate) (7/21/2016); Neuropathy (CMS-[facility]) (7/21/2016); Depression (7/21/2016); AK (actinic keratosis) (7/21/2016); and Hyperlipidemia (7/21/2016).    SOCIAL HISTORY  Social History     Social History Main Topics   • Smoking status: Former Smoker -- 2.00 packs/day for 30 years     Types: Cigarettes     Quit date: 03/05/1995   • Smokeless tobacco: Never Used      Comment: 1.5 ppd 25 yrs,quit 1995   • Alcohol Use: No   • Drug Use: No   • Sexual Activity: Not on file       SURGICAL HISTORY   has past surgical history that includes angiogram (1/25/2010); aortogram (1/25/2010); prostatectomy robotic (2/2001); insert,inflatable sphincter (2001); sphincter prosthesis removal (6/3/2010); cystoscopy (2/2/2011); sphincter prosthesis placement (2/24/2011);  "cystoscopy (2/24/2011); other abdominal surgery; groin exploration (3/13/2012); sphincter prosthesis placement (9/26/2012); cataract phaco with iol (2/13/2013); cataract phaco with iol (2/27/2013); colonoscopy with apc (6/5/2013); bowel resection (5/21/2014); laparoscopy (5/21/2014); lumbar fusion posterior (12/13/2012); lumbar decompression (12/13/2012); other orthopedic surgery; sphincter prosthesis removal (8/10/2015); and ankle orif (Right, 10/28/2015).    CURRENT MEDICATIONS  Home Medications     **Home medications have not yet been reviewed for this encounter**          ALLERGIES  Allergies   Allergen Reactions   • Augmentin Unspecified     Bleeding  RXN=5 years ago   • Latex Unspecified     Blisters  RXN=ongoing   • Tape Rash     Rash-blisters-paper tape okay  RXN=ongoing       REVIEW OF SYSTEMS  See HPI for further details. Review of systems as above, otherwise all other systems are negative.     PHYSICAL EXAM  VITAL SIGNS: /54 mmHg  Pulse 91  Temp(Src) 38.1 °C (100.6 °F)  Resp 20  Ht 1.803 m (5' 11\")  Wt 89.8 kg (197 lb 15.6 oz)  BMI 27.62 kg/m2  SpO2 97%    Constitutional: Elderly appearing, well-developed  HEENT: NC/AT.  Extra Ocular Muscles Intact. Posterior pharynx clear, and without exudate. No uvula edema.  Neck: Full range of motion; non tender. no meningismus.  Cardiovascular: Regular heart rate and rhythm.  No murmurs, rubs, nor gallop appreciated.   Back:  Non tender midline.  No obvious step off or deformity.  Thorax & Lungs:  Diminished breath sounds bilaterally, equal expansion   Abdomen: Soft, with no tenderness, rebound nor guarding.  No mass, pulsatile mass, nor hepatosplenomegaly appreciated.  Skin: No purpura nor petechia noted.  No rash.  Extremities/Musculoskeletal: No sign of trauma.    Musculoskeletal: Range of motion is intact in all major joints.  Neurologic: Alert & oriented x 3.  CN II-XII grossly intact.   Clear speech, appropriate, cooperative.  Psychiatric: Normal " affect appropriate for the clinical situation.    Results for orders placed or performed during the hospital encounter of 04/19/17   CBC w/ Differential   Result Value Ref Range    WBC 9.6 4.8 - 10.8 K/uL    RBC 3.59 (L) 4.70 - 6.10 M/uL    Hemoglobin 10.8 (L) 14.0 - 18.0 g/dL    Hematocrit 31.8 (L) 42.0 - 52.0 %    MCV 88.6 81.4 - 97.8 fL    MCH 30.1 27.0 - 33.0 pg    MCHC 34.0 33.7 - 35.3 g/dL    RDW 49.7 35.9 - 50.0 fL    Platelet Count 129 (L) 164 - 446 K/uL    MPV 9.4 9.0 - 12.9 fL    Neutrophils-Polys 92.20 (H) 44.00 - 72.00 %    Lymphocytes 2.00 (L) 22.00 - 41.00 %    Monocytes 4.20 0.00 - 13.40 %    Eosinophils 0.70 0.00 - 6.90 %    Basophils 0.40 0.00 - 1.80 %    Immature Granulocytes 0.50 0.00 - 0.90 %    Nucleated RBC 0.00 /100 WBC    Neutrophils (Absolute) 8.82 (H) 1.82 - 7.42 K/uL    Lymphs (Absolute) 0.19 (L) 1.00 - 4.80 K/uL    Monos (Absolute) 0.40 0.00 - 0.85 K/uL    Eos (Absolute) 0.07 0.00 - 0.51 K/uL    Baso (Absolute) 0.04 0.00 - 0.12 K/uL    Immature Granulocytes (abs) 0.05 0.00 - 0.11 K/uL    NRBC (Absolute) 0.00 K/uL   Complete Metabolic Panel (CMP)   Result Value Ref Range    Sodium 135 135 - 145 mmol/L    Potassium 4.0 3.6 - 5.5 mmol/L    Chloride 103 96 - 112 mmol/L    Co2 23 20 - 33 mmol/L    Anion Gap 9.0 0.0 - 11.9    Glucose 250 (H) 65 - 99 mg/dL    Bun 33 (H) 8 - 22 mg/dL    Creatinine 1.41 (H) 0.50 - 1.40 mg/dL    Calcium 8.9 8.4 - 10.2 mg/dL    AST(SGOT) 15 12 - 45 U/L    ALT(SGPT) 11 2 - 50 U/L    Alkaline Phosphatase 70 30 - 99 U/L    Total Bilirubin 0.9 0.1 - 1.5 mg/dL    Albumin 3.3 3.2 - 4.9 g/dL    Total Protein 6.0 6.0 - 8.2 g/dL    Globulin 2.7 1.9 - 3.5 g/dL    A-G Ratio 1.2 g/dL   Troponin STAT   Result Value Ref Range    Troponin I 0.05 (H) 0.00 - 0.04 ng/mL   Urinalysis, culture if indicated   Result Value Ref Range    Color Yellow     Character Cloudy (A)     Specific Gravity >=1.030 <1.035    Ph 5.0 5.0-8.0    Glucose Negative Negative mg/dL    Ketones Negative  Negative mg/dL    Protein 30 (A) Negative mg/dL    Bilirubin Negative Negative    Nitrite Positive (A) Negative    Leukocyte Esterase Small (A) Negative    Occult Blood Trace (A) Negative    Micro Urine Req Microscopic     Culture Indicated Yes UA Culture   ESTIMATED GFR   Result Value Ref Range    GFR If  58 (A) >60 mL/min/1.73 m 2    GFR If Non  48 (A) >60 mL/min/1.73 m 2   URINE MICROSCOPIC (W/UA)   Result Value Ref Range    WBC  (A) /hpf    RBC 5-10 (A) /hpf    Bacteria Many (A) None /hpf    Epithelial Cells Few Few /hpf    Mucous Threads Few /hpf   REFRACTOMETER SG   Result Value Ref Range    Specific Gravity 1.022      DX-CHEST-LIMITED (1 VIEW)   Final Result      1.  Cardiomegaly.      2.  Elevation the right hemidiaphragm.      3.  Linear bibasilar atelectasis.            PROCEDURES     MEDICAL RECORD  I have reviewed patient's medical record and pertinent results are listed above.    COURSE & MEDICAL DECISION MAKING  I have reviewed any medical record information, laboratory studies and radiographic results as noted above.    Differential diagnoses include but not limited to: mi, pe, ptx, pneumonia, uti.        FINAL IMPRESSION  1. Troponin level elevated    2. Acute UTI            Electronically signed by: Praneeth Arriaga, 4/19/2017 6:44 PM

## 2017-04-20 NOTE — PROGRESS NOTES
0830Report received, plan of care reviewed and discussed, assessment complete, oriented to room, bed alarm on, nonskid socks applied, advised to call for assistance.   1030 Discussed plan of care, encouraged and answered all questions, will continue to monitor.  1230 Up to shower, no complaints.  1430 Up in bed, call light within reach.  1630 Family at bedside.  Report given to next shift.  Cardiac Summary.  Sinus rhythm with occasional PVCs and rare couplets (0.18/0.08/0.38)

## 2017-04-20 NOTE — H&P
HPI:    Patient is an 81 year old male who comes to the ER because of chills. He states he was at home when at around 5 pm he began to experience chills and weakness. He took his temperature at home which he measured at 99 degrees. Patient has a history of gross hematuria due to radiation cystitis and has been getting hyperbaric oxygen therapy for the past 2 weeks. He says the therapy has been working well. He currently denies any nausea, vomiting, dysuria or abdominal pain. In the ER, he is febrile at 100.6. WBC is normal. His troponin is slightly elevated at 0.05.    ROS: Positive for chills, weakness. All other ROS are negative.      MEDICAL and SURGICAL HISTORY  •  DM (diabetes mellitus)  09/02/2010        Priority: Medium    •  HTN (hypertension)  09/02/2010        Priority: Medium    •  Spinal stenosis, lumbar region, without neurogenic claudication  12/13/2012        Priority: Low    •  Health care maintenance  08/30/2016    •  Chronic kidney disease (CKD), stage III (moderate)  07/21/2016    •  Neuropathy  07/21/2016    •  Depression  07/21/2016    •  AK (actinic keratosis)  07/21/2016    •  Hyperlipidemia  07/21/2016    •  Ankle fracture, right  10/28/2015    •  History of prostatectomy  05/08/2015    •  Diarrhea following gastrointestinal surgery  06/23/2014    •  Blood in stool  06/05/2013    •  Senile nuclear sclerosis  02/13/2013    •  DVT of lower extremity (deep venous thrombosis)  09/02/2010          Social History    Substance Use Topics    •  Smoking status:  Former Smoker -- 2.00 packs/day for 30 years        Types:  Cigarettes        Quit date:  03/05/1995    •  Smokeless tobacco:  Never Used          Comment: 1.5 ppd 25 yrs,quit 1995    •  Alcohol Use:  No           Current Medications     Current Outpatient Prescriptions    Medication  Sig  Dispense  Refill    •  insulin NPH (NOVOLIN N) 100 UNIT/ML Suspension  Inject 10 Units as instructed Once. Before dinner        •  Cyanocobalamin (B-12)  1000 MCG Tab CR  Take  by mouth.        •  pantoprazole (PROTONIX) 40 MG Tablet Delayed Response  Take 40 mg by mouth every day.        •  glipiZIDE (GLUCOTROL) 10 MG Tab  Take 10 mg by mouth 2 times a day. One po am, 1/2 po pm        •  hydrochlorothiazide (HYDRODIURIL) 12.5 MG tablet  Take 12.5 mg by mouth every day.        •  Saxagliptin HCl 5 MG Tab  Take  by mouth.        •  lisinopril (PRINIVIL) 5 MG Tab  Take 2.5 mg by mouth every day. Indications: High Blood Pressure        •  ferrous sulfate 325 (65 FE) MG tablet  Take 325 mg by mouth 2 Times a Day.        •  Cholecalciferol (VITAMIN D) 2000 UNIT TABS  Take 2,000 Units by mouth every day.        •  ascorbic acid (ASCORBIC ACID) 500 MG TABS  Take 500 mg by mouth every day.        •  metformin (GLUCOPHAGE) 500 MG TABS  Take 500 mg by mouth 2 times a day, with meals.        •  fluoxetine (PROZAC) 20 MG CAPS  Take 20 mg by mouth every day.        •  simvastatin (ZOCOR) 40 MG TABS  Take 40 mg by mouth every evening. Continue as per Dr. Mckeon. Continue as per Dr. Bobo.        •  clobetasol (TEMOVATE) 0.05 % Cream                     ALLERGIES:  Augmentin  Latex  Tape      Physical Exam:  Gen: NAD, lying comfortably in bed  HEENT: NC/AT, MMM  Chest: symmetrical expansion, no costochondral tenderness on palpation  Lungs: CTA B/L, no w/r/r  CV: +S1, S2, no m/r/g  Abdomen: S/NT/ND. + BS x 4  Ext: no c/c/e, FROM x 4  Skin: dry, warm to touch   Neuro: CN II - XII intact, no focal motor or sensory deficits noted  Psych: A+O x 3, judgment is intact        Labs (CBC): Last 72 Hours  (Last 3 results in the past 72 hours)             04/19/17   1855          WBC  9.6        Neutrophils-Polys  92.20        Basophils  0.40        RBC  3.59        Hemoglobin  10.8        Hematocrit  31.8        MCV  88.6        MCH  30.1        MCHC  34.0        Platelet Count  129        RDW  49.7        MPV  9.4        Neutrophils (Absolute)   8.82        Lymphs (Absolute)  0.19          "          - Comment  - Low  - High            Labs (Metabolic): Last 72 Hours  (Last 3 results in the past 72 hours)             04/19/17   1855          Sodium  135        Potassium  4.0        Chloride  103        Co2  23        Glucose  250        Bun  33        Creatinine  1.41        Calcium  8.9        AST(SGOT)  15        ALT(SGPT)  11        Alkaline Phosphatase  70        Total Bilirubin  0.9        Albumin  3.3        Total Protein  6.0        Globulin  2.7        A-G Ratio  1.2        GFR If African American 60 mL/min/1.73 m 2  Lab: V  Abnormal\" class=\"rz_1r\" style=\"padding-left: 2px;\"> 60 mL/min/1.73 m 2  Lab: V  Abnormal\" class=\"rz_1t\" style=\"padding-left: 3px;\">58        GFR If Non African American 60 mL/min/1.73 m 2  Lab: V  Abnormal\" class=\"rz_1r\" style=\"padding-left: 2px;\"> 60 mL/min/1.73 m 2  Lab: V  Abnormal\" class=\"rz_1t\" style=\"padding-left: 3px;\">48        Anion Gap  9.0                   - Abnormal  - High             Labs (Additional): Last 72 Hours  (Last 3 results in the past 72 hours)             04/19/17   1855          INR          Troponin I 0.50 ng/mL  \" class=\"rz_1r\" style=\"padding-left: 2px;\">  0.50 ng/mL  \" class=\"rz_1t\" style=\"padding-left: 3px;\">0.05           4/19/2017 6:50 PM    HISTORY/REASON FOR EXAM:  Shortness of breath    TECHNIQUE/EXAM DESCRIPTION AND NUMBER OF VIEWS:  Single AP view of the chest.    COMPARISON: 8/3/2015    FINDINGS:  There is elevation the right hemidiaphragm. There is colonic interposition on the right. There is linear atelectasis bilaterally.  The heart is enlarged.  There is no pleural effusion.  Bony structures and soft tissues are unremarkable.           Impression        1.  Cardiomegaly.    2.  Elevation the right hemidiaphragm.    3.  Linear bibasilar atelectasis.         Assessment/Plan:    UTI  Elevated troponin  Generalized weakness  Radiation cystitis  DM Type II with nephropathy  CKD stage III  HTN  Dyslipidemia  Hx of Prostate " CA  Depression    Patient with fever and chills due to UTI. Continue on IV Cipro. Continue IV fluids. Check lactic acid. Urine culture is pending  Troponin slightly elevated at 0.05. Most likely due to chronic kidney disease. No chest pain at this time.  Will monitor serial troponins  Start PT for weakness  Continue hyperbaric oxygen treatment for radiation cystitis as outpatient  Continue metformin, saxagliptin, and glipizide for diabetes. Start insulin sliding scale

## 2017-04-21 ENCOUNTER — APPOINTMENT (OUTPATIENT)
Dept: WOUND CARE | Facility: MEDICAL CENTER | Age: 82
End: 2017-04-21
Attending: UROLOGY
Payer: MEDICARE

## 2017-04-21 LAB
ANION GAP SERPL CALC-SCNC: 6 MMOL/L (ref 0–11.9)
BUN SERPL-MCNC: 26 MG/DL (ref 8–22)
CALCIUM SERPL-MCNC: 8.8 MG/DL (ref 8.4–10.2)
CHLORIDE SERPL-SCNC: 107 MMOL/L (ref 96–112)
CO2 SERPL-SCNC: 24 MMOL/L (ref 20–33)
CREAT SERPL-MCNC: 1.26 MG/DL (ref 0.5–1.4)
ERYTHROCYTE [DISTWIDTH] IN BLOOD BY AUTOMATED COUNT: 49.3 FL (ref 35.9–50)
GFR SERPL CREATININE-BSD FRML MDRD: 55 ML/MIN/1.73 M 2
GLUCOSE BLD-MCNC: 146 MG/DL (ref 65–99)
GLUCOSE BLD-MCNC: 220 MG/DL (ref 65–99)
GLUCOSE BLD-MCNC: 228 MG/DL (ref 65–99)
GLUCOSE SERPL-MCNC: 139 MG/DL (ref 65–99)
HCT VFR BLD AUTO: 27.7 % (ref 42–52)
HGB BLD-MCNC: 9.7 G/DL (ref 14–18)
INR PPP: 1.09 (ref 0.87–1.13)
LV EJECT FRACT  99904: 65
LV EJECT FRACT MOD 2C 99903: 67.34
LV EJECT FRACT MOD 4C 99902: 46.69
LV EJECT FRACT MOD BP 99901: 55.78
MAGNESIUM SERPL-MCNC: 1.6 MG/DL (ref 1.5–2.5)
MCH RBC QN AUTO: 30 PG (ref 27–33)
MCHC RBC AUTO-ENTMCNC: 35 G/DL (ref 33.7–35.3)
MCV RBC AUTO: 85.8 FL (ref 81.4–97.8)
PHOSPHATE SERPL-MCNC: 3.4 MG/DL (ref 2.5–4.5)
PLATELET # BLD AUTO: 96 K/UL (ref 164–446)
PMV BLD AUTO: 9.1 FL (ref 9–12.9)
POTASSIUM SERPL-SCNC: 3.6 MMOL/L (ref 3.6–5.5)
PROTHROMBIN TIME: 13.9 SEC (ref 12–14.6)
RBC # BLD AUTO: 3.23 M/UL (ref 4.7–6.1)
SODIUM SERPL-SCNC: 137 MMOL/L (ref 135–145)
TROPONIN I SERPL-MCNC: 0.05 NG/ML (ref 0–0.04)
WBC # BLD AUTO: 5.2 K/UL (ref 4.8–10.8)

## 2017-04-21 PROCEDURE — 85027 COMPLETE CBC AUTOMATED: CPT

## 2017-04-21 PROCEDURE — 84100 ASSAY OF PHOSPHORUS: CPT

## 2017-04-21 PROCEDURE — 93306 TTE W/DOPPLER COMPLETE: CPT | Mod: 26 | Performed by: INTERNAL MEDICINE

## 2017-04-21 PROCEDURE — 99232 SBSQ HOSP IP/OBS MODERATE 35: CPT | Performed by: INTERNAL MEDICINE

## 2017-04-21 PROCEDURE — 700105 HCHG RX REV CODE 258: Performed by: INTERNAL MEDICINE

## 2017-04-21 PROCEDURE — 770020 HCHG ROOM/CARE - TELE (206)

## 2017-04-21 PROCEDURE — 700102 HCHG RX REV CODE 250 W/ 637 OVERRIDE(OP): Performed by: FAMILY MEDICINE

## 2017-04-21 PROCEDURE — 80048 BASIC METABOLIC PNL TOTAL CA: CPT

## 2017-04-21 PROCEDURE — 83735 ASSAY OF MAGNESIUM: CPT

## 2017-04-21 PROCEDURE — 82962 GLUCOSE BLOOD TEST: CPT

## 2017-04-21 PROCEDURE — 700111 HCHG RX REV CODE 636 W/ 250 OVERRIDE (IP): Performed by: INTERNAL MEDICINE

## 2017-04-21 PROCEDURE — 85610 PROTHROMBIN TIME: CPT

## 2017-04-21 PROCEDURE — 93306 TTE W/DOPPLER COMPLETE: CPT

## 2017-04-21 PROCEDURE — A9270 NON-COVERED ITEM OR SERVICE: HCPCS | Performed by: FAMILY MEDICINE

## 2017-04-21 PROCEDURE — 84484 ASSAY OF TROPONIN QUANT: CPT

## 2017-04-21 RX ADMIN — ENOXAPARIN SODIUM 40 MG: 100 INJECTION SUBCUTANEOUS at 08:21

## 2017-04-21 RX ADMIN — OXYCODONE HYDROCHLORIDE AND ACETAMINOPHEN 500 MG: 500 TABLET ORAL at 08:22

## 2017-04-21 RX ADMIN — SIMVASTATIN 40 MG: 20 TABLET, FILM COATED ORAL at 20:07

## 2017-04-21 RX ADMIN — CEFTRIAXONE 2 G: 2 INJECTION, POWDER, FOR SOLUTION INTRAMUSCULAR; INTRAVENOUS at 08:21

## 2017-04-21 RX ADMIN — INSULIN LISPRO 4 UNITS: 100 INJECTION, SOLUTION INTRAVENOUS; SUBCUTANEOUS at 23:21

## 2017-04-21 RX ADMIN — INSULIN LISPRO 4 UNITS: 100 INJECTION, SOLUTION INTRAVENOUS; SUBCUTANEOUS at 11:24

## 2017-04-21 RX ADMIN — FERROUS SULFATE TAB 325 MG (65 MG ELEMENTAL FE) 325 MG: 325 (65 FE) TAB at 08:22

## 2017-04-21 RX ADMIN — LISINOPRIL 2.5 MG: 5 TABLET ORAL at 08:21

## 2017-04-21 RX ADMIN — VITAMIN D, TAB 1000IU (100/BT) 2000 UNITS: 25 TAB at 08:22

## 2017-04-21 RX ADMIN — Medication 1000 MCG: at 08:21

## 2017-04-21 RX ADMIN — FLUOXETINE 20 MG: 20 CAPSULE ORAL at 08:22

## 2017-04-21 RX ADMIN — FERROUS SULFATE TAB 325 MG (65 MG ELEMENTAL FE) 325 MG: 325 (65 FE) TAB at 20:08

## 2017-04-21 ASSESSMENT — ENCOUNTER SYMPTOMS
MYALGIAS: 0
ABDOMINAL PAIN: 0
HEADACHES: 0
DIZZINESS: 0
CHILLS: 0
FOCAL WEAKNESS: 0
DOUBLE VISION: 0
HEARTBURN: 0
NECK PAIN: 0
SPEECH CHANGE: 0
COUGH: 0
BACK PAIN: 0
SHORTNESS OF BREATH: 1
NAUSEA: 0
DEPRESSION: 1
VOMITING: 0
BLURRED VISION: 0
SPUTUM PRODUCTION: 0
FEVER: 0
SENSORY CHANGE: 0

## 2017-04-21 ASSESSMENT — PAIN SCALES - GENERAL
PAINLEVEL_OUTOF10: 0

## 2017-04-21 NOTE — PROGRESS NOTES
Pt's tele summary: sinus rhythm w/occasional PAC's and PVC's.      HR 69    PA interval 0.18  QRS complex 0.08  QT interval 0.40

## 2017-04-21 NOTE — PROGRESS NOTES
Resting with eyes closed. IV infusing without issue. Respirations even and unlabored. NC in place. Condom cath draining without issue. FSBS 139, no coverage needed. Call light in reach.

## 2017-04-21 NOTE — CARE PLAN
Problem: Safety  Goal: Will remain free from injury  Outcome: PROGRESSING AS EXPECTED  Pt remains free of accidental injury.  Pt uses call light approprietly.  Safety precautions in place: anti slip socks on, bed in low position, call light/personal belongings w/in reach, hourly rounding, room is clutter free.    Problem: Knowledge Deficit  Goal: Knowledge of disease process/condition, treatment plan, diagnostic tests, and medications will improve  Outcome: PROGRESSING AS EXPECTED  Discussed POC,TX, and meds.  Pt verbalizes understanding of disease process.  Encouraged pt to ask questions.

## 2017-04-21 NOTE — PROGRESS NOTES
Report received. Care assumed. Resting in bed with eyes closed. Respirations even and unlabored. IV infusing without issue. Call light in reach.

## 2017-04-21 NOTE — PROGRESS NOTES
Resting with eyes closed. Respirations even and unlabored. NC in place. Repositions self. Condom cath draining without issue. IV infusing without difficulty. Call light in reach.

## 2017-04-21 NOTE — PROGRESS NOTES
Continues to rest. Respirations even and unlabored. NC in place. New bag IVF hung and infusing without issue. Call light in reach.     Tele Report:   Sinus Rhythm  Frequent PVC/PAC, [place]. Couplet  PSVT less than 3sec at 2031 to 172  HR 70-95  HI 0.14  QRS 0.08  QT 0.36

## 2017-04-21 NOTE — CARE PLAN
Problem: Safety  Goal: Will remain free from injury  Outcome: PROGRESSING AS EXPECTED  Oriented to room and equipment. Call light instruction given, in reach at all times. Slipper socks in place. Assistive device in reach. Bed locked and in lowest position. Floor dry and uncluttered.     Problem: Urinary Elimination:  Goal: Ability to reestablish a normal urinary elimination pattern will improve  Outcome: PROGRESSING AS EXPECTED  Condom cath and hawkins clip in use as needed. Skin assessed for breakdown. Barrier cream used as needed.

## 2017-04-21 NOTE — PROGRESS NOTES
Due medications given without issue. IV infiltrated, New IV started left forearm by Shweta RN, infusing without issue. No c/o. Snack provided. Condom cath placed by Pt, draining without issue. Call light in reach.

## 2017-04-22 ENCOUNTER — APPOINTMENT (OUTPATIENT)
Dept: RADIOLOGY | Facility: MEDICAL CENTER | Age: 82
DRG: 872 | End: 2017-04-22
Attending: RADIOLOGY
Payer: MEDICARE

## 2017-04-22 ENCOUNTER — APPOINTMENT (OUTPATIENT)
Dept: RADIOLOGY | Facility: MEDICAL CENTER | Age: 82
DRG: 872 | End: 2017-04-22
Attending: INTERNAL MEDICINE
Payer: MEDICARE

## 2017-04-22 VITALS
HEART RATE: 69 BPM | HEIGHT: 71 IN | DIASTOLIC BLOOD PRESSURE: 59 MMHG | TEMPERATURE: 97.8 F | WEIGHT: 197.97 LBS | SYSTOLIC BLOOD PRESSURE: 144 MMHG | RESPIRATION RATE: 18 BRPM | OXYGEN SATURATION: 96 % | BODY MASS INDEX: 27.72 KG/M2

## 2017-04-22 LAB
BACTERIA UR CULT: ABNORMAL
BACTERIA UR CULT: ABNORMAL
GLUCOSE BLD-MCNC: 179 MG/DL (ref 65–99)
GLUCOSE BLD-MCNC: 248 MG/DL (ref 65–99)
SIGNIFICANT IND 70042: ABNORMAL
SITE SITE: ABNORMAL
SOURCE SOURCE: ABNORMAL

## 2017-04-22 PROCEDURE — 700111 HCHG RX REV CODE 636 W/ 250 OVERRIDE (IP): Performed by: INTERNAL MEDICINE

## 2017-04-22 PROCEDURE — A9270 NON-COVERED ITEM OR SERVICE: HCPCS | Performed by: FAMILY MEDICINE

## 2017-04-22 PROCEDURE — 700105 HCHG RX REV CODE 258: Performed by: INTERNAL MEDICINE

## 2017-04-22 PROCEDURE — A9567 TECHNETIUM TC-99M AEROSOL: HCPCS

## 2017-04-22 PROCEDURE — 82962 GLUCOSE BLOOD TEST: CPT

## 2017-04-22 PROCEDURE — 71020 DX-CHEST-2 VIEWS: CPT

## 2017-04-22 PROCEDURE — 700102 HCHG RX REV CODE 250 W/ 637 OVERRIDE(OP): Performed by: INTERNAL MEDICINE

## 2017-04-22 PROCEDURE — 99239 HOSP IP/OBS DSCHRG MGMT >30: CPT | Performed by: INTERNAL MEDICINE

## 2017-04-22 PROCEDURE — A9270 NON-COVERED ITEM OR SERVICE: HCPCS | Performed by: INTERNAL MEDICINE

## 2017-04-22 PROCEDURE — 700102 HCHG RX REV CODE 250 W/ 637 OVERRIDE(OP): Performed by: FAMILY MEDICINE

## 2017-04-22 RX ORDER — CEFDINIR 300 MG/1
300 CAPSULE ORAL EVERY 12 HOURS
Qty: 10 CAP | Refills: 0 | Status: SHIPPED | OUTPATIENT
Start: 2017-04-22 | End: 2017-04-27

## 2017-04-22 RX ORDER — CEFDINIR 300 MG/1
300 CAPSULE ORAL EVERY 12 HOURS
Status: DISCONTINUED | OUTPATIENT
Start: 2017-04-22 | End: 2017-04-22 | Stop reason: HOSPADM

## 2017-04-22 RX ADMIN — INSULIN LISPRO 4 UNITS: 100 INJECTION, SOLUTION INTRAVENOUS; SUBCUTANEOUS at 11:25

## 2017-04-22 RX ADMIN — CEFDINIR 300 MG: 300 CAPSULE ORAL at 15:33

## 2017-04-22 RX ADMIN — Medication 1000 MCG: at 08:41

## 2017-04-22 RX ADMIN — OXYCODONE HYDROCHLORIDE AND ACETAMINOPHEN 500 MG: 500 TABLET ORAL at 08:43

## 2017-04-22 RX ADMIN — CEFTRIAXONE 2 G: 2 INJECTION, POWDER, FOR SOLUTION INTRAMUSCULAR; INTRAVENOUS at 08:41

## 2017-04-22 RX ADMIN — ENOXAPARIN SODIUM 40 MG: 100 INJECTION SUBCUTANEOUS at 08:45

## 2017-04-22 RX ADMIN — LISINOPRIL 2.5 MG: 5 TABLET ORAL at 08:45

## 2017-04-22 RX ADMIN — VITAMIN D, TAB 1000IU (100/BT) 2000 UNITS: 25 TAB at 08:43

## 2017-04-22 RX ADMIN — FLUOXETINE 20 MG: 20 CAPSULE ORAL at 08:44

## 2017-04-22 RX ADMIN — FERROUS SULFATE TAB 325 MG (65 MG ELEMENTAL FE) 325 MG: 325 (65 FE) TAB at 08:44

## 2017-04-22 RX ADMIN — INSULIN LISPRO 3 UNITS: 100 INJECTION, SOLUTION INTRAVENOUS; SUBCUTANEOUS at 04:53

## 2017-04-22 ASSESSMENT — PAIN SCALES - GENERAL
PAINLEVEL_OUTOF10: 0

## 2017-04-22 ASSESSMENT — LIFESTYLE VARIABLES: EVER_SMOKED: NEVER

## 2017-04-22 NOTE — DISCHARGE INSTRUCTIONS
Discharge Instructions    Discharged to home by car with relative. Discharged via wheelchair, hospital escort: Yes.  Special equipment needed: Not Applicable    Be sure to schedule a follow-up appointment with your primary care doctor or any specialists as instructed.     Discharge Plan:   Diet Plan: Discussed  Activity Level: Discussed  Confirmed Follow up Appointment: Patient to Call and Schedule Appointment  Confirmed Symptoms Management: Discussed  Medication Reconciliation Updated: Yes  Influenza Vaccine Indication: Patient Refuses    I understand that a diet low in cholesterol, fat, and sodium is recommended for good health. Unless I have been given specific instructions below for another diet, I accept this instruction as my diet prescription.   Other diet: none    Special Instructions:     1) Take omnicef / Cefdinir abx as prescribed for the next five days. This has been send over to your pharmacy as requested.   2) Follow up with PCP within one week of hospital discharge. jscript:void(0)   3) Follow up with cardiology / heart doctors within one week of hospital discharge.   4) Take baby aspirin and metoprolol as prescribed until you see the heart doctors. Discuss with them need to continue this further.    · Is patient discharged on Warfarin / Coumadin?   No     · Is patient Post Blood Transfusion?  No    Depression / Suicide Risk    As you are discharged from this [facility] facility, it is important to learn how to keep safe from harming yourself.    Recognize the warning signs:  · Abrupt changes in personality, positive or negative- including increase in energy   · Giving away possessions  · Change in eating patterns- significant weight changes-  positive or negative  · Change in sleeping patterns- unable to sleep or sleeping all the time   · Unwillingness or inability to communicate  · Depression  · Unusual sadness, discouragement and loneliness  · Talk of wanting to die  · Neglect of personal  appearance   · Rebelliousness- reckless behavior  · Withdrawal from people/activities they love  · Confusion- inability to concentrate     If you or a loved one observes any of these behaviors or has concerns about self-harm, here's what you can do:  · Talk about it- your feelings and reasons for harming yourself  · Remove any means that you might use to hurt yourself (examples: pills, rope, extension cords, firearm)  · Get professional help from the community (Mental Health, Substance Abuse, psychological counseling)  · Do not be alone:Call your Safe Contact- someone whom you trust who will be there for you.  · Call your local CRISIS HOTLINE 046-2111 or 196-557-5123  · Call your local Children's Mobile Crisis Response Team [place] (112) 084-2285 or www.Foomanchew.com  · Call the toll free National Suicide Prevention Hotlines   · National Suicide Prevention Lifeline 436-524-RNSL (3856)  · [employer] Line Network 800-TFROXYH (443-6136)    Urinary Tract Infection  Urinary tract infections (UTIs) can develop anywhere along your urinary tract. Your urinary tract is your body's drainage system for removing wastes and extra water. Your urinary tract includes two kidneys, two ureters, a bladder, and a urethra. Your kidneys are a pair of bean-shaped organs. Each kidney is about the size of your fist. They are located below your ribs, one on each side of your spine.  CAUSES  Infections are caused by microbes, which are microscopic organisms, including fungi, viruses, and bacteria. These organisms are so small that they can only be seen through a microscope. Bacteria are the microbes that most commonly cause UTIs.  SYMPTOMS   Symptoms of UTIs may vary by age and gender of the patient and by the location of the infection. Symptoms in young women typically include a frequent and intense urge to urinate and a painful, burning feeling in the bladder or urethra during urination. Older women and men are more likely to be  tired, shaky, and weak and have muscle aches and abdominal pain. A fever may mean the infection is in your kidneys. Other symptoms of a kidney infection include pain in your back or sides below the ribs, nausea, and vomiting.  DIAGNOSIS  To diagnose a UTI, your caregiver will ask you about your symptoms. Your caregiver also will ask to provide a urine sample. The urine sample will be tested for bacteria and white blood cells. White blood cells are made by your body to help fight infection.  TREATMENT   Typically, UTIs can be treated with medication. Because most UTIs are caused by a bacterial infection, they usually can be treated with the use of antibiotics. The choice of antibiotic and length of treatment depend on your symptoms and the type of bacteria causing your infection.  HOME CARE INSTRUCTIONS  · If you were prescribed antibiotics, take them exactly as your caregiver instructs you. Finish the medication even if you feel better after you have only taken some of the medication.  · Drink enough water and fluids to keep your urine clear or pale yellow.  · Avoid caffeine, tea, and carbonated beverages. They tend to irritate your bladder.  · Empty your bladder often. Avoid holding urine for long periods of time.  · Empty your bladder before and after sexual intercourse.  · After a bowel movement, women should cleanse from front to back. Use each tissue only once.  SEEK MEDICAL CARE IF:   2. You have back pain.  3. You develop a fever.  4. Your symptoms do not begin to resolve within 3 days.  SEEK IMMEDIATE MEDICAL CARE IF:   · You have severe back pain or lower abdominal pain.  · You develop chills.  · You have nausea or vomiting.  · You have continued burning or discomfort with urination.  MAKE SURE YOU:   · Understand these instructions.  · Will watch your condition.  · Will get help right away if you are not doing well or get worse.     This information is not intended to replace advice given to you by your  health care provider. Make sure you discuss any questions you have with your health care provider.     Document Released: 09/27/2006 Document Revised: 01/08/2016 Document Reviewed: 01/25/2013  [employer] Interactive Patient Education ©2016 [employer]    Infection Control in the Home  If you have an infection or you are taking care of someone who has an infection, it is important to know how to keep the infection from spreading. Follow these guidelines to help stop the spread of infection, and talk to your health care provider.  HOW ARE INFECTIONS SPREAD?  In order for an infection to spread, the following must be present:  · A germ. This may be a virus, bacteria, fungus, or parasite.  · A place for the germ to live. This may be:  ¨ On or in a person, animal, plant, or food.  ¨ In soil or water.  ¨ On surfaces, such as a door handle.  · A susceptible host. This is a person or animal who does not have resistance (immunity) to the germ.  · A way for the germ to enter the host. This may occur by:  ¨ Direct contact. This may happen by making contact--such as shaking hands or hugging--with an infected person or animal. Some germs can also travel through the air and spread to you if an infected person coughs or sneezes on you or near you.  ¨ Indirect contact. This is when the germ enters the host through contact with an infected object. Examples include eating contaminated food, drinking contaminated water, or touching a contaminated surface with your hands and then touching your face, nose, or mouth soon after that.  HOW CAN I HELP TO PREVENT INFECTION FROM SPREADING?  There are several things that you can do to help prevent infection from spreading.  Hand Washing  It is very important to wash your hands correctly, following these steps:  5. Wet your hands with clean, running water.  6. Apply soap to your hands. Liquid soap is better than bar soap.  7. Rub your hands together quickly to create lather.  8. Keep rubbing  your hands together for at least 20 seconds. Thoroughly scrub all parts of your hands, including under your fingernails and between your fingers.  9. Rinse your hands with clean, running water until all of the soap is gone.  10. Dry your hands with an air dryer or a clean paper or cloth towel, or let your hands air-dry. Do not use your clothing or a soiled towel to dry your hands.  11. If you are in a public restroom, use your towel to turn off the water faucet and to open the bathroom door.  Make sure to wash your hands:  · Before:  ¨ Visiting a baby or anyone with a weakened or lowered defense (immune) system.  ¨ Putting in and taking out any contact lenses.  · After:  ¨ Working or playing outside.  ¨ Touching an animal or its toys or leash.  ¨ Handling livestock.  ¨ Using the bathroom or helping a child or adult to use the bathroom.  ¨ Using household  or toxic chemicals.  ¨ Touching or taking out the garbage.  ¨ Touching anything dirty around your home.  ¨ Handling soiled clothes or rags.  ¨ Taking care of a sick child. This includes touching used tissues, toys, and clothes.  ¨ Sneezing, coughing, or blowing your nose.  ¨ Using public transportation.  ¨ Shaking hands.  ¨ Using a phone, including your mobile phone.  ¨ Touching money.  · Before and after:  ¨ Preparing food.  ¨ Preparing a bottle for a baby.  ¨ Feeding a baby or a young child.  ¨ Eating.  ¨ Visiting or taking care of someone who is sick.  ¨ Changing a diaper.  ¨ Changing a bandage (dressing) or taking care of an injury or wound.  ¨ Giving or taking medicine.  Taking Care of Your Home  · Make sure that you have enough cleaning supplies at all times. These include:  ¨ Disinfectants.  ¨ Reusable cleaning cloths. Wash these after each use.  ¨ Paper towels.  ¨ Utility gloves. Replace your gloves if they are cracked or torn or if they start to peel.  · Use bleach safely. Never mix it with other cleaning products, especially those that contain  ammonia. This mixture can create a dangerous gas that may be deadly.  · Take care of your cleaning supplies. Toilet brushes, mops, and sponges can breed germs. Soak them in bleach and water for 5 minutes after each use.  · Do not pour used mop water down the sink. Pour it down the toilet instead.  · Maintain proper ventilation in your home.  · If you have a pet, ensure that your pet stays clean. Do not let people with weak immune systems touch bird droppings, fish tank water, or a litter box.  ¨ If you have a cat, be sure to change the litter every day.  · In the bathroom, make sure you:  ¨ Provide liquid soap.  ¨ Change towels and washcloths frequently. Avoid sharing towels and washcloths.  ¨ Change toothbrushes often and store them separately in a clean, dry place.  ¨ Disinfect the toilet.  ¨ Clean the tub, shower, and sink with standard cleaning products.    ¨ Mop the floor with a standard .  ¨ Do not share personal items, such as razors, toothbrushes, drinking glasses, deodorant, white, brushes, towels, and washcloths.    · In the kitchen, make sure you:  ¨ Store food carefully.  ¨ Refrigerate leftovers promptly in covered containers.  ¨ Throw out stale or spoiled food.  ¨ Clean the inside of your refrigerator each week.  ¨ Keep your refrigerator set at 40°F (4°C) or less, and set your freezer at 0°F (-18°C) or less.  ¨ Thaw foods in the refrigerator or microwave, not at room temperature.  ¨ Serve foods at the proper temperature. Do not eat raw meat. Make sure it is cooked to the appropriate temperature. Cook eggs until they are firm.  ¨ Wash fruits and vegetables under running water.  ¨ Use separate cutting boards, plates, and utensils for raw foods and cooked foods.  ¨ Keep work surfaces clean.  ¨ Use a clean spoon each time you sample food while cooking.  ¨ Wash your dishes in hot, soapy water. Air-dry your dishes or use a .  ¨ Do not share forks, cups, or spoons during meals.  · Wear gloves  if laundry is visibly soiled.  · Change linens each week or whenever they are soiled.  · Do not shake soiled linens. Doing that may send germs into the air. Put dressings, sanitary or incontinence pads, diapers, and gloves in plastic garbage bags for disposal.     This information is not intended to replace advice given to you by your health care provider. Make sure you discuss any questions you have with your health care provider.     Document Released: 09/26/2009 Document Revised: 01/08/2016 Document Reviewed: 08/20/2015  [employer] Interactive Patient Education ©2016 [employer]

## 2017-04-22 NOTE — DISCHARGE SUMMARY
C O N F I D E N T I A L I N F O R M A T I O N   -------------------------------------------------------------------------------------------------------------------   DISCHARGE SUMMARY    Patient ID:  Selvin Braga  [id number]  81 y.o.male  1935    Admit date: 4/19/2017    Discharge date and time: 04/22/2017    Admitting Physician: Kailash Jernigan D.O.    Discharge Physician: Pearl Hendrix    CODE STATUS: FULL CODE    Admission Diagnoses:   UTI (urinary tract infection)  Elevated troponin    Discharge Diagnoses:     Sepsis (CMS-HCC) POA, SIRS given fever and tachycardia. Source . No evidence of severe sepsis    UTI (urinary tract infection) E.Coli, POA    Elevated troponin    DM (diabetes mellitus) (CMS-HCC) type II. Controlled. Manifestations nephropathy and neuropathy    HTN (hypertension)    History of prostatectomy    Neuropathy (CMS-HCC)    Depression    Hyperlipidemia    History of prostate cancer    History of DVT (deep vein thrombosis)    Radiation cystitis    Thrombocytopenia (CMS-HCC)    Anemia    CKD (chronic kidney disease), stage II    GERD (gastroesophageal reflux disease)      Admission Condition: poor    Discharged Condition: good    Indication for Admission: UTI    HPI: As noted by Dr Jernigan    Hospital Course: This is a 81 male admitted 04/19/2017 with weakness and found to have UTI and elevated troponin's. Patient with sepsis noted on presentation 2/2 UTI in the setting of radiation cystitis. UTI attributed to E.Coli. Received IV ceftriaxone during the hospital stay and transitioned to PO omnicef on discharge. Patient with elevated troponins on presentation 2/2 underlying sepsis. He has history of DVT in the past. TTE obtained revealed no WMA. DVT study revealed chronic DVT and perfusion scan was low probability for PE. Given this VTE was ruled out as an etiology of his presentation. He has been initiated on ASA / BB / Statin and advised outpatient f/u with cardiology where a MPI may be  considered for further evaluation. He never had any chest pain during the hospital stay. Patient eager to be discharged home. Discharge planning discussed with patient. He is discharged home in stable condition. Case discussed with hospital schedulers for facilitation of outpatient f/u.     Things to follow up after discharge:   1) Take omnicef / Cefdinir abx as prescribed for the next five days. This has been send over to your pharmacy as requested.   2) Follow up with PCP within one week of hospital discharge.   3) Follow up with cardiology / heart doctors within one week of hospital discharge.   4) Take baby aspirin and metoprolol as prescribed until you see the heart doctors. Discuss with them need to continue this further.     Consults: none    Significant Studies:   DX-CHEST-2 VIEWS   Final Result      No active disease. Elevated right hemidiaphragm is again noted.      NM-LUNG VENT/PERF IMAGING   Final Result      Low probability of pulmonary embolism.      ECHOCARDIOGRAM-COMP W/ CONT   Final Result      CT-RENAL COLIC EVALUATION(A/P W/O)   Final Result         1.  No acute inflammatory or obstructive process.      2.  No hydronephrosis or nephrolithiasis.      3.  Unchanged cholelithiasis.      4.  Prostatectomy changes.      LE VENOUS DUPLEX - DVT ([facility] Only)   Final Result      DX-CHEST-2 VIEWS   Final Result      1.  Elevation of the right hemidiaphragm.      2.  Bibasilar atelectasis.      3.  Cardiomegaly.      DX-CHEST-LIMITED (1 VIEW)   Final Result      1.  Cardiomegaly.      2.  Elevation the right hemidiaphragm.      3.  Linear bibasilar atelectasis.          Procedures Performed While Hospitalized: None    Operations During Hospitalization: None    Disposition: Home    Patient Instructions: Given  Activity: activity as tolerated  Diet: cardiac diet  Wound Care: none needed    Medications at discharge:   Selvin Braga   Home Medication Instructions KARYN:[id number]    Printed  on:04/22/17 1554   Medication Information                      ascorbic acid (ASCORBIC ACID) 500 MG TABS  Take 500 mg by mouth every day.             cefdinir (OMNICEF) 300 MG Cap  Take 1 Cap by mouth every 12 hours for 5 days.             Cholecalciferol (VITAMIN D) 2000 UNIT TABS  Take 2,000 Units by mouth every day.             clobetasol (TEMOVATE) 0.05 % Cream               Cyanocobalamin (B-12) 1000 MCG Tab CR  Take  by mouth.             ferrous sulfate 325 (65 FE) MG tablet  Take 325 mg by mouth 2 Times a Day.             fluoxetine (PROZAC) 20 MG CAPS  Take 20 mg by mouth every day.             glipiZIDE (GLUCOTROL) 10 MG Tab  Take 10 mg by mouth 2 times a day. One po am, 1/2 po pm             hydrochlorothiazide (HYDRODIURIL) 12.5 MG tablet  Take 12.5 mg by mouth every day.             insulin NPH (NOVOLIN N) 100 UNIT/ML Suspension  Inject 10 Units as instructed Once. Before dinner             lisinopril (PRINIVIL) 5 MG Tab  Take 2.5 mg by mouth every day. Indications: High Blood Pressure             metformin (GLUCOPHAGE) 500 MG TABS  Take 500 mg by mouth 2 times a day, with meals.             pantoprazole (PROTONIX) 40 MG Tablet Delayed Response  Take 40 mg by mouth every day.             Saxagliptin HCl 5 MG Tab  Take 5 mg by mouth every day.             simvastatin (ZOCOR) 40 MG TABS  Take 40 mg by mouth every evening. Continue as per Dr. Mckeon. Continue as per Dr. Bobo.                 Opioid prescription history checked: N/A. None prescribed.     Time spend preparing discharge: 35 minutes. This included face to face with the patient, medication reconciliation, care co ordination with RN involved in patient care and discussion and co ordination with case management.     Signed:  Pearl Hendrix  4/22/2017  3:54 PM

## 2017-04-22 NOTE — PROGRESS NOTES
Received bedside report from Kelly MOHAMUD. Assumed care of pt who is resting in bed, RR even/unlabored. Fall protocol in place. CLIP. Will continue to monitor.

## 2017-04-22 NOTE — PROGRESS NOTES
Discharge instructions discussed. Vital signs taken, pt verbalized understanding. PIV removed. Tip intact. CNA in room getting pt dressed and ready. Tele removed. Son here to take pt home.

## 2017-04-22 NOTE — PROGRESS NOTES
Gave bedside report to NOC nurseKelly.  Discussed POC.  Pt resting in bed, semi-fowlers, no s/s of acute distress, calm, all lines patent, IVF infusing, safety precautions in place.

## 2017-04-22 NOTE — PROGRESS NOTES
Due medications given without issue. Respirations even and unlabored. IV infusing without issue. No c/o. Call light in reach.

## 2017-04-22 NOTE — DISCHARGE PLANNING
Medical Social Work    Referral: Pt discussed at IDT rounds this AM.    Intervention: Per flowsheet, pt lives alone and expects to d.c home.  Pt stated safe for home.  SW asked bs RN [place] if there were any O2 needs and there are none.  Pt probable d.c home.  No SS needs identified nor any requests for MELANIA Banuelos during rounds.      Plan: MELANIA available for any assistance with d.c planning.      Care Transition Team Assessment    Information Source  Orientation : Oriented x 4  Information Given By: Patient    Readmission Evaluation  Is this a readmission?: No    Elopement Risk  Legal Hold: No  Ambulatory or Self Mobile in Wheelchair: No-Not an Elopement Risk  Disoriented: No  Psychiatric Symptoms: None  History of Wandering: No  Elopement this Admit: No  Vocalizing Wanting to Leave: No  Displays Behaviors, Body Language Wanting to Leave: No-Not at Risk for Elopement  Elopement Risk: Not at Risk for Elopement    Interdisciplinary Discharge Planning  Does Admitting Nurse Feel This Could be a Complex Discharge?: No  Primary Care Physician: brenna  Lives with - Patient's Self Care Capacity: Alone and Able to Care For Self  Patient or legal guardian wants to designate a caregiver (see row info): No  Support Systems: Children  Housing / Facility: 1 Story Apartment / Condo  Do You Take your Prescribed Medications Regularly: Yes  Able to Return to Previous ADL's: Yes  Mobility Issues: Yes  Prior Services: [employer]  Patient Expects to be Discharged to:: home  Assistance Needed: Unknown at this Time  Durable Medical Equipment: Walker    Discharge Preparedness  What is your plan after discharge?: Home with help  What are your discharge supports?: Child         Finances  Prescription Coverage: Yes    Vision / Hearing Impairment  Vision Impairment : Yes  Right Eye Vision: Impaired, Wears Glasses  Left Eye Vision: Impaired, Wears Glasses  Hearing Impairment : Yes  Hearing Impairment: Hearing Device Not  Available  Does Pt Need Special Equipment for the Hearing Impaired?: No    Values / Beliefs / Concerns  Values / Beliefs Concerns : No    Advance Directive  Advance Directive?: DPOA for Health Care  Durable Power of  Name and Contact : Bushra Reyes [phone] or [phone]    Domestic Abuse  Have you ever been the victim of abuse or violence?: No  Physical Abuse or Sexual Abuse: No  Verbal Abuse or Emotional Abuse: No  Possible Abuse Reported to:: Not Applicable

## 2017-04-22 NOTE — CARE PLAN
Problem: Bowel/Gastric:  Goal: Normal bowel function is maintained or improved  Outcome: PROGRESSING AS EXPECTED  Encouraged fluid and fiber intake to promote gastric motility. Ambulated with staff in room and halls.     Problem: Knowledge Deficit  Goal: Knowledge of the prescribed therapeutic regimen will improve  Outcome: PROGRESSING AS EXPECTED  Plan of care discussed with patient. Opportunity given for questions, states understanding.

## 2017-04-22 NOTE — CARE PLAN
Problem: Safety  Goal: Will remain free from falls  Intervention: Implement fall precautions    04/21/17 0821 04/22/17 0843   OTHER   Environmental Precautions --  Treaded Slipper Socks on Patient;Personal Belongings, Wastebasket, Call Bell etc. in Easy Reach;Transferred to Stronger Side;Report Given to Other Health Care Providers Regarding Fall Risk;Bed in Low Position;Communication Sign for Patients & Families;Mobility Assessed & Appropriate Sign Placed   IV Pole on Same Side of Bed as Bathroom --  Yes   Bedrails --  Bedrails Closest to Bathroom Down   Chair/Bed Strip Alarm --  Yes - Alarm On   Bed Alarm (Built in - for ICU ONLY) Yes - Alarm On --      Non skid socks on, bed alarm on. Pt educated on call light and hourly rounding. Verbalized understanding. Pt aaox4.       Problem: Venous Thromboembolism (VTW)/Deep Vein Thrombosis (DVT) Prevention:  Goal: Patient will participate in Venous Thrombosis (VTE)/Deep Vein Thrombosis (DVT)Prevention Measures    04/22/17 0843   OTHER   Risk Assessment Score 3   VTE RISK High   Mechanical Prophylaxis SCDs, Sequentials (Intermittent Pneumatic Compression Devices)   Pharmacologic Prophylaxis Used LMWH: Enoxaparin(Lovenox)     Pt educated on Lovenox, verbalized understanding.

## 2017-04-22 NOTE — PROGRESS NOTES
Continues to rest. Respirations even and unlabored. NC in place. IV infusing without issue. FSBS 179, covered per MAR. No c/o. Call light in reach.

## 2017-04-22 NOTE — PROGRESS NOTES
Continues to rest. Respirations even and unlabored. Repositions self. IV infusing without issue. Call light in reach.     Tele Report:  Sinus Rhythm  Rare PAC, Couplet, Frequent PVC   HR 70-90  NE 0.16  QRS 0.08  QT 0.36

## 2017-04-22 NOTE — PROGRESS NOTES
Resting with eyes closed. Respirations even and unlabored. NC in place. FSBS 228, covered per MAR. No c/o. Condom cath draining without issue. IV infusing without difficulty. Call light in reach.

## 2017-04-22 NOTE — PROGRESS NOTES
Hospital Medicine Progress Note, Adult, Complex               Author: Pearl Hendrix Date & Time created: 4/21/2017  10:08 PM     Interval History:  81 y.o. male admitted 4/19/2017 with a CC of Weakness, fevers, chills, cough, some SOB and admitting dx of Elevated troponin and UTI.   Patient known to have PMH significant for Prostate cancer s/p radiation therapy and prostatectomy in the past c/b radiation cystitis chronically.   Admitted. Found to have sepsis evident on presentation 2/2 UTI.   Feels better today. Troponin leak noted. Minor. TTE ordered and negative. Pending final urine culture results.    Patient eager to be discharged home as soon as possible.   Plan for perfusion scan tomorrow. Await results of culture.   Disposition planned to home tomorrow if no further acute inpatient needs.     Review of Systems:  Review of Systems   Constitutional: Positive for malaise/fatigue. Negative for fever and chills.   HENT: Negative for hearing loss.    Eyes: Negative for blurred vision and double vision.   Respiratory: Positive for shortness of breath. Negative for cough and sputum production.    Cardiovascular: Positive for leg swelling. Negative for chest pain.   Gastrointestinal: Negative for heartburn, nausea, vomiting and abdominal pain.   Genitourinary: Negative for dysuria, urgency and frequency.   Musculoskeletal: Negative for myalgias, back pain and neck pain.   Skin: Negative for itching and rash.   Neurological: Negative for dizziness, sensory change, speech change, focal weakness and headaches.   Psychiatric/Behavioral: Positive for depression. Negative for suicidal ideas.       Physical Exam:  Physical Exam   Constitutional: He is oriented to person, place, and time. No distress.   HENT:   Head: Normocephalic.   Mouth/Throat: Oropharynx is clear and moist. No oropharyngeal exudate.   Eyes: Conjunctivae are normal. Pupils are equal, round, and reactive to light. No scleral icterus.   Neck: No JVD present.    Cardiovascular: Normal rate and regular rhythm.    Murmur heard.  Pulmonary/Chest: No stridor. No respiratory distress. He has no wheezes. He has rales.   Abdominal: Soft. Bowel sounds are normal. He exhibits no distension. There is no tenderness.   Musculoskeletal: He exhibits edema. He exhibits no tenderness.   Neurological: He is alert and oriented to person, place, and time.   Skin: Skin is warm and dry. He is not diaphoretic.   Psychiatric: He has a normal mood and affect. His behavior is normal. Judgment and thought content normal.       Labs:  Recent Labs      17   0030   ISTATSPEC  Venous     Recent Labs      17   0030  17   0642  175   TROPONINI  0.15*  0.12*  0.05*     Recent Labs      17   0445   SODIUM  135  137   POTASSIUM  4.0  3.6   CHLORIDE  103  107   CO2  23  24   BUN  33*  26*   CREATININE  1.41*  1.26   MAGNESIUM   --   1.6   PHOSPHORUS   --   3.4   CALCIUM  8.9  8.8     Recent Labs      17   0445   ALTSGPT  11   --    ASTSGOT  15   --    ALKPHOSPHAT  70   --    TBILIRUBIN  0.9   --    GLUCOSE  250*  139*     Recent Labs      175   RBC  3.59*  3.23*   HEMOGLOBIN  10.8*  9.7*   HEMATOCRIT  31.8*  27.7*   PLATELETCT  129*  96*   PROTHROMBTM   --   13.9   INR   --   1.09     Recent Labs      175   WBC  9.6  5.2   NEUTSPOLYS  92.20*   --    LYMPHOCYTES  2.00*   --    MONOCYTES  4.20   --    EOSINOPHILS  0.70   --    BASOPHILS  0.40   --    ASTSGOT  15   --    ALTSGPT  11   --    ALKPHOSPHAT  70   --    TBILIRUBIN  0.9   --            Hemodynamics:  Temp (24hrs), Av.7 °C (98.1 °F), Min:36.6 °C (97.8 °F), Max:36.9 °C (98.5 °F)  Temperature: 36.7 °C (98.1 °F)  Pulse  Av.5  Min: 71  Max: 104   Blood Pressure : 156/97 mmHg     Respiratory:    Respiration: 16, Pulse Oximetry: 96 %        RUL Breath Sounds: Diminished, RML Breath Sounds: Diminished, RLL Breath  Sounds: Diminished, DAWOOD Breath Sounds: Diminished, LLL Breath Sounds: Diminished  Fluids:    Intake/Output Summary (Last 24 hours) at 04/21/17 2208  Last data filed at 04/21/17 1400   Gross per 24 hour   Intake   1830 ml   Output    600 ml   Net   1230 ml        GI/Nutrition:  Orders Placed This Encounter   Procedures   • Diet Order     Standing Status: Standing      Number of Occurrences: 1      Standing Expiration Date:      Order Specific Question:  Diet:     Answer:  Diabetic [3]     Order Specific Question:  Diet:     Answer:  Cardiac [6]     Medical Decision Making, by Problem:  Active Hospital Problems    Diagnosis   • Sepsis (CMS-[facility]) [A41.9]  - POA, SIRS given fever and tachycardia. Source . No evidence of severe sepsis.   - Improved clinically. Continue to monitor.    • UTI (urinary tract infection) [N39.0]  - POA  - Transition ciprofloxacin to ceftriaxone.   - Obtain CT renal to r/o nephrolithiasis - Negative  - De escalate based on culture results   • Elevated troponin [R79.89]  - POA. I presume this is sepsis related.   - Monitor on telemetry.   - TTE is negative for any WMA. Preserved EF.   - DVT duplex reveals chronic DVT. Patient with remote history of DVT  - PE could not be ruled out as an etiology given he is moderate risk for PE. Discussed obtaining further evaluation with CTA PE. Patient does not want to proceed with contrasted study given his history of renal problems. Perfusion scan discussed and he is agreeable. This has been ordered for tomorrow.    • Thrombocytopenia (CMS-[facility]) [D69.6]  - Appears chronic. Monitor.    • Anemia [D64.9]  - Chronic disease related  - Monitor Hb / Restrictive transfusion strategy   • CKD (chronic kidney disease), stage II [N18.2]  - Monitor renal function / Avoid nephrotoxins and dose medications renally   • GERD (gastroesophageal reflux disease) [K21.9]  - Avoid PPI during hospital stay while abx are used   • Radiation cystitis [N30.40]  - Continue outpatient  management    • History of prostate cancer [Z85.46]   • History of DVT (deep vein thrombosis) [Z86.718]   • Neuropathy (CMS-HCC) [G62.9]   • Hyperlipidemia [E78.5]  - Simvastatin    • Depression [F32.9]  - Prozac    • History of prostatectomy [Z90.79]   • HTN (hypertension) [I10]  - Lisinopril    • DM (diabetes mellitus) (CMS-[facility]) [E11.9]  - Type II  - Controlled. Manifestations neuropathy and nephropathy  - Hold oral HG agents during hospital stay. SSI.      Labs reviewed, Medications reviewed and Radiology images reviewed  Laguerre catheter: No Laguerre      DVT Prophylaxis: Enoxaparin (Lovenox)  DVT prophylaxis - mechanical: SCDs  Ulcer prophylaxis: Not indicated  Antibiotics: Treating active infection/contamination beyond 24 hours perioperative coverage  Assessed for rehab: Patient returned to prior level of function, rehabilitation not indicated at this time

## 2017-04-22 NOTE — PROGRESS NOTES
Report received. Care assumed. Resting in bed. Alert and oriented. Respirations even and unlabored. No c/o. IV infusing without issue. Call light in reach.

## 2017-04-23 ENCOUNTER — PATIENT OUTREACH (OUTPATIENT)
Dept: HEALTH INFORMATION MANAGEMENT | Facility: OTHER | Age: 82
End: 2017-04-23

## 2017-04-24 ENCOUNTER — APPOINTMENT (OUTPATIENT)
Dept: WOUND CARE | Facility: MEDICAL CENTER | Age: 82
End: 2017-04-24
Attending: UROLOGY
Payer: MEDICARE

## 2017-04-24 LAB
BACTERIA BLD CULT: NORMAL
BACTERIA BLD CULT: NORMAL
SIGNIFICANT IND 70042: NORMAL
SIGNIFICANT IND 70042: NORMAL
SITE SITE: NORMAL
SITE SITE: NORMAL
SOURCE SOURCE: NORMAL
SOURCE SOURCE: NORMAL

## 2017-04-25 ENCOUNTER — APPOINTMENT (OUTPATIENT)
Dept: WOUND CARE | Facility: MEDICAL CENTER | Age: 82
End: 2017-04-25
Attending: UROLOGY
Payer: MEDICARE

## 2017-04-26 ENCOUNTER — APPOINTMENT (OUTPATIENT)
Dept: WOUND CARE | Facility: MEDICAL CENTER | Age: 82
End: 2017-04-26
Attending: UROLOGY
Payer: MEDICARE

## 2017-04-27 ENCOUNTER — APPOINTMENT (OUTPATIENT)
Dept: WOUND CARE | Facility: MEDICAL CENTER | Age: 82
End: 2017-04-27
Attending: UROLOGY
Payer: MEDICARE

## 2017-04-28 ENCOUNTER — APPOINTMENT (OUTPATIENT)
Dept: WOUND CARE | Facility: MEDICAL CENTER | Age: 82
End: 2017-04-28
Attending: UROLOGY
Payer: MEDICARE

## 2017-05-01 ENCOUNTER — APPOINTMENT (OUTPATIENT)
Dept: INTERNAL MEDICINE | Facility: MEDICAL CENTER | Age: 82
End: 2017-05-01
Payer: MEDICARE

## 2017-05-01 ENCOUNTER — APPOINTMENT (OUTPATIENT)
Dept: WOUND CARE | Facility: MEDICAL CENTER | Age: 82
End: 2017-05-01
Attending: UROLOGY
Payer: MEDICARE

## 2017-05-02 ENCOUNTER — APPOINTMENT (OUTPATIENT)
Dept: WOUND CARE | Facility: MEDICAL CENTER | Age: 82
End: 2017-05-02
Attending: UROLOGY
Payer: MEDICARE

## 2017-05-03 ENCOUNTER — APPOINTMENT (OUTPATIENT)
Dept: WOUND CARE | Facility: MEDICAL CENTER | Age: 82
End: 2017-05-03
Attending: UROLOGY
Payer: MEDICARE

## 2017-05-04 ENCOUNTER — APPOINTMENT (OUTPATIENT)
Dept: WOUND CARE | Facility: MEDICAL CENTER | Age: 82
End: 2017-05-04
Attending: UROLOGY
Payer: MEDICARE

## 2017-05-05 ENCOUNTER — APPOINTMENT (OUTPATIENT)
Dept: WOUND CARE | Facility: MEDICAL CENTER | Age: 82
End: 2017-05-05
Attending: UROLOGY
Payer: MEDICARE

## 2017-05-08 ENCOUNTER — APPOINTMENT (OUTPATIENT)
Dept: WOUND CARE | Facility: MEDICAL CENTER | Age: 82
End: 2017-05-08
Attending: UROLOGY
Payer: MEDICARE

## 2017-05-09 ENCOUNTER — APPOINTMENT (OUTPATIENT)
Dept: WOUND CARE | Facility: MEDICAL CENTER | Age: 82
End: 2017-05-09
Attending: UROLOGY
Payer: MEDICARE

## 2017-05-10 ENCOUNTER — APPOINTMENT (OUTPATIENT)
Dept: WOUND CARE | Facility: MEDICAL CENTER | Age: 82
End: 2017-05-10
Attending: UROLOGY
Payer: MEDICARE

## 2017-05-11 ENCOUNTER — APPOINTMENT (OUTPATIENT)
Dept: WOUND CARE | Facility: MEDICAL CENTER | Age: 82
End: 2017-05-11
Attending: UROLOGY
Payer: MEDICARE

## 2017-05-12 ENCOUNTER — APPOINTMENT (OUTPATIENT)
Dept: WOUND CARE | Facility: MEDICAL CENTER | Age: 82
End: 2017-05-12
Attending: UROLOGY
Payer: MEDICARE

## 2017-05-15 ENCOUNTER — OFFICE VISIT (OUTPATIENT)
Dept: CARDIOLOGY | Facility: MEDICAL CENTER | Age: 82
End: 2017-05-15
Payer: MEDICARE

## 2017-05-15 ENCOUNTER — APPOINTMENT (OUTPATIENT)
Dept: WOUND CARE | Facility: MEDICAL CENTER | Age: 82
End: 2017-05-15
Attending: UROLOGY
Payer: MEDICARE

## 2017-05-15 VITALS
WEIGHT: 191 LBS | OXYGEN SATURATION: 94 % | BODY MASS INDEX: 26.74 KG/M2 | HEART RATE: 74 BPM | SYSTOLIC BLOOD PRESSURE: 144 MMHG | DIASTOLIC BLOOD PRESSURE: 64 MMHG | HEIGHT: 71 IN

## 2017-05-15 DIAGNOSIS — N18.30 CHRONIC KIDNEY DISEASE (CKD), STAGE III (MODERATE) (HCC): ICD-10-CM

## 2017-05-15 DIAGNOSIS — E78.5 HYPERLIPIDEMIA, UNSPECIFIED HYPERLIPIDEMIA TYPE: ICD-10-CM

## 2017-05-15 DIAGNOSIS — I10 ESSENTIAL HYPERTENSION, BENIGN: ICD-10-CM

## 2017-05-15 DIAGNOSIS — R79.89 ELEVATED TROPONIN I LEVEL: ICD-10-CM

## 2017-05-15 DIAGNOSIS — N18.30 TYPE 2 DIABETES MELLITUS WITH STAGE 3 CHRONIC KIDNEY DISEASE, WITH LONG-TERM CURRENT USE OF INSULIN (HCC): ICD-10-CM

## 2017-05-15 DIAGNOSIS — Z79.4 TYPE 2 DIABETES MELLITUS WITH STAGE 3 CHRONIC KIDNEY DISEASE, WITH LONG-TERM CURRENT USE OF INSULIN (HCC): ICD-10-CM

## 2017-05-15 DIAGNOSIS — E11.22 TYPE 2 DIABETES MELLITUS WITH STAGE 3 CHRONIC KIDNEY DISEASE, WITH LONG-TERM CURRENT USE OF INSULIN (HCC): ICD-10-CM

## 2017-05-15 LAB — EKG IMPRESSION: NORMAL

## 2017-05-15 PROCEDURE — 1101F PT FALLS ASSESS-DOCD LE1/YR: CPT | Performed by: INTERNAL MEDICINE

## 2017-05-15 PROCEDURE — 1111F DSCHRG MED/CURRENT MED MERGE: CPT | Performed by: INTERNAL MEDICINE

## 2017-05-15 PROCEDURE — 99204 OFFICE O/P NEW MOD 45 MIN: CPT | Performed by: INTERNAL MEDICINE

## 2017-05-15 PROCEDURE — G8432 DEP SCR NOT DOC, RNG: HCPCS | Performed by: INTERNAL MEDICINE

## 2017-05-15 PROCEDURE — 93000 ELECTROCARDIOGRAM COMPLETE: CPT | Performed by: INTERNAL MEDICINE

## 2017-05-15 PROCEDURE — G8417 CALC BMI ABV UP PARAM F/U: HCPCS | Performed by: INTERNAL MEDICINE

## 2017-05-15 PROCEDURE — 1036F TOBACCO NON-USER: CPT | Performed by: INTERNAL MEDICINE

## 2017-05-15 PROCEDURE — 4040F PNEUMOC VAC/ADMIN/RCVD: CPT | Performed by: INTERNAL MEDICINE

## 2017-05-15 RX ORDER — VITAMIN E 268 MG
400 CAPSULE ORAL DAILY
COMMUNITY
End: 2018-05-09

## 2017-05-15 RX ORDER — ATORVASTATIN CALCIUM 40 MG/1
40 TABLET, FILM COATED ORAL DAILY
Qty: 30 TAB | Refills: 11 | Status: SHIPPED | OUTPATIENT
Start: 2017-05-15 | End: 2018-04-23 | Stop reason: SDUPTHER

## 2017-05-15 NOTE — Clinical Note
[facility]   [address]   [address]  [place] [address]  Phone: [phone]  Fax: [phone]              Selvin Braga  1935    Encounter Date: 5/15/2017    Destinee Ferguson M.D.    Thank you for the referral. I had the pleasure of seeing Selvin Braga today in cardiology clinic. I've attached my visit note below. If you have any questions please feel free to give me a call anytime.      Beth Huston MD, PhD, [facility]  Cardiology and Lipidology  [facility]                                                                PROGRESS NOTE:  Cardiology Consult Note:    Beth Huston  Date & Time note created:    5/15/2017   2:37 PM       Patient ID:  Name:             Selvin Braga   YOB: 1935  Age:                 81 y.o.  male   MRN:               [id number]                                                             Chief Complaint:   Chief Complaint   Patient presents with   • New Patient             History of Present Illness:   Selvin Braga has recent hospitalization for UTI and sepsis; Trop was elevated; He has high AHA 10 yr CV risk of 62.5%  Will use high intensity Statin txmt  All recent labs, imaging studies and procedures reviewed    Review of Systems:     Constitutional: Denies fevers, Denies weight changes  Eyes: Denies changes in vision, no eye pain  Ears/Nose/Throat/Mouth: Denies nasal congestion or sore throat   Cardiovascular: Denies chest pain or palpitations   Respiratory: Denies shortness of breath , Denies cough  Gastrointestinal/Hepatic: Denies abdominal pain, nausea, vomiting, diarrhea, constipation or GI bleeding   Genitourinary: Denies bladder dysfunction, dysuria or frequency  Musculoskeletal/Rheum: Denies  joint pain and swelling   Skin/Breast: Denies rash, denies breast lumps or discharge  Neurological: Denies headache, confusion, memory loss  or focal weakness/parasthesias  Psychiatric: denies mood disorder   Endocrine: denies hx of diabetes or thyroid dysfunction  Heme/Oncology/Lymph Nodes: Denies enlarged lymph nodes, denies bruising or known bleeding disorder  Allergic/Immunologic:  hx of allergies      All other systems were reviewed and are negative (AMA/CMS criteria)    Past Medical History:   Past Medical History   Diagnosis Date   • Heart burn    • Dental disorder      dentures   • Snoring    • Psychiatric problem      taking prozac   • Cancer (CMS-[facility]) 2001     prostate   • CATARACT    • Arrhythmia      PVC's   • Indigestion    • Other specified disorder of intestines      alters between normal and diarrhea since radiation tx   • Arthritis    • Colon polyps    • Anemia    • Diabetes 1984   • Lombardi esophagus    • Personal history of venous thrombosis and embolism 2010/1995     leg   • GERD (gastroesophageal reflux disease)    • Cholesterol blood decreased    • Unspecified urinary incontinence      spill from artifical urinary sphincter   • Urinary bladder disorder      artificial urinary sphincter /catheter   • Sleep apnea      no cpap   • Hypertension      well controlled on meds   • Chronic kidney disease (CKD), stage III (moderate) 7/21/2016   • Neuropathy (CMS-[facility]) 7/21/2016   • Depression 7/21/2016   • AK (actinic keratosis) 7/21/2016   • Hyperlipidemia 7/21/2016         Past Surgical History:  Past Surgical History   Procedure Laterality Date   • Angiogram  1/25/2010     Performed by PETER NORMAN at SURGERY [facility]   • Aortogram  1/25/2010     Performed by PETER NORMAN at SURGERY [facility]   • Prostatectomy robotic  2/2001   • Pr insert,inflatable sphincter  2001   • Sphincter prosthesis removal  6/3/2010     Performed by JOSH APARICIO at SURGERY [facility]   • Cystoscopy  2/2/2011     Performed by GERSON SEGUNDO at SURGERY [facility]   • Sphincter prosthesis placement  2/24/2011      Performed by GERSON CAR at [facility]   • Cystoscopy  2/24/2011     Performed by GERSON CAR at SURGERY [facility]   • Other abdominal surgery     • Groin exploration  3/13/2012     Performed by DEEPA PATRICIA at [facility]   • Sphincter prosthesis placement  9/26/2012     Performed by GERSON Car M.D. at [facility]   • Cataract phaco with iol  2/13/2013     Performed by Clayton Noonan M.D. at SURGERY SAME DAY [facility]   • Cataract phaco with iol  2/27/2013     Performed by Clayton Noonan M.D. at SURGERY SAME DAY [facility]   • Colonoscopy with apc  6/5/2013     Performed by Deepa Vela M.D. at [facility]   • Bowel resection  5/21/2014     Performed by Graham Peña M.D. at [facility]   • Laparoscopy  5/21/2014     Performed by Graham Peña M.D. at [facility]   • Lumbar fusion posterior  12/13/2012     Procedure: L5-S1 removal of Jennings Fragment NON-INSTRUMENTAL;  Surgeon: Wil Martinez M.D.;  Location: [facility];  Service:    • Lumbar decompression  12/13/2012     Procedure: 4-5;  Surgeon: Wil Martinez M.D.;  Location: [facility];  Service:    • Other orthopedic surgery       hip & femur fx   • Sphincter prosthesis removal  8/10/2015     Procedure: SPHINCTER PROSTHESIS REMOVAL;  Surgeon: Tuan Marie M.D.;  Location: [facility];  Service:    • Ankle orif Right 10/28/2015     Procedure: ANKLE ORIF;  Surgeon: Venu Elizabeth M.D.;  Location: [facility];  Service:        Hospital Medications:    Current outpatient prescriptions:   •  Probiotic Product (PRO-BIOTIC BLEND) Cap, Take  by mouth., Disp: , Rfl:   •  vitamin e (VITAMIN E) 400 UNIT Cap, Take 400 Units by mouth every day., Disp: , Rfl:   •  atorvastatin (LIPITOR) 40 MG Tab, Take 1 Tab by mouth every day., Disp: 30 Tab, Rfl: 11  •  aspirin EC (ECOTRIN) 81 MG Tablet  Delayed Response, Take 1 Tab by mouth every day., Disp: 30 Tab, Rfl: 0  •  metoprolol (LOPRESSOR) 25 MG Tab, Take 0.5 Tabs by mouth 2 times a day., Disp: 60 Tab, Rfl: 0  •  insulin NPH (NOVOLIN N) 100 UNIT/ML Suspension, Inject 10 Units as instructed Once. Before dinner, Disp: , Rfl:   •  Cyanocobalamin (B-12) 1000 MCG Tab CR, Take  by mouth., Disp: , Rfl:   •  pantoprazole (PROTONIX) 40 MG Tablet Delayed Response, Take 20 mg by mouth every day., Disp: , Rfl:   •  glipiZIDE (GLUCOTROL) 10 MG Tab, Take 10 mg by mouth 2 times a day. One po am, 1/2 po pm, Disp: , Rfl:   •  hydrochlorothiazide (HYDRODIURIL) 12.5 MG tablet, Take 12.5 mg by mouth every day., Disp: , Rfl:   •  Saxagliptin HCl 5 MG Tab, Take 5 mg by mouth every day., Disp: , Rfl:   •  lisinopril (PRINIVIL) 5 MG Tab, Take 2.5 mg by mouth every day. Indications: High Blood Pressure, Disp: , Rfl:   •  ferrous sulfate 325 (65 FE) MG tablet, Take 325 mg by mouth 2 Times a Day., Disp: , Rfl:   •  Cholecalciferol (VITAMIN D) 2000 UNIT TABS, Take 2,000 Units by mouth every day., Disp: , Rfl:   •  ascorbic acid (ASCORBIC ACID) 500 MG TABS, Take 500 mg by mouth every day., Disp: , Rfl:   •  metformin (GLUCOPHAGE) 500 MG TABS, Take 500 mg by mouth 2 times a day, with meals., Disp: , Rfl:   •  fluoxetine (PROZAC) 20 MG CAPS, Take 20 mg by mouth every day., Disp: , Rfl:   •  clobetasol (TEMOVATE) 0.05 % Cream, , Disp: , Rfl:     Current Outpatient Medications:  Current Outpatient Prescriptions   Medication Sig Dispense Refill   • Probiotic Product (PRO-BIOTIC BLEND) Cap Take  by mouth.     • vitamin e (VITAMIN E) 400 UNIT Cap Take 400 Units by mouth every day.     • atorvastatin (LIPITOR) 40 MG Tab Take 1 Tab by mouth every day. 30 Tab 11   • aspirin EC (ECOTRIN) 81 MG Tablet Delayed Response Take 1 Tab by mouth every day. 30 Tab 0   • metoprolol (LOPRESSOR) 25 MG Tab Take 0.5 Tabs by mouth 2 times a day. 60 Tab 0   • insulin NPH (NOVOLIN N) 100 UNIT/ML Suspension  Inject 10 Units as instructed Once. Before dinner     • Cyanocobalamin (B-12) 1000 MCG Tab CR Take  by mouth.     • pantoprazole (PROTONIX) 40 MG Tablet Delayed Response Take 20 mg by mouth every day.     • glipiZIDE (GLUCOTROL) 10 MG Tab Take 10 mg by mouth 2 times a day. One po am, 1/2 po pm     • hydrochlorothiazide (HYDRODIURIL) 12.5 MG tablet Take 12.5 mg by mouth every day.     • Saxagliptin HCl 5 MG Tab Take 5 mg by mouth every day.     • lisinopril (PRINIVIL) 5 MG Tab Take 2.5 mg by mouth every day. Indications: High Blood Pressure     • ferrous sulfate 325 (65 FE) MG tablet Take 325 mg by mouth 2 Times a Day.     • Cholecalciferol (VITAMIN D) 2000 UNIT TABS Take 2,000 Units by mouth every day.     • ascorbic acid (ASCORBIC ACID) 500 MG TABS Take 500 mg by mouth every day.     • metformin (GLUCOPHAGE) 500 MG TABS Take 500 mg by mouth 2 times a day, with meals.     • fluoxetine (PROZAC) 20 MG CAPS Take 20 mg by mouth every day.     • clobetasol (TEMOVATE) 0.05 % Cream        No current facility-administered medications for this visit.         Medication Allergy/Sensitivities:  Allergies   Allergen Reactions   • Augmentin Unspecified     Bleeding  RXN=5 years ago   • Latex Unspecified     Blisters  RXN=ongoing   • Tape Rash     Rash-blisters-paper tape okay  RXN=ongoing       Family History:  Family History   Problem Relation Age of Onset   • Hypertension     • Cancer       multiple cousins with prostate cancer.    • Cancer Mother      ovarian   • Heart Disease Father    • Diabetes Father    • Diabetes Sister        Social History:  Social History     Social History   • Marital Status:      Spouse Name: N/A   • Number of Children: 5   • Years of Education: N/A     Occupational History   • Not on file.     Social History Main Topics   • Smoking status: Former Smoker -- 2.00 packs/day for 30 years     Types: Cigarettes     Quit date: 03/05/1995   • Smokeless tobacco: Never Used      Comment: 1.5 ppd 25  "yrs,quit 1995   • Alcohol Use: No   • Drug Use: No   • Sexual Activity: Not on file     Other Topics Concern   • Not on file     Social History Narrative         Physical Exam:  Vitals  Weight/BMI: Body mass index is 26.65 kg/(m^2).  Blood pressure 144/64, pulse 74, height 1.803 m (5' 11\"), weight 86.637 kg (191 lb), SpO2 94 %.  Filed Vitals:    05/15/17 1348   BP: 144/64   Pulse: 74   Height: 1.803 m (5' 11\")   Weight: 86.637 kg (191 lb)   SpO2: 94%     Oxygen Therapy:  Pulse Oximetry: 94 %  General Appearance:  Hard of hearing, mild;  Well developed, Well nourished, No acute distress, Non-toxic appearance.   HENT:  Normocephalic, Atraumatic, Oropharynx moist mucous membranes, Dentition: , Nose normal.    Eyes:  PERRLA, EOMI, Conjunctiva normal, No discharge.  Neck:  Normal range of motion, No cervical tenderness, Supple, No stridor, no JVD .  No thyromegaly.  No carotid bruit.  Cardiovascular:  Normal heart rate, Normal rhythm,  S1, S2, no S3,  S4; No gallops; No murmurs, No rubs, .   Extremitites with intact distal pulses, no cyanosis, clubbing or edema.  No heaves, thrills, HJR;  Peripheral pulses: carotid 2+, brachial 2+, radial 2+, ulnar 2+, femoral 2+, popliteal 2+, PT 2+, DP 2+;  Lungs:  Respiratory effort is normal. Normal breath sounds, breath sounds clear to auscultation bilaterally,  no rales, no rhonchi, no wheezing.   Abdomen: Bowel sounds normal, Soft, No tenderness, No guarding, No rebound, No masses, No hepatosplenomegaly.  Skin: Warm, Dry, No erythema, No rash, no induration or crepitus.  Neurologic: Alert & oriented x 3, Normal motor function, Normal sensory function, No focal deficits noted, cranial nerves II through XII are normal,  abnormal gait, uses cane.  Psychiatric: Affect normal, Judgment normal, Mood normal.      Data Review:     Records reviewed and summarized in current documentation    Lab Data Review:  Lab Results   Component Value Date/Time    SODIUM 137 04/21/2017 04:45 AM    " POTASSIUM 3.6 04/21/2017 04:45 AM    CHLORIDE 107 04/21/2017 04:45 AM    CO2 24 04/21/2017 04:45 AM    GLUCOSE 139* 04/21/2017 04:45 AM    BUN 26* 04/21/2017 04:45 AM    CREATININE 1.26 04/21/2017 04:45 AM      Lab Results   Component Value Date/Time    PT 13.9 04/21/2017 04:45 AM    INR 1.09 04/21/2017 04:45 AM      Lab Results   Component Value Date/Time    WBC 5.2 04/21/2017 04:45 AM    RBC 3.23* 04/21/2017 04:45 AM    HEMOGLOBIN 9.7* 04/21/2017 04:45 AM    HEMATOCRIT 27.7* 04/21/2017 04:45 AM    MCV 85.8 04/21/2017 04:45 AM    MCH 30.0 04/21/2017 04:45 AM    MCHC 35.0 04/21/2017 04:45 AM    MPV 9.1 04/21/2017 04:45 AM    NEUTROPHILS-POLYS 92.20* 04/19/2017 06:55 PM    LYMPHOCYTES 2.00* 04/19/2017 06:55 PM    MONOCYTES 4.20 04/19/2017 06:55 PM    EOSINOPHILS 0.70 04/19/2017 06:55 PM    BASOPHILS 0.40 04/19/2017 06:55 PM    HYPOCHROMIA 1+ 06/24/2014 04:52 AM    ANISOCYTOSIS 1+ 04/09/2012 06:58 AM    Results for KARINA SKAGGS (MRN [id number]) as of 5/15/2017 14:21   Ref. Range 7/12/2016 09:43 8/22/2016 07:00 4/19/2017 18:55 4/20/2017 00:30 4/20/2017 06:42 4/21/2017 04:45   Lactic Acid Latest Ref Range: 0.50-2.00 mmol/L    1.91     Glycohemoglobin Latest Ref Range: 0.0-5.6 %  7.4 (H)       Estim. Avg Glu Latest Units: mg/dL  166       Cholesterol,Tot Latest Ref Range: 100-199 mg/dL  141       Triglycerides Latest Ref Range: 0-149 mg/dL  131       HDL Latest Ref Range: >=40 mg/dL  47       LDL Latest Ref Range: <100 mg/dL  68       Troponin I Latest Ref Range: 0.00-0.04 ng/mL   0.05 (H) 0.15 (H) 0.12 (H) 0.05 (H)       Imaging/Procedures Review:    To my review shows:see above    4/21/2017 Echo:   Normal left ventricular systolic function.  Left ventricular ejection fraction is visually estimated to be 65%.  Grade I diastolic dysfunction.  Mild mitral regurgitation.  Mild aortic insufficiency.  EKG To my review shows:SR 69 bpm    Assessment and Plan.   81 y.o. male has multiple med issues and high AHA 10 yr CV risk  of 62.5%; Pls see orders; Unable to do stress test but Lexiscan  Changed Simvastatin ti Lipitor 40 mg/d; recheck FLP in 3 months    1. Essential hypertension, benig  2. Elevated Troponin likely d/t UTI and sepsis  3. HTN  4. Hyperlipidemia  5. CKD III  6. DM II  See above  - EKG      1. Essential hypertension, benign  EKG   2. Elevated troponin I level  NM-CARDIAC STRESS TEST   3. Hyperlipidemia, unspecified hyperlipidemia type  atorvastatin (LIPITOR) 40 MG Tab    NM-CARDIAC STRESS TEST   4. Chronic kidney disease (CKD), stage III (moderate)     5. Type 2 diabetes mellitus with stage 3 chronic kidney disease, with long-term current use of insulin (HCC)  NM-CARDIAC STRESS TEST         Destinee Ferguson M.D.  [address] [address]  VIA In Basket

## 2017-05-15 NOTE — MR AVS SNAPSHOT
"        Selvin Braga   5/15/2017 2:00 PM   Office Visit   MRN: [id number]    Department:  [facility] [place] [place]   Dept Phone:  [phone]    Description:  Male : 1935   Provider:  Beth Huston MD,[facility]           Reason for Visit     New Patient           Allergies as of 5/15/2017     Allergen Noted Reactions    Augmentin 10/28/2015   Unspecified    Bleeding  RXN=5 years ago    Latex 10/28/2015   Unspecified    Blisters  RXN=ongoing    Tape 10/28/2015   Rash    Rash-blisters-paper tape okay  RXN=ongoing      You were diagnosed with     Essential hypertension, benign   [401.1.ICD-9-CM]       Elevated troponin I level   [646959]       Hyperlipidemia, unspecified hyperlipidemia type   [7696594]       Chronic kidney disease (CKD), stage III (moderate)   [521622]       Type 2 diabetes mellitus with stage 3 chronic kidney disease, with long-term current use of insulin (CMS-HCC)   [3057718]         Vital Signs     Blood Pressure Pulse Height Weight Body Mass Index Oxygen Saturation    144/64 mmHg 74 1.803 m (5' 11\") 86.637 kg (191 lb) 26.65 kg/m2 94%    Smoking Status                   Former Smoker           Basic Information     Date Of Birth Sex Race Ethnicity Preferred Language    1935 Male White Non- English      Your appointments     May 22, 2017  2:15 PM   NM CARDIAC STRESS TEST (30) with [facility] FORTE 1   [facility] ([facility])    [address] [address] [phone]           NPO for 4 hours prior to scan.  No caffeine for 24 hours prior to test (decaf, coffee, cola, tea, chocolate, Excedrin, and Anacin).  No Viagra or other ED meds for 48 hours.  Warn patient of length of test and to bring list of meds.              Problem List              ICD-10-CM Priority Class Noted - Resolved    DVT of lower extremity (deep venous thrombosis) (CMS-[facility]) I82.409   2010 - Present    DM (diabetes mellitus) (CMS-[facility]) E11.9 Low  2010 - " Present    HTN (hypertension) I10 Low  9/2/2010 - Present    Spinal stenosis, lumbar region, without neurogenic claudication M48.06 Low  12/13/2012 - Present    Senile nuclear sclerosis H25.10   2/13/2013 - Present    Blood in stool K92.1   6/5/2013 - Present    Diarrhea following gastrointestinal surgery R19.7, Z98.890   6/23/2014 - Present    History of prostatectomy Z90.79 Low  5/8/2015 - Present    Ankle fracture, right S82.891A   10/28/2015 - Present    Chronic kidney disease (CKD), stage III (moderate) N18.3   7/21/2016 - Present    Neuropathy (CMS-HCC) G62.9 Low  7/21/2016 - Present    Depression F32.9 Low  7/21/2016 - Present    AK (actinic keratosis) L57.0   7/21/2016 - Present    Hyperlipidemia E78.5 Low  7/21/2016 - Present    Health care maintenance Z00.00   8/30/2016 - Present    History of prostate cancer Z85.46 Low  3/24/2017 - Present    History of radiation therapy Z92.3   3/24/2017 - Present    Type 2 diabetes mellitus with stage 3 chronic kidney disease, with long-term current use of insulin ([facility]) E11.22, N18.3, Z79.4   3/24/2017 - Present    Continuous leakage of urine N39.45   3/24/2017 - Present    History of DVT (deep vein thrombosis) Z86.718 Low  3/24/2017 - Present    Other specified disorders of the skin and subcutaneous tissue related to radiation L59.8   3/29/2017 - Present    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  3/29/2017 - Present    Hematuria R31.9   3/29/2017 - Present    Pressure-related ear pain T70.0XXA   4/4/2017 - Present    UTI (urinary tract infection) N39.0 Medium  4/19/2017 - Present    Elevated troponin R74.8 Medium  4/19/2017 - Present    Sepsis (CMS-HCC) A41.9 High  4/20/2017 - Present    Thrombocytopenia (CMS-HCC) D69.6 Low  4/20/2017 - Present    Anemia D64.9 Low  4/20/2017 - Present    CKD (chronic kidney disease), stage II N18.2 Low  4/20/2017 - Present    GERD (gastroesophageal reflux disease) K21.9 Low  4/20/2017 - Present      Health Maintenance        Date Due Completion  Dates    IMM ZOSTER VACCINE 11/28/1996 ---    IMM DTaP/Tdap/Td Vaccine (1 - Tdap) 8/8/2010 8/7/2010    URINE ACR / MICROALBUMIN 1/16/2013 1/16/2012    IMM PNEUMOCOCCAL 65+ (ADULT) LOW/MEDIUM RISK SERIES (2 of 2 - PCV13) 12/13/2013 12/13/2012    A1C SCREENING 2/22/2017 8/22/2016, 1/16/2015, 6/23/2014, 1/16/2012, 9/2/2010    FASTING LIPID PROFILE 8/22/2017 8/22/2016, 1/16/2012, 9/2/2010    DIABETES MONOFILAMENT / LE EXAM 8/30/2017 8/30/2016 (N/S)    Override on 8/30/2016: (N/S)    RETINAL SCREENING 10/10/2017 10/10/2016    SERUM CREATININE 4/21/2018 4/21/2017, 4/19/2017, 8/22/2016, 7/12/2016, 8/3/2015, 7/22/2015, 1/16/2015, 6/25/2014, 6/24/2014, 6/23/2014, 5/29/2014, 5/27/2014, 5/26/2014, 5/22/2014, 5/14/2014, 4/12/2014, 3/24/2014, 3/17/2014, 6/5/2013, 5/31/2013, 12/26/2012, 12/22/2012, 12/17/2012, 12/14/2012, 12/3/2012, 9/17/2012, 3/5/2012, 1/16/2012, 1/26/2011, 9/18/2010, 9/17/2010, 9/2/2010, 9/1/2010, 5/24/2010, 1/15/2010, 2/8/2005    COLONOSCOPY 6/5/2023 6/5/2013, 6/5/2013 (N/S)    Override on 6/5/2013: (N/S) (GIC)            Results       Current Immunizations     Influenza TIV (IM) 9/25/2016, 10/21/2013, 10/1/2012    Influenza Vaccine Quad Inj (Preserved) 9/21/2015    Pneumococcal polysaccharide vaccine (PPSV-23) 12/13/2012  7:30 PM    TD Vaccine 8/7/2010 12:38 PM      Below and/or attached are the medications your provider expects you to take. Review all of your home medications and newly ordered medications with your provider and/or pharmacist. Follow medication instructions as directed by your provider and/or pharmacist. Please keep your medication list with you and share with your provider. Update the information when medications are discontinued, doses are changed, or new medications (including over-the-counter products) are added; and carry medication information at all times in the event of emergency situations     Allergies:  AUGMENTIN - Unspecified     LATEX - Unspecified     TAPE - Rash                  Medications  Valid as of: May 15, 2017 -  2:49 PM    Generic Name Brand Name Tablet Size Instructions for use    Ascorbic Acid (Tab) ascorbic acid 500 MG Take 500 mg by mouth every day.        Aspirin (Tablet Delayed Response) ECOTRIN 81 MG Take 1 Tab by mouth every day.        Atorvastatin Calcium (Tab) LIPITOR 40 MG Take 1 Tab by mouth every day.        Cholecalciferol (Tab) vitamin D 2000 UNIT Take 2,000 Units by mouth every day.        Clobetasol Propionate (Cream) TEMOVATE 0.05 %         Cyanocobalamin (Tab CR) B-12 1000 MCG Take  by mouth.        Ferrous Sulfate (Tab) ferrous sulfate 325 (65 FE) MG Take 325 mg by mouth 2 Times a Day.        FLUoxetine HCl (Cap) PROZAC 20 MG Take 20 mg by mouth every day.        GlipiZIDE (Tab) GLUCOTROL 10 MG Take 10 mg by mouth 2 times a day. One po am, 1/2 po pm        HydroCHLOROthiazide (Tab) HYDRODIURIL 12.5 MG Take 12.5 mg by mouth every day.        Insulin NPH Human (Isophane) (Suspension) HUMULIN,NOVOLIN 100 UNIT/ML Inject 10 Units as instructed Once. Before dinner        Lisinopril (Tab) PRINIVIL 5 MG Take 2.5 mg by mouth every day. Indications: High Blood Pressure        MetFORMIN HCl (Tab) GLUCOPHAGE 500 MG Take 500 mg by mouth 2 times a day, with meals.        Metoprolol Tartrate (Tab) LOPRESSOR 25 MG Take 0.5 Tabs by mouth 2 times a day.        Pantoprazole Sodium (Tablet Delayed Response) PROTONIX 40 MG Take 20 mg by mouth every day.        Probiotic Product (Cap) PRO-BIOTIC BLEND  Take  by mouth.        SAXagliptin HCl (Tab) SAXagliptin HCl 5 MG Take 5 mg by mouth every day.        Vitamin E (Cap) VITAMIN E 400 UNIT Take 400 Units by mouth every day.        .                 Medicines prescribed today were sent to:     [id number] - [place] - [address]    [address] [place] [address]    Phone: [phone] Fax: [phone]    Open 24 Hours?: No      Medication refill instructions:       If your prescription bottle indicates you have  medication refills left, it is not necessary to call your provider’s office. Please contact your pharmacy and they will refill your medication.    If your prescription bottle indicates you do not have any refills left, you may request refills at any time through one of the following ways: The online [employer] system (except Urgent Care), by calling your provider’s office, or by asking your pharmacy to contact your provider’s office with a refill request. Medication refills are processed only during regular business hours and may not be available until the next business day. Your provider may request additional information or to have a follow-up visit with you prior to refilling your medication.   *Please Note: Medication refills are assigned a new Rx number when refilled electronically. Your pharmacy may indicate that no refills were authorized even though a new prescription for the same medication is available at the pharmacy. Please request the medicine by name with the pharmacy before contacting your provider for a refill.        Your To Do List     Future Labs/Procedures Complete By Expires    NM-CARDIAC STRESS TEST  As directed 5/15/2018         [employer] Access Code: Activation code not generated  Current [employer] Status: Active

## 2017-05-15 NOTE — PROGRESS NOTES
Cardiology Consult Note:    Beth Huston  Date & Time note created:    5/15/2017   2:37 PM       Patient ID:  Name:             Selvin Braga   YOB: 1935  Age:                 81 y.o.  male   MRN:               [id number]                                                             Chief Complaint:   Chief Complaint   Patient presents with   • New Patient             History of Present Illness:   Selvin Braga has recent hospitalization for UTI and sepsis; Trop was elevated; He has high AHA 10 yr CV risk of 62.5%  Will use high intensity Statin txmt  All recent labs, imaging studies and procedures reviewed    Review of Systems:     Constitutional: Denies fevers, Denies weight changes  Eyes: Denies changes in vision, no eye pain  Ears/Nose/Throat/Mouth: Denies nasal congestion or sore throat   Cardiovascular: Denies chest pain or palpitations   Respiratory: Denies shortness of breath , Denies cough  Gastrointestinal/Hepatic: Denies abdominal pain, nausea, vomiting, diarrhea, constipation or GI bleeding   Genitourinary: Denies bladder dysfunction, dysuria or frequency  Musculoskeletal/Rheum: Denies  joint pain and swelling   Skin/Breast: Denies rash, denies breast lumps or discharge  Neurological: Denies headache, confusion, memory loss or focal weakness/parasthesias  Psychiatric: denies mood disorder   Endocrine: denies hx of diabetes or thyroid dysfunction  Heme/Oncology/Lymph Nodes: Denies enlarged lymph nodes, denies bruising or known bleeding disorder  Allergic/Immunologic:  hx of allergies      All other systems were reviewed and are negative (AMA/CMS criteria)    Past Medical History:   Past Medical History   Diagnosis Date   • Heart burn    • Dental disorder      dentures   • Snoring    • Psychiatric problem      taking prozac   • Cancer (CMS-HCC) 2001     prostate   • CATARACT    • Arrhythmia      PVC's   • Indigestion    • Other specified disorder of intestines      alters between  normal and diarrhea since radiation tx   • Arthritis    • Colon polyps    • Anemia    • Diabetes 1984   • Lombardi esophagus    • Personal history of venous thrombosis and embolism 2010/1995     leg   • GERD (gastroesophageal reflux disease)    • Cholesterol blood decreased    • Unspecified urinary incontinence      spill from artifical urinary sphincter   • Urinary bladder disorder      artificial urinary sphincter /catheter   • Sleep apnea      no cpap   • Hypertension      well controlled on meds   • Chronic kidney disease (CKD), stage III (moderate) 7/21/2016   • Neuropathy (CMS-HCC) 7/21/2016   • Depression 7/21/2016   • AK (actinic keratosis) 7/21/2016   • Hyperlipidemia 7/21/2016         Past Surgical History:  Past Surgical History   Procedure Laterality Date   • Angiogram  1/25/2010     Performed by PETER NORMAN at SURGERY [facility]   • Aortogram  1/25/2010     Performed by PETER NORMAN at SURGERY [facility]   • Prostatectomy robotic  2/2001   • Pr insert,inflatable sphincter  2001   • Sphincter prosthesis removal  6/3/2010     Performed by JOSH APARICIO at SURGERY [facility]   • Cystoscopy  2/2/2011     Performed by GERSON CAR at SURGERY [facility]   • Sphincter prosthesis placement  2/24/2011     Performed by GERSON CAR at SURGERY [facility]   • Cystoscopy  2/24/2011     Performed by GERSON CAR at SURGERY [facility]   • Other abdominal surgery     • Groin exploration  3/13/2012     Performed by DEEPA PATRICIA at SURGERY [facility]   • Sphincter prosthesis placement  9/26/2012     Performed by GERSON Car M.D. at SURGERY [facility]   • Cataract phaco with iol  2/13/2013     Performed by Clayton Noonan M.D. at SURGERY SAME DAY [facility]   • Cataract phaco with iol  2/27/2013     Performed by Clayton Noonan M.D. at SURGERY SAME DAY [facility]   • Colonoscopy with apc  6/5/2013     Performed by Deepa Vela  M.D. at [facility]   • Bowel resection  5/21/2014     Performed by Graham Peña M.D. at SURGERY [facility]   • Laparoscopy  5/21/2014     Performed by Graham Peña M.D. at [facility]   • Lumbar fusion posterior  12/13/2012     Procedure: L5-S1 removal of Jennings Fragment NON-INSTRUMENTAL;  Surgeon: Wil Martinez M.D.;  Location: SURGERY [facility];  Service:    • Lumbar decompression  12/13/2012     Procedure: 4-5;  Surgeon: Wil Martinez M.D.;  Location: SURGERY [facility];  Service:    • Other orthopedic surgery       hip & femur fx   • Sphincter prosthesis removal  8/10/2015     Procedure: SPHINCTER PROSTHESIS REMOVAL;  Surgeon: Tuan Marie M.D.;  Location: [facility];  Service:    • Ankle orif Right 10/28/2015     Procedure: ANKLE ORIF;  Surgeon: Venu Elizabeth M.D.;  Location: [facility];  Service:        Hospital Medications:    Current outpatient prescriptions:   •  Probiotic Product (PRO-BIOTIC BLEND) Cap, Take  by mouth., Disp: , Rfl:   •  vitamin e (VITAMIN E) 400 UNIT Cap, Take 400 Units by mouth every day., Disp: , Rfl:   •  atorvastatin (LIPITOR) 40 MG Tab, Take 1 Tab by mouth every day., Disp: 30 Tab, Rfl: 11  •  aspirin EC (ECOTRIN) 81 MG Tablet Delayed Response, Take 1 Tab by mouth every day., Disp: 30 Tab, Rfl: 0  •  metoprolol (LOPRESSOR) 25 MG Tab, Take 0.5 Tabs by mouth 2 times a day., Disp: 60 Tab, Rfl: 0  •  insulin NPH (NOVOLIN N) 100 UNIT/ML Suspension, Inject 10 Units as instructed Once. Before dinner, Disp: , Rfl:   •  Cyanocobalamin (B-12) 1000 MCG Tab CR, Take  by mouth., Disp: , Rfl:   •  pantoprazole (PROTONIX) 40 MG Tablet Delayed Response, Take 20 mg by mouth every day., Disp: , Rfl:   •  glipiZIDE (GLUCOTROL) 10 MG Tab, Take 10 mg by mouth 2 times a day. One po am, 1/2 po pm, Disp: , Rfl:   •  hydrochlorothiazide (HYDRODIURIL) 12.5 MG tablet, Take 12.5 mg by mouth every day., Disp: , Rfl:    •  Saxagliptin HCl 5 MG Tab, Take 5 mg by mouth every day., Disp: , Rfl:   •  lisinopril (PRINIVIL) 5 MG Tab, Take 2.5 mg by mouth every day. Indications: High Blood Pressure, Disp: , Rfl:   •  ferrous sulfate 325 (65 FE) MG tablet, Take 325 mg by mouth 2 Times a Day., Disp: , Rfl:   •  Cholecalciferol (VITAMIN D) 2000 UNIT TABS, Take 2,000 Units by mouth every day., Disp: , Rfl:   •  ascorbic acid (ASCORBIC ACID) 500 MG TABS, Take 500 mg by mouth every day., Disp: , Rfl:   •  metformin (GLUCOPHAGE) 500 MG TABS, Take 500 mg by mouth 2 times a day, with meals., Disp: , Rfl:   •  fluoxetine (PROZAC) 20 MG CAPS, Take 20 mg by mouth every day., Disp: , Rfl:   •  clobetasol (TEMOVATE) 0.05 % Cream, , Disp: , Rfl:     Current Outpatient Medications:  Current Outpatient Prescriptions   Medication Sig Dispense Refill   • Probiotic Product (PRO-BIOTIC BLEND) Cap Take  by mouth.     • vitamin e (VITAMIN E) 400 UNIT Cap Take 400 Units by mouth every day.     • atorvastatin (LIPITOR) 40 MG Tab Take 1 Tab by mouth every day. 30 Tab 11   • aspirin EC (ECOTRIN) 81 MG Tablet Delayed Response Take 1 Tab by mouth every day. 30 Tab 0   • metoprolol (LOPRESSOR) 25 MG Tab Take 0.5 Tabs by mouth 2 times a day. 60 Tab 0   • insulin NPH (NOVOLIN N) 100 UNIT/ML Suspension Inject 10 Units as instructed Once. Before dinner     • Cyanocobalamin (B-12) 1000 MCG Tab CR Take  by mouth.     • pantoprazole (PROTONIX) 40 MG Tablet Delayed Response Take 20 mg by mouth every day.     • glipiZIDE (GLUCOTROL) 10 MG Tab Take 10 mg by mouth 2 times a day. One po am, 1/2 po pm     • hydrochlorothiazide (HYDRODIURIL) 12.5 MG tablet Take 12.5 mg by mouth every day.     • Saxagliptin HCl 5 MG Tab Take 5 mg by mouth every day.     • lisinopril (PRINIVIL) 5 MG Tab Take 2.5 mg by mouth every day. Indications: High Blood Pressure     • ferrous sulfate 325 (65 FE) MG tablet Take 325 mg by mouth 2 Times a Day.     • Cholecalciferol (VITAMIN D) 2000 UNIT TABS Take  "2,000 Units by mouth every day.     • ascorbic acid (ASCORBIC ACID) 500 MG TABS Take 500 mg by mouth every day.     • metformin (GLUCOPHAGE) 500 MG TABS Take 500 mg by mouth 2 times a day, with meals.     • fluoxetine (PROZAC) 20 MG CAPS Take 20 mg by mouth every day.     • clobetasol (TEMOVATE) 0.05 % Cream        No current facility-administered medications for this visit.         Medication Allergy/Sensitivities:  Allergies   Allergen Reactions   • Augmentin Unspecified     Bleeding  RXN=5 years ago   • Latex Unspecified     Blisters  RXN=ongoing   • Tape Rash     Rash-blisters-paper tape okay  RXN=ongoing       Family History:  Family History   Problem Relation Age of Onset   • Hypertension     • Cancer       multiple cousins with prostate cancer.    • Cancer Mother      ovarian   • Heart Disease Father    • Diabetes Father    • Diabetes Sister        Social History:  Social History     Social History   • Marital Status:      Spouse Name: N/A   • Number of Children: 5   • Years of Education: N/A     Occupational History   • Not on file.     Social History Main Topics   • Smoking status: Former Smoker -- 2.00 packs/day for 30 years     Types: Cigarettes     Quit date: 03/05/1995   • Smokeless tobacco: Never Used      Comment: 1.5 ppd 25 yrs,quit 1995   • Alcohol Use: No   • Drug Use: No   • Sexual Activity: Not on file     Other Topics Concern   • Not on file     Social History Narrative         Physical Exam:  Vitals  Weight/BMI: Body mass index is 26.65 kg/(m^2).  Blood pressure 144/64, pulse 74, height 1.803 m (5' 11\"), weight 86.637 kg (191 lb), SpO2 94 %.  Filed Vitals:    05/15/17 1348   BP: 144/64   Pulse: 74   Height: 1.803 m (5' 11\")   Weight: 86.637 kg (191 lb)   SpO2: 94%     Oxygen Therapy:  Pulse Oximetry: 94 %  General Appearance:  Hard of hearing, mild;  Well developed, Well nourished, No acute distress, Non-toxic appearance.   HENT:  Normocephalic, Atraumatic, Oropharynx moist mucous " membranes, Dentition: , Nose normal.    Eyes:  PERRLA, EOMI, Conjunctiva normal, No discharge.  Neck:  Normal range of motion, No cervical tenderness, Supple, No stridor, no JVD .  No thyromegaly.  No carotid bruit.  Cardiovascular:  Normal heart rate, Normal rhythm,  S1, S2, no S3,  S4; No gallops; No murmurs, No rubs, .   Extremitites with intact distal pulses, no cyanosis, clubbing or edema.  No heaves, thrills, HJR;  Peripheral pulses: carotid 2+, brachial 2+, radial 2+, ulnar 2+, femoral 2+, popliteal 2+, PT 2+, DP 2+;  Lungs:  Respiratory effort is normal. Normal breath sounds, breath sounds clear to auscultation bilaterally,  no rales, no rhonchi, no wheezing.   Abdomen: Bowel sounds normal, Soft, No tenderness, No guarding, No rebound, No masses, No hepatosplenomegaly.  Skin: Warm, Dry, No erythema, No rash, no induration or crepitus.  Neurologic: Alert & oriented x 3, Normal motor function, Normal sensory function, No focal deficits noted, cranial nerves II through XII are normal,  abnormal gait, uses cane.  Psychiatric: Affect normal, Judgment normal, Mood normal.      Data Review:     Records reviewed and summarized in current documentation    Lab Data Review:  Lab Results   Component Value Date/Time    SODIUM 137 04/21/2017 04:45 AM    POTASSIUM 3.6 04/21/2017 04:45 AM    CHLORIDE 107 04/21/2017 04:45 AM    CO2 24 04/21/2017 04:45 AM    GLUCOSE 139* 04/21/2017 04:45 AM    BUN 26* 04/21/2017 04:45 AM    CREATININE 1.26 04/21/2017 04:45 AM      Lab Results   Component Value Date/Time    PT 13.9 04/21/2017 04:45 AM    INR 1.09 04/21/2017 04:45 AM      Lab Results   Component Value Date/Time    WBC 5.2 04/21/2017 04:45 AM    RBC 3.23* 04/21/2017 04:45 AM    HEMOGLOBIN 9.7* 04/21/2017 04:45 AM    HEMATOCRIT 27.7* 04/21/2017 04:45 AM    MCV 85.8 04/21/2017 04:45 AM    MCH 30.0 04/21/2017 04:45 AM    MCHC 35.0 04/21/2017 04:45 AM    MPV 9.1 04/21/2017 04:45 AM    NEUTROPHILS-POLYS 92.20* 04/19/2017 06:55 PM     LYMPHOCYTES 2.00* 04/19/2017 06:55 PM    MONOCYTES 4.20 04/19/2017 06:55 PM    EOSINOPHILS 0.70 04/19/2017 06:55 PM    BASOPHILS 0.40 04/19/2017 06:55 PM    HYPOCHROMIA 1+ 06/24/2014 04:52 AM    ANISOCYTOSIS 1+ 04/09/2012 06:58 AM    Results for KARINA SKAGGS (MRN [id number]) as of 5/15/2017 14:21   Ref. Range 7/12/2016 09:43 8/22/2016 07:00 4/19/2017 18:55 4/20/2017 00:30 4/20/2017 06:42 4/21/2017 04:45   Lactic Acid Latest Ref Range: 0.50-2.00 mmol/L    1.91     Glycohemoglobin Latest Ref Range: 0.0-5.6 %  7.4 (H)       Estim. Avg Glu Latest Units: mg/dL  166       Cholesterol,Tot Latest Ref Range: 100-199 mg/dL  141       Triglycerides Latest Ref Range: 0-149 mg/dL  131       HDL Latest Ref Range: >=40 mg/dL  47       LDL Latest Ref Range: <100 mg/dL  68       Troponin I Latest Ref Range: 0.00-0.04 ng/mL   0.05 (H) 0.15 (H) 0.12 (H) 0.05 (H)       Imaging/Procedures Review:    To my review shows:see above    4/21/2017 Echo:   Normal left ventricular systolic function.  Left ventricular ejection fraction is visually estimated to be 65%.  Grade I diastolic dysfunction.  Mild mitral regurgitation.  Mild aortic insufficiency.  EKG To my review shows:SR 69 bpm    Assessment and Plan.   81 y.o. male has multiple med issues and high AHA 10 yr CV risk of 62.5%; Pls see orders; Unable to do stress test but Lexiscan  Changed Simvastatin ti Lipitor 40 mg/d; recheck FLP in 3 months    1. Essential hypertension, benig  2. Elevated Troponin likely d/t UTI and sepsis  3. HTN  4. Hyperlipidemia  5. CKD III  6. DM II  See above  - EKG      1. Essential hypertension, benign  EKG   2. Elevated troponin I level  NM-CARDIAC STRESS TEST   3. Hyperlipidemia, unspecified hyperlipidemia type  atorvastatin (LIPITOR) 40 MG Tab    NM-CARDIAC STRESS TEST   4. Chronic kidney disease (CKD), stage III (moderate)     5. Type 2 diabetes mellitus with stage 3 chronic kidney disease, with long-term current use of insulin (HCC)  NM-CARDIAC STRESS  TEST

## 2017-05-16 ENCOUNTER — APPOINTMENT (OUTPATIENT)
Dept: WOUND CARE | Facility: MEDICAL CENTER | Age: 82
End: 2017-05-16
Attending: UROLOGY
Payer: MEDICARE

## 2017-05-17 ENCOUNTER — APPOINTMENT (OUTPATIENT)
Dept: WOUND CARE | Facility: MEDICAL CENTER | Age: 82
End: 2017-05-17
Attending: UROLOGY
Payer: MEDICARE

## 2017-05-18 ENCOUNTER — APPOINTMENT (OUTPATIENT)
Dept: WOUND CARE | Facility: MEDICAL CENTER | Age: 82
End: 2017-05-18
Attending: UROLOGY
Payer: MEDICARE

## 2017-05-19 ENCOUNTER — APPOINTMENT (OUTPATIENT)
Dept: WOUND CARE | Facility: MEDICAL CENTER | Age: 82
End: 2017-05-19
Attending: UROLOGY
Payer: MEDICARE

## 2017-05-22 ENCOUNTER — APPOINTMENT (OUTPATIENT)
Dept: WOUND CARE | Facility: MEDICAL CENTER | Age: 82
End: 2017-05-22
Attending: UROLOGY
Payer: MEDICARE

## 2017-05-22 ENCOUNTER — HOSPITAL ENCOUNTER (OUTPATIENT)
Dept: RADIOLOGY | Facility: MEDICAL CENTER | Age: 82
End: 2017-05-22
Attending: INTERNAL MEDICINE
Payer: MEDICARE

## 2017-05-22 DIAGNOSIS — N18.30 TYPE 2 DIABETES MELLITUS WITH STAGE 3 CHRONIC KIDNEY DISEASE, WITH LONG-TERM CURRENT USE OF INSULIN (HCC): ICD-10-CM

## 2017-05-22 DIAGNOSIS — R79.89 ELEVATED TROPONIN I LEVEL: ICD-10-CM

## 2017-05-22 DIAGNOSIS — Z79.4 TYPE 2 DIABETES MELLITUS WITH STAGE 3 CHRONIC KIDNEY DISEASE, WITH LONG-TERM CURRENT USE OF INSULIN (HCC): ICD-10-CM

## 2017-05-22 DIAGNOSIS — E11.22 TYPE 2 DIABETES MELLITUS WITH STAGE 3 CHRONIC KIDNEY DISEASE, WITH LONG-TERM CURRENT USE OF INSULIN (HCC): ICD-10-CM

## 2017-05-22 DIAGNOSIS — E78.5 HYPERLIPIDEMIA, UNSPECIFIED HYPERLIPIDEMIA TYPE: ICD-10-CM

## 2017-05-22 PROCEDURE — 700111 HCHG RX REV CODE 636 W/ 250 OVERRIDE (IP)

## 2017-05-22 PROCEDURE — A9502 TC99M TETROFOSMIN: HCPCS

## 2017-05-22 RX ORDER — REGADENOSON 0.08 MG/ML
INJECTION, SOLUTION INTRAVENOUS
Status: COMPLETED
Start: 2017-05-22 | End: 2017-05-22

## 2017-05-22 RX ADMIN — REGADENOSON 0.4 MG: 0.08 INJECTION, SOLUTION INTRAVENOUS at 14:40

## 2017-05-23 ENCOUNTER — APPOINTMENT (OUTPATIENT)
Dept: WOUND CARE | Facility: MEDICAL CENTER | Age: 82
End: 2017-05-23
Attending: UROLOGY
Payer: MEDICARE

## 2017-05-24 ENCOUNTER — PATIENT MESSAGE (OUTPATIENT)
Dept: CARDIOLOGY | Facility: MEDICAL CENTER | Age: 82
End: 2017-05-24

## 2017-05-24 ENCOUNTER — APPOINTMENT (OUTPATIENT)
Dept: WOUND CARE | Facility: MEDICAL CENTER | Age: 82
End: 2017-05-24
Attending: UROLOGY
Payer: MEDICARE

## 2017-05-24 DIAGNOSIS — E78.5 HYPERLIPIDEMIA, UNSPECIFIED HYPERLIPIDEMIA TYPE: ICD-10-CM

## 2017-05-24 DIAGNOSIS — I10 ESSENTIAL HYPERTENSION: ICD-10-CM

## 2017-05-25 ENCOUNTER — APPOINTMENT (OUTPATIENT)
Dept: WOUND CARE | Facility: MEDICAL CENTER | Age: 82
End: 2017-05-25
Attending: UROLOGY
Payer: MEDICARE

## 2017-05-26 ENCOUNTER — APPOINTMENT (OUTPATIENT)
Dept: WOUND CARE | Facility: MEDICAL CENTER | Age: 82
End: 2017-05-26
Attending: UROLOGY
Payer: MEDICARE

## 2017-06-29 ENCOUNTER — RX ONLY (OUTPATIENT)
Age: 82
Setting detail: RX ONLY
End: 2017-06-29

## 2017-07-09 ENCOUNTER — OFFICE VISIT (OUTPATIENT)
Dept: URGENT CARE | Facility: PHYSICIAN GROUP | Age: 82
End: 2017-07-09
Payer: MEDICARE

## 2017-07-09 ENCOUNTER — HOSPITAL ENCOUNTER (OUTPATIENT)
Facility: MEDICAL CENTER | Age: 82
End: 2017-07-09
Attending: FAMILY MEDICINE
Payer: MEDICARE

## 2017-07-09 VITALS
HEIGHT: 71 IN | TEMPERATURE: 98.2 F | DIASTOLIC BLOOD PRESSURE: 70 MMHG | BODY MASS INDEX: 27.02 KG/M2 | RESPIRATION RATE: 16 BRPM | HEART RATE: 68 BPM | WEIGHT: 193 LBS | OXYGEN SATURATION: 95 % | SYSTOLIC BLOOD PRESSURE: 148 MMHG

## 2017-07-09 DIAGNOSIS — R30.0 DYSURIA: ICD-10-CM

## 2017-07-09 LAB
APPEARANCE UR: NORMAL
BILIRUB UR STRIP-MCNC: NEGATIVE MG/DL
COLOR UR AUTO: YELLOW
GLUCOSE UR STRIP.AUTO-MCNC: NEGATIVE MG/DL
KETONES UR STRIP.AUTO-MCNC: NEGATIVE MG/DL
LEUKOCYTE ESTERASE UR QL STRIP.AUTO: NORMAL
NITRITE UR QL STRIP.AUTO: NEGATIVE
PH UR STRIP.AUTO: 6.5 [PH] (ref 5–8)
PROT UR QL STRIP: NORMAL MG/DL
RBC UR QL AUTO: NORMAL
SP GR UR STRIP.AUTO: 1.01
UROBILINOGEN UR STRIP-MCNC: NEGATIVE MG/DL

## 2017-07-09 PROCEDURE — 99214 OFFICE O/P EST MOD 30 MIN: CPT | Performed by: FAMILY MEDICINE

## 2017-07-09 PROCEDURE — 87086 URINE CULTURE/COLONY COUNT: CPT

## 2017-07-09 PROCEDURE — 87077 CULTURE AEROBIC IDENTIFY: CPT

## 2017-07-09 PROCEDURE — 81002 URINALYSIS NONAUTO W/O SCOPE: CPT | Performed by: FAMILY MEDICINE

## 2017-07-09 PROCEDURE — 87186 SC STD MICRODIL/AGAR DIL: CPT

## 2017-07-09 RX ORDER — SULFAMETHOXAZOLE AND TRIMETHOPRIM 800; 160 MG/1; MG/1
1 TABLET ORAL EVERY 12 HOURS
Qty: 14 TAB | Refills: 0 | Status: SHIPPED | OUTPATIENT
Start: 2017-07-09 | End: 2017-07-16

## 2017-07-09 ASSESSMENT — ENCOUNTER SYMPTOMS
CHILLS: 0
FEVER: 0
FOCAL WEAKNESS: 0
DIZZINESS: 0

## 2017-07-09 ASSESSMENT — PAIN SCALES - GENERAL: PAINLEVEL: NO PAIN

## 2017-07-09 NOTE — PROGRESS NOTES
Subjective:      Selvin Braga is a 81 y.o. male who presents with UTI    Chief Complaint   Patient presents with   • UTI     x 3 days; burning, cloudy urine, 2 months ago had Urinary infx that turned into sepsis         - This is a very pleasant 81 y.o. male with complaints of freq/clody urine and a little burning x 2 days. No NVFC  - Hx DM, sugars running low 100's           ALLERGIES:  Augmentin; Latex; and Tape     PMH:  Past Medical History   Diagnosis Date   • Heart burn    • Dental disorder      dentures   • Snoring    • Psychiatric problem      taking prozac   • Cancer (CMS-HCC) 2001     prostate   • CATARACT    • Arrhythmia      PVC's   • Indigestion    • Other specified disorder of intestines      alters between normal and diarrhea since radiation tx   • Arthritis    • Colon polyps    • Anemia    • Diabetes 1984   • Lombardi esophagus    • Personal history of venous thrombosis and embolism 2010/1995     leg   • GERD (gastroesophageal reflux disease)    • Cholesterol blood decreased    • Unspecified urinary incontinence      spill from artifical urinary sphincter   • Urinary bladder disorder      artificial urinary sphincter /catheter   • Sleep apnea      no cpap   • Hypertension      well controlled on meds   • Chronic kidney disease (CKD), stage III (moderate) 7/21/2016   • Neuropathy (CMS-HCC) 7/21/2016   • Depression 7/21/2016   • AK (actinic keratosis) 7/21/2016   • Hyperlipidemia 7/21/2016        MEDS:    Current outpatient prescriptions:   •  sulfamethoxazole-trimethoprim (BACTRIM DS) 800-160 MG tablet, Take 1 Tab by mouth every 12 hours for 7 days., Disp: 14 Tab, Rfl: 0  •  aspirin EC (ECOTRIN) 81 MG Tablet Delayed Response, Take 1 Tab by mouth every day., Disp: 30 Tab, Rfl: 2  •  metoprolol (LOPRESSOR) 25 MG Tab, Take 0.5 Tabs by mouth 2 times a day., Disp: 30 Tab, Rfl: 2  •  Probiotic Product (PRO-BIOTIC BLEND) Cap, Take  by mouth., Disp: , Rfl:   •  vitamin e (VITAMIN E) 400 UNIT Cap, Take 400  "Units by mouth every day., Disp: , Rfl:   •  atorvastatin (LIPITOR) 40 MG Tab, Take 1 Tab by mouth every day., Disp: 30 Tab, Rfl: 11  •  insulin NPH (NOVOLIN N) 100 UNIT/ML Suspension, Inject 10 Units as instructed Once. Before dinner, Disp: , Rfl:   •  Cyanocobalamin (B-12) 1000 MCG Tab CR, Take  by mouth., Disp: , Rfl:   •  clobetasol (TEMOVATE) 0.05 % Cream, , Disp: , Rfl:   •  pantoprazole (PROTONIX) 40 MG Tablet Delayed Response, Take 20 mg by mouth every day., Disp: , Rfl:   •  glipiZIDE (GLUCOTROL) 10 MG Tab, Take 10 mg by mouth 2 times a day. One po am, 1/2 po pm, Disp: , Rfl:   •  hydrochlorothiazide (HYDRODIURIL) 12.5 MG tablet, Take 12.5 mg by mouth every day., Disp: , Rfl:   •  Saxagliptin HCl 5 MG Tab, Take 5 mg by mouth every day., Disp: , Rfl:   •  lisinopril (PRINIVIL) 5 MG Tab, Take 2.5 mg by mouth every day. Indications: High Blood Pressure, Disp: , Rfl:   •  ferrous sulfate 325 (65 FE) MG tablet, Take 325 mg by mouth 2 Times a Day., Disp: , Rfl:   •  Cholecalciferol (VITAMIN D) 2000 UNIT TABS, Take 2,000 Units by mouth every day., Disp: , Rfl:   •  ascorbic acid (ASCORBIC ACID) 500 MG TABS, Take 500 mg by mouth every day., Disp: , Rfl:   •  metformin (GLUCOPHAGE) 500 MG TABS, Take 500 mg by mouth 2 times a day, with meals., Disp: , Rfl:   •  fluoxetine (PROZAC) 20 MG CAPS, Take 20 mg by mouth every day., Disp: , Rfl:     ** I have documented what I find to be significant in regards to past medical, social, family and surgical history  in my HPI or under PMH/PSH/FH review section, otherwise it is contributory **             UTI  Pertinent negatives include no chills or fever.       Review of Systems   Constitutional: Negative for fever and chills.   Genitourinary: Positive for dysuria and frequency.   Neurological: Negative for dizziness and focal weakness.          Objective:     /70 mmHg  Pulse 68  Temp(Src) 36.8 °C (98.2 °F)  Resp 16  Ht 1.803 m (5' 10.98\")  Wt 87.544 kg (193 lb)  " BMI 26.93 kg/m2  SpO2 95%     Physical Exam   Constitutional: He appears well-developed. No distress.   HENT:   Head: Normocephalic and atraumatic.   Eyes: Conjunctivae are normal.   Neck: Neck supple.   Cardiovascular: Regular rhythm.    No murmur heard.  Pulmonary/Chest: Effort normal. No respiratory distress.   Abdominal: Soft. There is no tenderness.   Neurological: He is alert. He exhibits normal muscle tone.   Skin: Skin is warm and dry.   Psychiatric: He has a normal mood and affect. Judgment normal.   Nursing note and vitals reviewed.              Assessment/Plan:       1. Dysuria  POCT Urinalysis    Urine Culture    sulfamethoxazole-trimethoprim (BACTRIM DS) 800-160 MG tablet             Dx & d/c instructions discussed w/ patient and/or family members. Follow up w/ Prvt Dr or here in 3-4 days if not getting better, sooner if needed,  ER if worse and UC/PCP unavailable.        Possible side effects (i.e. Rash, GI upset/constipation, sedation, elevation of BP or sugars) of any medications given discussed.

## 2017-07-09 NOTE — MR AVS SNAPSHOT
"        Selvin Braga   2017 11:30 AM   Office Visit   MRN: [id number]    Department:  [facility]   Dept Phone:  [phone]    Description:  Male : 1935   Provider:  Tex Loera M.D.           Reason for Visit     UTI x 3 days; burning, cloudy urine, 2 months ago had Urinary infx that turned into sepsis       Allergies as of 2017     Allergen Noted Reactions    Augmentin 10/28/2015   Unspecified    Bleeding  RXN=5 years ago    Latex 10/28/2015   Unspecified    Blisters  RXN=ongoing    Tape 10/28/2015   Rash    Rash-blisters-paper tape okay  RXN=ongoing      Vital Signs     Blood Pressure Pulse Temperature Respirations Height Weight    148/70 mmHg 68 36.8 °C (98.2 °F) 16 1.803 m (5' 10.98\") 87.544 kg (193 lb)    Body Mass Index Oxygen Saturation Smoking Status             26.93 kg/m2 95% Former Smoker         Basic Information     Date Of Birth Sex Race Ethnicity Preferred Language    1935 Male White Non- English      Your appointments     Aug 14, 2017  1:15 PM   FOLLOW UP with [facility]uston MD,[facility]    [address]  [address]  [facility] [id number] [phone]              Problem List              ICD-10-CM Priority Class Noted - Resolved    DVT of lower extremity (deep venous thrombosis) (CMS-[facility]) I82.409   2010 - Present    DM (diabetes mellitus) (CMS-[facility]) E11.9 Low  2010 - Present    HTN (hypertension) I10 Low  2010 - Present    Spinal stenosis, lumbar region, without neurogenic claudication M48.06 Low  2012 - Present    Senile nuclear sclerosis H25.10   2013 - Present    Blood in stool K92.1   2013 - Present    Diarrhea following gastrointestinal surgery R19.7, Z98.890   2014 - Present    History of prostatectomy Z90.79 Low  2015 - Present    Ankle fracture, right S82.891A   10/28/2015 - Present    Chronic kidney disease (CKD), stage III (moderate) " N18.3   7/21/2016 - Present    Neuropathy (CMS-HCC) G62.9 Low  7/21/2016 - Present    Depression F32.9 Low  7/21/2016 - Present    AK (actinic keratosis) L57.0   7/21/2016 - Present    Hyperlipidemia E78.5 Low  7/21/2016 - Present    Health care maintenance Z00.00   8/30/2016 - Present    History of prostate cancer Z85.46 Low  3/24/2017 - Present    History of radiation therapy Z92.3   3/24/2017 - Present    Type 2 diabetes mellitus with stage 3 chronic kidney disease, with long-term current use of insulin ([facility]) E11.22, N18.3, Z79.4   3/24/2017 - Present    Continuous leakage of urine N39.45   3/24/2017 - Present    History of DVT (deep vein thrombosis) Z86.718 Low  3/24/2017 - Present    Other specified disorders of the skin and subcutaneous tissue related to radiation L59.8   3/29/2017 - Present    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  3/29/2017 - Present    Hematuria R31.9   3/29/2017 - Present    Pressure-related ear pain T70.0XXA   4/4/2017 - Present    UTI (urinary tract infection) N39.0 Medium  4/19/2017 - Present    Elevated troponin R74.8 Medium  4/19/2017 - Present    Sepsis (CMS-HCC) A41.9 High  4/20/2017 - Present    Thrombocytopenia (CMS-HCC) D69.6 Low  4/20/2017 - Present    Anemia D64.9 Low  4/20/2017 - Present    CKD (chronic kidney disease), stage II N18.2 Low  4/20/2017 - Present    GERD (gastroesophageal reflux disease) K21.9 Low  4/20/2017 - Present      Health Maintenance        Date Due Completion Dates    IMM ZOSTER VACCINE 11/28/1996 ---    IMM DTaP/Tdap/Td Vaccine (1 - Tdap) 8/8/2010 8/7/2010    URINE ACR / MICROALBUMIN 1/16/2013 1/16/2012    IMM PNEUMOCOCCAL 65+ (ADULT) LOW/MEDIUM RISK SERIES (2 of 2 - PCV13) 12/13/2013 12/13/2012    A1C SCREENING 2/22/2017 8/22/2016, 1/16/2015, 6/23/2014, 1/16/2012, 9/2/2010    FASTING LIPID PROFILE 8/22/2017 8/22/2016, 1/16/2012, 9/2/2010    DIABETES MONOFILAMENT / LE EXAM 8/30/2017 8/30/2016 (N/S)    Override on 8/30/2016: (N/S)    IMM INFLUENZA (1) 9/1/2017  9/25/2016, 9/21/2015, 10/21/2013, 10/1/2012    RETINAL SCREENING 10/10/2017 10/10/2016    SERUM CREATININE 4/21/2018 4/21/2017, 4/19/2017, 8/22/2016, 7/12/2016, 8/3/2015, 7/22/2015, 1/16/2015, 6/25/2014, 6/24/2014, 6/23/2014, 5/29/2014, 5/27/2014, 5/26/2014, 5/22/2014, 5/14/2014, 4/12/2014, 3/24/2014, 3/17/2014, 6/5/2013, 5/31/2013, 12/26/2012, 12/22/2012, 12/17/2012, 12/14/2012, 12/3/2012, 9/17/2012, 3/5/2012, 1/16/2012, 1/26/2011, 9/18/2010, 9/17/2010, 9/2/2010, 9/1/2010, 5/24/2010, 1/15/2010, 2/8/2005    COLONOSCOPY 6/5/2023 6/5/2013, 6/5/2013 (N/S)    Override on 6/5/2013: (N/S) ([facility]IC)            Current Immunizations     Influenza TIV (IM) 9/25/2016, 10/21/2013, 10/1/2012    Influenza Vaccine Quad Inj (Preserved) 9/21/2015    Pneumococcal polysaccharide vaccine (PPSV-23) 12/13/2012  7:30 PM    TD Vaccine 8/7/2010 12:38 PM      Below and/or attached are the medications your provider expects you to take. Review all of your home medications and newly ordered medications with your provider and/or pharmacist. Follow medication instructions as directed by your provider and/or pharmacist. Please keep your medication list with you and share with your provider. Update the information when medications are discontinued, doses are changed, or new medications (including over-the-counter products) are added; and carry medication information at all times in the event of emergency situations     Allergies:  AUGMENTIN - Unspecified     LATEX - Unspecified     TAPE - Rash               Medications  Valid as of: July 09, 2017 - 12:46 PM    Generic Name Brand Name Tablet Size Instructions for use    Ascorbic Acid (Tab) ascorbic acid 500 MG Take 500 mg by mouth every day.        Aspirin (Tablet Delayed Response) ECOTRIN 81 MG Take 1 Tab by mouth every day.        Atorvastatin Calcium (Tab) LIPITOR 40 MG Take 1 Tab by mouth every day.        Cholecalciferol (Tab) vitamin D 2000 UNIT Take 2,000 Units by mouth every day.         Clobetasol Propionate (Cream) TEMOVATE 0.05 %         Cyanocobalamin (Tab CR) B-12 1000 MCG Take  by mouth.        Ferrous Sulfate (Tab) ferrous sulfate 325 (65 FE) MG Take 325 mg by mouth 2 Times a Day.        FLUoxetine HCl (Cap) PROZAC 20 MG Take 20 mg by mouth every day.        GlipiZIDE (Tab) GLUCOTROL 10 MG Take 10 mg by mouth 2 times a day. One po am, 1/2 po pm        HydroCHLOROthiazide (Tab) HYDRODIURIL 12.5 MG Take 12.5 mg by mouth every day.        Insulin NPH Human (Isophane) (Suspension) HUMULIN,NOVOLIN 100 UNIT/ML Inject 10 Units as instructed Once. Before dinner        Lisinopril (Tab) PRINIVIL 5 MG Take 2.5 mg by mouth every day. Indications: High Blood Pressure        MetFORMIN HCl (Tab) GLUCOPHAGE 500 MG Take 500 mg by mouth 2 times a day, with meals.        Metoprolol Tartrate (Tab) LOPRESSOR 25 MG Take 0.5 Tabs by mouth 2 times a day.        Pantoprazole Sodium (Tablet Delayed Response) PROTONIX 40 MG Take 20 mg by mouth every day.        Probiotic Product (Cap) PRO-BIOTIC BLEND  Take  by mouth.        SAXagliptin HCl (Tab) SAXagliptin HCl 5 MG Take 5 mg by mouth every day.        Vitamin E (Cap) VITAMIN E 400 UNIT Take 400 Units by mouth every day.        .                 Medicines prescribed today were sent to:     [id number] - [place] - [address]    [address] [place] [address]    Phone: [phone] Fax: [phone]    Open 24 Hours?: No      Medication refill instructions:       If your prescription bottle indicates you have medication refills left, it is not necessary to call your provider’s office. Please contact your pharmacy and they will refill your medication.    If your prescription bottle indicates you do not have any refills left, you may request refills at any time through one of the following ways: The online [employer] system (except Urgent Care), by calling your provider’s office, or by asking your pharmacy to contact your provider’s office with a refill  request. Medication refills are processed only during regular business hours and may not be available until the next business day. Your provider may request additional information or to have a follow-up visit with you prior to refilling your medication.   *Please Note: Medication refills are assigned a new Rx number when refilled electronically. Your pharmacy may indicate that no refills were authorized even though a new prescription for the same medication is available at the pharmacy. Please request the medicine by name with the pharmacy before contacting your provider for a refill.           MapS[employer] Access Code: Activation code not generated  Current MapSense Status: Active

## 2017-07-10 DIAGNOSIS — R30.0 DYSURIA: ICD-10-CM

## 2017-07-12 LAB
BACTERIA UR CULT: ABNORMAL
SIGNIFICANT IND 70042: ABNORMAL
SOURCE SOURCE: ABNORMAL

## 2017-07-19 ENCOUNTER — OFFICE VISIT (OUTPATIENT)
Dept: URGENT CARE | Facility: PHYSICIAN GROUP | Age: 82
End: 2017-07-19
Payer: MEDICARE

## 2017-07-19 VITALS
SYSTOLIC BLOOD PRESSURE: 136 MMHG | HEART RATE: 100 BPM | BODY MASS INDEX: 27.14 KG/M2 | HEIGHT: 70 IN | RESPIRATION RATE: 12 BRPM | WEIGHT: 189.6 LBS | TEMPERATURE: 98.4 F | OXYGEN SATURATION: 94 % | DIASTOLIC BLOOD PRESSURE: 62 MMHG

## 2017-07-19 DIAGNOSIS — B02.9 HERPES ZOSTER WITHOUT COMPLICATION: ICD-10-CM

## 2017-07-19 DIAGNOSIS — R82.90 CLOUDY URINE: ICD-10-CM

## 2017-07-19 DIAGNOSIS — Z87.440 HISTORY OF UTI: ICD-10-CM

## 2017-07-19 LAB
APPEARANCE UR: CLEAR
BILIRUB UR STRIP-MCNC: NORMAL MG/DL
COLOR UR AUTO: YELLOW
GLUCOSE UR STRIP.AUTO-MCNC: NORMAL MG/DL
KETONES UR STRIP.AUTO-MCNC: NORMAL MG/DL
LEUKOCYTE ESTERASE UR QL STRIP.AUTO: NORMAL
NITRITE UR QL STRIP.AUTO: NORMAL
PH UR STRIP.AUTO: 6 [PH] (ref 5–8)
PROT UR QL STRIP: 30 MG/DL
RBC UR QL AUTO: NORMAL
SP GR UR STRIP.AUTO: 1.02
UROBILINOGEN UR STRIP-MCNC: NORMAL MG/DL

## 2017-07-19 PROCEDURE — 81002 URINALYSIS NONAUTO W/O SCOPE: CPT | Performed by: FAMILY MEDICINE

## 2017-07-19 PROCEDURE — 99214 OFFICE O/P EST MOD 30 MIN: CPT | Performed by: FAMILY MEDICINE

## 2017-07-19 ASSESSMENT — ENCOUNTER SYMPTOMS
PHOTOPHOBIA: 0
SENSORY CHANGE: 0
DOUBLE VISION: 0
EYE DISCHARGE: 0
FOCAL WEAKNESS: 0
BLURRED VISION: 0
HEADACHES: 0

## 2017-07-19 ASSESSMENT — PAIN SCALES - GENERAL: PAINLEVEL: 1=MINIMAL PAIN

## 2017-07-19 NOTE — PROGRESS NOTES
"Subjective:      Selvin Braga is a 81 y.o. male who presents with UTI            HPI  #1 1 week urine slightly cloudy. No pain. No hematuria. +PMH urosepsis recently and is \"edgy\" because of this. He uses a condom catheter at night and clamp during the day.  No fever. No flank pain    #2 1 week right forehead rash. No blisters. Tender to touch and dull ache. No conjunctiva redness. No vision change. He has had the zoster vaccine.     Review of Systems   Eyes: Negative for blurred vision, double vision, photophobia and discharge.   Neurological: Negative for sensory change, focal weakness and headaches.     .  Medications, Allergies, and current problem list reviewed today in Epic       Objective:     /62 mmHg  Pulse 100  Temp(Src) 36.9 °C (98.4 °F)  Resp 12  Ht 1.778 m (5' 10\")  Wt 86.002 kg (189 lb 9.6 oz)  BMI 27.20 kg/m2  SpO2 94%     Physical Exam   Constitutional: He appears well-developed and well-nourished. No distress.   HENT:   Head: Normocephalic and atraumatic.   Right Ear: External ear normal.   Left Ear: External ear normal.   Eyes: Conjunctivae and EOM are normal. Pupils are equal, round, and reactive to light.   Genitourinary:   No cvat, no suprapubic tenderness   Neurological:   Speech is clear. Patient is appropriate and cooperative.     Skin: Skin is warm and dry. Rash (mild erythema right V1 dermatome that doesn't cross midline. Small area of crusting temple. No vesicles. No conjunctiva involvement. ) noted.               Assessment/Plan:   UA reviewed: no evidence of infection. Proteinuria has been seen previously    1. Cloudy urine     2. History of UTI     3. Herpes zoster without complication       Given 1 week sx's and stage 3 CKD recommend no valtrex. He will f/u with ophthalmologist for further evaluation of any eye involvement.         "

## 2017-07-19 NOTE — MR AVS SNAPSHOT
"        Selvin Braga   2017 11:00 AM   Office Visit   MRN: [id number]    Department:  [facility]   Dept Phone:  [phone]    Description:  Male : 1935   Provider:  Chase Hamm M.D.           Reason for Visit     UTI x 2 days / Rash on forehead      Allergies as of 2017     Allergen Noted Reactions    Augmentin 10/28/2015   Unspecified    Bleeding  RXN=5 years ago    Latex 10/28/2015   Unspecified    Blisters  RXN=ongoing    Tape 10/28/2015   Rash    Rash-blisters-paper tape okay  RXN=ongoing      You were diagnosed with     Cloudy urine   [013673]       History of UTI   [962890]       Herpes zoster without complication   [224835]         Vital Signs     Blood Pressure Pulse Temperature Respirations Height Weight    136/62 mmHg 100 36.9 °C (98.4 °F) 12 1.778 m (5' 10\") 86.002 kg (189 lb 9.6 oz)    Body Mass Index Oxygen Saturation Smoking Status             27.20 kg/m2 94% Former Smoker         Basic Information     Date Of Birth Sex Race Ethnicity Preferred Language    1935 Male White Non- English      Your appointments     Aug 08, 2017  8:15 AM   Adult Draw/Collection with [facility]   [facility] (--)    [address]  [place] [address]   [phone]            Aug 14, 2017  1:15 PM   FOLLOW UP with Beth Huston MD,[facility]    [address]  [address]  [place] [id number] [phone]              Problem List              ICD-10-CM Priority Class Noted - Resolved    DVT of lower extremity (deep venous thrombosis) (CMS-HCC) I82.409   2010 - Present    DM (diabetes mellitus) (CMS-HCC) E11.9 Low  2010 - Present    HTN (hypertension) I10 Low  2010 - Present    Spinal stenosis, lumbar region, without neurogenic claudication M48.06 Low  2012 - Present    Senile nuclear sclerosis H25.10   2013 - Present    Blood in stool K92.1   2013 - Present    Diarrhea following " gastrointestinal surgery R19.7, Z98.890   6/23/2014 - Present    History of prostatectomy Z90.79 Low  5/8/2015 - Present    Ankle fracture, right S82.891A   10/28/2015 - Present    Chronic kidney disease (CKD), stage III (moderate) N18.3   7/21/2016 - Present    Neuropathy (CMS-HCC) G62.9 Low  7/21/2016 - Present    Depression F32.9 Low  7/21/2016 - Present    AK (actinic keratosis) L57.0   7/21/2016 - Present    Hyperlipidemia E78.5 Low  7/21/2016 - Present    Health care maintenance Z00.00   8/30/2016 - Present    History of prostate cancer Z85.46 Low  3/24/2017 - Present    History of radiation therapy Z92.3   3/24/2017 - Present    Type 2 diabetes mellitus with stage 3 chronic kidney disease, with long-term current use of insulin ([facility]) E11.22, N18.3, Z79.4   3/24/2017 - Present    Continuous leakage of urine N39.45   3/24/2017 - Present    History of DVT (deep vein thrombosis) Z86.718 Low  3/24/2017 - Present    Other specified disorders of the skin and subcutaneous tissue related to radiation L59.8   3/29/2017 - Present    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  3/29/2017 - Present    Hematuria R31.9   3/29/2017 - Present    Pressure-related ear pain T70.0XXA   4/4/2017 - Present    UTI (urinary tract infection) N39.0 Medium  4/19/2017 - Present    Elevated troponin R74.8 Medium  4/19/2017 - Present    Sepsis (CMS-HCC) A41.9 High  4/20/2017 - Present    Thrombocytopenia (CMS-HCC) D69.6 Low  4/20/2017 - Present    Anemia D64.9 Low  4/20/2017 - Present    CKD (chronic kidney disease), stage II N18.2 Low  4/20/2017 - Present    GERD (gastroesophageal reflux disease) K21.9 Low  4/20/2017 - Present      Health Maintenance        Date Due Completion Dates    IMM ZOSTER VACCINE 11/28/1996 ---    IMM DTaP/Tdap/Td Vaccine (1 - Tdap) 8/8/2010 8/7/2010    URINE ACR / MICROALBUMIN 1/16/2013 1/16/2012    IMM PNEUMOCOCCAL 65+ (ADULT) LOW/MEDIUM RISK SERIES (2 of 2 - PCV13) 12/13/2013 12/13/2012    A1C SCREENING 2/22/2017 8/22/2016,  1/16/2015, 6/23/2014, 1/16/2012, 9/2/2010    FASTING LIPID PROFILE 8/22/2017 8/22/2016, 1/16/2012, 9/2/2010    DIABETES MONOFILAMENT / LE EXAM 8/30/2017 8/30/2016 (N/S)    Override on 8/30/2016: (N/S)    IMM INFLUENZA (1) 9/1/2017 9/25/2016, 9/21/2015, 10/21/2013, 10/1/2012    RETINAL SCREENING 10/10/2017 10/10/2016    SERUM CREATININE 4/21/2018 4/21/2017, 4/19/2017, 8/22/2016, 7/12/2016, 8/3/2015, 7/22/2015, 1/16/2015, 6/25/2014, 6/24/2014, 6/23/2014, 5/29/2014, 5/27/2014, 5/26/2014, 5/22/2014, 5/14/2014, 4/12/2014, 3/24/2014, 3/17/2014, 6/5/2013, 5/31/2013, 12/26/2012, 12/22/2012, 12/17/2012, 12/14/2012, 12/3/2012, 9/17/2012, 3/5/2012, 1/16/2012, 1/26/2011, 9/18/2010, 9/17/2010, 9/2/2010, 9/1/2010, 5/24/2010, 1/15/2010, 2/8/2005    COLONOSCOPY 6/5/2023 6/5/2013, 6/5/2013 (N/S)    Override on 6/5/2013: (N/S) (GIC)            Results     POCT Urinalysis      Component Value Standard Range & Units    POC Color yellow Negative    POC Appearance Clear Negative    POC Leukocyte Esterase Neg Negative    POC Nitrites Neg Negative    POC Urobiligen Neg Negative (0.2) mg/dL    POC Protein 30 Negative mg/dL    POC Urine PH 6.0 5.0 - 8.0    POC Blood Neg Negative    POC Specific Gravity 1.020 <1.005 - >1.030    POC Ketones Neg Negative mg/dL    POC Biliruben Neg Negative mg/dL    POC Glucose Neg Negative mg/dL                        Current Immunizations     Influenza TIV (IM) 9/25/2016, 10/21/2013, 10/1/2012    Influenza Vaccine Quad Inj (Preserved) 9/21/2015    Pneumococcal polysaccharide vaccine (PPSV-23) 12/13/2012  7:30 PM    TD Vaccine 8/7/2010 12:38 PM      Below and/or attached are the medications your provider expects you to take. Review all of your home medications and newly ordered medications with your provider and/or pharmacist. Follow medication instructions as directed by your provider and/or pharmacist. Please keep your medication list with you and share with your provider. Update the information when  medications are discontinued, doses are changed, or new medications (including over-the-counter products) are added; and carry medication information at all times in the event of emergency situations     Allergies:  AUGMENTIN - Unspecified     LATEX - Unspecified     TAPE - Rash               Medications  Valid as of: July 19, 2017 -  3:55 PM    Generic Name Brand Name Tablet Size Instructions for use    Ascorbic Acid (Tab) ascorbic acid 500 MG Take 500 mg by mouth every day.        Aspirin (Tablet Delayed Response) ECOTRIN 81 MG Take 1 Tab by mouth every day.        Atorvastatin Calcium (Tab) LIPITOR 40 MG Take 1 Tab by mouth every day.        Cholecalciferol (Tab) vitamin D 2000 UNIT Take 2,000 Units by mouth every day.        Clobetasol Propionate (Cream) TEMOVATE 0.05 %         Cyanocobalamin (Tab CR) B-12 1000 MCG Take  by mouth.        Ferrous Sulfate (Tab) ferrous sulfate 325 (65 FE) MG Take 325 mg by mouth 2 Times a Day.        FLUoxetine HCl (Cap) PROZAC 20 MG Take 20 mg by mouth every day.        GlipiZIDE (Tab) GLUCOTROL 10 MG Take 10 mg by mouth 2 times a day. One po am, 1/2 po pm        HydroCHLOROthiazide (Tab) HYDRODIURIL 12.5 MG Take 12.5 mg by mouth every day.        Insulin NPH Human (Isophane) (Suspension) HUMULIN,NOVOLIN 100 UNIT/ML Inject 10 Units as instructed Once. Before dinner        Lisinopril (Tab) PRINIVIL 5 MG Take 2.5 mg by mouth every day. Indications: High Blood Pressure        MetFORMIN HCl (Tab) GLUCOPHAGE 500 MG Take 500 mg by mouth 2 times a day, with meals.        Metoprolol Tartrate (Tab) LOPRESSOR 25 MG Take 0.5 Tabs by mouth 2 times a day.        Pantoprazole Sodium (Tablet Delayed Response) PROTONIX 40 MG Take 20 mg by mouth every day.        Probiotic Product (Cap) PRO-BIOTIC BLEND  Take  by mouth.        SAXagliptin HCl (Tab) SAXagliptin HCl 5 MG Take 5 mg by mouth every day.        Vitamin E (Cap) VITAMIN E 400 UNIT Take 400 Units by mouth every day.        .                  Medicines prescribed today were sent to:     [id number] - [place], [place] - [address]    [address] [place] [address]    Phone: [phone] Fax: [phone]    Open 24 Hours?: No      Medication refill instructions:       If your prescription bottle indicates you have medication refills left, it is not necessary to call your provider’s office. Please contact your pharmacy and they will refill your medication.    If your prescription bottle indicates you do not have any refills left, you may request refills at any time through one of the following ways: The online [employer] system (except Urgent Care), by calling your provider’s office, or by asking your pharmacy to contact your provider’s office with a refill request. Medication refills are processed only during regular business hours and may not be available until the next business day. Your provider may request additional information or to have a follow-up visit with you prior to refilling your medication.   *Please Note: Medication refills are assigned a new Rx number when refilled electronically. Your pharmacy may indicate that no refills were authorized even though a new prescription for the same medication is available at the pharmacy. Please request the medicine by name with the pharmacy before contacting your provider for a refill.           [employer] Access Code: Activation code not generated  Current [employer] Status: Active

## 2017-07-25 DIAGNOSIS — I10 ESSENTIAL HYPERTENSION: ICD-10-CM

## 2017-07-25 DIAGNOSIS — E78.5 HYPERLIPIDEMIA, UNSPECIFIED HYPERLIPIDEMIA TYPE: ICD-10-CM

## 2017-08-08 ENCOUNTER — HOSPITAL ENCOUNTER (OUTPATIENT)
Dept: LAB | Facility: MEDICAL CENTER | Age: 82
End: 2017-08-08
Attending: INTERNAL MEDICINE
Payer: MEDICARE

## 2017-08-08 DIAGNOSIS — I10 ESSENTIAL HYPERTENSION: ICD-10-CM

## 2017-08-08 DIAGNOSIS — E78.5 HYPERLIPIDEMIA, UNSPECIFIED HYPERLIPIDEMIA TYPE: ICD-10-CM

## 2017-08-08 LAB
ALBUMIN SERPL BCP-MCNC: 3.7 G/DL (ref 3.2–4.9)
ALBUMIN/GLOB SERPL: 1.2 G/DL
ALP SERPL-CCNC: 80 U/L (ref 30–99)
ALT SERPL-CCNC: 9 U/L (ref 2–50)
ANION GAP SERPL CALC-SCNC: 9 MMOL/L (ref 0–11.9)
AST SERPL-CCNC: 12 U/L (ref 12–45)
BASOPHILS # BLD AUTO: 1.1 % (ref 0–1.8)
BASOPHILS # BLD: 0.09 K/UL (ref 0–0.12)
BILIRUB SERPL-MCNC: 0.6 MG/DL (ref 0.1–1.5)
BUN SERPL-MCNC: 25 MG/DL (ref 8–22)
CALCIUM SERPL-MCNC: 9.6 MG/DL (ref 8.5–10.5)
CHLORIDE SERPL-SCNC: 105 MMOL/L (ref 96–112)
CHOLEST SERPL-MCNC: 115 MG/DL (ref 100–199)
CO2 SERPL-SCNC: 25 MMOL/L (ref 20–33)
CREAT SERPL-MCNC: 1.19 MG/DL (ref 0.5–1.4)
EOSINOPHIL # BLD AUTO: 0.64 K/UL (ref 0–0.51)
EOSINOPHIL NFR BLD: 7.8 % (ref 0–6.9)
ERYTHROCYTE [DISTWIDTH] IN BLOOD BY AUTOMATED COUNT: 49.1 FL (ref 35.9–50)
GFR SERPL CREATININE-BSD FRML MDRD: 59 ML/MIN/1.73 M 2
GLOBULIN SER CALC-MCNC: 3.1 G/DL (ref 1.9–3.5)
GLUCOSE SERPL-MCNC: 186 MG/DL (ref 65–99)
HCT VFR BLD AUTO: 35.6 % (ref 42–52)
HDLC SERPL-MCNC: 40 MG/DL
HGB BLD-MCNC: 11.9 G/DL (ref 14–18)
IMM GRANULOCYTES # BLD AUTO: 0.02 K/UL (ref 0–0.11)
IMM GRANULOCYTES NFR BLD AUTO: 0.2 % (ref 0–0.9)
LDLC SERPL CALC-MCNC: 54 MG/DL
LYMPHOCYTES # BLD AUTO: 1.46 K/UL (ref 1–4.8)
LYMPHOCYTES NFR BLD: 17.8 % (ref 22–41)
MCH RBC QN AUTO: 30 PG (ref 27–33)
MCHC RBC AUTO-ENTMCNC: 33.4 G/DL (ref 33.7–35.3)
MCV RBC AUTO: 89.7 FL (ref 81.4–97.8)
MONOCYTES # BLD AUTO: 0.69 K/UL (ref 0–0.85)
MONOCYTES NFR BLD AUTO: 8.4 % (ref 0–13.4)
NEUTROPHILS # BLD AUTO: 5.28 K/UL (ref 1.82–7.42)
NEUTROPHILS NFR BLD: 64.7 % (ref 44–72)
NRBC # BLD AUTO: 0 K/UL
NRBC BLD AUTO-RTO: 0 /100 WBC
PLATELET # BLD AUTO: 170 K/UL (ref 164–446)
PMV BLD AUTO: 10 FL (ref 9–12.9)
POTASSIUM SERPL-SCNC: 4.1 MMOL/L (ref 3.6–5.5)
PROT SERPL-MCNC: 6.8 G/DL (ref 6–8.2)
RBC # BLD AUTO: 3.97 M/UL (ref 4.7–6.1)
SODIUM SERPL-SCNC: 139 MMOL/L (ref 135–145)
TRIGL SERPL-MCNC: 105 MG/DL (ref 0–149)
WBC # BLD AUTO: 8.2 K/UL (ref 4.8–10.8)

## 2017-08-08 PROCEDURE — 80053 COMPREHEN METABOLIC PANEL: CPT

## 2017-08-08 PROCEDURE — 80061 LIPID PANEL: CPT

## 2017-08-08 PROCEDURE — 36415 COLL VENOUS BLD VENIPUNCTURE: CPT

## 2017-08-08 PROCEDURE — 85025 COMPLETE CBC W/AUTO DIFF WBC: CPT

## 2017-08-14 ENCOUNTER — OFFICE VISIT (OUTPATIENT)
Dept: CARDIOLOGY | Facility: MEDICAL CENTER | Age: 82
End: 2017-08-14
Payer: MEDICARE

## 2017-08-14 VITALS
SYSTOLIC BLOOD PRESSURE: 140 MMHG | OXYGEN SATURATION: 90 % | HEART RATE: 86 BPM | DIASTOLIC BLOOD PRESSURE: 60 MMHG | BODY MASS INDEX: 27.2 KG/M2 | HEIGHT: 70 IN | WEIGHT: 190 LBS

## 2017-08-14 DIAGNOSIS — E11.22 TYPE 2 DIABETES MELLITUS WITH STAGE 3 CHRONIC KIDNEY DISEASE, WITH LONG-TERM CURRENT USE OF INSULIN (HCC): ICD-10-CM

## 2017-08-14 DIAGNOSIS — E78.5 HYPERLIPIDEMIA, UNSPECIFIED HYPERLIPIDEMIA TYPE: ICD-10-CM

## 2017-08-14 DIAGNOSIS — N18.30 CHRONIC KIDNEY DISEASE (CKD), STAGE III (MODERATE) (HCC): ICD-10-CM

## 2017-08-14 DIAGNOSIS — N18.30 TYPE 2 DIABETES MELLITUS WITH STAGE 3 CHRONIC KIDNEY DISEASE, WITH LONG-TERM CURRENT USE OF INSULIN (HCC): ICD-10-CM

## 2017-08-14 DIAGNOSIS — Z79.4 TYPE 2 DIABETES MELLITUS WITH STAGE 3 CHRONIC KIDNEY DISEASE, WITH LONG-TERM CURRENT USE OF INSULIN (HCC): ICD-10-CM

## 2017-08-14 DIAGNOSIS — I10 ESSENTIAL HYPERTENSION: ICD-10-CM

## 2017-08-14 DIAGNOSIS — R79.89 ELEVATED TROPONIN I LEVEL: ICD-10-CM

## 2017-08-14 PROCEDURE — 99214 OFFICE O/P EST MOD 30 MIN: CPT | Performed by: INTERNAL MEDICINE

## 2017-08-14 NOTE — Clinical Note
[facility]   [address]   [address]  [place] [address]  Phone: [phone]  Fax: [phone]              Selvin Braga  1935    Encounter Date: 8/14/2017    Destinee Ferguson M.D.    Thank you for the referral. I had the pleasure of seeing Selvin Braga today in cardiology clinic. I've attached my visit note below. If you have any questions please feel free to give me a call anytime.      Beth Huston MD, PhD, [facility]  Cardiology and Lipidology  [facility]                                                                    PROGRESS NOTE:  Chief Complaint   Patient presents with   • Follow-Up     HTN         This patient is an established male who is here today to discuss:  Recent elevated Troponin with sepsis;  He has high AHA 10 yr CV risk of 62.5%  Currently using high intensity Statin txmt; No new complaints;  All recent labs, imaging studies and procedures reviewed    Patient Active Problem List    Diagnosis Date Noted   • Sepsis (CMS-[facility]) 04/20/2017     Priority: High   • UTI (urinary tract infection) 04/19/2017     Priority: Medium   • Elevated troponin 04/19/2017     Priority: Medium   • Thrombocytopenia (CMS-[facility]) 04/20/2017     Priority: Low   • Anemia 04/20/2017     Priority: Low   • CKD (chronic kidney disease), stage II 04/20/2017     Priority: Low   • GERD (gastroesophageal reflux disease) 04/20/2017     Priority: Low   • 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     Priority: Low   • History of prostate cancer 03/24/2017     Priority: Low   • History of DVT (deep vein thrombosis) 03/24/2017     Priority: Low   • Neuropathy (CMS-[facility]) 07/21/2016     Priority: Low   • Depression 07/21/2016     Priority: Low   • Hyperlipidemia 07/21/2016     Priority: Low   • History of prostatectomy 05/08/2015     Priority: Low   • Spinal stenosis, lumbar region, without neurogenic claudication 12/13/2012    Priority: Low   • DM (diabetes mellitus) (CMS-HCC) 09/02/2010     Priority: Low   • HTN (hypertension) 09/02/2010     Priority: Low   • Pressure-related ear pain 04/04/2017   • Other specified disorders of the skin and subcutaneous tissue related to radiation 03/29/2017   • Hematuria 03/29/2017   • History of radiation therapy 03/24/2017   • Type 2 diabetes mellitus with stage 3 chronic kidney disease, with long-term current use of insulin ([facility]) 03/24/2017   • Continuous leakage of urine 03/24/2017   • Health care maintenance 08/30/2016   • Chronic kidney disease (CKD), stage III (moderate) 07/21/2016   • AK (actinic keratosis) 07/21/2016   • Ankle fracture, right 10/28/2015   • Diarrhea following gastrointestinal surgery 06/23/2014   • Blood in stool 06/05/2013   • Senile nuclear sclerosis 02/13/2013   • DVT of lower extremity (deep venous thrombosis) (CMS-HCC) 09/02/2010       Past Medical History   Diagnosis Date   • Heart burn    • Dental disorder      dentures   • Snoring    • Psychiatric problem      taking prozac   • Cancer (CMS-HCC) 2001     prostate   • CATARACT    • Arrhythmia      PVC's   • Indigestion    • Other specified disorder of intestines      alters between normal and diarrhea since radiation tx   • Arthritis    • Colon polyps    • Anemia    • Diabetes 1984   • Lombardi esophagus    • Personal history of venous thrombosis and embolism 2010/1995     leg   • GERD (gastroesophageal reflux disease)    • Cholesterol blood decreased    • Unspecified urinary incontinence      spill from artifical urinary sphincter   • Urinary bladder disorder      artificial urinary sphincter /catheter   • Sleep apnea      no cpap   • Hypertension      well controlled on meds   • Chronic kidney disease (CKD), stage III (moderate) 7/21/2016   • Neuropathy (CMS-HCC) 7/21/2016   • Depression 7/21/2016   • AK (actinic keratosis) 7/21/2016   • Hyperlipidemia 7/21/2016     Past Surgical History   Procedure Laterality Date   •  Angiogram  1/25/2010     Performed by PETER NORMAN at SURGERY [facility]   • Aortogram  1/25/2010     Performed by PETER NORMAN at [facility]   • Prostatectomy robotic  2/2001   • Pr insert,inflatable sphincter  2001   • Sphincter prosthesis removal  6/3/2010     Performed by JOSH APARICIO at [facility]   • Cystoscopy  2/2/2011     Performed by GERSON CAR at [facility]   • Sphincter prosthesis placement  2/24/2011     Performed by GERSON CAR at [facility]   • Cystoscopy  2/24/2011     Performed by GERSON CAR at [facility]   • Other abdominal surgery     • Groin exploration  3/13/2012     Performed by DEEPA PATRICIA at [facility]   • Sphincter prosthesis placement  9/26/2012     Performed by GERSON Car M.D. at SURGERY [facility]   • Cataract phaco with iol  2/13/2013     Performed by Clayton Noonan M.D. at SUR[facility]ERY [facility]   • Cataract phaco with iol  2/27/2013     Performed by Clayton Noonan M.D. at SURGERY [facility]   • Colonoscopy with apc  6/5/2013     Performed by Deepa Vela M.D. at [facility]   • Bowel resection  5/21/2014     Performed by Graham Peña M.D. at [facility]   • Laparoscopy  5/21/2014     Performed by Graham Peña M.D. at [facility]   • Lumbar fusion posterior  12/13/2012     Procedure: L5-S1 removal of Jennings Fragment NON-INSTRUMENTAL;  Surgeon: Wil Martinez M.D.;  Location: [facility];  Service:    • Lumbar decompression  12/13/2012     Procedure: 4-5;  Surgeon: Wil Martinez M.D.;  Location: [facility];  Service:    • Other orthopedic surgery       hip & femur fx   • Sphincter prosthesis removal  8/10/2015     Procedure: SPHINCTER PROSTHESIS REMOVAL;  Surgeon: Tuan Marie M.D.;  Location: [facility];  Service:    • Ankle  orif Right 10/28/2015     Procedure: ANKLE ORIF;  Surgeon: Venu Elizabeth M.D.;  Location: SURGERY [facility];  Service:      Social History     Social History   • Marital Status:      Spouse Name: N/A   • Number of Children: 5   • Years of Education: N/A     Social History Main Topics   • Smoking status: Former Smoker -- 2.00 packs/day for 30 years     Types: Cigarettes     Quit date: 03/05/1995   • Smokeless tobacco: Never Used      Comment: 1.5 ppd 25 yrs,quit 1995   • Alcohol Use: No   • Drug Use: No   • Sexual Activity: Not Asked     Other Topics Concern   • None     Social History Narrative     Family History   Problem Relation Age of Onset   • Hypertension     • Cancer       multiple cousins with prostate cancer.    • Cancer Mother      ovarian   • Heart Disease Father    • Diabetes Father    • Diabetes Sister        Current Outpatient Prescriptions   Medication Sig Dispense Refill   • metoprolol (LOPRESSOR) 25 MG Tab Take 0.5 Tabs by mouth 2 times a day. 30 Tab 0   • Probiotic Product (PRO-BIOTIC BLEND) Cap Take  by mouth.     • vitamin e (VITAMIN E) 400 UNIT Cap Take 400 Units by mouth every day.     • atorvastatin (LIPITOR) 40 MG Tab Take 1 Tab by mouth every day. 30 Tab 11   • insulin NPH (NOVOLIN N) 100 UNIT/ML Suspension Inject 10 Units as instructed Once. Before dinner     • Cyanocobalamin (B-12) 1000 MCG Tab CR Take  by mouth.     • pantoprazole (PROTONIX) 40 MG Tablet Delayed Response Take 20 mg by mouth every day.     • glipiZIDE (GLUCOTROL) 10 MG Tab Take 10 mg by mouth 2 times a day. One po am, 1/2 po pm     • hydrochlorothiazide (HYDRODIURIL) 12.5 MG tablet Take 12.5 mg by mouth every day.     • Saxagliptin HCl 5 MG Tab Take 5 mg by mouth every day.     • lisinopril (PRINIVIL) 5 MG Tab Take 2.5 mg by mouth every day. Indications: High Blood Pressure     • ferrous sulfate 325 (65 FE) MG tablet Take 325 mg by mouth 2 Times a Day.     • Cholecalciferol (VITAMIN D) 2000 UNIT TABS Take  "2,000 Units by mouth every day.     • ascorbic acid (ASCORBIC ACID) 500 MG TABS Take 500 mg by mouth every day.     • metformin (GLUCOPHAGE) 500 MG TABS Take 500 mg by mouth 2 times a day, with meals.     • fluoxetine (PROZAC) 20 MG CAPS Take 20 mg by mouth every day.     • aspirin EC (ECOTRIN) 81 MG Tablet Delayed Response Take 1 Tab by mouth every day. 30 Tab 2   • clobetasol (TEMOVATE) 0.05 % Cream        No current facility-administered medications for this visit.     Augmentin; Latex; and Tape    Review of Systems:     Constitutional: Denies fevers, Denies weight changes  Eyes: Denies changes in vision, no eye pain  Ears/Nose/Throat/Mouth: Denies nasal congestion or sore throat   Cardiovascular: Denies chest pain or palpitations   Respiratory: Denies shortness of breath , Denies cough  Gastrointestinal/Hepatic: Denies abdominal pain, nausea, vomiting, diarrhea, constipation or GI bleeding   Genitourinary: Denies bladder dysfunction, dysuria or frequency  Musculoskeletal/Rheum: Denies  joint pain and swelling   Skin/Breast: Denies rash, denies breast lumps or discharge  Neurological: Denies headache, confusion, memory loss or focal weakness/parasthesias  Psychiatric: denies mood disorder   Endocrine: denies hx of diabetes or thyroid dysfunction  Heme/Oncology/Lymph Nodes: Denies enlarged lymph nodes, denies brusing or known bleeding disorder  Allergic/Immunologic:  hx of allergies      All other systems were reviewed and are negative (AMA/CMS criteria)      Blood pressure 140/60, pulse 86, height 1.778 m (5' 10\"), weight 86.183 kg (190 lb), SpO2 90 %.  General Appearance:  Hard of hearing, mild;  Well developed, Well nourished, No acute distress, Non-toxic appearance.   HENT:  Normocephalic, Atraumatic, Oropharynx moist mucous membranes, Dentition: , Nose normal.    Eyes:  PERRLA, EOMI, Conjunctiva normal, No discharge.  Neck:  Normal range of motion, No cervical tenderness, Supple, No stridor, no JVD .  No " thyromegaly.  No carotid bruit.  Cardiovascular:  Normal heart rate, Normal rhythm,  S1, S2, no S3,  S4; No gallops; No murmurs, No rubs, .   Extremitites with intact distal pulses, no cyanosis, clubbing or edema.  No heaves, thrills, HJR;  Peripheral pulses: carotid 2+, brachial 2+, radial 2+, ulnar 2+, femoral 2+, popliteal 2+, PT 2+, DP 2+;  Lungs:  Respiratory effort is normal. Normal breath sounds, breath sounds clear to auscultation bilaterally,  no rales, no rhonchi, no wheezing.   Abdomen: Bowel sounds normal, Soft, No tenderness, No guarding, No rebound, No masses, No hepatosplenomegaly.  Skin: Warm, Dry, No erythema, No rash, no induration or crepitus.  Neurologic: Alert & oriented x 3, Normal motor function, Normal sensory function, No focal deficits noted, cranial nerves II through XII are normal,  abnormal gait, uses cane.  Psychiatric: Affect normal, Judgment normal, Mood normal..    Results for KARINA SKAGGS (MRN [id number]) as of 8/14/2017 13:46   Ref. Range 4/21/2017 04:45 4/21/2017 07:27 4/21/2017 11:20 4/21/2017 17:22 4/21/2017 23:15 4/22/2017 04:51 4/22/2017 11:24 4/22/2017 14:03 4/22/2017 14:13 5/15/2017 13:56 5/22/2017 15:58 7/9/2017 12:40 7/9/2017 12:47 7/19/2017 11:08 8/8/2017 07:59   WBC Latest Ref Range: 4.8-10.8 K/uL 5.2              8.2   RBC Latest Ref Range: 4.70-6.10 M/uL 3.23 (L)              3.97 (L)   Hemoglobin Latest Ref Range: 14.0-18.0 g/dL 9.7 (L)              11.9 (L)   Hematocrit Latest Ref Range: 42.0-52.0 % 27.7 (L)              35.6 (L)   MCV Latest Ref Range: 81.4-97.8 fL 85.8              89.7   MCH Latest Ref Range: 27.0-33.0 pg 30.0              30.0   MCHC Latest Ref Range: 33.7-35.3 g/dL 35.0              33.4 (L)   RDW Latest Ref Range: 35.9-50.0 fL 49.3              49.1   Platelet Count Latest Ref Range: 164-446 K/uL 96 (L)              170   MPV Latest Ref Range: 9.0-12.9 fL 9.1              10.0   Results for KARINA SKAGGS (MRN [id number]) as of 8/14/2017  13:46   Ref. Range 7/19/2017 11:08 8/8/2017 07:59   WBC Latest Ref Range: 4.8-10.8 K/uL  8.2   RBC Latest Ref Range: 4.70-6.10 M/uL  3.97 (L)   Hemoglobin Latest Ref Range: 14.0-18.0 g/dL  11.9 (L)   Hematocrit Latest Ref Range: 42.0-52.0 %  35.6 (L)   MCV Latest Ref Range: 81.4-97.8 fL  89.7   MCH Latest Ref Range: 27.0-33.0 pg  30.0   MCHC Latest Ref Range: 33.7-35.3 g/dL  33.4 (L)   RDW Latest Ref Range: 35.9-50.0 fL  49.1   Platelet Count Latest Ref Range: 164-446 K/uL  170   MPV Latest Ref Range: 9.0-12.9 fL  10.0   Results for KARINA SKAGGS (MRN [id number]) as of 8/14/2017 13:46   Ref. Range 8/8/2017 07:59   Sodium Latest Ref Range: 135-145 mmol/L 139   Potassium Latest Ref Range: 3.6-5.5 mmol/L 4.1   Chloride Latest Ref Range:  mmol/L 105   Co2 Latest Ref Range: 20-33 mmol/L 25   Anion Gap Latest Ref Range: 0.0-11.9  9.0   Glucose Latest Ref Range: 65-99 mg/dL 186 (H)   Bun Latest Ref Range: 8-22 mg/dL 25 (H)   Creatinine Latest Ref Range: 0.50-1.40 mg/dL 1.19   GFR If  Latest Ref Range: >60 mL/min/1.73 m 2 >60   GFR If Non  Latest Ref Range: >60 mL/min/1.73 m 2 59 (A)   Calcium Latest Ref Range: 8.5-10.5 mg/dL 9.6   AST(SGOT) Latest Ref Range: 12-45 U/L 12   ALT(SGPT) Latest Ref Range: 2-50 U/L 9   Alkaline Phosphatase Latest Ref Range: 30-99 U/L 80   Total Bilirubin Latest Ref Range: 0.1-1.5 mg/dL 0.6   Albumin Latest Ref Range: 3.2-4.9 g/dL 3.7   Total Protein Latest Ref Range: 6.0-8.2 g/dL 6.8   Globulin Latest Ref Range: 1.9-3.5 g/dL 3.1   A-G Ratio Latest Units: g/dL 1.2   Cholesterol,Tot Latest Ref Range: 100-199 mg/dL 115   Triglycerides Latest Ref Range: 0-149 mg/dL 105   HDL Latest Ref Range: >=40 mg/dL 40   LDL Latest Ref Range: <100 mg/dL 54   5/22/2017 NUCLEAR IMAGING INTERPRETATION   No evidence of significant jeopardized viable myocardium or prior myocardial    infarction.   Normal left ventricular size, ejection fraction, and wall motion.      Assessment  and Plan.   81 y.o. male has been asx; Stress test neg for ischemia and good LVEF; see above; Reassured; FLP better and CBC better;     1. Essential hypertension  controlled    2. Hyperlipidemia, unspecified hyperlipidemia type  Reviewed and stay on current regimen    3. Chronic kidney disease (CKD), stage III (moderate)  better    4. Type 2 diabetes mellitus with stage 3 chronic kidney disease, with long-term current use of insulin (HCC)  PCP    5. Elevated troponin I level  See above, from sepsis/UTI      Return to clinic in  6 , months    1. Essential hypertension     2. Hyperlipidemia, unspecified hyperlipidemia type     3. Chronic kidney disease (CKD), stage III (moderate)     4. Type 2 diabetes mellitus with stage 3 chronic kidney disease, with long-term current use of insulin ([facility])     5. Elevated troponin I level               Destinee Ferguson M.D.  [address] [address]  VIA In Basket

## 2017-08-14 NOTE — MR AVS SNAPSHOT
"        Selvin Braga   2017 1:15 PM   Office Visit   MRN: [id number]    Department:  Heart [facility] [place]   Dept Phone:  [phone]    Description:  Male : 1935   Provider:  Beth Huston MD,[facility]           Reason for Visit     Follow-Up HTN      Allergies as of 2017     Allergen Noted Reactions    Augmentin 10/28/2015   Unspecified    Bleeding  RXN=5 years ago    Latex 10/28/2015   Unspecified    Blisters  RXN=ongoing    Tape 10/28/2015   Rash    Rash-blisters-paper tape okay  RXN=ongoing      You were diagnosed with     Essential hypertension   [8281689]       Hyperlipidemia, unspecified hyperlipidemia type   [3933497]       Chronic kidney disease (CKD), stage III (moderate)   [046572]       Type 2 diabetes mellitus with stage 3 chronic kidney disease, with long-term current use of insulin (CMS-HCC)   [9502686]       Elevated troponin I level   [308750]         Vital Signs     Blood Pressure Pulse Height Weight Body Mass Index Oxygen Saturation    140/60 mmHg 86 1.778 m (5' 10\") 86.183 kg (190 lb) 27.26 kg/m2 90%    Smoking Status                   Former Smoker           Basic Information     Date Of Birth Sex Race Ethnicity Preferred Language    1935 Male White Non- English      Problem List              ICD-10-CM Priority Class Noted - Resolved    DVT of lower extremity (deep venous thrombosis) (CMS-[facility]) I82.409   2010 - Present    DM (diabetes mellitus) (CMS-HCC) E11.9 Low  2010 - Present    HTN (hypertension) I10 Low  2010 - Present    Spinal stenosis, lumbar region, without neurogenic claudication M48.06 Low  2012 - Present    Senile nuclear sclerosis H25.10   2013 - Present    Blood in stool K92.1   2013 - Present    Diarrhea following gastrointestinal surgery R19.7, Z98.890   2014 - Present    History of prostatectomy Z90.79 Low  2015 - Present    Ankle fracture, right S82.891A   10/28/2015 - Present    Chronic kidney disease (CKD), " stage III (moderate) N18.3   7/21/2016 - Present    Neuropathy (CMS-HCC) G62.9 Low  7/21/2016 - Present    Depression F32.9 Low  7/21/2016 - Present    AK (actinic keratosis) L57.0   7/21/2016 - Present    Hyperlipidemia E78.5 Low  7/21/2016 - Present    Health care maintenance Z00.00   8/30/2016 - Present    History of prostate cancer Z85.46 Low  3/24/2017 - Present    History of radiation therapy Z92.3   3/24/2017 - Present    Type 2 diabetes mellitus with stage 3 chronic kidney disease, with long-term current use of insulin ([facility]) E11.22, N18.3, Z79.4   3/24/2017 - Present    Continuous leakage of urine N39.45   3/24/2017 - Present    History of DVT (deep vein thrombosis) Z86.718 Low  3/24/2017 - Present    Other specified disorders of the skin and subcutaneous tissue related to radiation L59.8   3/29/2017 - Present    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  3/29/2017 - Present    Hematuria R31.9   3/29/2017 - Present    Pressure-related ear pain T70.0XXA   4/4/2017 - Present    UTI (urinary tract infection) N39.0 Medium  4/19/2017 - Present    Elevated troponin R74.8 Medium  4/19/2017 - Present    Sepsis (CMS-HCC) A41.9 High  4/20/2017 - Present    Thrombocytopenia (CMS-HCC) D69.6 Low  4/20/2017 - Present    Anemia D64.9 Low  4/20/2017 - Present    CKD (chronic kidney disease), stage II N18.2 Low  4/20/2017 - Present    GERD (gastroesophageal reflux disease) K21.9 Low  4/20/2017 - Present      Health Maintenance        Date Due Completion Dates    IMM ZOSTER VACCINE 11/28/1996 ---    IMM DTaP/Tdap/Td Vaccine (1 - Tdap) 8/8/2010 8/7/2010    URINE ACR / MICROALBUMIN 1/16/2013 1/16/2012    IMM PNEUMOCOCCAL 65+ (ADULT) LOW/MEDIUM RISK SERIES (2 of 2 - PCV13) 12/13/2013 12/13/2012    A1C SCREENING 2/22/2017 8/22/2016, 1/16/2015, 6/23/2014, 1/16/2012, 9/2/2010    DIABETES MONOFILAMENT / LE EXAM 8/30/2017 8/30/2016 (N/S)    Override on 8/30/2016: (N/S)    IMM INFLUENZA (1) 9/1/2017 9/25/2016, 9/21/2015, 10/21/2013, 10/1/2012     RETINAL SCREENING 10/10/2017 10/10/2016    FASTING LIPID PROFILE 8/8/2018 8/8/2017, 8/22/2016, 1/16/2012, 9/2/2010    SERUM CREATININE 8/8/2018 8/8/2017, 4/21/2017, 4/19/2017, 8/22/2016, 7/12/2016, 8/3/2015, 7/22/2015, 1/16/2015, 6/25/2014, 6/24/2014, 6/23/2014, 5/29/2014, 5/27/2014, 5/26/2014, 5/22/2014, 5/14/2014, 4/12/2014, 3/24/2014, 3/17/2014, 6/5/2013, 5/31/2013, 12/26/2012, 12/22/2012, 12/17/2012, 12/14/2012, 12/3/2012, 9/17/2012, 3/5/2012, 1/16/2012, 1/26/2011, 9/18/2010, 9/17/2010, 9/2/2010, 9/1/2010, 5/24/2010, 1/15/2010, 2/8/2005    COLONOSCOPY 6/5/2023 6/5/2013, 6/5/2013 (N/S)    Override on 6/5/2013: (N/S) ([facility])            Current Immunizations     Influenza TIV (IM) 9/25/2016, 10/21/2013, 10/1/2012    Influenza Vaccine Quad Inj (Preserved) 9/21/2015    Pneumococcal polysaccharide vaccine (PPSV-23) 12/13/2012  7:30 PM    TD Vaccine 8/7/2010 12:38 PM      Below and/or attached are the medications your provider expects you to take. Review all of your home medications and newly ordered medications with your provider and/or pharmacist. Follow medication instructions as directed by your provider and/or pharmacist. Please keep your medication list with you and share with your provider. Update the information when medications are discontinued, doses are changed, or new medications (including over-the-counter products) are added; and carry medication information at all times in the event of emergency situations     Allergies:  AUGMENTIN - Unspecified     LATEX - Unspecified     TAPE - Rash               Medications  Valid as of: August 14, 2017 -  1:55 PM    Generic Name Brand Name Tablet Size Instructions for use    Ascorbic Acid (Tab) ascorbic acid 500 MG Take 500 mg by mouth every day.        Aspirin (Tablet Delayed Response) ECOTRIN 81 MG Take 1 Tab by mouth every day.        Atorvastatin Calcium (Tab) LIPITOR 40 MG Take 1 Tab by mouth every day.        Cholecalciferol (Tab) vitamin D 2000 UNIT Take 2,000  Units by mouth every day.        Clobetasol Propionate (Cream) TEMOVATE 0.05 %         Cyanocobalamin (Tab CR) B-12 1000 MCG Take  by mouth.        Ferrous Sulfate (Tab) ferrous sulfate 325 (65 FE) MG Take 325 mg by mouth 2 Times a Day.        FLUoxetine HCl (Cap) PROZAC 20 MG Take 20 mg by mouth every day.        GlipiZIDE (Tab) GLUCOTROL 10 MG Take 10 mg by mouth 2 times a day. One po am, 1/2 po pm        HydroCHLOROthiazide (Tab) HYDRODIURIL 12.5 MG Take 12.5 mg by mouth every day.        Insulin NPH Human (Isophane) (Suspension) HUMULIN,NOVOLIN 100 UNIT/ML Inject 10 Units as instructed Once. Before dinner        Lisinopril (Tab) PRINIVIL 5 MG Take 2.5 mg by mouth every day. Indications: High Blood Pressure        MetFORMIN HCl (Tab) GLUCOPHAGE 500 MG Take 500 mg by mouth 2 times a day, with meals.        Metoprolol Tartrate (Tab) LOPRESSOR 25 MG Take 0.5 Tabs by mouth 2 times a day.        Pantoprazole Sodium (Tablet Delayed Response) PROTONIX 40 MG Take 20 mg by mouth every day.        Probiotic Product (Cap) PRO-BIOTIC BLEND  Take  by mouth.        SAXagliptin HCl (Tab) SAXagliptin HCl 5 MG Take 5 mg by mouth every day.        Vitamin E (Cap) VITAMIN E 400 UNIT Take 400 Units by mouth every day.        .                 Medicines prescribed today were sent to:     [id number] - [place] - [address]    [address] [place] [address]    Phone: [phone] Fax: [phone]    Open 24 Hours?: No      Medication refill instructions:       If your prescription bottle indicates you have medication refills left, it is not necessary to call your provider’s office. Please contact your pharmacy and they will refill your medication.    If your prescription bottle indicates you do not have any refills left, you may request refills at any time through one of the following ways: The online [email or web address] system (except Urgent Care), by calling your provider’s office, or by asking your pharmacy to contact  your provider’s office with a refill request. Medication refills are processed only during regular business hours and may not be available until the next business day. Your provider may request additional information or to have a follow-up visit with you prior to refilling your medication.   *Please Note: Medication refills are assigned a new Rx number when refilled electronically. Your pharmacy may indicate that no refills were authorized even though a new prescription for the same medication is available at the pharmacy. Please request the medicine by name with the pharmacy before contacting your provider for a refill.           [employer] Access Code: Activation code not generated  Current [employer] Status: Active

## 2017-08-14 NOTE — PROGRESS NOTES
Chief Complaint   Patient presents with   • Follow-Up     HTN         This patient is an established male who is here today to discuss:  Recent elevated Troponin with sepsis;  He has high AHA 10 yr CV risk of 62.5%  Currently using high intensity Statin txmt; No new complaints;  All recent labs, imaging studies and procedures reviewed    Patient Active Problem List    Diagnosis Date Noted   • Sepsis (CMS-HCC) 04/20/2017     Priority: High   • UTI (urinary tract infection) 04/19/2017     Priority: Medium   • Elevated troponin 04/19/2017     Priority: Medium   • Thrombocytopenia (CMS-HCC) 04/20/2017     Priority: Low   • Anemia 04/20/2017     Priority: Low   • CKD (chronic kidney disease), stage II 04/20/2017     Priority: Low   • GERD (gastroesophageal reflux disease) 04/20/2017     Priority: Low   • 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     Priority: Low   • History of prostate cancer 03/24/2017     Priority: Low   • History of DVT (deep vein thrombosis) 03/24/2017     Priority: Low   • Neuropathy (CMS-HCC) 07/21/2016     Priority: Low   • Depression 07/21/2016     Priority: Low   • Hyperlipidemia 07/21/2016     Priority: Low   • History of prostatectomy 05/08/2015     Priority: Low   • Spinal stenosis, lumbar region, without neurogenic claudication 12/13/2012     Priority: Low   • DM (diabetes mellitus) (CMS-HCC) 09/02/2010     Priority: Low   • HTN (hypertension) 09/02/2010     Priority: Low   • Pressure-related ear pain 04/04/2017   • Other specified disorders of the skin and subcutaneous tissue related to radiation 03/29/2017   • Hematuria 03/29/2017   • History of radiation therapy 03/24/2017   • Type 2 diabetes mellitus with stage 3 chronic kidney disease, with long-term current use of insulin ([facility]) 03/24/2017   • Continuous leakage of urine 03/24/2017   • Health care maintenance 08/30/2016   • Chronic kidney disease (CKD), stage III (moderate) 07/21/2016   • AK (actinic keratosis) 07/21/2016   • Ankle fracture, right  10/28/2015   • Diarrhea following gastrointestinal surgery 06/23/2014   • Blood in stool 06/05/2013   • Senile nuclear sclerosis 02/13/2013   • DVT of lower extremity (deep venous thrombosis) (CMS-HCC) 09/02/2010       Past Medical History   Diagnosis Date   • Heart burn    • Dental disorder      dentures   • Snoring    • Psychiatric problem      taking prozac   • Cancer (CMS-HCC) 2001     prostate   • CATARACT    • Arrhythmia      PVC's   • Indigestion    • Other specified disorder of intestines      alters between normal and diarrhea since radiation tx   • Arthritis    • Colon polyps    • Anemia    • Diabetes 1984   • Lombardi esophagus    • Personal history of venous thrombosis and embolism 2010/1995     leg   • GERD (gastroesophageal reflux disease)    • Cholesterol blood decreased    • Unspecified urinary incontinence      spill from artifical urinary sphincter   • Urinary bladder disorder      artificial urinary sphincter /catheter   • Sleep apnea      no cpap   • Hypertension      well controlled on meds   • Chronic kidney disease (CKD), stage III (moderate) 7/21/2016   • Neuropathy (CMS-HCC) 7/21/2016   • Depression 7/21/2016   • AK (actinic keratosis) 7/21/2016   • Hyperlipidemia 7/21/2016     Past Surgical History   Procedure Laterality Date   • Angiogram  1/25/2010     Performed by PETER NORMAN at SURGERY [facility]   • Aortogram  1/25/2010     Performed by PETER NORMAN at SURGERY [facility]   • Prostatectomy robotic  2/2001   • Pr insert,inflatable sphincter  2001   • Sphincter prosthesis removal  6/3/2010     Performed by JOSH APARICIO at SURGERY [facility]S   • Cystoscopy  2/2/2011     Performed by GERSON SEGUNDO at SURGERY [facility] ORS   • Sphincter prosthesis placement  2/24/2011     Performed by GERSON SEGUNDO at SURGERY [facility] ORS   • Cystoscopy  2/24/2011     Performed by GERSON SEGUNDO at SURGERY [facility] ORS   • Other abdominal surgery      • Groin exploration  3/13/2012     Performed by DEEPA PATRICIA at SURGERY [facility]   • Sphincter prosthesis placement  9/26/2012     Performed by GERSON Car M.D. at SURGERY [facility]   • Cataract phaco with iol  2/13/2013     Performed by Clayton Noonan M.D. at SURGERY SAME DAY [facility]   • Cataract phaco with iol  2/27/2013     Performed by Clayton Noonan M.D. at SURGERY SAME DAY [facility]   • Colonoscopy with apc  6/5/2013     Performed by Deepa Vela M.D. at [facility]   • Bowel resection  5/21/2014     Performed by Grahma Peña M.D. at SURGERY [facility]   • Laparoscopy  5/21/2014     Performed by Graham Peña M.D. at SURGERY [facility]   • Lumbar fusion posterior  12/13/2012     Procedure: L5-S1 removal of Jennings Fragment NON-INSTRUMENTAL;  Surgeon: Wil Martinez M.D.;  Location: SURGERY [facility];  Service:    • Lumbar decompression  12/13/2012     Procedure: 4-5;  Surgeon: Wil Martinez M.D.;  Location: SURGERY [facility];  Service:    • Other orthopedic surgery       hip & femur fx   • Sphincter prosthesis removal  8/10/2015     Procedure: SPHINCTER PROSTHESIS REMOVAL;  Surgeon: Tuan Marie M.D.;  Location: [facility];  Service:    • Ankle orif Right 10/28/2015     Procedure: ANKLE ORIF;  Surgeon: Venu Elizabeth M.D.;  Location: [facility];  Service:      Social History     Social History   • Marital Status:      Spouse Name: N/A   • Number of Children: 5   • Years of Education: N/A     Social History Main Topics   • Smoking status: Former Smoker -- 2.00 packs/day for 30 years     Types: Cigarettes     Quit date: 03/05/1995   • Smokeless tobacco: Never Used      Comment: 1.5 ppd 25 yrs,quit 1995   • Alcohol Use: No   • Drug Use: No   • Sexual Activity: Not Asked     Other Topics Concern   • None     Social History Narrative     Family History   Problem Relation Age of Onset   •  Hypertension     • Cancer       multiple cousins with prostate cancer.    • Cancer Mother      ovarian   • Heart Disease Father    • Diabetes Father    • Diabetes Sister        Current Outpatient Prescriptions   Medication Sig Dispense Refill   • metoprolol (LOPRESSOR) 25 MG Tab Take 0.5 Tabs by mouth 2 times a day. 30 Tab 0   • Probiotic Product (PRO-BIOTIC BLEND) Cap Take  by mouth.     • vitamin e (VITAMIN E) 400 UNIT Cap Take 400 Units by mouth every day.     • atorvastatin (LIPITOR) 40 MG Tab Take 1 Tab by mouth every day. 30 Tab 11   • insulin NPH (NOVOLIN N) 100 UNIT/ML Suspension Inject 10 Units as instructed Once. Before dinner     • Cyanocobalamin (B-12) 1000 MCG Tab CR Take  by mouth.     • pantoprazole (PROTONIX) 40 MG Tablet Delayed Response Take 20 mg by mouth every day.     • glipiZIDE (GLUCOTROL) 10 MG Tab Take 10 mg by mouth 2 times a day. One po am, 1/2 po pm     • hydrochlorothiazide (HYDRODIURIL) 12.5 MG tablet Take 12.5 mg by mouth every day.     • Saxagliptin HCl 5 MG Tab Take 5 mg by mouth every day.     • lisinopril (PRINIVIL) 5 MG Tab Take 2.5 mg by mouth every day. Indications: High Blood Pressure     • ferrous sulfate 325 (65 FE) MG tablet Take 325 mg by mouth 2 Times a Day.     • Cholecalciferol (VITAMIN D) 2000 UNIT TABS Take 2,000 Units by mouth every day.     • ascorbic acid (ASCORBIC ACID) 500 MG TABS Take 500 mg by mouth every day.     • metformin (GLUCOPHAGE) 500 MG TABS Take 500 mg by mouth 2 times a day, with meals.     • fluoxetine (PROZAC) 20 MG CAPS Take 20 mg by mouth every day.     • aspirin EC (ECOTRIN) 81 MG Tablet Delayed Response Take 1 Tab by mouth every day. 30 Tab 2   • clobetasol (TEMOVATE) 0.05 % Cream        No current facility-administered medications for this visit.     Augmentin; Latex; and Tape    Review of Systems:     Constitutional: Denies fevers, Denies weight changes  Eyes: Denies changes in vision, no eye pain  Ears/Nose/Throat/Mouth: Denies nasal  "congestion or sore throat   Cardiovascular: Denies chest pain or palpitations   Respiratory: Denies shortness of breath , Denies cough  Gastrointestinal/Hepatic: Denies abdominal pain, nausea, vomiting, diarrhea, constipation or GI bleeding   Genitourinary: Denies bladder dysfunction, dysuria or frequency  Musculoskeletal/Rheum: Denies  joint pain and swelling   Skin/Breast: Denies rash, denies breast lumps or discharge  Neurological: Denies headache, confusion, memory loss or focal weakness/parasthesias  Psychiatric: denies mood disorder   Endocrine: denies hx of diabetes or thyroid dysfunction  Heme/Oncology/Lymph Nodes: Denies enlarged lymph nodes, denies brusing or known bleeding disorder  Allergic/Immunologic:  hx of allergies      All other systems were reviewed and are negative (AMA/CMS criteria)      Blood pressure 140/60, pulse 86, height 1.778 m (5' 10\"), weight 86.183 kg (190 lb), SpO2 90 %.  General Appearance:  Hard of hearing, mild;  Well developed, Well nourished, No acute distress, Non-toxic appearance.   HENT:  Normocephalic, Atraumatic, Oropharynx moist mucous membranes, Dentition: , Nose normal.    Eyes:  PERRLA, EOMI, Conjunctiva normal, No discharge.  Neck:  Normal range of motion, No cervical tenderness, Supple, No stridor, no JVD .  No thyromegaly.  No carotid bruit.  Cardiovascular:  Normal heart rate, Normal rhythm,  S1, S2, no S3,  S4; No gallops; No murmurs, No rubs, .   Extremitites with intact distal pulses, no cyanosis, clubbing or edema.  No heaves, thrills, HJR;  Peripheral pulses: carotid 2+, brachial 2+, radial 2+, ulnar 2+, femoral 2+, popliteal 2+, PT 2+, DP 2+;  Lungs:  Respiratory effort is normal. Normal breath sounds, breath sounds clear to auscultation bilaterally,  no rales, no rhonchi, no wheezing.   Abdomen: Bowel sounds normal, Soft, No tenderness, No guarding, No rebound, No masses, No hepatosplenomegaly.  Skin: Warm, Dry, No erythema, No rash, no induration or " crepitus.  Neurologic: Alert & oriented x 3, Normal motor function, Normal sensory function, No focal deficits noted, cranial nerves II through XII are normal,  abnormal gait, uses cane.  Psychiatric: Affect normal, Judgment normal, Mood normal..    Results for KARINA SKAGGS (MRN [id number]) as of 8/14/2017 13:46   Ref. Range 4/21/2017 04:45 4/21/2017 07:27 4/21/2017 11:20 4/21/2017 17:22 4/21/2017 23:15 4/22/2017 04:51 4/22/2017 11:24 4/22/2017 14:03 4/22/2017 14:13 5/15/2017 13:56 5/22/2017 15:58 7/9/2017 12:40 7/9/2017 12:47 7/19/2017 11:08 8/8/2017 07:59   WBC Latest Ref Range: 4.8-10.8 K/uL 5.2              8.2   RBC Latest Ref Range: 4.70-6.10 M/uL 3.23 (L)              3.97 (L)   Hemoglobin Latest Ref Range: 14.0-18.0 g/dL 9.7 (L)              11.9 (L)   Hematocrit Latest Ref Range: 42.0-52.0 % 27.7 (L)              35.6 (L)   MCV Latest Ref Range: 81.4-97.8 fL 85.8              89.7   MCH Latest Ref Range: 27.0-33.0 pg 30.0              30.0   MCHC Latest Ref Range: 33.7-35.3 g/dL 35.0              33.4 (L)   RDW Latest Ref Range: 35.9-50.0 fL 49.3              49.1   Platelet Count Latest Ref Range: 164-446 K/uL 96 (L)              170   MPV Latest Ref Range: 9.0-12.9 fL 9.1              10.0   Results for KARINA SKAGGS (MRN [id number]) as of 8/14/2017 13:46   Ref. Range 7/19/2017 11:08 8/8/2017 07:59   WBC Latest Ref Range: 4.8-10.8 K/uL  8.2   RBC Latest Ref Range: 4.70-6.10 M/uL  3.97 (L)   Hemoglobin Latest Ref Range: 14.0-18.0 g/dL  11.9 (L)   Hematocrit Latest Ref Range: 42.0-52.0 %  35.6 (L)   MCV Latest Ref Range: 81.4-97.8 fL  89.7   MCH Latest Ref Range: 27.0-33.0 pg  30.0   MCHC Latest Ref Range: 33.7-35.3 g/dL  33.4 (L)   RDW Latest Ref Range: 35.9-50.0 fL  49.1   Platelet Count Latest Ref Range: 164-446 K/uL  170   MPV Latest Ref Range: 9.0-12.9 fL  10.0   Results for KARINA SKAGGS (MRN [id number]) as of 8/14/2017 13:46   Ref. Range 8/8/2017 07:59   Sodium Latest Ref Range: 135-145 mmol/L  139   Potassium Latest Ref Range: 3.6-5.5 mmol/L 4.1   Chloride Latest Ref Range:  mmol/L 105   Co2 Latest Ref Range: 20-33 mmol/L 25   Anion Gap Latest Ref Range: 0.0-11.9  9.0   Glucose Latest Ref Range: 65-99 mg/dL 186 (H)   Bun Latest Ref Range: 8-22 mg/dL 25 (H)   Creatinine Latest Ref Range: 0.50-1.40 mg/dL 1.19   GFR If  Latest Ref Range: >60 mL/min/1.73 m 2 >60   GFR If Non  Latest Ref Range: >60 mL/min/1.73 m 2 59 (A)   Calcium Latest Ref Range: 8.5-10.5 mg/dL 9.6   AST(SGOT) Latest Ref Range: 12-45 U/L 12   ALT(SGPT) Latest Ref Range: 2-50 U/L 9   Alkaline Phosphatase Latest Ref Range: 30-99 U/L 80   Total Bilirubin Latest Ref Range: 0.1-1.5 mg/dL 0.6   Albumin Latest Ref Range: 3.2-4.9 g/dL 3.7   Total Protein Latest Ref Range: 6.0-8.2 g/dL 6.8   Globulin Latest Ref Range: 1.9-3.5 g/dL 3.1   A-G Ratio Latest Units: g/dL 1.2   Cholesterol,Tot Latest Ref Range: 100-199 mg/dL 115   Triglycerides Latest Ref Range: 0-149 mg/dL 105   HDL Latest Ref Range: >=40 mg/dL 40   LDL Latest Ref Range: <100 mg/dL 54   5/22/2017 NUCLEAR IMAGING INTERPRETATION   No evidence of significant jeopardized viable myocardium or prior myocardial    infarction.   Normal left ventricular size, ejection fraction, and wall motion.      Assessment and Plan.   81 y.o. male has been asx; Stress test neg for ischemia and good LVEF; see above; Reassured; FLP better and CBC better;     1. Essential hypertension  controlled    2. Hyperlipidemia, unspecified hyperlipidemia type  Reviewed and stay on current regimen    3. Chronic kidney disease (CKD), stage III (moderate)  better    4. Type 2 diabetes mellitus with stage 3 chronic kidney disease, with long-term current use of insulin ([facility])  PCP    5. Elevated troponin I level  See above, from sepsis/UTI      Return to clinic in  6 , months    1. Essential hypertension     2. Hyperlipidemia, unspecified hyperlipidemia type     3. Chronic kidney disease  (CKD), stage III (moderate)     4. Type 2 diabetes mellitus with stage 3 chronic kidney disease, with long-term current use of insulin (HCC)     5. Elevated troponin I level

## 2017-08-22 DIAGNOSIS — I10 ESSENTIAL HYPERTENSION: ICD-10-CM

## 2017-09-12 ENCOUNTER — HOSPITAL ENCOUNTER (OUTPATIENT)
Facility: MEDICAL CENTER | Age: 82
End: 2017-09-12
Attending: FAMILY MEDICINE
Payer: MEDICARE

## 2017-09-12 ENCOUNTER — OFFICE VISIT (OUTPATIENT)
Dept: URGENT CARE | Facility: PHYSICIAN GROUP | Age: 82
End: 2017-09-12
Payer: MEDICARE

## 2017-09-12 VITALS
TEMPERATURE: 97.8 F | DIASTOLIC BLOOD PRESSURE: 50 MMHG | WEIGHT: 190 LBS | BODY MASS INDEX: 27.2 KG/M2 | OXYGEN SATURATION: 91 % | RESPIRATION RATE: 12 BRPM | HEART RATE: 86 BPM | SYSTOLIC BLOOD PRESSURE: 122 MMHG | HEIGHT: 70 IN

## 2017-09-12 DIAGNOSIS — N30.01 ACUTE CYSTITIS WITH HEMATURIA: ICD-10-CM

## 2017-09-12 LAB
APPEARANCE UR: CLEAR
BILIRUB UR STRIP-MCNC: NORMAL MG/DL
COLOR UR AUTO: YELLOW
GLUCOSE UR STRIP.AUTO-MCNC: NORMAL MG/DL
KETONES UR STRIP.AUTO-MCNC: NORMAL MG/DL
LEUKOCYTE ESTERASE UR QL STRIP.AUTO: NORMAL
NITRITE UR QL STRIP.AUTO: NORMAL
PH UR STRIP.AUTO: 5 [PH] (ref 5–8)
PROT UR QL STRIP: NORMAL MG/DL
RBC UR QL AUTO: NORMAL
SP GR UR STRIP.AUTO: 1.01
UROBILINOGEN UR STRIP-MCNC: NORMAL MG/DL

## 2017-09-12 PROCEDURE — 87186 SC STD MICRODIL/AGAR DIL: CPT

## 2017-09-12 PROCEDURE — 87086 URINE CULTURE/COLONY COUNT: CPT

## 2017-09-12 PROCEDURE — 87077 CULTURE AEROBIC IDENTIFY: CPT

## 2017-09-12 PROCEDURE — 81002 URINALYSIS NONAUTO W/O SCOPE: CPT | Performed by: FAMILY MEDICINE

## 2017-09-12 PROCEDURE — 99214 OFFICE O/P EST MOD 30 MIN: CPT | Performed by: FAMILY MEDICINE

## 2017-09-12 RX ORDER — SULFAMETHOXAZOLE AND TRIMETHOPRIM 800; 160 MG/1; MG/1
1 TABLET ORAL EVERY 12 HOURS
Qty: 28 TAB | Refills: 0 | Status: SHIPPED | OUTPATIENT
Start: 2017-09-12 | End: 2017-09-26

## 2017-09-12 NOTE — PATIENT INSTRUCTIONS
Urinary Tract Infection  A urinary tract infection (UTI) can occur any place along the urinary tract. The tract includes the kidneys, ureters, bladder, and urethra. A type of germ called bacteria often causes a UTI. UTIs are often helped with antibiotic medicine.   HOME CARE   · If given, take antibiotics as told by your doctor. Finish them even if you start to feel better.  · Drink enough fluids to keep your pee (urine) clear or pale yellow.  · Avoid tea, drinks with caffeine, and bubbly (carbonated) drinks.  · Pee often. Avoid holding your pee in for a long time.  · Pee before and after having sex (intercourse).  · Wipe from front to back after you poop (bowel movement) if you are a woman. Use each tissue only once.  GET HELP RIGHT AWAY IF:   · You have back pain.  · You have lower belly (abdominal) pain.  · You have chills.  · You feel sick to your stomach (nauseous).  · You throw up (vomit).  · Your burning or discomfort with peeing does not go away.  · You have a fever.  · Your symptoms are not better in 3 days.  MAKE SURE YOU:   · Understand these instructions.  · Will watch your condition.  · Will get help right away if you are not doing well or get worse.     This information is not intended to replace advice given to you by your health care provider. Make sure you discuss any questions you have with your health care provider.     Document Released: 06/05/2009 Document Revised: 01/08/2016 Document Reviewed: 07/18/2013  [employer] Interactive Patient Education ©2016 [employer]

## 2017-09-13 DIAGNOSIS — N30.01 ACUTE CYSTITIS WITH HEMATURIA: ICD-10-CM

## 2017-09-15 LAB
BACTERIA UR CULT: ABNORMAL
SIGNIFICANT IND 70042: ABNORMAL
SOURCE SOURCE: ABNORMAL

## 2017-09-27 ASSESSMENT — ENCOUNTER SYMPTOMS
CHILLS: 0
ABDOMINAL PAIN: 0
FEVER: 0

## 2017-09-27 NOTE — PROGRESS NOTES
"Subjective:   Selvin Renteria a 81 y.o. male who presents for UTI (incontinent, having frequent UTI's since May)        UTI   This is a new problem. The current episode started in the past 7 days. The problem occurs intermittently. The problem has been waxing and waning. Associated symptoms include urinary symptoms. Pertinent negatives include no abdominal pain, chills or fever.     Review of Systems   Constitutional: Negative for chills and fever.   Gastrointestinal: Negative for abdominal pain.     Allergies   Allergen Reactions   • Augmentin Unspecified     Bleeding  RXN=5 years ago   • Latex Unspecified     Blisters  RXN=ongoing   • Tape Rash     Rash-blisters-paper tape okay  RXN=ongoing      Objective:   /50   Pulse 86   Temp 36.6 °C (97.8 °F)   Resp 12   Ht 1.778 m (5' 10\")   Wt 86.2 kg (190 lb)   SpO2 91%   BMI 27.26 kg/m²   Physical Exam   Constitutional: He is oriented to person, place, and time. He appears well-developed and well-nourished. No distress.   HENT:   Head: Normocephalic and atraumatic.   Eyes: Conjunctivae and EOM are normal. Pupils are equal, round, and reactive to light.   Cardiovascular: Normal rate and regular rhythm.    No murmur heard.  Pulmonary/Chest: Effort normal and breath sounds normal. No respiratory distress.   Abdominal: Soft. He exhibits no distension. There is tenderness in the suprapubic area. There is no CVA tenderness.   Neurological: He is alert and oriented to person, place, and time. He has normal reflexes. No sensory deficit.   Skin: Skin is warm and dry.   Psychiatric: He has a normal mood and affect.         Assessment/Plan:   Assessment    1. Acute cystitis with hematuria  Differential diagnosis, natural history, supportive care, and indications for immediate follow-up discussed.   - POCT Urinalysis  - sulfamethoxazole-trimethoprim (BACTRIM DS) 800-160 MG tablet; Take 1 Tab by mouth every 12 hours for 14 days.  Dispense: 28 Tab; Refill: 0  - URINE " CULTURE(NEW); Future

## 2017-09-29 DIAGNOSIS — I10 ESSENTIAL HYPERTENSION: ICD-10-CM

## 2017-10-19 ENCOUNTER — OFFICE VISIT (OUTPATIENT)
Dept: INTERNAL MEDICINE | Facility: MEDICAL CENTER | Age: 82
End: 2017-10-19
Payer: MEDICARE

## 2017-10-19 VITALS
HEART RATE: 76 BPM | TEMPERATURE: 97.4 F | HEIGHT: 70 IN | WEIGHT: 188.6 LBS | SYSTOLIC BLOOD PRESSURE: 140 MMHG | RESPIRATION RATE: 17 BRPM | BODY MASS INDEX: 27 KG/M2 | OXYGEN SATURATION: 95 % | DIASTOLIC BLOOD PRESSURE: 60 MMHG

## 2017-10-19 DIAGNOSIS — L30.9 ECZEMA, UNSPECIFIED TYPE: ICD-10-CM

## 2017-10-19 DIAGNOSIS — S81.801A WOUND OF RIGHT LOWER EXTREMITY, INITIAL ENCOUNTER: ICD-10-CM

## 2017-10-19 PROCEDURE — 99213 OFFICE O/P EST LOW 20 MIN: CPT | Mod: GE | Performed by: INTERNAL MEDICINE

## 2017-10-19 ASSESSMENT — PATIENT HEALTH QUESTIONNAIRE - PHQ9: CLINICAL INTERPRETATION OF PHQ2 SCORE: 0

## 2017-10-19 ASSESSMENT — PAIN SCALES - GENERAL: PAINLEVEL: NO PAIN

## 2017-10-19 NOTE — PROGRESS NOTES
Established Patient    Selvin presents today with the following:    CC: right leg wound, posterior knee  (focused visit)    HPI: Selvin Braga is a 81 y.o. Male, with a long medical history, who [facility]    He presents today for an acute visit, limited to the combination of his leg wounds.    He notes that he has had several weeks of some sort of open wound on the back of his right knee, in crease of the popliteal fossa.  He originally noted on the lateral aspect of his posterior knee, but then developed a swelling to the medial aspect as well. He notes that the swelling began after about a week, increased in size, and then decreased to where it is barely noticeable.  He denies any significant pain from this, but is concerned because of swelling, and the fact that the open wound on the lateral aspect has not yet healed.    He also notes a mild redness to the top of his left foot, that has been there for a week or two.  He is uncertain about how much rubbing there may be against his shoe.  There is mild dryness around the area as well.  There is been no bleeding, or ulceration. The skin is still intact.      He denies any systemic effects. He denies any fevers, chills, nausea, vomiting. He also notes that his urination is better than before, and without any clots or blood currently.    ROS  Review of Symptoms  GEN/CONST:   Denies fever, fatigue, weakness, or significant weight change.   H/E:     Denies blurry vision or eye pain.  ENT:   Denies sinus pain, sore throat, ear pain,   CARDIO:   Denies chest pain, palpitations, or edema.  RESP:   Denies shortness of breath, wheezing, or coughing.  GI:    Denies nausea, vomiting, diarrhea, constipation, or abdominal pain.   :   Has had ongoing history of urine incontinence, but currently without hematuria.   MSK:  Denies weakness, or muscle aches.   Notes lesions, as per HPI.    NEURO:  Denies focal weakness.  Notes tingling in  both feet, but no burning.   SKIN:  Denies itches, ...   Notes lesions, as per HPI.      PSYCH:  Denies problems with depression (while on prozac),        Family History   Problem Relation Age of Onset   • Cancer Mother      ovarian   • Heart Disease Father    • Diabetes Father    • Diabetes Sister    • Hypertension     • Cancer       multiple cousins with prostate cancer.        Social History     Social History   • Marital status:      Spouse name: N/A   • Number of children: 5   • Years of education: N/A     Occupational History   • Not on file.     Social History Main Topics   • Smoking status: Former Smoker     Packs/day: 2.00     Years: 30.00     Types: Cigarettes     Quit date: 3/5/1995   • Smokeless tobacco: Never Used      Comment: 1.5 ppd 25 yrs,quit 1995   • Alcohol use No   • Drug use: No   • Sexual activity: Not on file     Other Topics Concern   • Not on file     Social History Narrative   • No narrative on file         Current Outpatient Prescriptions   Medication Sig Dispense Refill   • metoprolol (LOPRESSOR) 25 MG Tab Take 0.5 Tabs by mouth 2 times a day. 90 Tab 3   • aspirin EC (ECOTRIN) 81 MG Tablet Delayed Response Take 1 Tab by mouth every day. 30 Tab 2   • Probiotic Product (PRO-BIOTIC BLEND) Cap Take  by mouth.     • vitamin e (VITAMIN E) 400 UNIT Cap Take 400 Units by mouth every day.     • atorvastatin (LIPITOR) 40 MG Tab Take 1 Tab by mouth every day. 30 Tab 11   • insulin NPH (NOVOLIN N) 100 UNIT/ML Suspension Inject 10 Units as instructed Once. Before dinner     • Cyanocobalamin (B-12) 1000 MCG Tab CR Take  by mouth.     • clobetasol (TEMOVATE) 0.05 % Cream      • pantoprazole (PROTONIX) 40 MG Tablet Delayed Response Take 20 mg by mouth every day.     • glipiZIDE (GLUCOTROL) 10 MG Tab Take 10 mg by mouth 2 times a day. One po am, 1/2 po pm     • hydrochlorothiazide (HYDRODIURIL) 12.5 MG tablet Take 12.5 mg by mouth every day.     • Saxagliptin HCl 5 MG Tab Take 5 mg by mouth every  day.     • lisinopril (PRINIVIL) 5 MG Tab Take 2.5 mg by mouth every day. Indications: High Blood Pressure     • ferrous sulfate 325 (65 FE) MG tablet Take 325 mg by mouth 2 Times a Day.     • Cholecalciferol (VITAMIN D) 2000 UNIT TABS Take 2,000 Units by mouth every day.     • ascorbic acid (ASCORBIC ACID) 500 MG TABS Take 500 mg by mouth every day.     • metformin (GLUCOPHAGE) 500 MG TABS Take 500 mg by mouth 2 times a day, with meals.     • fluoxetine (PROZAC) 20 MG CAPS Take 20 mg by mouth every day.       No current facility-administered medications for this visit.        Patient Active Problem List    Diagnosis Date Noted   • Sepsis (CMS-HCC) 04/20/2017     Priority: High   • UTI (urinary tract infection) 04/19/2017     Priority: Medium   • Elevated troponin 04/19/2017     Priority: Medium   • Thrombocytopenia (CMS-HCC) 04/20/2017     Priority: Low   • Anemia 04/20/2017     Priority: Low   • CKD (chronic kidney disease), stage II 04/20/2017     Priority: Low   • GERD (gastroesophageal reflux disease) 04/20/2017     Priority: Low   • 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     Priority: Low   • History of prostate cancer 03/24/2017     Priority: Low   • History of DVT (deep vein thrombosis) 03/24/2017     Priority: Low   • Neuropathy (CMS-HCC) 07/21/2016     Priority: Low   • Depression 07/21/2016     Priority: Low   • Hyperlipidemia 07/21/2016     Priority: Low   • History of prostatectomy 05/08/2015     Priority: Low   • Spinal stenosis, lumbar region, without neurogenic claudication 12/13/2012     Priority: Low   • DM (diabetes mellitus) (CMS-HCC) 09/02/2010     Priority: Low   • HTN (hypertension) 09/02/2010     Priority: Low   • Pressure-related ear pain 04/04/2017   • Other specified disorders of the skin and subcutaneous tissue related to radiation 03/29/2017   • Hematuria 03/29/2017   • History of radiation therapy 03/24/2017   • Type 2 diabetes mellitus with stage 3 chronic kidney disease, with long-term current  "use of insulin ([facility]) 03/24/2017   • Continuous leakage of urine 03/24/2017   • Health care maintenance 08/30/2016   • Chronic kidney disease (CKD), stage III (moderate) 07/21/2016   • AK (actinic keratosis) 07/21/2016   • Ankle fracture, right 10/28/2015   • Diarrhea following gastrointestinal surgery 06/23/2014   • Blood in stool 06/05/2013   • Senile nuclear sclerosis 02/13/2013   • DVT of lower extremity (deep venous thrombosis) (CMS-HCC) 09/02/2010     He is seen at the [facility], for most of his health issues (as listed above).   He is here, today, just for a focused exam for his leg lesions.       Physical Exam  /60   Pulse 76   Temp 36.3 °C (97.4 °F)   Resp 17   Ht 1.778 m (5' 10\")   Wt 85.5 kg (188 lb 9.6 oz)   SpO2 95%   BMI 27.06 kg/m²     General:  Alert and oriented, No apparent distress.  Eyes:  EOMI.   No scleral icterus.   Lungs: Clear to auscultation bilaterally.  No rales.  Very faint expiratory wheezes.  Cardiovascular: Regular rate and rhythm.  Slight systolic murmur.  Abdomen:  Soft.  Non-distended.  Non-tender.  No rebound or guarding noted.  Musculoskelatal: No pedal edema.  Good general motion of all 4 extremities.   Good general gait, without assistance of walker.  Uses cane, as needed.    Neurological:  Motor function grossly intact and symmetrical.   Psychological: Appears to have normal mood and affect.  Skin:  (3 main areas of lower extremity...  1&2 are posterior right knee.  #3 is dorsal left foot).   (1)  +ulceration / open wound on posterior lateral aspect of right knee / popliteal fossa, with mild crusting around it.   (appears unhealing, but also with crusting around it).   (2)  +mild crusting / induration on the posterior medial aspect of the right knee / popliteal fossa.  (without open wound).  The skin fold between these two areas also appears a bit darker than normal, but without obvious erythema and does not currently appear to be cellulitis.   (3)  +exzematous " change over the left dorsal foot.  Slight redness to area, but with adjacent crusting as well.  No ulceration or breaks in the skin were noted.    Toenails were long and thick, with some chronic changes but no active signs of infection.  No fissures between toes.        Assessment & Plan    1. Wound of right lower extremity, initial encounter (right popliteal fossa)  Open wound on lateral aspect of right popliteal aspect, with mild induration/scabbing on the medial aspect.    The surrounding skin did not look erythematous, and did not appear to have cellulitis.   There may have been some prior drainage from the wounds/lesions.    The open wound appeared to have some crusting around it, causing minor concern for how well it might heal without intervention.  [facility], for further evaluation and management.   He was also [facility].     2. Eczema, unspecified type, (dorsal aspect of left foot).   Since this was an incidental note by the patient (which he was not very concerned about), he will be treated conservatively for this.  He was advised to keep the area well cleaned and washed (dabbed dry), and monitor for a short while.   If it becomes worse, then consideration can be given to further treatment options.     He also notes that he occasionally follows-up with dermatology (for other issues / AK), and that it is about time for his annual follow-up.   He can also follow-up with dermatology on these issues, when he schedules his routine follow-up with dermatology (however, it will likely take quite some time for him to be seen by them).        Parts of this note were created with a computerized dictation system.    Despite review, there may be some spelling or grammatical errors.    Cam Larson M.D.  10/19/2017

## 2017-11-02 ENCOUNTER — NON-PROVIDER VISIT (OUTPATIENT)
Dept: WOUND CARE | Facility: MEDICAL CENTER | Age: 82
End: 2017-11-02
Attending: STUDENT IN AN ORGANIZED HEALTH CARE EDUCATION/TRAINING PROGRAM
Payer: MEDICARE

## 2017-11-02 PROCEDURE — 99212 OFFICE O/P EST SF 10 MIN: CPT

## 2017-11-02 NOTE — CERTIFICATION
[facility]   [facility]   [address]   [address]   [place] [address]   [phone] Fax: [phone]    Discharge Note      Referring Physician: Cam Larson MD  Wound Etiology: trauma  Wound location: right knee posterior and left lateral LE  ICD-10: S81.801A (ICD-10-CM) - Wound of right lower extremity, initial encounter  Date of Discharge: 11/02/2017    Assessment:  Discharge patient at this time secondary to wound resolution.  Pt instr dc today, keep area clean, it will be fragile for a few days, bathe and dry area gently, only ever regains a maximum of 80% of the tensile strength of the surrounding skin, remodeling of scar can continue for 6mo - a year. Contact PCP for a referral back him if any problems with area opening and draining again. Pt understands      Thank you for the referral and the opportunity to treat your patient.      Clinician Signature: _____________________________ Date:_______________

## 2017-11-09 ENCOUNTER — HOSPITAL ENCOUNTER (OUTPATIENT)
Facility: MEDICAL CENTER | Age: 82
End: 2017-11-09
Attending: PHYSICIAN ASSISTANT
Payer: MEDICARE

## 2017-11-09 LAB — EKG IMPRESSION: NORMAL

## 2017-11-09 PROCEDURE — 87186 SC STD MICRODIL/AGAR DIL: CPT

## 2017-11-09 PROCEDURE — 87086 URINE CULTURE/COLONY COUNT: CPT

## 2017-11-09 PROCEDURE — 87077 CULTURE AEROBIC IDENTIFY: CPT

## 2017-11-12 LAB
BACTERIA UR CULT: ABNORMAL
SIGNIFICANT IND 70042: ABNORMAL
SITE SITE: ABNORMAL
SOURCE SOURCE: ABNORMAL

## 2017-11-16 ENCOUNTER — APPOINTMENT (OUTPATIENT)
Dept: WOUND CARE | Facility: MEDICAL CENTER | Age: 82
End: 2017-11-16
Attending: STUDENT IN AN ORGANIZED HEALTH CARE EDUCATION/TRAINING PROGRAM
Payer: MEDICARE

## 2018-03-06 ENCOUNTER — TELEPHONE (OUTPATIENT)
Dept: INTERNAL MEDICINE | Facility: MEDICAL CENTER | Age: 83
End: 2018-03-06

## 2018-03-06 NOTE — TELEPHONE ENCOUNTER
Called patient today and left a message stating he would probably want to go with Dr. Laury Rees. Asked for him to call as soon as he can to get in and establish

## 2018-03-06 NOTE — TELEPHONE ENCOUNTER
----- Message from Chad Francois sent at 3/5/2018  3:25 PM PST -----  Regarding: former Marty pt  Contact: [phone]  Patient would like to know who Dr. Ferguson had assigned him too for a new PCP.

## 2018-03-25 ENCOUNTER — HOSPITAL ENCOUNTER (OUTPATIENT)
Facility: MEDICAL CENTER | Age: 83
End: 2018-03-25
Attending: FAMILY MEDICINE
Payer: MEDICARE

## 2018-03-25 ENCOUNTER — OFFICE VISIT (OUTPATIENT)
Dept: URGENT CARE | Facility: PHYSICIAN GROUP | Age: 83
End: 2018-03-25
Payer: MEDICARE

## 2018-03-25 VITALS
OXYGEN SATURATION: 92 % | RESPIRATION RATE: 16 BRPM | TEMPERATURE: 97.8 F | BODY MASS INDEX: 27.26 KG/M2 | DIASTOLIC BLOOD PRESSURE: 68 MMHG | SYSTOLIC BLOOD PRESSURE: 134 MMHG | HEART RATE: 70 BPM | WEIGHT: 190 LBS

## 2018-03-25 DIAGNOSIS — N30.01 ACUTE CYSTITIS WITH HEMATURIA: ICD-10-CM

## 2018-03-25 PROCEDURE — 99214 OFFICE O/P EST MOD 30 MIN: CPT | Performed by: FAMILY MEDICINE

## 2018-03-25 PROCEDURE — 87086 URINE CULTURE/COLONY COUNT: CPT

## 2018-03-25 RX ORDER — SULFAMETHOXAZOLE AND TRIMETHOPRIM 800; 160 MG/1; MG/1
1 TABLET ORAL EVERY 12 HOURS
Qty: 28 TAB | Refills: 0 | Status: SHIPPED | OUTPATIENT
Start: 2018-03-25 | End: 2018-04-08

## 2018-03-25 RX ORDER — METHENAMINE HIPPURATE 1000 MG/1
1 TABLET ORAL 2 TIMES DAILY
COMMUNITY
End: 2020-01-31

## 2018-03-25 ASSESSMENT — ENCOUNTER SYMPTOMS
FEVER: 0
NAUSEA: 0
EYE DISCHARGE: 0
ABDOMINAL PAIN: 0
CHILLS: 0
HEMOPTYSIS: 0
SORE THROAT: 0
SINUS PAIN: 0
DIARRHEA: 0
PALPITATIONS: 0
FLANK PAIN: 0
VOMITING: 0
MYALGIAS: 0
EYE REDNESS: 0
COUGH: 0

## 2018-03-25 NOTE — PROGRESS NOTES
Subjective:      Selvin Braga is a 82 y.o. male who presents with No chief complaint on file.            Subjective:    Selvin Braga is a 82 y.o. male who complains of hematuria for 3 days.  He has hx of prostate CA, s/p radiation. He is currently incontinent and self cath plus wears condom cath. Patient denies back pain.  Patient does have a history of recurrent UTI.  Patient does not have a history of pyelonephritis.There is no history of nephrolithiasis.    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  2001: Cancer (CMS-HCC)      Comment: prostate  No date: CATARACT  No date: Cholesterol blood decreased  7/21/2016: Chronic kidney disease (CKD), stage III (moder*  No date: Colon polyps  No date: Dental disorder      Comment: dentures  7/21/2016: Depression  1984: Diabetes  No date: GERD (gastroesophageal reflux disease)  No date: Heart burn  7/21/2016: Hyperlipidemia  No date: Hypertension      Comment: well controlled on meds  No date: Indigestion  7/21/2016: Neuropathy (CMS-HCC)  No date: Other specified disorder of intestines      Comment: alters between normal and diarrhea since                radiation tx  2010/1995: Personal history of venous thrombosis and embo*      Comment: leg  No date: Psychiatric problem      Comment: taking prozac  No date: Sleep apnea      Comment: no cpap  No date: Snoring  No date: Unspecified urinary incontinence      Comment: spill from artifical urinary sphincter  No date: Urinary bladder disorder      Comment: artificial urinary sphincter /catheter  Patient Active Problem List    Sepsis (CMS-HCC)         Priority: High (1)         Date Noted: 04/20/2017      UTI (urinary tract infection)         Priority: Medium (2)         Date Noted: 04/19/2017      Elevated troponin         Priority: Medium (2)         Date Noted: 04/19/2017      Thrombocytopenia  (CMS-HCC)         Priority: Low (3)         Date Noted: 04/20/2017      Anemia         Priority: Low (3)         Date Noted: 04/20/2017      CKD (chronic kidney disease), stage II         Priority: Low (3)         Date Noted: 04/20/2017      GERD (gastroesophageal reflux disease)         Priority: Low (3)         Date Noted: 04/20/2017      Radiation cystitis         Priority: Low (3)         Date Noted: 03/29/2017      History of prostate cancer         Priority: Low (3)         Date Noted: 03/24/2017      History of DVT (deep vein thrombosis)         Priority: Low (3)         Date Noted: 03/24/2017      Neuropathy (CMS-HCC)         Priority: Low (3)         Date Noted: 07/21/2016      Depression         Priority: Low (3)         Date Noted: 07/21/2016      Hyperlipidemia         Priority: Low (3)         Date Noted: 07/21/2016      History of prostatectomy         Priority: Low (3)         Date Noted: 05/08/2015      Spinal stenosis, lumbar region, without neurogenic claudication         Priority: Low (3)         Date Noted: 12/13/2012      DM (diabetes mellitus) (CMS-HCC)         Priority: Low (3)         Date Noted: 09/02/2010      HTN (hypertension)         Priority: Low (3)         Date Noted: 09/02/2010      Pressure-related ear pain         Date Noted: 04/04/2017      Other specified disorders of the skin and subcutaneous tissue related to radiation         Date Noted: 03/29/2017      Hematuria         Date Noted: 03/29/2017      History of radiation therapy         Date Noted: 03/24/2017      Type 2 diabetes mellitus with stage 3 chronic kidney disease, with long-term current use of insulin (HCC)         Date Noted: 03/24/2017      Continuous leakage of urine         Date Noted: 03/24/2017      Health care maintenance         Date Noted: 08/30/2016      Chronic kidney disease (CKD), stage III (moderate)         Date Noted: 07/21/2016      AK (actinic keratosis)         Date Noted: 07/21/2016      Ankle  fracture, right         Date Noted: 10/28/2015      Diarrhea following gastrointestinal surgery         Date Noted: 06/23/2014      Blood in stool         Date Noted: 06/05/2013      Senile nuclear sclerosis         Date Noted: 02/13/2013      DVT of lower extremity (deep venous thrombosis) (CMS-HCC)         Date Noted: 09/02/2010      Past Surgical History:  10/28/2015: ANKLE ORIF Right      Comment: Procedure: ANKLE ORIF;  Surgeon: Venu Elizabeth M.D.;  Location: [facility];  Service:   8/10/2015: SPHINCTER PROSTHESIS REMOVAL      Comment: Procedure: SPHINCTER PROSTHESIS REMOVAL;                 Surgeon: Tuan Marie M.D.;  Location: [facility];  Service:   5/21/2014: BOWEL RESECTION      Comment: Performed by Graham Peña M.D. at                [facility]  5/21/2014: LAPAROSCOPY      Comment: Performed by Graham Peña M.D. at                [facility]  6/5/2013: COLONOSCOPY WITH APC      Comment: Performed by Alek Vela M.D. at [facility]  2/27/2013: CATARACT PHACO WITH IOL      Comment: Performed by Clayton Noonan M.D. at SURGERY               SAME DAY [facility]  2/13/2013: CATARACT PHACO WITH IOL      Comment: Performed by Clayton Noonan M.D. at SURGERY               SAME DAY [facility]  12/13/2012: LUMBAR FUSION POSTERIOR      Comment: Procedure: L5-S1 removal of [place] Fragment                NON-INSTRUMENTAL;  Surgeon: Wil Martinez M.D.;               Location: [facility];  Service:   12/13/2012: LUMBAR DECOMPRESSION      Comment: Procedure: 4-5;  Surgeon: Wil Martinez M.D.;                Location: [facility];  Service:   9/26/2012: SPHINCTER PROSTHESIS PLACEMENT      Comment: Performed by GESRON Car M.D. at [facility]  3/13/2012: GROIN EXPLORATION      Comment: Performed by BELEM  DEEPA NIETO at SURGERY [facility] ORS  2/24/2011: SPHINCTER PROSTHESIS PLACEMENT      Comment: Performed by GERSON SEGUNDO at SURGERY                [facility] ORS  2/24/2011: CYSTOSCOPY      Comment: Performed by GERSON SEGUNDO at SURGERY                [facility] ORS  2/2/2011: CYSTOSCOPY      Comment: Performed by GERSON SEGUNDO at SURGERY                [facility] ORS  6/3/2010: SPHINCTER PROSTHESIS REMOVAL      Comment: Performed by JOSH APARICIO at SURGERY               [facility] ORS  1/25/2010: ANGIOGRAM      Comment: Performed by PETER NORMAN at SURGERY                [facility] ORS  1/25/2010: AORTOGRAM      Comment: Performed by PETER NORMAN at SURGERY                [facility] ORS  2/2001: PROSTATECTOMY ROBOTIC  2001: PB INSERT,INFLATABLE SPHINCTER  No date: OTHER ABDOMINAL SURGERY  No date: OTHER ORTHOPEDIC SURGERY      Comment: hip & femur fx  Review of patient's family history indicates:    Cancer                         Mother                      Comment: ovarian    Heart Disease                  Father                    Diabetes                       Father                    Diabetes                       Sister                    Hypertension                                             Cancer                                                     Comment: multiple cousins with prostate cancer.     Social History    Marital status:              Spouse name:                       Years of education:                 Number of children: 5             Social History Main Topics    Smoking status: Former Smoker                                                                Packs/day: 2.00      Years: 30.00          Types: Cigarettes       Quit date: 3/5/1995    Smokeless tobacco: Never Used                        Comment: 1.5 ppd 25 yrs,quit 1995    Alcohol use: No              Drug use: No              Current Outpatient  Prescriptions:  metoprolol (LOPRESSOR) 25 MG Tab, Take 0.5 Tabs by mouth 2 times a day., Disp: 90 Tab, Rfl: 3  aspirin EC (ECOTRIN) 81 MG Tablet Delayed Response, Take 1 Tab by mouth every day., Disp: 30 Tab, Rfl: 2  Probiotic Product (PRO-BIOTIC BLEND) Cap, Take  by mouth., Disp: , Rfl:   vitamin e (VITAMIN E) 400 UNIT Cap, Take 400 Units by mouth every day., Disp: , Rfl:   atorvastatin (LIPITOR) 40 MG Tab, Take 1 Tab by mouth every day., Disp: 30 Tab, Rfl: 11  insulin NPH (NOVOLIN N) 100 UNIT/ML Suspension, Inject 10 Units as instructed Once. Before dinner, Disp: , Rfl:   Cyanocobalamin (B-12) 1000 MCG Tab CR, Take  by mouth., Disp: , Rfl:   clobetasol (TEMOVATE) 0.05 % Cream, , Disp: , Rfl:   pantoprazole (PROTONIX) 40 MG Tablet Delayed Response, Take 20 mg by mouth every day., Disp: , Rfl:   glipiZIDE (GLUCOTROL) 10 MG Tab, Take 10 mg by mouth 2 times a day. One po am, 1/2 po pm, Disp: , Rfl:   hydrochlorothiazide (HYDRODIURIL) 12.5 MG tablet, Take 12.5 mg by mouth every day., Disp: , Rfl:   Saxagliptin HCl 5 MG Tab, Take 5 mg by mouth every day., Disp: , Rfl:   lisinopril (PRINIVIL) 5 MG Tab, Take 2.5 mg by mouth every day. Indications: High Blood Pressure, Disp: , Rfl:   ferrous sulfate 325 (65 FE) MG tablet, Take 325 mg by mouth 2 Times a Day., Disp: , Rfl:   Cholecalciferol (VITAMIN D) 2000 UNIT TABS, Take 2,000 Units by mouth every day., Disp: , Rfl:   ascorbic acid (ASCORBIC ACID) 500 MG TABS, Take 500 mg by mouth every day., Disp: , Rfl:   metformin (GLUCOPHAGE) 500 MG TABS, Take 500 mg by mouth 2 times a day, with meals., Disp: , Rfl:   fluoxetine (PROZAC) 20 MG CAPS, Take 20 mg by mouth every day., Disp: , Rfl:     No current facility-administered medications for this visit.      -- Augmentin -- Unspecified    --  Bleeding             RXN=5 years ago   -- Latex -- Unspecified    --  Blisters             RXN=ongoing   -- Tape -- Rash    --  Rash-blisters-paper tape okay              RXN=ongoing                Review of Systems   Constitutional: Negative for chills, fever and malaise/fatigue.   HENT: Negative for congestion, sinus pain and sore throat.    Eyes: Negative for discharge and redness.   Respiratory: Negative for cough and hemoptysis.    Cardiovascular: Negative for chest pain and palpitations.   Gastrointestinal: Negative for abdominal pain, diarrhea, nausea and vomiting.   Genitourinary: Positive for frequency and hematuria. Negative for dysuria, flank pain and urgency.   Musculoskeletal: Negative for myalgias.   Skin: Negative for itching and rash.          Objective:     There were no vitals taken for this visit.     Physical Exam   Constitutional: He is oriented to person, place, and time. He appears well-developed and well-nourished.   HENT:   Head: Normocephalic and atraumatic.   Eyes: EOM are normal. Pupils are equal, round, and reactive to light.   Neck: Normal range of motion. Neck supple.   Cardiovascular: Normal rate, regular rhythm and normal heart sounds.    Pulmonary/Chest: Effort normal and breath sounds normal.   Abdominal: Soft. Bowel sounds are normal. He exhibits no distension. There is no tenderness.   Genitourinary:   Genitourinary Comments: Deferred    Neurological: He is alert and oriented to person, place, and time.               Assessment/Plan:   Laboratory:   Urine dipstick shows negative for glucose, 3+ for hemoglobin, negative for ketones, 2+ for leukocyte esterase, negative for nitrites.         Assessment:    Acute cystitis     Plan:  1. Medications: bactrim is prescribed based on the sensitivities from last UCx however he has a standing prescription of methenamine 1 gm QD from his urologist, which he has taken todat. Recommended to call Dr. Valentino and ask his recommendation as well as followup with him next week  2. Maintain adequate hydration  3. Follow up if symptoms not improving, and prn.      Differential diagnoses, natural history, supportive care  discussed  Indications for immediate care in an emergency department, when to return to the urgent care, and when to follow up with primary care provider discussed at length. Patient espressed understanding and agreed to do so.

## 2018-03-27 LAB
BACTERIA UR CULT: ABNORMAL
BACTERIA UR CULT: ABNORMAL
SIGNIFICANT IND 70042: ABNORMAL
SITE SITE: ABNORMAL
SOURCE SOURCE: ABNORMAL

## 2018-04-12 ENCOUNTER — HOSPITAL ENCOUNTER (OUTPATIENT)
Dept: LAB | Facility: MEDICAL CENTER | Age: 83
End: 2018-04-12
Attending: NURSE PRACTITIONER
Payer: MEDICARE

## 2018-04-12 PROCEDURE — 87086 URINE CULTURE/COLONY COUNT: CPT

## 2018-04-15 LAB
BACTERIA UR CULT: NORMAL
SIGNIFICANT IND 70042: NORMAL
SITE SITE: NORMAL
SOURCE SOURCE: NORMAL

## 2018-04-23 DIAGNOSIS — E78.5 HYPERLIPIDEMIA, UNSPECIFIED HYPERLIPIDEMIA TYPE: ICD-10-CM

## 2018-04-23 RX ORDER — ATORVASTATIN CALCIUM 40 MG/1
40 TABLET, FILM COATED ORAL DAILY
Qty: 90 TAB | Refills: 0 | Status: SHIPPED | OUTPATIENT
Start: 2018-04-23 | End: 2018-06-26 | Stop reason: SDUPTHER

## 2018-05-09 ENCOUNTER — OFFICE VISIT (OUTPATIENT)
Dept: INTERNAL MEDICINE | Facility: MEDICAL CENTER | Age: 83
End: 2018-05-09
Payer: MEDICARE

## 2018-05-09 VITALS
BODY MASS INDEX: 27.19 KG/M2 | WEIGHT: 183.6 LBS | OXYGEN SATURATION: 90 % | TEMPERATURE: 97.2 F | DIASTOLIC BLOOD PRESSURE: 62 MMHG | RESPIRATION RATE: 18 BRPM | HEIGHT: 69 IN | SYSTOLIC BLOOD PRESSURE: 140 MMHG | HEART RATE: 64 BPM

## 2018-05-09 DIAGNOSIS — I10 ESSENTIAL HYPERTENSION: ICD-10-CM

## 2018-05-09 DIAGNOSIS — N18.2 CKD (CHRONIC KIDNEY DISEASE), STAGE II: ICD-10-CM

## 2018-05-09 DIAGNOSIS — N18.30 TYPE 2 DIABETES MELLITUS WITH STAGE 3 CHRONIC KIDNEY DISEASE, WITH LONG-TERM CURRENT USE OF INSULIN (HCC): ICD-10-CM

## 2018-05-09 DIAGNOSIS — E78.5 HYPERLIPIDEMIA, UNSPECIFIED HYPERLIPIDEMIA TYPE: ICD-10-CM

## 2018-05-09 DIAGNOSIS — G62.9 NEUROPATHY: ICD-10-CM

## 2018-05-09 DIAGNOSIS — Z79.4 TYPE 2 DIABETES MELLITUS WITH STAGE 3 CHRONIC KIDNEY DISEASE, WITH LONG-TERM CURRENT USE OF INSULIN (HCC): ICD-10-CM

## 2018-05-09 DIAGNOSIS — F32.9 REACTIVE DEPRESSION: ICD-10-CM

## 2018-05-09 DIAGNOSIS — Z85.46 HISTORY OF PROSTATE CANCER: ICD-10-CM

## 2018-05-09 DIAGNOSIS — K21.9 GASTROESOPHAGEAL REFLUX DISEASE WITHOUT ESOPHAGITIS: ICD-10-CM

## 2018-05-09 DIAGNOSIS — E11.22 TYPE 2 DIABETES MELLITUS WITH STAGE 3 CHRONIC KIDNEY DISEASE, WITH LONG-TERM CURRENT USE OF INSULIN (HCC): ICD-10-CM

## 2018-05-09 PROCEDURE — 99214 OFFICE O/P EST MOD 30 MIN: CPT | Performed by: INTERNAL MEDICINE

## 2018-05-09 ASSESSMENT — PATIENT HEALTH QUESTIONNAIRE - PHQ9: CLINICAL INTERPRETATION OF PHQ2 SCORE: 0

## 2018-05-09 ASSESSMENT — PAIN SCALES - GENERAL: PAINLEVEL: NO PAIN

## 2018-05-09 ASSESSMENT — ACTIVITIES OF DAILY LIVING (ADL): BATHING_REQUIRES_ASSISTANCE: 0

## 2018-05-09 NOTE — PROGRESS NOTES
New Patient to Establish        CC: DM/HTN/establish PCP  Chief Complaint   Patient presents with   • New Patient     htn   • Hyperlipidemia     gerd   • Diabetes     arthritis   • Anemia        HPI: Selvin Braga is a  pleasant 82 y.o.  here to establish PCP.  History taken from patient/chart review and PVQ, [facility]  [facility] in [facility]  A1C- 6.6, cr- 1.1/GFR-64,hb- 12, plt- 168, TSH- 2.69, PSA- ,0.01, alb/cr ratio- 37    DM- reports compliance to insulin and meds, no hypoglycemia  Last A1c 6.6,   HTN  -stable  Depression- takes Prozac/family supportive  Prostate ca sp radiation/hormonal rx- has urinary/fecal complications post radiation- sees GI and urology, occasional blood in stool and hematuria  CKD- 2- not using nephrotoxins  Hyperlipidemia- tolerating atorvastatin      Patient Active Problem List    Diagnosis Date Noted   • Sepsis (HCC) 04/20/2017     Priority: High   • UTI (urinary tract infection) 04/19/2017     Priority: Medium   • Elevated troponin 04/19/2017     Priority: Medium   • Thrombocytopenia (HCC) 04/20/2017     Priority: Low   • Anemia 04/20/2017     Priority: Low   • CKD (chronic kidney disease), stage II 04/20/2017     Priority: Low   • GERD (gastroesophageal reflux disease) 04/20/2017     Priority: Low   • 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     Priority: Low   • History of prostate cancer 03/24/2017     Priority: Low   • History of DVT (deep vein thrombosis) 03/24/2017     Priority: Low   • Neuropathy (HCC) 07/21/2016     Priority: Low   • Depression 07/21/2016     Priority: Low   • Hyperlipidemia 07/21/2016     Priority: Low   • History of prostatectomy 05/08/2015     Priority: Low   • Spinal stenosis, lumbar region, without neurogenic claudication 12/13/2012     Priority: Low   • DM (diabetes mellitus) (HCC) 09/02/2010     Priority: Low   • HTN (hypertension) 09/02/2010     Priority: Low   • Pressure-related ear pain 04/04/2017   • Other specified  disorders of the skin and subcutaneous tissue related to radiation 03/29/2017   • Hematuria 03/29/2017   • History of radiation therapy 03/24/2017   • Type 2 diabetes mellitus with stage 3 chronic kidney disease, with long-term current use of insulin (Formerly [facility]) 03/24/2017   • Continuous leakage of urine 03/24/2017   • Health care maintenance 08/30/2016   • Chronic kidney disease (CKD), stage III (moderate) 07/21/2016   • AK (actinic keratosis) 07/21/2016   • Ankle fracture, right 10/28/2015   • Diarrhea following gastrointestinal surgery 06/23/2014   • Blood in stool 06/05/2013   • Senile nuclear sclerosis 02/13/2013   • DVT of lower extremity (deep venous thrombosis) (Formerly [facility]) 09/02/2010       Past Medical History:   Diagnosis Date   • AK (actinic keratosis) 7/21/2016   • Anemia    • Arrhythmia     PVC's   • Arthritis    • Lombardi esophagus    • Cancer (Formerly [facility]) 2001    prostate   • CATARACT    • Cholesterol blood decreased    • Chronic kidney disease (CKD), stage III (moderate) 7/21/2016   • Colon polyps    • Dental disorder     dentures   • Depression 7/21/2016   • Diabetes 1984   • GERD (gastroesophageal reflux disease)    • Heart burn    • Hyperlipidemia 7/21/2016   • Hypertension     well controlled on meds   • Indigestion    • Neuropathy ([facility]) 7/21/2016   • Other specified disorder of intestines     alters between normal and diarrhea since radiation tx   • Personal history of venous thrombosis and embolism 2010/1995    leg   • Psychiatric problem     taking prozac   • Sleep apnea     no cpap   • Snoring    • Unspecified urinary incontinence     spill from artifical urinary sphincter   • Urinary bladder disorder     artificial urinary sphincter /catheter       Current Outpatient Prescriptions   Medication Sig Dispense Refill   • atorvastatin (LIPITOR) 40 MG Tab Take 1 Tab by mouth every day. For further refills please contact new cardiologist. Thank you 90 Tab 0   • methenamine hip (HIPPREX) 1 GM Tab Take 1 g by mouth 2  times a day.     • metoprolol (LOPRESSOR) 25 MG Tab Take 0.5 Tabs by mouth 2 times a day. 90 Tab 3   • Probiotic Product (PRO-BIOTIC BLEND) Cap Take  by mouth.     • insulin NPH (NOVOLIN N) 100 UNIT/ML Suspension Inject 10 Units as instructed Once. Before dinner     • clobetasol (TEMOVATE) 0.05 % Cream      • pantoprazole (PROTONIX) 40 MG Tablet Delayed Response Take 20 mg by mouth every day.     • glipiZIDE (GLUCOTROL) 10 MG Tab Take 10 mg by mouth 2 times a day. One po am, 1/2 po pm     • hydrochlorothiazide (HYDRODIURIL) 12.5 MG tablet Take 12.5 mg by mouth every day.     • Saxagliptin HCl 5 MG Tab Take 5 mg by mouth every day.     • lisinopril (PRINIVIL) 5 MG Tab Take 2.5 mg by mouth every day. Indications: High Blood Pressure     • ferrous sulfate 325 (65 FE) MG tablet Take 325 mg by mouth 2 Times a Day.     • Cholecalciferol (VITAMIN D) 2000 UNIT TABS Take 2,000 Units by mouth every day.     • ascorbic acid (ASCORBIC ACID) 500 MG TABS Take 500 mg by mouth every day.     • metformin (GLUCOPHAGE) 500 MG TABS Take 500 mg by mouth 2 times a day, with meals.     • fluoxetine (PROZAC) 20 MG CAPS Take 20 mg by mouth every day.     • aspirin EC (ECOTRIN) 81 MG Tablet Delayed Response Take 1 Tab by mouth every day. 30 Tab 2   • Cyanocobalamin (B-12) 1000 MCG Tab CR Take  by mouth.       No current facility-administered medications for this visit.        Allergies as of 05/09/2018 - Reviewed 05/09/2018   Allergen Reaction Noted   • Augmentin Unspecified 10/28/2015   • Latex Unspecified 10/28/2015   • Tape Rash 10/28/2015       Social History     Social History   • Marital status:      Spouse name: N/A   • Number of children: 5   • Years of education: N/A     Occupational History   • Not on file.     Social History Main Topics   • Smoking status: Former Smoker     Packs/day: 2.00     Years: 30.00     Types: Cigarettes     Quit date: 3/5/1995   • Smokeless tobacco: Never Used      Comment: 1.5 ppd 25 yrs,quit 1995    • Alcohol use No   • Drug use: No   • Sexual activity: Not on file     Other Topics Concern   • Not on file     Social History Narrative   • No narrative on file     ADLs/IADLs-intact  Lives in apartment alone, son is in the same bleeding  Kids-  4 living  Stay in touch, 2 sones in [place]  Retired- [employer] 2002, 5 years retired completely at the age of 77, likes to read   Education- some college, [employer]  Smoking-no  rec drug use-no  , wife passed away 2006 was  almost 50 years, has tried to look into relationships again but has been challenging d/t his bowel and bladder functions.    Family History   Problem Relation Age of Onset   • Cancer Mother      ovarian   • Heart Disease Father    • Diabetes Father    • Diabetes Sister    • Hypertension     • Cancer       multiple cousins with prostate cancer.        Past Surgical History:   Procedure Laterality Date   • ANKLE ORIF Right 10/28/2015    Procedure: ANKLE ORIF;  Surgeon: Venu Elizabeth M.D.;  Location: [facility];  Service:    • SPHINCTER PROSTHESIS REMOVAL  8/10/2015    Procedure: SPHINCTER PROSTHESIS REMOVAL;  Surgeon: Tuan Marie M.D.;  Location: [facility];  Service:    • BOWEL RESECTION  5/21/2014    Performed by Graham Peña M.D. at [facility]   • LAPAROSCOPY  5/21/2014    Performed by Graham Peña M.D. at [facility]   • COLONOSCOPY WITH APC  6/5/2013    Performed by Alek Vela M.D. at [facility]   • CATARACT PHACO WITH IOL  2/27/2013    Performed by Clayton Noonan M.D. at [facility]ERY [facility]   • CATARACT PHACO WITH IOL  2/13/2013    Performed by Clayton Noonan M.D. at [facility]ERY [facility]   • LUMBAR FUSION POSTERIOR  12/13/2012    Procedure: L5-S1 removal of Jennings Fragment NON-INSTRUMENTAL;  Surgeon: Wil Martinez M.D.;  Location: [facility];  Service:    • LUMBAR DECOMPRESSION  12/13/2012    Procedure: 4-5;   "Surgeon: Wil Martinez M.D.;  Location: SURGERY [facility];  Service:    • SPHINCTER PROSTHESIS PLACEMENT  9/26/2012    Performed by GERSON Car M.D. at SURGERY [facility]   • GROIN EXPLORATION  3/13/2012    Performed by DEEPA PATRICIA at SURGERY [facility]   • SPHINCTER PROSTHESIS PLACEMENT  2/24/2011    Performed by GERSON CAR at SURGERY [facility]   • CYSTOSCOPY  2/24/2011    Performed by GERSON CAR at SURGERY [facility]   • CYSTOSCOPY  2/2/2011    Performed by GERSON CAR at SURGERY [facility]   • SPHINCTER PROSTHESIS REMOVAL  6/3/2010    Performed by JOSH APARICIO at SURGERY [facility]   • ANGIOGRAM  1/25/2010    Performed by PETER NORMAN at SURGERY [facility]   • AORTOGRAM  1/25/2010    Performed by PETER NORMAN at SURGERY [facility]   • PROSTATECTOMY ROBOTIC  2/2001   • PB INSERT,INFLATABLE SPHINCTER  2001   • OTHER ABDOMINAL SURGERY     • OTHER ORTHOPEDIC SURGERY      hip & femur fx       ROS:  A 14- system ROS reviewed and negative except pertinent symptoms as mentioned in HPI and noted below.    Falls in the last 12 months:1 couple of months ago while picking dog poop, difficulty to get up after fall, people at park helped him to get up   Memory:good  Vision:ok, s/p cataract/ no signs of diabetic retinopathy  Hearing:aids/ annual eval  Mood:good  Sleep:good  Weight loss:N  Assistive devices:cane/walker/scoooter  Incontinence: Y  Pain: Scale/type/site: back pain  Antipsychotics/Benzodiazepine use:N  Hospitalizations within the last year:N    /62   Pulse 64   Temp 36.2 °C (97.2 °F)   Resp 18   Ht 1.753 m (5' 9\")   Wt 83.3 kg (183 lb 9.6 oz)   SpO2 90%   BMI 27.11 kg/m²     Physical Exam:  General:  Alert and oriented, No apparent distress.    Mood/Affect: euthymic/congruent  PHQ-2 Score:0  Gait/Get Up and Go:>15s, difficulty standing up w/o support  Mini-cog:       Word recall:3/3      Clock " draw:N    Eyes: Pupils equal and reactive. No scleral icterus.    Throat: Clear no erythema or exudates noted. Dentures  Ear- left ear- drain    Neck: Supple. No lymphadenopathy noted. Thyroid not enlarged.    Lungs: Clear to auscultation and percussion bilaterally.    Cardiovascular: Regular rate and rhythm. No murmurs, rubs or gallops.    Abdomen:  Benign. No rebound or guarding noted.    Extremities: No clubbing, cyanosis, edema.    Skin: Clear. No rash or suspicious skin lesions noted.    Assessment and Plan    1. Type 2 diabetes mellitus with stage 3 chronic kidney disease, with long-term current use of insulin (HCC)  A1C-6.6, on  Metformin,NPH- 10 units at bedtime, glipizide, saxagliptin  - discussed liberal control of A1C 7-7.5 should be okay, concerned about hypoglycemia and pill burden  - [facility], if A1C  Remains less than 7, d/c glipizide    2. Essential hypertension  Est/stableOn lisiniopril/HCTZ/lopressor    3. CKD (chronic kidney disease), stage II  Reviewed last labs avoiding nephrotoxins    4. Gastroesophageal reflux disease without esophagitis  On pantoprazole, sees GI later this month    5. Neuropathy (HCC)  - occasional, not on meds    6. Reactive depression  On Fluoxetine, tried being off did not work, mood is okay  - discussed concerns with fluoxetine, wanted to continue on it for now    7 Hyperlipidemia- on statin  8. Recurrent UTI- [facility] with [facility] and [facility]  9. 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 Frailty/debility- recommend trial of PT for balance and gait strengthening and fall recovery    Health maintenance:  Discussed bone density and shingles- will [facility] with [facility]    Advanced care planning:  DPOA- Y  Living will-Y  POLST- N, plans to do it in near future    Signed by: Laury Rees M.D.

## 2018-05-09 NOTE — PATIENT INSTRUCTIONS
Get new shingles vaccine  Make sure you get B12 checked in your next labs.  [facility] to [facility].

## 2018-05-10 ENCOUNTER — PATIENT OUTREACH (OUTPATIENT)
Dept: HEALTH INFORMATION MANAGEMENT | Facility: OTHER | Age: 83
End: 2018-05-10

## 2018-05-10 NOTE — PROGRESS NOTES
Outcome: Left Message    Please transfer to Patient Outreach Team at [phone] when patient returns call.    WebIZ Checked & Epic Updated:  no    HealthConnect Verified: yes    Attempt # AWV PROJECT 1ST ATTEMPT

## 2018-05-10 NOTE — PROGRESS NOTES
Attempt #:Final    Verify PCP: yes    Communication Preference Obtained: yes  Annual Wellness Visit Scheduling  1. Scheduling Status:Not Scheduled. Patient states they have too many other visits        Care Gap Scheduling (Attempt to Schedule EACH Overdue Care Gap!) not able to address care gaps  Health Maintenance Due   Topic Date Due   • Annual Wellness Visit  1935   • IMM DTaP/Tdap/Td Vaccine (1 - Tdap) 08/08/2010   • URINE ACR / MICROALBUMIN  01/16/2013   • IMM PNEUMOCOCCAL 65+ (ADULT) LOW/MEDIUM RISK SERIES (2 of 2 - PCV13) 12/13/2013   • A1C SCREENING  02/22/2017   • DIABETES MONOFILAMENT / LE EXAM  08/30/2017       MyChart Activation: already active

## 2018-06-26 ENCOUNTER — OFFICE VISIT (OUTPATIENT)
Dept: CARDIOLOGY | Facility: MEDICAL CENTER | Age: 83
End: 2018-06-26
Payer: MEDICARE

## 2018-06-26 VITALS
SYSTOLIC BLOOD PRESSURE: 112 MMHG | BODY MASS INDEX: 27.25 KG/M2 | HEIGHT: 69 IN | OXYGEN SATURATION: 91 % | DIASTOLIC BLOOD PRESSURE: 60 MMHG | HEART RATE: 76 BPM | WEIGHT: 184 LBS

## 2018-06-26 DIAGNOSIS — N18.30 CHRONIC KIDNEY DISEASE (CKD), STAGE III (MODERATE) (HCC): ICD-10-CM

## 2018-06-26 DIAGNOSIS — E11.22 TYPE 2 DIABETES MELLITUS WITH STAGE 3 CHRONIC KIDNEY DISEASE, WITH LONG-TERM CURRENT USE OF INSULIN (HCC): ICD-10-CM

## 2018-06-26 DIAGNOSIS — N18.30 TYPE 2 DIABETES MELLITUS WITH STAGE 3 CHRONIC KIDNEY DISEASE, WITH LONG-TERM CURRENT USE OF INSULIN (HCC): ICD-10-CM

## 2018-06-26 DIAGNOSIS — Z79.4 TYPE 2 DIABETES MELLITUS WITH STAGE 3 CHRONIC KIDNEY DISEASE, WITH LONG-TERM CURRENT USE OF INSULIN (HCC): ICD-10-CM

## 2018-06-26 DIAGNOSIS — E78.5 HYPERLIPIDEMIA, UNSPECIFIED HYPERLIPIDEMIA TYPE: ICD-10-CM

## 2018-06-26 DIAGNOSIS — I10 ESSENTIAL HYPERTENSION: ICD-10-CM

## 2018-06-26 PROBLEM — D69.6 THROMBOCYTOPENIA (HCC): Status: RESOLVED | Noted: 2017-04-20 | Resolved: 2018-06-26

## 2018-06-26 PROBLEM — N18.2 CKD (CHRONIC KIDNEY DISEASE), STAGE II: Status: RESOLVED | Noted: 2017-04-20 | Resolved: 2018-06-26

## 2018-06-26 PROBLEM — A41.9 SEPSIS (HCC): Status: RESOLVED | Noted: 2017-04-20 | Resolved: 2018-06-26

## 2018-06-26 PROBLEM — R79.89 ELEVATED TROPONIN: Status: RESOLVED | Noted: 2017-04-19 | Resolved: 2018-06-26

## 2018-06-26 PROBLEM — R31.9 HEMATURIA: Status: RESOLVED | Noted: 2017-03-29 | Resolved: 2018-06-26

## 2018-06-26 PROBLEM — N39.0 UTI (URINARY TRACT INFECTION): Status: RESOLVED | Noted: 2017-04-19 | Resolved: 2018-06-26

## 2018-06-26 PROCEDURE — 99214 OFFICE O/P EST MOD 30 MIN: CPT | Performed by: NURSE PRACTITIONER

## 2018-06-26 RX ORDER — ATORVASTATIN CALCIUM 40 MG/1
40 TABLET, FILM COATED ORAL DAILY
Qty: 90 TAB | Refills: 3 | Status: SHIPPED | OUTPATIENT
Start: 2018-06-26 | End: 2019-05-23 | Stop reason: SDUPTHER

## 2018-06-26 ASSESSMENT — ENCOUNTER SYMPTOMS
HEADACHES: 0
BRUISES/BLEEDS EASILY: 0
FEVER: 0
DIZZINESS: 0
LOSS OF CONSCIOUSNESS: 0
CHILLS: 0
ORTHOPNEA: 0
PALPITATIONS: 0
PND: 0
SHORTNESS OF BREATH: 0
ABDOMINAL PAIN: 0
NAUSEA: 0
MYALGIAS: 0

## 2018-06-26 NOTE — LETTER
[facility]   [address]   [address]   [place] [address]  Phone: [phone]  Fax: [phone]              Selvin Braga  1935    Encounter Date: 6/26/2018    MANJEET Jay          PROGRESS NOTE:  Chief Complaint   Patient presents with   • Follow-Up   • HTN (Controlled)   • Hyperlipidemia       Subjective:   Selvin Braga is a 82 y.o. male who presents today for annual follow-up of hypertension and hyperlipidemia.    Selvin is an 82 year old male with history of hypertension, hyperlipidemia, diabetes and chronic kidney disease, previously followed by Dr. Huston. He was first seen when he developed sepsis following hospitalization for UTI.  No further urology problems since then.    His BP is well controlled. No chest pain, pressure or discomfort; no palpitations; no shortness of breath, orthopnea or PND; no dizziness or syncope; very mild, stable LE edema. He has some balance problems, unchanged from previous. Glucose is well controlled too.    Past Medical History:   Diagnosis Date   • AK (actinic keratosis) 7/21/2016   • Anemia    • Arrhythmia     PVC's   • Arthritis    • Lombardi esophagus    • CATARACT    • Chronic kidney disease (CKD), stage III (moderate)    • Colon polyps    • Dental disorder     Dentures   • Depression 7/21/2016   • Diabetes 1984   • GERD (gastroesophageal reflux disease)    • Hyperlipidemia    • Hypertension    • Neuropathy (HCC)    • Other specified disorder of intestines     alters between normal and diarrhea since radiation tx   • Personal history of venous thrombosis and embolism 2010/1995    leg   • Prostate cancer (HCC) 2001    Status post prostatectomy   • Psychiatric problem     Depression, treated with Prozac.   • Sleep apnea     Does not use CPAP   • Snoring    • Unspecified urinary incontinence     spill from artifical urinary sphincter     Past Surgical History:   Procedure Laterality  Date   • ANKLE ORIF Right 10/28/2015    Procedure: ANKLE ORIF;  Surgeon: Venu Elizabeth M.D.;  Location: [facility];  Service:    • SPHINCTER PROSTHESIS REMOVAL  8/10/2015    Procedure: SPHINCTER PROSTHESIS REMOVAL;  Surgeon: Tuan Marie M.D.;  Location: [facility];  Service:    • BOWEL RESECTION  5/21/2014    Performed by Graham Peña M.D. at [facility]   • LAPAROSCOPY  5/21/2014    Performed by Graham Peña M.D. at [facility]   • COLONOSCOPY WITH APC  6/5/2013    Performed by Deepa Vela M.D. at [facility]   • CATARACT PHACO WITH IOL  2/27/2013    Performed by Clayton Noonan M.D. at [facility]ERY [facility]   • CATARACT PHACO WITH IOL  2/13/2013    Performed by Clayton Noonan M.D. at [facility]ERY [facility]   • LUMBAR FUSION POSTERIOR  12/13/2012    Procedure: L5-S1 removal of Jennings Fragment NON-INSTRUMENTAL;  Surgeon: Wil Martinez M.D.;  Location: [facility];  Service:    • LUMBAR DECOMPRESSION  12/13/2012    Procedure: 4-5;  Surgeon: Wil Martinez M.D.;  Location: [facility];  Service:    • SPHINCTER PROSTHESIS PLACEMENT  9/26/2012    Performed by GERSON Car M.D. at [facility]   • GROIN EXPLORATION  3/13/2012    Performed by DEEPA PATRICIA at [facility]   • SPHINCTER PROSTHESIS PLACEMENT  2/24/2011    Performed by GERSON CAR at [facility]   • CYSTOSCOPY  2/24/2011    Performed by GERSON CAR at [facility]   • CYSTOSCOPY  2/2/2011    Performed by GERSON CAR at [facility]   • SPHINCTER PROSTHESIS REMOVAL  6/3/2010    Performed by JOSH APARICIO at [facility]   • ANGIOGRAM  1/25/2010    Performed by PETER NORMAN at [facility]   • AORTOGRAM  1/25/2010    Performed by PETER NORMAN at SURGERY [facility]   • PROSTATECTOMY ROBOTIC  2/2001     • PB INSERT,INFLATABLE SPHINCTER  2001   • OTHER ABDOMINAL SURGERY     • OTHER ORTHOPEDIC SURGERY      hip & femur fx     Family History   Problem Relation Age of Onset   • Cancer Mother      ovarian   • Heart Disease Father    • Diabetes Father    • Diabetes Sister    • Hypertension     • Cancer       multiple cousins with prostate cancer.      Social History     Social History   • Marital status:      Spouse name: N/A   • Number of children: 5   • Years of education: N/A     Occupational History   • Not on file.     Social History Main Topics   • Smoking status: Former Smoker     Packs/day: 2.00     Years: 30.00     Types: Cigarettes     Quit date: 3/5/1995   • Smokeless tobacco: Never Used      Comment: 1.5 ppd 25 yrs,quit 1995   • Alcohol use No   • Drug use: No   • Sexual activity: Not on file     Other Topics Concern   • Not on file     Social History Narrative   • No narrative on file     Allergies   Allergen Reactions   • Augmentin Unspecified     Bleeding  RXN=5 years ago   • Latex Unspecified     Blisters  RXN=ongoing   • Tape Rash     Rash-blisters-paper tape okay  RXN=ongoing     Outpatient Encounter Prescriptions as of 6/26/2018   Medication Sig Dispense Refill   • atorvastatin (LIPITOR) 40 MG Tab Take 1 Tab by mouth every day. 90 Tab 3   • metoprolol (LOPRESSOR) 25 MG Tab Take 0.5 Tabs by mouth 2 times a day. 90 Tab 3   • methenamine hip (HIPPREX) 1 GM Tab Take 1 g by mouth 2 times a day.     • Probiotic Product (PRO-BIOTIC BLEND) Cap Take  by mouth.     • insulin NPH (NOVOLIN N) 100 UNIT/ML Suspension Inject 10 Units as instructed Once. Before dinner     • clobetasol (TEMOVATE) 0.05 % Cream      • pantoprazole (PROTONIX) 40 MG Tablet Delayed Response Take 20 mg by mouth every day.     • glipiZIDE (GLUCOTROL) 10 MG Tab Take 10 mg by mouth 2 times a day. One po am, 1/2 po pm     • hydrochlorothiazide (HYDRODIURIL) 12.5 MG tablet Take 12.5 mg by mouth every day.     • Saxagliptin HCl 5 MG Tab  "Take 5 mg by mouth every day.     • lisinopril (PRINIVIL) 5 MG Tab Take 2.5 mg by mouth every day. Indications: High Blood Pressure     • ferrous sulfate 325 (65 FE) MG tablet Take 325 mg by mouth 2 Times a Day.     • Cholecalciferol (VITAMIN D) 2000 UNIT TABS Take 2,000 Units by mouth every day.     • ascorbic acid (ASCORBIC ACID) 500 MG TABS Take 500 mg by mouth every day.     • metformin (GLUCOPHAGE) 500 MG TABS Take 500 mg by mouth 2 times a day, with meals.     • fluoxetine (PROZAC) 20 MG CAPS Take 20 mg by mouth every day.     • [DISCONTINUED] atorvastatin (LIPITOR) 40 MG Tab Take 1 Tab by mouth every day. For further refills please contact new cardiologist. Thank you 90 Tab 0   • [DISCONTINUED] metoprolol (LOPRESSOR) 25 MG Tab Take 0.5 Tabs by mouth 2 times a day. 90 Tab 3     No facility-administered encounter medications on file as of 6/26/2018.      Review of Systems   Constitutional: Negative for chills and fever.   Respiratory: Negative for shortness of breath.    Cardiovascular: Negative for chest pain, palpitations, orthopnea, leg swelling and PND.   Gastrointestinal: Negative for abdominal pain and nausea.   Musculoskeletal: Negative for myalgias.   Neurological: Negative for dizziness, loss of consciousness and headaches.        Mild balance problems.   Endo/Heme/Allergies: Does not bruise/bleed easily.        Objective:   /60   Pulse 76   Ht 1.753 m (5' 9\")   Wt 83.5 kg (184 lb)   SpO2 91%   BMI 27.17 kg/m²      Physical Exam   Constitutional: He is oriented to person, place, and time. He appears well-developed and well-nourished.   Ambulates with a walker.   HENT:   Head: Normocephalic.   Eyes: EOM are normal.   Neck: Normal range of motion. Neck supple. No JVD present.   Cardiovascular: Normal rate, regular rhythm and normal heart sounds.    Pulmonary/Chest: Effort normal and breath sounds normal. No respiratory distress. He has no wheezes. He has no rales.   Abdominal: Soft. Bowel " sounds are normal. He exhibits no distension. There is no tenderness.   Musculoskeletal: Normal range of motion. He exhibits edema.   Trace edema to the ankles bilaterally.   Neurological: He is alert and oriented to person, place, and time.   Skin: Skin is warm and dry. No rash noted.   Psychiatric: He has a normal mood and affect.     NUCLEAR IMAGING INTERPRETATION OF MPI OF 5/22/2017 - REVIEWED WITH PATIENT:   No evidence of significant jeopardized viable myocardium or prior myocardial    infarction.   Normal left ventricular size, ejection fraction, and wall motion.    CONCLUSIONS OF ECHOCARDIOGRAM OF 4/21/2017 - REVIEWED WITH PATIENT:  Normal left ventricular systolic function.  Left ventricular ejection fraction is visually estimated to be 65%.  Grade I diastolic dysfunction.  Mild mitral regurgitation.  Mild aortic insufficiency.    Lab Results   Component Value Date/Time    CHOLSTRLTOT 115 08/08/2017 07:59 AM    LDL 54 08/08/2017 07:59 AM    HDL 40 08/08/2017 07:59 AM    TRIGLYCERIDE 105 08/08/2017 07:59 AM       Lab Results   Component Value Date/Time    SODIUM 139 08/08/2017 07:59 AM    POTASSIUM 4.1 08/08/2017 07:59 AM    CHLORIDE 105 08/08/2017 07:59 AM    CO2 25 08/08/2017 07:59 AM    GLUCOSE 186 (H) 08/08/2017 07:59 AM    BUN 25 (H) 08/08/2017 07:59 AM    CREATININE 1.19 08/08/2017 07:59 AM    CREATININE 1.0 02/08/2005 02:20 PM     Lab Results   Component Value Date/Time    ALKPHOSPHAT 80 08/08/2017 07:59 AM    ASTSGOT 12 08/08/2017 07:59 AM    ALTSGPT 9 08/08/2017 07:59 AM    TBILIRUBIN 0.6 08/08/2017 07:59 AM        Assessment:     1. Essential hypertension  metoprolol (LOPRESSOR) 25 MG Tab    CBC WITH DIFFERENTIAL   2. Hyperlipidemia, unspecified hyperlipidemia type  atorvastatin (LIPITOR) 40 MG Tab    COMP METABOLIC PANEL    LIPID PROFILE   3. Chronic kidney disease (CKD), stage III (moderate)  COMP METABOLIC PANEL   4. Type 2 diabetes mellitus with stage 3 chronic kidney disease, with long-term  current use of insulin (HCC)         Medical Decision Making:  Today's Assessment / Status / Plan:     1. Hypertension, treated and stable. BP is good today. Metoprolol is renewed today.    2. Hyperlipidemia, treated with Lipitor. To repeat CMP and lipid panel.    3. Chronic kidney disease, to check metabolic panel.    4. Diabetes mellitus, treated and stable.    He is doing well. Medications are renewed today. FU as already scheduled with Dr. OTIS Joyce. FU sooner if clinical condition changes.    Collaborating MD: Lavinia.      No Recipients

## 2018-06-26 NOTE — PROGRESS NOTES
Chief Complaint   Patient presents with   • Follow-Up   • HTN (Controlled)   • Hyperlipidemia       Subjective:   Selvin Braga is a 82 y.o. male who presents today for annual follow-up of hypertension and hyperlipidemia.    Selvin is an 82 year old male with history of hypertension, hyperlipidemia, diabetes and chronic kidney disease, previously followed by Dr. Huston. He was first seen when he developed sepsis following hospitalization for UTI.  No further urology problems since then.    His BP is well controlled. No chest pain, pressure or discomfort; no palpitations; no shortness of breath, orthopnea or PND; no dizziness or syncope; very mild, stable LE edema. He has some balance problems, unchanged from previous. Glucose is well controlled too.    Past Medical History:   Diagnosis Date   • AK (actinic keratosis) 7/21/2016   • Anemia    • Arrhythmia     PVC's   • Arthritis    • Lombardi esophagus    • CATARACT    • Chronic kidney disease (CKD), stage III (moderate)    • Colon polyps    • Dental disorder     Dentures   • Depression 7/21/2016   • Diabetes 1984   • GERD (gastroesophageal reflux disease)    • Hyperlipidemia    • Hypertension    • Neuropathy (HCC)    • Other specified disorder of intestines     alters between normal and diarrhea since radiation tx   • Personal history of venous thrombosis and embolism 2010/1995    leg   • Prostate cancer (HCC) 2001    Status post prostatectomy   • Psychiatric problem     Depression, treated with Prozac.   • Sleep apnea     Does not use CPAP   • Snoring    • Unspecified urinary incontinence     spill from artifical urinary sphincter     Past Surgical History:   Procedure Laterality Date   • ANKLE ORIF Right 10/28/2015    Procedure: ANKLE ORIF;  Surgeon: Venu Elizabeth M.D.;  Location: [facility];  Service:    • SPHINCTER PROSTHESIS REMOVAL  8/10/2015    Procedure: SPHINCTER PROSTHESIS REMOVAL;  Surgeon: Tuan Marie M.D.;  Location: [facility]  [facility];  Service:    • BOWEL RESECTION  5/21/2014    Performed by Graham Peña M.D. at SURGERY [facility]   • LAPAROSCOPY  5/21/2014    Performed by Graham Peña M.D. at [facility]   • COLONOSCOPY WITH APC  6/5/2013    Performed by Deepa Vela M.D. at [facility]   • CATARACT PHACO WITH IOL  2/27/2013    Performed by Clayton Noonan M.D. at SURGERY SAME DAY [facility]   • CATARACT PHACO WITH IOL  2/13/2013    Performed by Clayton Noonan M.D. at SURGERY SAME DAY [facility]   • LUMBAR FUSION POSTERIOR  12/13/2012    Procedure: L5-S1 removal of Jennings Fragment NON-INSTRUMENTAL;  Surgeon: Wil Martinez M.D.;  Location: SURGERY [facility];  Service:    • LUMBAR DECOMPRESSION  12/13/2012    Procedure: 4-5;  Surgeon: Wil Martinez M.D.;  Location: SURGERY [facility];  Service:    • SPHINCTER PROSTHESIS PLACEMENT  9/26/2012    Performed by GERSON Car M.D. at SURGERY [facility]   • GROIN EXPLORATION  3/13/2012    Performed by DEEPA PATRICIA at [facility]   • SPHINCTER PROSTHESIS PLACEMENT  2/24/2011    Performed by GERSON CAR at [facility]   • CYSTOSCOPY  2/24/2011    Performed by GERSON CAR at [facility]   • CYSTOSCOPY  2/2/2011    Performed by GERSON CAR at [facility]   • SPHINCTER PROSTHESIS REMOVAL  6/3/2010    Performed by JOSH APARICIO at SURGERY [facility]   • ANGIOGRAM  1/25/2010    Performed by PETER NORMAN at SURGERY [facility]   • AORTOGRAM  1/25/2010    Performed by PETER NORMAN at [facility]   • PROSTATECTOMY ROBOTIC  2/2001   • PB INSERT,INFLATABLE SPHINCTER  2001   • OTHER ABDOMINAL SURGERY     • OTHER ORTHOPEDIC SURGERY      hip & femur fx     Family History   Problem Relation Age of Onset   • Cancer Mother      ovarian   • Heart Disease Father    • Diabetes Father    • Diabetes Sister    • Hypertension      • Cancer       multiple cousins with prostate cancer.      Social History     Social History   • Marital status:      Spouse name: N/A   • Number of children: 5   • Years of education: N/A     Occupational History   • Not on file.     Social History Main Topics   • Smoking status: Former Smoker     Packs/day: 2.00     Years: 30.00     Types: Cigarettes     Quit date: 3/5/1995   • Smokeless tobacco: Never Used      Comment: 1.5 ppd 25 yrs,quit 1995   • Alcohol use No   • Drug use: No   • Sexual activity: Not on file     Other Topics Concern   • Not on file     Social History Narrative   • No narrative on file     Allergies   Allergen Reactions   • Augmentin Unspecified     Bleeding  RXN=5 years ago   • Latex Unspecified     Blisters  RXN=ongoing   • Tape Rash     Rash-blisters-paper tape okay  RXN=ongoing     Outpatient Encounter Prescriptions as of 6/26/2018   Medication Sig Dispense Refill   • atorvastatin (LIPITOR) 40 MG Tab Take 1 Tab by mouth every day. 90 Tab 3   • metoprolol (LOPRESSOR) 25 MG Tab Take 0.5 Tabs by mouth 2 times a day. 90 Tab 3   • methenamine hip (HIPPREX) 1 GM Tab Take 1 g by mouth 2 times a day.     • Probiotic Product (PRO-BIOTIC BLEND) Cap Take  by mouth.     • insulin NPH (NOVOLIN N) 100 UNIT/ML Suspension Inject 10 Units as instructed Once. Before dinner     • clobetasol (TEMOVATE) 0.05 % Cream      • pantoprazole (PROTONIX) 40 MG Tablet Delayed Response Take 20 mg by mouth every day.     • glipiZIDE (GLUCOTROL) 10 MG Tab Take 10 mg by mouth 2 times a day. One po am, 1/2 po pm     • hydrochlorothiazide (HYDRODIURIL) 12.5 MG tablet Take 12.5 mg by mouth every day.     • Saxagliptin HCl 5 MG Tab Take 5 mg by mouth every day.     • lisinopril (PRINIVIL) 5 MG Tab Take 2.5 mg by mouth every day. Indications: High Blood Pressure     • ferrous sulfate 325 (65 FE) MG tablet Take 325 mg by mouth 2 Times a Day.     • Cholecalciferol (VITAMIN D) 2000 UNIT TABS Take 2,000 Units by mouth  "every day.     • ascorbic acid (ASCORBIC ACID) 500 MG TABS Take 500 mg by mouth every day.     • metformin (GLUCOPHAGE) 500 MG TABS Take 500 mg by mouth 2 times a day, with meals.     • fluoxetine (PROZAC) 20 MG CAPS Take 20 mg by mouth every day.     • [DISCONTINUED] atorvastatin (LIPITOR) 40 MG Tab Take 1 Tab by mouth every day. For further refills please contact new cardiologist. Thank you 90 Tab 0   • [DISCONTINUED] metoprolol (LOPRESSOR) 25 MG Tab Take 0.5 Tabs by mouth 2 times a day. 90 Tab 3     No facility-administered encounter medications on file as of 6/26/2018.      Review of Systems   Constitutional: Negative for chills and fever.   Respiratory: Negative for shortness of breath.    Cardiovascular: Negative for chest pain, palpitations, orthopnea, leg swelling and PND.   Gastrointestinal: Negative for abdominal pain and nausea.   Musculoskeletal: Negative for myalgias.   Neurological: Negative for dizziness, loss of consciousness and headaches.        Mild balance problems.   Endo/Heme/Allergies: Does not bruise/bleed easily.        Objective:   /60   Pulse 76   Ht 1.753 m (5' 9\")   Wt 83.5 kg (184 lb)   SpO2 91%   BMI 27.17 kg/m²     Physical Exam   Constitutional: He is oriented to person, place, and time. He appears well-developed and well-nourished.   Ambulates with a walker.   HENT:   Head: Normocephalic.   Eyes: EOM are normal.   Neck: Normal range of motion. Neck supple. No JVD present.   Cardiovascular: Normal rate, regular rhythm and normal heart sounds.    Pulmonary/Chest: Effort normal and breath sounds normal. No respiratory distress. He has no wheezes. He has no rales.   Abdominal: Soft. Bowel sounds are normal. He exhibits no distension. There is no tenderness.   Musculoskeletal: Normal range of motion. He exhibits edema.   Trace edema to the ankles bilaterally.   Neurological: He is alert and oriented to person, place, and time.   Skin: Skin is warm and dry. No rash noted. "   Psychiatric: He has a normal mood and affect.     NUCLEAR IMAGING INTERPRETATION OF MPI OF 5/22/2017 - REVIEWED WITH PATIENT:   No evidence of significant jeopardized viable myocardium or prior myocardial    infarction.   Normal left ventricular size, ejection fraction, and wall motion.    CONCLUSIONS OF ECHOCARDIOGRAM OF 4/21/2017 - REVIEWED WITH PATIENT:  Normal left ventricular systolic function.  Left ventricular ejection fraction is visually estimated to be 65%.  Grade I diastolic dysfunction.  Mild mitral regurgitation.  Mild aortic insufficiency.    Lab Results   Component Value Date/Time    CHOLSTRLTOT 115 08/08/2017 07:59 AM    LDL 54 08/08/2017 07:59 AM    HDL 40 08/08/2017 07:59 AM    TRIGLYCERIDE 105 08/08/2017 07:59 AM       Lab Results   Component Value Date/Time    SODIUM 139 08/08/2017 07:59 AM    POTASSIUM 4.1 08/08/2017 07:59 AM    CHLORIDE 105 08/08/2017 07:59 AM    CO2 25 08/08/2017 07:59 AM    GLUCOSE 186 (H) 08/08/2017 07:59 AM    BUN 25 (H) 08/08/2017 07:59 AM    CREATININE 1.19 08/08/2017 07:59 AM    CREATININE 1.0 02/08/2005 02:20 PM     Lab Results   Component Value Date/Time    ALKPHOSPHAT 80 08/08/2017 07:59 AM    ASTSGOT 12 08/08/2017 07:59 AM    ALTSGPT 9 08/08/2017 07:59 AM    TBILIRUBIN 0.6 08/08/2017 07:59 AM        Assessment:     1. Essential hypertension  metoprolol (LOPRESSOR) 25 MG Tab    CBC WITH DIFFERENTIAL   2. Hyperlipidemia, unspecified hyperlipidemia type  atorvastatin (LIPITOR) 40 MG Tab    COMP METABOLIC PANEL    LIPID PROFILE   3. Chronic kidney disease (CKD), stage III (moderate)  COMP METABOLIC PANEL   4. Type 2 diabetes mellitus with stage 3 chronic kidney disease, with long-term current use of insulin (HCC)         Medical Decision Making:  Today's Assessment / Status / Plan:     1. Hypertension, treated and stable. BP is good today. Metoprolol is renewed today.    2. Hyperlipidemia, treated with Lipitor. To repeat CMP and lipid panel.    3. Chronic kidney  disease, to check metabolic panel.    4. Diabetes mellitus, treated and stable.    He is doing well. Medications are renewed today. FU as already scheduled with Dr. OTIS Joyce. FU sooner if clinical condition changes.    Collaborating MD: Lavinia.

## 2018-07-06 ENCOUNTER — HOSPITAL ENCOUNTER (OUTPATIENT)
Dept: LAB | Facility: MEDICAL CENTER | Age: 83
End: 2018-07-06
Attending: NURSE PRACTITIONER
Payer: MEDICARE

## 2018-07-06 DIAGNOSIS — E78.5 HYPERLIPIDEMIA, UNSPECIFIED HYPERLIPIDEMIA TYPE: ICD-10-CM

## 2018-07-06 DIAGNOSIS — I10 ESSENTIAL HYPERTENSION: ICD-10-CM

## 2018-07-06 DIAGNOSIS — N18.30 CHRONIC KIDNEY DISEASE (CKD), STAGE III (MODERATE) (HCC): ICD-10-CM

## 2018-07-06 LAB
ALBUMIN SERPL BCP-MCNC: 3.7 G/DL (ref 3.2–4.9)
ALBUMIN/GLOB SERPL: 1.3 G/DL
ALP SERPL-CCNC: 72 U/L (ref 30–99)
ALT SERPL-CCNC: 8 U/L (ref 2–50)
ANION GAP SERPL CALC-SCNC: 8 MMOL/L (ref 0–11.9)
AST SERPL-CCNC: 11 U/L (ref 12–45)
BASOPHILS # BLD AUTO: 1 % (ref 0–1.8)
BASOPHILS # BLD: 0.07 K/UL (ref 0–0.12)
BILIRUB SERPL-MCNC: 0.8 MG/DL (ref 0.1–1.5)
BUN SERPL-MCNC: 23 MG/DL (ref 8–22)
CALCIUM SERPL-MCNC: 9.3 MG/DL (ref 8.5–10.5)
CHLORIDE SERPL-SCNC: 105 MMOL/L (ref 96–112)
CHOLEST SERPL-MCNC: 110 MG/DL (ref 100–199)
CO2 SERPL-SCNC: 26 MMOL/L (ref 20–33)
CREAT SERPL-MCNC: 1.1 MG/DL (ref 0.5–1.4)
EOSINOPHIL # BLD AUTO: 0.42 K/UL (ref 0–0.51)
EOSINOPHIL NFR BLD: 6.2 % (ref 0–6.9)
ERYTHROCYTE [DISTWIDTH] IN BLOOD BY AUTOMATED COUNT: 46.8 FL (ref 35.9–50)
GLOBULIN SER CALC-MCNC: 2.9 G/DL (ref 1.9–3.5)
GLUCOSE SERPL-MCNC: 182 MG/DL (ref 65–99)
HCT VFR BLD AUTO: 33.9 % (ref 42–52)
HDLC SERPL-MCNC: 40 MG/DL
HGB BLD-MCNC: 11.3 G/DL (ref 14–18)
IMM GRANULOCYTES # BLD AUTO: 0.03 K/UL (ref 0–0.11)
IMM GRANULOCYTES NFR BLD AUTO: 0.4 % (ref 0–0.9)
LDLC SERPL CALC-MCNC: 47 MG/DL
LYMPHOCYTES # BLD AUTO: 1.73 K/UL (ref 1–4.8)
LYMPHOCYTES NFR BLD: 25.7 % (ref 22–41)
MCH RBC QN AUTO: 29.8 PG (ref 27–33)
MCHC RBC AUTO-ENTMCNC: 33.3 G/DL (ref 33.7–35.3)
MCV RBC AUTO: 89.4 FL (ref 81.4–97.8)
MONOCYTES # BLD AUTO: 0.67 K/UL (ref 0–0.85)
MONOCYTES NFR BLD AUTO: 10 % (ref 0–13.4)
NEUTROPHILS # BLD AUTO: 3.81 K/UL (ref 1.82–7.42)
NEUTROPHILS NFR BLD: 56.7 % (ref 44–72)
NRBC # BLD AUTO: 0 K/UL
NRBC BLD-RTO: 0 /100 WBC
PLATELET # BLD AUTO: 156 K/UL (ref 164–446)
PMV BLD AUTO: 9.9 FL (ref 9–12.9)
POTASSIUM SERPL-SCNC: 3.7 MMOL/L (ref 3.6–5.5)
PROT SERPL-MCNC: 6.6 G/DL (ref 6–8.2)
RBC # BLD AUTO: 3.79 M/UL (ref 4.7–6.1)
SODIUM SERPL-SCNC: 139 MMOL/L (ref 135–145)
TRIGL SERPL-MCNC: 117 MG/DL (ref 0–149)
WBC # BLD AUTO: 6.7 K/UL (ref 4.8–10.8)

## 2018-07-06 PROCEDURE — 36415 COLL VENOUS BLD VENIPUNCTURE: CPT

## 2018-07-06 PROCEDURE — 80061 LIPID PANEL: CPT

## 2018-07-06 PROCEDURE — 80053 COMPREHEN METABOLIC PANEL: CPT

## 2018-07-06 PROCEDURE — 85025 COMPLETE CBC W/AUTO DIFF WBC: CPT

## 2018-07-09 DIAGNOSIS — D64.9 ANEMIA, UNSPECIFIED TYPE: ICD-10-CM

## 2018-07-19 ENCOUNTER — HOSPITAL ENCOUNTER (OUTPATIENT)
Dept: LAB | Facility: MEDICAL CENTER | Age: 83
End: 2018-07-19
Attending: PHYSICIAN ASSISTANT
Payer: MEDICARE

## 2018-07-19 PROCEDURE — 87086 URINE CULTURE/COLONY COUNT: CPT

## 2018-07-20 LAB
AMBIGUOUS DTTM AMBI4: NORMAL
SIGNIFICANT IND 70042: NORMAL
SITE SITE: NORMAL
SOURCE SOURCE: NORMAL

## 2018-07-22 LAB
BACTERIA UR CULT: NORMAL
SIGNIFICANT IND 70042: NORMAL
SITE SITE: NORMAL
SOURCE SOURCE: NORMAL

## 2018-08-09 ENCOUNTER — HOSPITAL ENCOUNTER (OUTPATIENT)
Dept: LAB | Facility: MEDICAL CENTER | Age: 83
End: 2018-08-09
Attending: NURSE PRACTITIONER
Payer: MEDICARE

## 2018-08-09 DIAGNOSIS — D64.9 ANEMIA, UNSPECIFIED TYPE: ICD-10-CM

## 2018-08-09 LAB
BASOPHILS # BLD AUTO: 0.7 % (ref 0–1.8)
BASOPHILS # BLD: 0.05 K/UL (ref 0–0.12)
EOSINOPHIL # BLD AUTO: 0.29 K/UL (ref 0–0.51)
EOSINOPHIL NFR BLD: 4.1 % (ref 0–6.9)
ERYTHROCYTE [DISTWIDTH] IN BLOOD BY AUTOMATED COUNT: 47.8 FL (ref 35.9–50)
HCT VFR BLD AUTO: 35.4 % (ref 42–52)
HGB BLD-MCNC: 11.7 G/DL (ref 14–18)
IMM GRANULOCYTES # BLD AUTO: 0.03 K/UL (ref 0–0.11)
IMM GRANULOCYTES NFR BLD AUTO: 0.4 % (ref 0–0.9)
LYMPHOCYTES # BLD AUTO: 1.48 K/UL (ref 1–4.8)
LYMPHOCYTES NFR BLD: 20.9 % (ref 22–41)
MCH RBC QN AUTO: 29.7 PG (ref 27–33)
MCHC RBC AUTO-ENTMCNC: 33.1 G/DL (ref 33.7–35.3)
MCV RBC AUTO: 89.8 FL (ref 81.4–97.8)
MONOCYTES # BLD AUTO: 0.66 K/UL (ref 0–0.85)
MONOCYTES NFR BLD AUTO: 9.3 % (ref 0–13.4)
NEUTROPHILS # BLD AUTO: 4.56 K/UL (ref 1.82–7.42)
NEUTROPHILS NFR BLD: 64.6 % (ref 44–72)
NRBC # BLD AUTO: 0 K/UL
NRBC BLD-RTO: 0 /100 WBC
PLATELET # BLD AUTO: 181 K/UL (ref 164–446)
PMV BLD AUTO: 10.3 FL (ref 9–12.9)
RBC # BLD AUTO: 3.94 M/UL (ref 4.7–6.1)
WBC # BLD AUTO: 7.1 K/UL (ref 4.8–10.8)

## 2018-08-09 PROCEDURE — 36415 COLL VENOUS BLD VENIPUNCTURE: CPT

## 2018-08-09 PROCEDURE — 85025 COMPLETE CBC W/AUTO DIFF WBC: CPT

## 2018-08-28 ENCOUNTER — OFFICE VISIT (OUTPATIENT)
Dept: URGENT CARE | Facility: PHYSICIAN GROUP | Age: 83
End: 2018-08-28
Payer: MEDICARE

## 2018-08-28 VITALS
WEIGHT: 184 LBS | SYSTOLIC BLOOD PRESSURE: 148 MMHG | OXYGEN SATURATION: 92 % | DIASTOLIC BLOOD PRESSURE: 62 MMHG | HEIGHT: 69 IN | BODY MASS INDEX: 27.25 KG/M2 | TEMPERATURE: 97.5 F | HEART RATE: 74 BPM | RESPIRATION RATE: 16 BRPM

## 2018-08-28 DIAGNOSIS — N30.01 ACUTE CYSTITIS WITH HEMATURIA: ICD-10-CM

## 2018-08-28 PROCEDURE — 99214 OFFICE O/P EST MOD 30 MIN: CPT | Performed by: FAMILY MEDICINE

## 2018-08-28 RX ORDER — SULFAMETHOXAZOLE AND TRIMETHOPRIM 400; 80 MG/1; MG/1
1 TABLET ORAL 2 TIMES DAILY
Qty: 10 TAB | Refills: 0 | Status: SHIPPED | OUTPATIENT
Start: 2018-08-28 | End: 2018-12-27

## 2018-08-28 ASSESSMENT — ENCOUNTER SYMPTOMS
HEADACHES: 0
SHORTNESS OF BREATH: 0
COUGH: 0
NAUSEA: 0
VOMITING: 0
CHILLS: 0
ABDOMINAL PAIN: 0
SORE THROAT: 0
DIARRHEA: 0
WEIGHT LOSS: 0
FLANK PAIN: 0
FEVER: 0

## 2018-08-28 NOTE — PROGRESS NOTES
"Subjective:     Selvin Braga is a 82 y.o. male who presents for UTI (blood in urine, burning, frequency at night x3 days)       HPI   Pt here with concerns for a UTI for the past 3 days  Has red color in his urine which he thinks is blood  +Dysuria   +Frequency   +Urgency   No hesitancy   No incomplete voiding   No cloudiness   No odor   No flank pain   Pt had prostate cancer in past.  Had radiation, surgery, and hormone therapy     Review of Systems   Constitutional: Negative for chills, fever and weight loss.   HENT: Negative for congestion and sore throat.    Respiratory: Negative for cough and shortness of breath.    Cardiovascular: Negative for chest pain.   Gastrointestinal: Negative for abdominal pain, diarrhea, nausea and vomiting.   Genitourinary: Positive for dysuria, frequency, hematuria and urgency. Negative for flank pain.   Skin: Negative for rash.   Neurological: Negative for headaches.     Allergies   Allergen Reactions   • Augmentin Unspecified     Bleeding  RXN=5 years ago   • Latex Unspecified     Blisters  RXN=ongoing   • Tape Rash     Rash-blisters-paper tape okay  RXN=ongoing      Objective:   /62   Pulse 74   Temp 36.4 °C (97.5 °F)   Resp 16   Ht 1.753 m (5' 9\")   Wt 83.5 kg (184 lb)   SpO2 92%   BMI 27.17 kg/m²   Physical Exam   Constitutional: He appears well-developed and well-nourished. No distress.   HENT:   Head: Normocephalic and atraumatic.   Eyes: Conjunctivae and EOM are normal.   Neck: Normal range of motion. No tracheal deviation present.   Cardiovascular: Normal rate, regular rhythm and normal heart sounds.    Pulmonary/Chest: Effort normal and breath sounds normal. No respiratory distress. He has no wheezes. He has no rales.   Abdominal: Soft. Bowel sounds are normal. He exhibits no distension. There is no tenderness. There is no guarding and no CVA tenderness.   Neurological: He is alert.   Skin: Skin is warm and dry. He is not diaphoretic.   Psychiatric: He " has a normal mood and affect.        Assessment/Plan:     1. Acute cystitis with hematuria  Pt is an 83 yo male with hx of prostate cancer, catheter use, and frequent UTI's who presents for evaluation of UTI symptoms.  Has large blood, 30 protein, small MAYKEL, and neg nitrites.  Pt has had sepsis from UTI's and would prefer to start medication empirically rather than awaiting urine culture.  Bactrim x 5 days.  F/U if not improved after finishing abx.    - sulfamethoxazole-trimethoprim (BACTRIM) 400-80 MG Tab; Take 1 Tab by mouth 2 times a day.  Dispense: 10 Tab; Refill: 0    Parth Hockenberry

## 2018-09-01 NOTE — PROGRESS NOTES
I have reviewed and agree with history, assessment and plan for office encounter on 8/28/2018  Suggested changes to plan or follow-up: none   Derrick Garcia M.D.

## 2018-10-10 ENCOUNTER — HOSPITAL ENCOUNTER (OUTPATIENT)
Dept: LAB | Facility: MEDICAL CENTER | Age: 83
End: 2018-10-10
Attending: NURSE PRACTITIONER
Payer: MEDICARE

## 2018-10-10 PROCEDURE — 87086 URINE CULTURE/COLONY COUNT: CPT

## 2018-10-10 PROCEDURE — 87077 CULTURE AEROBIC IDENTIFY: CPT

## 2018-10-10 PROCEDURE — 87186 SC STD MICRODIL/AGAR DIL: CPT

## 2018-10-11 LAB
AMBIGUOUS DTTM AMBI4: NORMAL
SIGNIFICANT IND 70042: NORMAL
SITE SITE: NORMAL
SOURCE SOURCE: NORMAL

## 2018-10-16 ENCOUNTER — OFFICE VISIT (OUTPATIENT)
Dept: CARDIOLOGY | Facility: MEDICAL CENTER | Age: 83
End: 2018-10-16
Payer: MEDICARE

## 2018-10-16 VITALS
HEART RATE: 80 BPM | WEIGHT: 180 LBS | SYSTOLIC BLOOD PRESSURE: 122 MMHG | DIASTOLIC BLOOD PRESSURE: 60 MMHG | HEIGHT: 69 IN | OXYGEN SATURATION: 90 % | BODY MASS INDEX: 26.66 KG/M2

## 2018-10-16 DIAGNOSIS — I73.9 PAD (PERIPHERAL ARTERY DISEASE) (HCC): ICD-10-CM

## 2018-10-16 DIAGNOSIS — I10 ESSENTIAL HYPERTENSION: ICD-10-CM

## 2018-10-16 DIAGNOSIS — E78.5 DYSLIPIDEMIA: ICD-10-CM

## 2018-10-16 DIAGNOSIS — Z66 DNR (DO NOT RESUSCITATE): ICD-10-CM

## 2018-10-16 PROCEDURE — 99214 OFFICE O/P EST MOD 30 MIN: CPT | Performed by: INTERNAL MEDICINE

## 2018-10-16 ASSESSMENT — ENCOUNTER SYMPTOMS
BRUISES/BLEEDS EASILY: 1
DEPRESSION: 1
BACK PAIN: 1
HEARTBURN: 1
FALLS: 1
BLOOD IN STOOL: 1
DIARRHEA: 1

## 2018-10-16 NOTE — LETTER
Name:          Selvin Braga   YOB: 1935  Date:     10/16/2018      Laury Rees M.D.  [address]  [place] [address]     Zeus Joyce MD  [address], [address]  [place] [address]  Phone: [phone]  Back Line: [phone]  Fax: [phone]  E-mail: [email or web address]   Dear Dr. Rees,    We had the pleasure of seeing your patient, Selvin Braga, in Cardiology Clinic at [facility] today.    As you know, he is an 82-year-old man with previous elevated troponin in the setting of sepsis in April 2017 (myocardial perfusion imaging 5/20 2/2017- for ischemia) also with essential hypertension for which he is followed by cardiology. He does also have mild aortic insufficiency and mitral regurgitation, dyslipidemia, diabetes, and peripheral artery disease with previous mild aortoiliac disease by lower extremity angiogram 1/25/2010.    He has no cardiovascular complaints today with the exception of mild leg pain with ambulation in conjunction with his previous mild peripheral atherosclerosis.  I did order repeat ankle-brachial indices to evaluate.    I reviewed with him my impression that his previous troponin elevation is consistent with demand ischemia especially in conjunction with his normal myocardial perfusion imaging in May 2017.    Otherwise, his blood pressure and cholesterol are very well controlled.  I did recommend he trial a month off of his statin to see if his back pain gets any better.    Return in about 6 months (around 4/16/2019).    Thank you for the referral and please do not hesitate to contact me at any time. My contact information is listed above.    This note was dictated using Dragon speech recognition software.     A full note including my physical examination and a full list of rectified medications is available in our medical record, and can be faxed as well.    Zeus Joyce MD  Cardiologist  [facility]  [facility]

## 2018-10-16 NOTE — PROGRESS NOTES
Chief Complaint   Patient presents with   • Hypertension       Subjective:   Selvin Braga is an 82 -year-old man with previous elevated troponin in the setting of sepsis in April 2017 (myocardial perfusion imaging 5/20 2/2017- for ischemia) also with essential hypertension for which he is followed by cardiology. He does also have mild aortic insufficiency and mitral regurgitation, dyslipidemia, diabetes, and peripheral artery disease with previous mild aortoiliac disease by lower extremity angiogram 1/25/2010.    He has no cardiovascular complaints today though he does note some leg pain with walking.  He previously was diagnosed with mild peripheral artery disease by lower extremity angiogram in 2010 and has not had any follow-up studies to that.    His demand ischemia is detailed above.    He tells me that he lives alone though he has 2 sons in town.  He describes to me has wishes in conjunction with his urinary incontinence from prior prostate surgery, and limited mobility in the setting of chronic low back pain.  In summary, he seems to have some diminished quality of life from his multiple medical issues.    Past Medical History:   Diagnosis Date   • AK (actinic keratosis) 7/21/2016   • Anemia    • Arrhythmia     PVC's   • Arthritis    • Lombardi esophagus    • CATARACT    • Chronic kidney disease (CKD), stage III (moderate) ([facility])    • Colon polyps    • Dental disorder     Dentures   • Depression 7/21/2016   • Diabetes 1984   • GERD (gastroesophageal reflux disease)    • Hyperlipidemia    • Hypertension    • Neuropathy (HCC)    • Other specified disorder of intestines     alters between normal and diarrhea since radiation tx   • Personal history of venous thrombosis and embolism 2010/1995    leg   • Prostate cancer (HCC) 2001    Status post prostatectomy   • Psychiatric problem     Depression, treated with Prozac.   • Sleep apnea     Does not use CPAP   • Snoring    • Unspecified urinary incontinence      spill from artifical urinary sphincter     Past Surgical History:   Procedure Laterality Date   • ANKLE ORIF Right 10/28/2015    Procedure: ANKLE ORIF;  Surgeon: Venu Elizabeth M.D.;  Location: [facility];  Service:    • SPHINCTER PROSTHESIS REMOVAL  8/10/2015    Procedure: SPHINCTER PROSTHESIS REMOVAL;  Surgeon: Tuan Marie M.D.;  Location: [facility];  Service:    • BOWEL RESECTION  5/21/2014    Performed by Graham Peña M.D. at [facility]   • LAPAROSCOPY  5/21/2014    Performed by Graham Peña M.D. at [facility]   • COLONOSCOPY WITH APC  6/5/2013    Performed by Deepa Vela M.D. at [facility]   • CATARACT PHACO WITH IOL  2/27/2013    Performed by Clayton Noonan M.D. at SURGERY SAME DAY [facility]   • CATARACT PHACO WITH IOL  2/13/2013    Performed by Clayton Noonan M.D. at SURGERY SAME DAY [facility]   • LUMBAR FUSION POSTERIOR  12/13/2012    Procedure: L5-S1 removal of Jennings Fragment NON-INSTRUMENTAL;  Surgeon: Wil Martinez M.D.;  Location: [facility];  Service:    • LUMBAR DECOMPRESSION  12/13/2012    Procedure: 4-5;  Surgeon: Wil Martinez M.D.;  Location: [facility];  Service:    • SPHINCTER PROSTHESIS PLACEMENT  9/26/2012    Performed by GERSON Car M.D. at [facility]   • GROIN EXPLORATION  3/13/2012    Performed by DEEPA PATRICIA at [facility]   • SPHINCTER PROSTHESIS PLACEMENT  2/24/2011    Performed by GERSON CAR at [facility]   • CYSTOSCOPY  2/24/2011    Performed by GERSON CAR at [facility]   • CYSTOSCOPY  2/2/2011    Performed by GERSON CAR at [facility]   • SPHINCTER PROSTHESIS REMOVAL  6/3/2010    Performed by JOSH APARICIO at [facility]   • ANGIOGRAM  1/25/2010    Performed by PETER NORMAN at [facility]   • AORTOGRAM  1/25/2010     Performed by PETER NORMAN at SURGERY [facility]   • PROSTATECTOMY ROBOTIC  2/2001   • PB INSERT,INFLATABLE SPHINCTER  2001   • OTHER ABDOMINAL SURGERY     • OTHER ORTHOPEDIC SURGERY      hip & femur fx     Family History   Problem Relation Age of Onset   • Cancer Mother         ovarian   • Heart Disease Father    • Diabetes Father    • Diabetes Sister    • Hypertension Unknown    • Cancer Unknown         multiple cousins with prostate cancer.      Social History     Social History   • Marital status:      Spouse name: N/A   • Number of children: 5   • Years of education: N/A     Occupational History   • Not on file.     Social History Main Topics   • Smoking status: Former Smoker     Packs/day: 2.00     Years: 30.00     Types: Cigarettes     Quit date: 3/5/1995   • Smokeless tobacco: Never Used      Comment: 1.5 ppd 25 yrs,quit 1995   • Alcohol use No   • Drug use: No   • Sexual activity: Not on file     Other Topics Concern   • Not on file     Social History Narrative   • No narrative on file     Allergies   Allergen Reactions   • Augmentin Unspecified     Bleeding  RXN=5 years ago   • Latex Unspecified     Blisters  RXN=ongoing   • Tape Rash     Rash-blisters-paper tape okay  RXN=ongoing     Outpatient Encounter Prescriptions as of 10/16/2018   Medication Sig Dispense Refill   • atorvastatin (LIPITOR) 40 MG Tab Take 1 Tab by mouth every day. 90 Tab 3   • metoprolol (LOPRESSOR) 25 MG Tab Take 0.5 Tabs by mouth 2 times a day. 90 Tab 3   • methenamine hip (HIPPREX) 1 GM Tab Take 1 g by mouth 2 times a day.     • Probiotic Product (PRO-BIOTIC BLEND) Cap Take  by mouth.     • insulin NPH (NOVOLIN N) 100 UNIT/ML Suspension Inject 10 Units as instructed Once. Before dinner     • clobetasol (TEMOVATE) 0.05 % Cream      • pantoprazole (PROTONIX) 40 MG Tablet Delayed Response Take 20 mg by mouth every day.     • glipiZIDE (GLUCOTROL) 10 MG Tab Take 10 mg by mouth 2 times a day. One po am, 1/2 po pm     •  "hydrochlorothiazide (HYDRODIURIL) 12.5 MG tablet Take 12.5 mg by mouth every day.     • Saxagliptin HCl 5 MG Tab Take 5 mg by mouth every day.     • lisinopril (PRINIVIL) 5 MG Tab Take 2.5 mg by mouth every day. Indications: High Blood Pressure     • ferrous sulfate 325 (65 FE) MG tablet Take 325 mg by mouth 2 Times a Day.     • Cholecalciferol (VITAMIN D) 2000 UNIT TABS Take 2,000 Units by mouth every day.     • ascorbic acid (ASCORBIC ACID) 500 MG TABS Take 500 mg by mouth every day.     • metformin (GLUCOPHAGE) 500 MG TABS Take 500 mg by mouth 2 times a day, with meals.     • fluoxetine (PROZAC) 20 MG CAPS Take 20 mg by mouth every day.     • sulfamethoxazole-trimethoprim (BACTRIM) 400-80 MG Tab Take 1 Tab by mouth 2 times a day. (Patient not taking: Reported on 10/16/2018) 10 Tab 0     No facility-administered encounter medications on file as of 10/16/2018.      Review of Systems   HENT: Positive for hearing loss.    Gastrointestinal: Positive for blood in stool, diarrhea and heartburn.   Genitourinary: Positive for hematuria (Mild presently, and associated with radiation cystitis).   Musculoskeletal: Positive for back pain and falls.   Endo/Heme/Allergies: Bruises/bleeds easily.   Psychiatric/Behavioral: Positive for depression. Negative for suicidal ideas.   All other systems reviewed and are negative.       Objective:   /60 (BP Location: Right arm, Patient Position: Sitting, BP Cuff Size: Adult)   Pulse 80   Ht 1.753 m (5' 9\")   Wt 81.6 kg (180 lb)   SpO2 90%   BMI 26.58 kg/m²     Physical Exam   Constitutional: He is oriented to person, place, and time. He appears well-developed and well-nourished. No distress.   Pleasant, elderly man ambulating via front wheel walker in no distress   Eyes: Pupils are equal, round, and reactive to light. EOM are normal.   Neck: No JVD present.   Cardiovascular: Normal rate and regular rhythm.  Exam reveals no gallop and no friction rub.    Murmur heard.   Systolic " (Blowing systolic murmur at cardiac apex and left lower sternal border) murmur is present with a grade of 2/6   Pulses:       Dorsalis pedis pulses are 1+ on the right side, and 1+ on the left side.   Pulmonary/Chest: Effort normal and breath sounds normal. No respiratory distress. He has no wheezes. He has no rales.   Abdominal: Soft. Bowel sounds are normal. He exhibits no distension.   Musculoskeletal: He exhibits edema (1+ bilateral lower extremity edema).   Full exam deferred, again ambulating with a walker   Neurological: He is alert and oriented to person, place, and time.   Skin: Skin is warm and dry. No rash noted. He is not diaphoretic. No erythema. No pallor.   Psychiatric: He has a normal mood and affect. Judgment and thought content normal.   Vitals reviewed.    Lab Results   Component Value Date/Time    WBC 7.1 08/09/2018 10:26 AM    RBC 3.94 (L) 08/09/2018 10:26 AM    HEMOGLOBIN 11.7 (L) 08/09/2018 10:26 AM    HEMATOCRIT 35.4 (L) 08/09/2018 10:26 AM    MCV 89.8 08/09/2018 10:26 AM    MCH 29.7 08/09/2018 10:26 AM    MCHC 33.1 (L) 08/09/2018 10:26 AM    MPV 10.3 08/09/2018 10:26 AM        Lab Results   Component Value Date/Time    SODIUM 139 07/06/2018 07:06 AM    POTASSIUM 3.7 07/06/2018 07:06 AM    CHLORIDE 105 07/06/2018 07:06 AM    CO2 26 07/06/2018 07:06 AM    GLUCOSE 182 (H) 07/06/2018 07:06 AM    BUN 23 (H) 07/06/2018 07:06 AM    CREATININE 1.10 07/06/2018 07:06 AM    CREATININE 1.0 02/08/2005 02:20 PM        Lab Results   Component Value Date/Time    ASTSGOT 11 (L) 07/06/2018 07:06 AM    ALTSGPT 8 07/06/2018 07:06 AM        Lab Results   Component Value Date/Time    CHOLSTRLTOT 110 07/06/2018 07:06 AM    LDL 47 07/06/2018 07:06 AM    HDL 40 07/06/2018 07:06 AM    TRIGLYCERIDE 117 07/06/2018 07:06 AM         Results for orders placed or performed in visit on 05/15/17   EKG   Result Value Ref Range    Report       [facility] Cardiology [facility]    Test Date:  2017-05-15  Pt Name:    KARINA SKAGGS  "               Department: [facility]  MRN:        [id number]                      Room:  Gender:     M                            Technician: PAT  :        [id number]                   Requested By:MONICA COWAN  Order #:    [id number]                    Reading MD: Monica Cowan MD    Measurements  Intervals                                Axis  Rate:       74                           P:          48  HI:         184                          QRS:        -19  QRSD:       88                           T:          69  QT:         400  QTc:        444    Interpretive Statements  SINUS RHYTHM  BORDERLINE LEFT AXIS DEVIATION  Compared to ECG 05/15/2017 13:56:57  No significant changes    Electronically Signed On 2017 16:41:06 PST by Monica Cowan MD     EKG   Result Value Ref Range    Report       [facility] Cardiology [facility]    Test Date:  2017-05-15  Pt Name:    KARINA SKAGGS                Department: [facility]  MRN:        [id number]                      Room:  Gender:     M                            Technician: PAT  :        [id number]                   Requested By:MONICA COWAN  Order #:    [id number]                    Reading MD: Monica Cowan MD    Measurements  Intervals                                Axis  Rate:       69                           P:          50  HI:         168                          QRS:        63  QRSD:       90                           T:          41  QT:         420  QTc:        450    Interpretive Statements  SINUS RHYTHM  Compared to ECG 2015 09:50:24  No significant changes    Electronically Signed On 5- 14:56:43 PDT by Monica Cowan MD       Lower extremity angiogram, 2010:  \"IMPRESSION  1.  Mild aortoiliac and distal disease - no focal stenoses.  2.  Normal renal vasculature.  3.  Occluded right hypogastric vessel.  4.  Stenotic left hypogastric artery\"    Echocardiogram, 2017:  \"CONCLUSIONS  Normal left ventricular systolic function.  Left ventricular " "ejection fraction is visually estimated to be 65%.  Grade I diastolic dysfunction.  Mild mitral regurgitation.  Mild aortic insufficiency\"    Myocardial perfusion imaging, 5/22/2017:  \" NUCLEAR IMAGING INTERPRETATION   No evidence of significant jeopardized viable myocardium or prior myocardial    infarction.   Normal left ventricular size, ejection fraction, and wall motion\"    Assessment:     1. PAD (peripheral artery disease) ([facility])  US-ARTERY LOWER BILAT W/DARCIE (COMBO) EXTREMITY   2. DNR (do not resuscitate)     3. Essential hypertension     4. Dyslipidemia         Medical Decision Making:  Today's Assessment / Status / Plan:     He has no cardiovascular complaints today with the exception of mild leg pain with ambulation in conjunction with his previous mild peripheral atherosclerosis.  I did order repeat ankle-brachial indices to evaluate.    I reviewed with him my impression that his previous troponin elevation is consistent with demand ischemia especially in conjunction with his normal myocardial perfusion imaging in May 2017.    Otherwise, his blood pressure and cholesterol are very well controlled.  I did recommend he trial a month off of his statin to see if his back pain gets any better.    Zeus Joyce MD  Cardiologist, [facility] [place]     Return in about 6 months (around 4/16/2019).    "

## 2018-11-01 ENCOUNTER — HOSPITAL ENCOUNTER (OUTPATIENT)
Dept: RADIOLOGY | Facility: MEDICAL CENTER | Age: 83
End: 2018-11-01
Attending: INTERNAL MEDICINE
Payer: MEDICARE

## 2018-11-01 DIAGNOSIS — I73.9 PERIPHERAL ARTERIAL DISEASE (HCC): ICD-10-CM

## 2018-11-01 DIAGNOSIS — I73.9 PAD (PERIPHERAL ARTERY DISEASE) (HCC): ICD-10-CM

## 2018-11-01 PROCEDURE — 93925 LOWER EXTREMITY STUDY: CPT | Mod: 26 | Performed by: INTERNAL MEDICINE

## 2018-11-01 PROCEDURE — 93922 UPR/L XTREMITY ART 2 LEVELS: CPT | Mod: 26 | Performed by: INTERNAL MEDICINE

## 2018-11-01 PROCEDURE — 93922 UPR/L XTREMITY ART 2 LEVELS: CPT

## 2018-11-09 ENCOUNTER — TELEPHONE (OUTPATIENT)
Dept: CARDIOLOGY | Facility: MEDICAL CENTER | Age: 83
End: 2018-11-09

## 2018-11-09 ENCOUNTER — OFFICE VISIT (OUTPATIENT)
Dept: INTERNAL MEDICINE | Facility: MEDICAL CENTER | Age: 83
End: 2018-11-09
Payer: MEDICARE

## 2018-11-09 VITALS
DIASTOLIC BLOOD PRESSURE: 60 MMHG | HEART RATE: 66 BPM | SYSTOLIC BLOOD PRESSURE: 138 MMHG | TEMPERATURE: 97.2 F | OXYGEN SATURATION: 92 % | HEIGHT: 71 IN | BODY MASS INDEX: 25.45 KG/M2 | WEIGHT: 181.8 LBS | RESPIRATION RATE: 19 BRPM

## 2018-11-09 DIAGNOSIS — M89.9 DISORDER OF BONE AND CARTILAGE: ICD-10-CM

## 2018-11-09 DIAGNOSIS — M94.9 DISORDER OF BONE AND CARTILAGE: ICD-10-CM

## 2018-11-09 DIAGNOSIS — E11.22 TYPE 2 DIABETES MELLITUS WITH STAGE 3 CHRONIC KIDNEY DISEASE, WITH LONG-TERM CURRENT USE OF INSULIN (HCC): ICD-10-CM

## 2018-11-09 DIAGNOSIS — I73.9 PAD (PERIPHERAL ARTERY DISEASE) (HCC): ICD-10-CM

## 2018-11-09 DIAGNOSIS — N18.30 TYPE 2 DIABETES MELLITUS WITH STAGE 3 CHRONIC KIDNEY DISEASE, WITH LONG-TERM CURRENT USE OF INSULIN (HCC): ICD-10-CM

## 2018-11-09 DIAGNOSIS — M48.061 SPINAL STENOSIS, LUMBAR REGION, WITHOUT NEUROGENIC CLAUDICATION: ICD-10-CM

## 2018-11-09 DIAGNOSIS — Z71.89 ADVANCED CARE PLANNING/COUNSELING DISCUSSION: ICD-10-CM

## 2018-11-09 DIAGNOSIS — Z79.4 TYPE 2 DIABETES MELLITUS WITH STAGE 3 CHRONIC KIDNEY DISEASE, WITH LONG-TERM CURRENT USE OF INSULIN (HCC): ICD-10-CM

## 2018-11-09 DIAGNOSIS — E78.5 DYSLIPIDEMIA: ICD-10-CM

## 2018-11-09 DIAGNOSIS — Z13.820 ENCOUNTER FOR SCREENING FOR OSTEOPOROSIS: ICD-10-CM

## 2018-11-09 PROCEDURE — 99214 OFFICE O/P EST MOD 30 MIN: CPT | Performed by: INTERNAL MEDICINE

## 2018-11-09 ASSESSMENT — PAIN SCALES - GENERAL: PAINLEVEL: NO PAIN

## 2018-11-09 ASSESSMENT — PATIENT HEALTH QUESTIONNAIRE - PHQ9: CLINICAL INTERPRETATION OF PHQ2 SCORE: 0

## 2018-11-09 NOTE — PROGRESS NOTES
Established Patient    Selvin presents today with the following:    CC: review chronic medical problems  Chief Complaint   Patient presents with   • Leg Problem     follow up   • Diabetes     htn       HPI:   Selvin Braga is here for follow up.    C/o  leg pain with walking, had visit with Dr. Joyce and DARCIE was ordered, has not heard back yet and will call for further directions.  He has been off of statins as per his suggestions to see if back and leg pain improves with discontinuation of statin, but reports no changes with stopping statin.    DARCIE 11/18- Conclusions   There is mild to moderate arterial disease demonstrated (DARCIE is .5-.89) on    the right side.    There is no evidence of arterial disease demonstrated (DARCIE is .9-1.0) on    the left side at rest   Advised PT eval and t/t for supervised exercise, has back pain/fear of falling, 1 fall in the last 6 months and balance issues, will benefit from PT.    Has had ankle fracture and hip fracture and has not had bone density, inquiring about test, orders done definitely with h/o hip Fr, needs eval and Rx  Chronic conditions:  DM- reports compliance to insulin and meds, no hypoglycemia  Last A1c 7.1 as per patient  HTN  -stable  Depression- takes Prozac/family supportive  Prostate ca sp radiation/hormonal rx- has urinary/fecal complications post radiation- sees GI and urology, occasional blood in stool and hematuria  CKD- 2- not using nephrotoxins  Hyperlipidemia- currently off of statins      Advanced care planning:  DPOA- Y  Living will-Y  POLST- done today, no CPR, selective treatment and no artificial long-term nutrition.    Patient Active Problem List    Diagnosis Date Noted   • Hyperlipidemia 07/21/2016     Priority: High   • Essential hypertension 09/02/2010     Priority: High   • GERD (gastroesophageal reflux disease) 04/20/2017     Priority: Medium   • History of prostate cancer 03/24/2017     Priority: Medium   • Type 2 diabetes mellitus with  stage 3 chronic kidney disease, with long-term current use of insulin ([facility]) 03/24/2017     Priority: Medium   • History of DVT (deep vein thrombosis) 03/24/2017     Priority: Medium   • Chronic kidney disease (CKD), stage III (moderate) ([facility]) 07/21/2016     Priority: Medium   • History of prostatectomy 05/08/2015     Priority: Medium   • Anemia 04/20/2017     Priority: Low   • 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     Priority: Low   • Neuropathy ([facility]) 07/21/2016     Priority: Low   • Depression 07/21/2016     Priority: Low   • Spinal stenosis, lumbar region, without neurogenic claudication 12/13/2012     Priority: Low   • PAD (peripheral artery disease) ([facility]) 10/16/2018   • DNR (do not resuscitate) 10/16/2018   • Dyslipidemia 10/16/2018   • Pressure-related ear pain 04/04/2017   • Other specified disorders of the skin and subcutaneous tissue related to radiation 03/29/2017   • History of radiation therapy 03/24/2017   • Continuous leakage of urine 03/24/2017   • Health care maintenance 08/30/2016   • AK (actinic keratosis) 07/21/2016   • Senile nuclear sclerosis 02/13/2013       Current Outpatient Prescriptions   Medication Sig Dispense Refill   • Multiple Vitamins-Minerals (PRESERVISION AREDS 2+MULTI VIT PO) Take  by mouth.     • atorvastatin (LIPITOR) 40 MG Tab Take 1 Tab by mouth every day. 90 Tab 3   • sulfamethoxazole-trimethoprim (BACTRIM) 400-80 MG Tab Take 1 Tab by mouth 2 times a day. (Patient not taking: Reported on 10/16/2018) 10 Tab 0   • metoprolol (LOPRESSOR) 25 MG Tab Take 0.5 Tabs by mouth 2 times a day. 90 Tab 3   • methenamine hip (HIPPREX) 1 GM Tab Take 1 g by mouth 2 times a day.     • Probiotic Product (PRO-BIOTIC BLEND) Cap Take  by mouth.     • insulin NPH (NOVOLIN N) 100 UNIT/ML Suspension Inject 10 Units as instructed Once. Before dinner     • clobetasol (TEMOVATE) 0.05 % Cream      • pantoprazole (PROTONIX) 40 MG Tablet Delayed Response Take 20 mg by mouth every day.     • glipiZIDE (GLUCOTROL) 10  "MG Tab Take 10 mg by mouth 2 times a day. One po am, 1/2 po pm     • hydrochlorothiazide (HYDRODIURIL) 12.5 MG tablet Take 12.5 mg by mouth every day.     • Saxagliptin HCl 5 MG Tab Take 5 mg by mouth every day.     • lisinopril (PRINIVIL) 5 MG Tab Take 2.5 mg by mouth every day. Indications: High Blood Pressure     • ferrous sulfate 325 (65 FE) MG tablet Take 325 mg by mouth 2 Times a Day.     • Cholecalciferol (VITAMIN D) 2000 UNIT TABS Take 2,000 Units by mouth every day.     • ascorbic acid (ASCORBIC ACID) 500 MG TABS Take 500 mg by mouth every day.     • metformin (GLUCOPHAGE) 500 MG TABS Take 500 mg by mouth 2 times a day, with meals.     • fluoxetine (PROZAC) 20 MG CAPS Take 20 mg by mouth every day.       No current facility-administered medications for this visit.        Social History     Social History   • Marital status:      Spouse name: N/A   • Number of children: 5   • Years of education: N/A     Occupational History   • Not on file.     Social History Main Topics   • Smoking status: Former Smoker     Packs/day: 2.00     Years: 30.00     Types: Cigarettes     Quit date: 3/5/1995   • Smokeless tobacco: Never Used      Comment: 1.5 ppd 25 yrs,quit 1995   • Alcohol use No   • Drug use: No   • Sexual activity: Not on file     Other Topics Concern   • Not on file     Social History Narrative   • No narrative on file       Family History   Problem Relation Age of Onset   • Cancer Mother         ovarian   • Heart Disease Father    • Diabetes Father    • Diabetes Sister    • Hypertension Unknown    • Cancer Unknown         multiple cousins with prostate cancer.        ROS: A 14 system review done and negative except HPI    /60 (BP Location: Left arm, Patient Position: Sitting, BP Cuff Size: Adult long) Comment: repeated bp 5 minutes  Pulse 66   Temp 36.2 °C (97.2 °F) (Temporal)   Resp 19   Ht 1.803 m (5' 11\")   Wt 82.5 kg (181 lb 12.8 oz)   SpO2 92%   BMI 25.36 kg/m²     Physical " Exam  General:  Alert and oriented, No apparent distress, ha swalker    Eyes: Pupils equal and reactive. No scleral icterus.    Throat: Clear no erythema or exudates noted.    Neck: Supple. No lymphadenopathy noted. Thyroid not enlarged.    Lungs: Clear to auscultation and percussion bilaterally.    Cardiovascular: Regular rate and rhythm. Systolic murmur murmurs, rubs or gallops.    Abdomen:  Benign. No rebound or guarding noted.    Extremities: No clubbing, cyanosis, mild pitting b/l edema.    Skin: Clear. No rash or suspicious skin lesions noted.      Note: I have reviewed all pertinent labs and diagnostic tests associated with this visit with specific comments listed under the assessment and plan below      Assessment and Plan    1. PAD (peripheral artery disease) (HCC)  Will get in touch with cardiologist re; claudia valverde  Discussed PT and is agreeeable  - REFERRAL TO PHYSICAL THERAPY Reason for Therapy: Eval/Treat/Report    2. Advanced care planning/counseling discussion  POLST form filled and scanned    3. Encounter for screening for osteoporosis  4. Disorder of bone and cartilage  Discussed ca in diet/get Dexa  - DS-BONE DENSITY STUDY (DEXA); Future    5. Spinal stenosis, lumbar region, without neurogenic claudication  Could be contributing to back pain. H/o Sx which was not helpful  - REFERRAL TO PHYSICAL THERAPY Reason for Therapy: Eval/Treat/Report    6. Type 2 diabetes mellitus with stage 3 chronic kidney disease, with long-term current use of insulin (HCC)  Compliant with rx, [facility], asked for copy    7. Dyslipidemia  Off of statin, will resume after talking to cards    Signed by: Laury Rees M.D.

## 2018-11-09 NOTE — TELEPHONE ENCOUNTER
Allyson Lyon R.N.   Phone Number: [phone]             IA/Irena Olivares is calling to discuss Ultrasound results he received via My Chart. He can be reached at [phone].          To Dr Joyce, please review DARCIE US and advice, Thank You!

## 2018-11-09 NOTE — PATIENT INSTRUCTIONS
Please call cardiology regarding the follow up of your test results.  I have done referral to Physical therapy, see physical therapy.  Please get Shingrix(new Shingles vaccine from pharmacy)  Please get me a copy of your labs when you get it [facility] with [facility]    I recommend getting 1200mg of calcium in your diet.    If you are unable to do this, then, it's ok to supplement the rest.    Here is a list of calcium rich foods (UpToDate, 2016):

## 2018-11-10 NOTE — TELEPHONE ENCOUNTER
DARCIE US showed moderate arterial disease on the right and mild on the left.     Called pt, discussed DARCIE result, pt reports his right leg is bothering him and feels very heavy and achy after walking a block, he uses a walker but would probably fall without it, talked about Vasc Surgeon referral, pt is interested on this. Informed pt will discussed w/ Dr Joyce and will let him know once completed.     Discussed w/ Dr Joyce, he agreed w/ referral to Vas Surgery.     Referral initiated

## 2018-11-20 ENCOUNTER — HOSPITAL ENCOUNTER (OUTPATIENT)
Dept: RADIOLOGY | Facility: MEDICAL CENTER | Age: 83
End: 2018-11-20
Attending: INTERNAL MEDICINE
Payer: MEDICARE

## 2018-11-20 DIAGNOSIS — M89.9 DISORDER OF BONE AND CARTILAGE: ICD-10-CM

## 2018-11-20 DIAGNOSIS — Z13.820 ENCOUNTER FOR SCREENING FOR OSTEOPOROSIS: ICD-10-CM

## 2018-11-20 DIAGNOSIS — M94.9 DISORDER OF BONE AND CARTILAGE: ICD-10-CM

## 2018-11-20 PROCEDURE — 77080 DXA BONE DENSITY AXIAL: CPT

## 2018-11-21 ENCOUNTER — HOSPITAL ENCOUNTER (OUTPATIENT)
Facility: MEDICAL CENTER | Age: 83
End: 2018-11-21
Attending: EMERGENCY MEDICINE
Payer: MEDICARE

## 2018-11-21 ENCOUNTER — OFFICE VISIT (OUTPATIENT)
Dept: URGENT CARE | Facility: PHYSICIAN GROUP | Age: 83
End: 2018-11-21
Payer: MEDICARE

## 2018-11-21 VITALS
HEART RATE: 76 BPM | HEIGHT: 71 IN | RESPIRATION RATE: 20 BRPM | TEMPERATURE: 97.5 F | SYSTOLIC BLOOD PRESSURE: 140 MMHG | OXYGEN SATURATION: 90 % | BODY MASS INDEX: 25.62 KG/M2 | WEIGHT: 183 LBS | DIASTOLIC BLOOD PRESSURE: 60 MMHG

## 2018-11-21 DIAGNOSIS — R30.0 DYSURIA: ICD-10-CM

## 2018-11-21 DIAGNOSIS — R31.9 URINARY TRACT INFECTION WITH HEMATURIA, SITE UNSPECIFIED: ICD-10-CM

## 2018-11-21 DIAGNOSIS — Z87.440 HISTORY OF RECURRENT UTI (URINARY TRACT INFECTION): ICD-10-CM

## 2018-11-21 DIAGNOSIS — N39.0 URINARY TRACT INFECTION WITH HEMATURIA, SITE UNSPECIFIED: ICD-10-CM

## 2018-11-21 LAB
APPEARANCE UR: NORMAL
BILIRUB UR STRIP-MCNC: NEGATIVE MG/DL
COLOR UR AUTO: YELLOW
GLUCOSE UR STRIP.AUTO-MCNC: NEGATIVE MG/DL
KETONES UR STRIP.AUTO-MCNC: NEGATIVE MG/DL
LEUKOCYTE ESTERASE UR QL STRIP.AUTO: NORMAL
NITRITE UR QL STRIP.AUTO: POSITIVE
PH UR STRIP.AUTO: 5.5 [PH] (ref 5–8)
PROT UR QL STRIP: NORMAL MG/DL
RBC UR QL AUTO: NORMAL
SP GR UR STRIP.AUTO: 1.01
UROBILINOGEN UR STRIP-MCNC: 0.2 MG/DL

## 2018-11-21 PROCEDURE — 99203 OFFICE O/P NEW LOW 30 MIN: CPT | Performed by: EMERGENCY MEDICINE

## 2018-11-21 PROCEDURE — 87086 URINE CULTURE/COLONY COUNT: CPT

## 2018-11-21 PROCEDURE — 87077 CULTURE AEROBIC IDENTIFY: CPT

## 2018-11-21 PROCEDURE — 81002 URINALYSIS NONAUTO W/O SCOPE: CPT | Performed by: EMERGENCY MEDICINE

## 2018-11-21 PROCEDURE — 87186 SC STD MICRODIL/AGAR DIL: CPT

## 2018-11-21 RX ORDER — SULFAMETHOXAZOLE AND TRIMETHOPRIM 800; 160 MG/1; MG/1
1 TABLET ORAL EVERY 12 HOURS
Qty: 28 TAB | Refills: 0 | Status: SHIPPED | OUTPATIENT
Start: 2018-11-21 | End: 2018-12-05

## 2018-11-21 ASSESSMENT — ENCOUNTER SYMPTOMS
CHILLS: 0
VOMITING: 0
BLOOD IN STOOL: 0
FLANK PAIN: 0
SWEATS: 0
DIZZINESS: 0
NAUSEA: 0
ABDOMINAL PAIN: 0

## 2018-11-22 DIAGNOSIS — R30.0 DYSURIA: ICD-10-CM

## 2018-11-22 NOTE — PROGRESS NOTES
Subjective:      Selvin Braga is a 82 y.o. male who presents with Dysuria (x 4 days// painful urinating )            Dysuria    This is a recurrent problem. Episode onset: 4 days. The problem occurs every urination. The problem has been unchanged. The quality of the pain is described as burning. The pain is mild. There has been no fever. Associated symptoms include hematuria. Pertinent negatives include no chills, discharge, flank pain, nausea, sweats or vomiting. He has tried increased fluids for the symptoms. The treatment provided no relief. His past medical history is significant for recurrent UTIs. penile clamping       Review of Systems   Constitutional: Negative for chills and malaise/fatigue.   Gastrointestinal: Negative for abdominal pain, blood in stool, nausea and vomiting.   Genitourinary: Positive for dysuria and hematuria. Negative for flank pain.   Skin: Negative for rash.   Neurological: Negative for dizziness.     PMH:  has a past medical history of AK (actinic keratosis) (7/21/2016); Anemia; Arrhythmia; Arthritis; Lombardi esophagus; CATARACT; Chronic kidney disease (CKD), stage III (moderate) ([facility]); Colon polyps; Dental disorder; Depression (7/21/2016); Diabetes (1984); GERD (gastroesophageal reflux disease); Hyperlipidemia; Hypertension; Neuropathy ([facility]); Other specified disorder of intestines; Personal history of venous thrombosis and embolism (2010/1995); Prostate cancer ([facility]) (2001); Psychiatric problem; Sleep apnea; Snoring; and Unspecified urinary incontinence.  MEDS:   Current Outpatient Prescriptions:   •  sulfamethoxazole-trimethoprim (BACTRIM DS) 800-160 MG tablet, Take 1 Tab by mouth every 12 hours for 14 days., Disp: 28 Tab, Rfl: 0  •  Multiple Vitamins-Minerals (PRESERVISION AREDS 2+MULTI VIT PO), Take  by mouth., Disp: , Rfl:   •  sulfamethoxazole-trimethoprim (BACTRIM) 400-80 MG Tab, Take 1 Tab by mouth 2 times a day. (Patient not taking: Reported on 10/16/2018), Disp: 10 Tab,  Rfl: 0  •  atorvastatin (LIPITOR) 40 MG Tab, Take 1 Tab by mouth every day., Disp: 90 Tab, Rfl: 3  •  metoprolol (LOPRESSOR) 25 MG Tab, Take 0.5 Tabs by mouth 2 times a day., Disp: 90 Tab, Rfl: 3  •  methenamine hip (HIPPREX) 1 GM Tab, Take 1 g by mouth 2 times a day., Disp: , Rfl:   •  Probiotic Product (PRO-BIOTIC BLEND) Cap, Take  by mouth., Disp: , Rfl:   •  insulin NPH (NOVOLIN N) 100 UNIT/ML Suspension, Inject 10 Units as instructed Once. Before dinner, Disp: , Rfl:   •  clobetasol (TEMOVATE) 0.05 % Cream, , Disp: , Rfl:   •  pantoprazole (PROTONIX) 40 MG Tablet Delayed Response, Take 20 mg by mouth every day., Disp: , Rfl:   •  glipiZIDE (GLUCOTROL) 10 MG Tab, Take 10 mg by mouth 2 times a day. One po am, 1/2 po pm, Disp: , Rfl:   •  hydrochlorothiazide (HYDRODIURIL) 12.5 MG tablet, Take 12.5 mg by mouth every day., Disp: , Rfl:   •  Saxagliptin HCl 5 MG Tab, Take 5 mg by mouth every day., Disp: , Rfl:   •  lisinopril (PRINIVIL) 5 MG Tab, Take 2.5 mg by mouth every day. Indications: High Blood Pressure, Disp: , Rfl:   •  ferrous sulfate 325 (65 FE) MG tablet, Take 325 mg by mouth 2 Times a Day., Disp: , Rfl:   •  Cholecalciferol (VITAMIN D) 2000 UNIT TABS, Take 2,000 Units by mouth every day., Disp: , Rfl:   •  ascorbic acid (ASCORBIC ACID) 500 MG TABS, Take 500 mg by mouth every day., Disp: , Rfl:   •  metformin (GLUCOPHAGE) 500 MG TABS, Take 500 mg by mouth 2 times a day, with meals., Disp: , Rfl:   •  fluoxetine (PROZAC) 20 MG CAPS, Take 20 mg by mouth every day., Disp: , Rfl:   ALLERGIES:   Allergies   Allergen Reactions   • Augmentin Unspecified     Bleeding  RXN=5 years ago   • Latex Unspecified     Blisters  RXN=ongoing   • Tape Rash     Rash-blisters-paper tape okay  RXN=ongoing     SURGHX:   Past Surgical History:   Procedure Laterality Date   • ANKLE ORIF Right 10/28/2015    Procedure: ANKLE ORIF;  Surgeon: Venu Elizabeth M.D.;  Location: SURGERY [facility];  Service:    • SPHINCTER  PROSTHESIS REMOVAL  8/10/2015    Procedure: SPHINCTER PROSTHESIS REMOVAL;  Surgeon: Tuan Marie M.D.;  Location: SURGERY [facility];  Service:    • BOWEL RESECTION  5/21/2014    Performed by Graham Peña M.D. at [facility]   • LAPAROSCOPY  5/21/2014    Performed by Graham Peña M.D. at [facility]   • COLONOSCOPY WITH APC  6/5/2013    Performed by Deepa Vela M.D. at [facility]   • CATARACT PHACO WITH IOL  2/27/2013    Performed by Clayton Noonan M.D. at SURGERY SAME DAY [facility]   • CATARACT PHACO WITH IOL  2/13/2013    Performed by Clayton Noonan M.D. at SURGERY SAME DAY [facility]   • LUMBAR FUSION POSTERIOR  12/13/2012    Procedure: L5-S1 removal of Jennings Fragment NON-INSTRUMENTAL;  Surgeon: Wil Martinez M.D.;  Location: SURGERY [facility];  Service:    • LUMBAR DECOMPRESSION  12/13/2012    Procedure: 4-5;  Surgeon: Wil Martinez M.D.;  Location: [facility];  Service:    • SPHINCTER PROSTHESIS PLACEMENT  9/26/2012    Performed by GERSON Car M.D. at SURGERY [facility]   • GROIN EXPLORATION  3/13/2012    Performed by DEEPA PATRICIA at [facility]   • SPHINCTER PROSTHESIS PLACEMENT  2/24/2011    Performed by GERSON CAR at [facility]   • CYSTOSCOPY  2/24/2011    Performed by GERSON CAR at [facility]   • CYSTOSCOPY  2/2/2011    Performed by GERSON CAR at [facility]   • SPHINCTER PROSTHESIS REMOVAL  6/3/2010    Performed by JOSH APARICIO at [facility]   • ANGIOGRAM  1/25/2010    Performed by PETER NORMAN at [facility]   • AORTOGRAM  1/25/2010    Performed by PETER NORMAN at [facility]   • PROSTATECTOMY ROBOTIC  2/2001   • PB INSERT,INFLATABLE SPHINCTER  2001   • OTHER ABDOMINAL SURGERY     • OTHER ORTHOPEDIC SURGERY      hip & femur fx     SOCHX:  reports that he quit  "smoking about 23 years ago. His smoking use included Cigarettes. He has a 60.00 pack-year smoking history. He has never used smokeless tobacco. He reports that he does not drink alcohol or use drugs.  FH: family history includes Cancer in his mother and unknown relative; Diabetes in his father and sister; Heart Disease in his father; Hypertension in his unknown relative.       Objective:     /60 (BP Location: Left arm, Patient Position: Sitting)   Pulse 76   Temp 36.4 °C (97.5 °F) (Temporal)   Resp 20   Ht 1.803 m (5' 11\")   Wt 83 kg (183 lb)   SpO2 90%   BMI 25.52 kg/m²      Physical Exam   Constitutional: He is oriented to person, place, and time. He appears well-developed and well-nourished. He is cooperative. He does not have a sickly appearance. He does not appear ill. No distress.   Cardiovascular: Normal rate, regular rhythm and normal heart sounds.    Pulmonary/Chest: Effort normal and breath sounds normal.   Abdominal: Bowel sounds are normal. He exhibits no distension. There is no tenderness. There is no CVA tenderness.   Genitourinary: Cremasteric reflex is present. Right testis shows no swelling and no tenderness. Left testis shows no swelling and no tenderness. No penile erythema. No discharge found.   Lymphadenopathy:        Right: No inguinal adenopathy present.        Left: No inguinal adenopathy present.   Neurological: He is alert and oriented to person, place, and time.   Skin: Skin is warm and dry. No rash noted.   Psychiatric: He has a normal mood and affect.               Assessment/Plan:     1. Urinary tract infection with hematuria, site unspecified  Advised need for ED evaluation if any fever, worsening malaise, bleeding.  - sulfamethoxazole-trimethoprim (BACTRIM DS) 800-160 MG tablet; Take 1 Tab by mouth every 12 hours for 14 days.  Dispense: 28 Tab; Refill: 0    2. Dysuria  Positive blood, positive LE, positive nitrite- POCT Urinalysis  - URINE CULTURE(NEW); Future    3. " History of recurrent UTI (urinary tract infection)  - REFERRAL TO FOLLOW-UP WITH PRIMARY CARE

## 2018-11-24 ENCOUNTER — TELEPHONE (OUTPATIENT)
Dept: URGENT CARE | Facility: CLINIC | Age: 83
End: 2018-11-24

## 2018-11-24 NOTE — TELEPHONE ENCOUNTER
Please notify patient of urine culture result with bacteria sensitive to current antibiotic; should resolve with treatment.  Advise F/U PCP as advised.

## 2018-11-29 ENCOUNTER — PHYSICAL THERAPY (OUTPATIENT)
Dept: PHYSICAL THERAPY | Facility: MEDICAL CENTER | Age: 83
End: 2018-11-29
Attending: INTERNAL MEDICINE
Payer: MEDICARE

## 2018-11-29 DIAGNOSIS — I73.9 PAD (PERIPHERAL ARTERY DISEASE) (HCC): ICD-10-CM

## 2018-11-29 DIAGNOSIS — M48.061 SPINAL STENOSIS, LUMBAR REGION WITHOUT NEUROGENIC CLAUDICATION: ICD-10-CM

## 2018-11-29 PROCEDURE — 97110 THERAPEUTIC EXERCISES: CPT

## 2018-11-29 PROCEDURE — 97162 PT EVAL MOD COMPLEX 30 MIN: CPT

## 2018-11-29 ASSESSMENT — BALANCE ASSESSMENTS
STANDING UNSUPPORTED WITH EYES CLOSED: 3
SITTING TO STANDING: 3
TURN 360 DEGREES: 2
LONG VERSION TOTAL SCORE (MAX 56): 39
LOOK OVER LEFT AND RIGHT SHOULDERS WHILE STANDING: 2
STANDING UNSUPPORTED WITH FEET TOGETHER: 3
REACHING FORWARD WITH OUTSTRETCHED ARM WHILE STANDING: 2
PICK UP OBJECT FROM THE FLOOR FROM A STANDING POSITION: 3
LONG VERSION TOTAL SCORE (MAX 56): 39
PLACE ALTERNATE FOOT ON STEP OR STOOL WHILE STANDING UNSUPPORTED: 4
STANDING TO SITTING: 3
TRANSFERS: 2
SITTING UNSUPPORTED: 4
STANDING UNSUPPORTED: 4
STANDING ON ONE LEG: 3
STANDING UNSUPPORTED ONE FOOT IN FRONT: 1

## 2018-11-29 ASSESSMENT — ENCOUNTER SYMPTOMS
PAIN SCALE AT HIGHEST: 8
QUALITY: BURNING
PAIN SCALE: 0
PAIN SCALE AT LOWEST: 0
QUALITY: ACHING

## 2018-11-29 ASSESSMENT — ACTIVITIES OF DAILY LIVING (ADL): POOR_BALANCE: 1

## 2018-11-29 NOTE — OP THERAPY EVALUATION
"  Outpatient Physical Therapy  INITIAL EVALUATION    [facility] Outpatient Physical Therapy  [address]  [place] [address]  Phone:  [phone]  Fax:  [phone]    Date of Evaluation: 11/29/2018    Patient: Selvin Braga  YOB: 1935  MRN: [id number]     Referring Provider: Laury Rees M.D.  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 PAD (peripheral artery disease) ([facility]) I73.9   2. 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 Had back surgery for stenosis which did not really help. The last 3-4 months if he walks 50ft his legs start to feel dead, his legs start to ache, and his feet feel heavy. If he sits, the pain will almost immediately subside. Pt had DARCIE done and had moderate obstruction in the legs. He goes back to the doctor in 6 weeks to see if they need surgery or can continue with PT. His balance has gotten really bad and is scared to death of falling as he knows if he falls something will break. He has a history of a broken of ankle. Pt knows he is sitting too much, he just walks to do chores, to the mailbox, but that is about it. Pt had prostate removed due to cancer in 2001, and is incontinent all the time and " wears a clamp during the day and catheter at night. Pt had a lot of radiation damage. Pt wears hearing aids. Pt has some issues with dizziness, but not bad. Getting in and out of car is difficult. No issues with SOB.  Pain:     Current pain ratin    At best pain ratin    At worst pain ratin    Quality:  Aching and burning (feels dead/tired )  Patient Goals:     Patient goals for therapy:  Decreased pain, improved balance and increased strength      Past Medical History:   Diagnosis Date   • AK (actinic keratosis) 2016   • Anemia    • Arrhythmia     PVC's   • Arthritis    • Lombardi esophagus    • CATARACT    • Chronic kidney disease (CKD), stage III (moderate) ([facility])    • Colon polyps    • Dental disorder     Dentures   • Depression 2016   • Diabetes    • GERD (gastroesophageal reflux disease)    • Hyperlipidemia    • Hypertension    • Neuropathy ([facility])    • Other specified disorder of intestines     alters between normal and diarrhea since radiation tx   • Personal history of venous thrombosis and embolism     leg   • Prostate cancer ([facility]) 2001    Status post prostatectomy   • Psychiatric problem     Depression, treated with Prozac.   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 at [facility]   • LAPAROSCOPY  2014    Performed by Graham Peña M.D. at [facility]   • COLONOSCOPY WITH APC  2013    Performed by Alek Vela M.D.  at SURGERY [facility]   • CATARACT PHACO WITH IOL  2/27/2013    Performed by Clayton Noonan M.D. at SURGERY SAME DAY [facility]   • CATARACT PHACO WITH IOL  2/13/2013    Performed by Clayton Noonan M.D. at SURGERY SAME DAY [facility]   • LUMBAR FUSION POSTERIOR  12/13/2012    Procedure: L5-S1 removal of Jennings Fragment NON-INSTRUMENTAL;  Surgeon: Wil Martinez M.D.;  Location: SURGERY [facility];  Service:    • LUMBAR DECOMPRESSION  12/13/2012    Procedure: 4-5;  Surgeon: Wil Martinez M.D.;  Location: SURGERY [facility];  Service:    • SPHINCTER PROSTHESIS PLACEMENT  9/26/2012    Performed by GERSON Car M.D. at SURGERY [facility]   • GROIN EXPLORATION  3/13/2012    Performed by DEEPA PATRICIA at SURGERY [facility]   • SPHINCTER PROSTHESIS PLACEMENT  2/24/2011    Performed by GERSON CAR at SURGERY [facility]   • CYSTOSCOPY  2/24/2011    Performed by GERSON CAR at SURGERY [facility]   • CYSTOSCOPY  2/2/2011    Performed by GERSON CAR at SURGERY [facility]   • SPHINCTER PROSTHESIS REMOVAL  6/3/2010    Performed by JOSH APARICIO at SURGERY [facility]   • ANGIOGRAM  1/25/2010    Performed by PETER NORMAN at SURGERY [facility]   • AORTOGRAM  1/25/2010    Performed by PETER NORMAN at SURGERY [facility]   • PROSTATECTOMY ROBOTIC  2/2001   • PB INSERT,INFLATABLE SPHINCTER  2001   • OTHER ABDOMINAL SURGERY     • OTHER ORTHOPEDIC SURGERY      hip & femur fx     Social History   Substance Use Topics   • Smoking status: Former Smoker     Packs/day: 2.00     Years: 30.00     Types: Cigarettes     Quit date: 3/5/1995   • Smokeless tobacco: Never Used      Comment: 1.5 ppd 25 yrs,quit 1995   • Alcohol use No     Family and Occupational History     Social History   • Marital status:      Spouse name: N/A   • Number of children: 5   • Years of education: N/A       Objective     Strength:      Left Hip   Planes  "of Motion   Flexion: 3+    Right Hip   Planes of Motion   Flexion: 3+    Left Knee   Flexion: 5  Extension: 5    Right Knee   Flexion: 5  Extension: 5    Left Ankle/Foot   Dorsiflexion: 5  Plantar flexion: 5    Right Ankle/Foot   Dorsiflexion: 5  Plantar flexion: 5    Functional Assessment     Comments  6 MWT: 400ft total (pt went 240 ft before needing rest break due to low back pain. Pt rested for 1.5 minutes. Pt then went another 160ft before the test ended, pt also had \"heaviness\" and pain in calves and started moving in a shuffling gait pattern).    Activities of Daily Living:   Transfers/Mobility:     Bed/chair transfers: contact guard assist    Sit to stand: stand by assist    Transfer mobility comments:  Pt is SBA to CGA with sit<>stand and chair<>bed transfers. Pt had LOB with sit to  which he was able to self correct and had decreased control in descent with stand to sit, even with use of hands.         Therapeutic Exercises (CPT 34774):     1. Supine marching, 10 x 1    2. Bridging, 5\" x 10 x 1    3. LAQ, 5\" x 10 x 1      Therapeutic Exercise Summary: Walking goal: walk to point in which you feel symptoms, then go a little further. Repeat 3 times in a row, at least 1x/day.      Time-based treatments/modalities:  Therapeutic exercise minutes (CPT 73744): 10 minutes       Assessment, Response and Plan:   Impairments: abnormal gait, abnormal or restricted ROM, activity intolerance, impaired balance, impaired physical strength, limited ADL's and pain with function    Assessment details:  Pt is a pleasant, cooperative 82 yo male who presents with multiple issues including: moderate PAD (per DARCIE), lx stenosis, and decreased balance and strength. Pt has decreased activity tolerance with walking, decreased control with sit<>stand transfers, decreased BLE strength, and decreased balance. Pt will benefit from skilled physical therapy in order to increase his BLE strength, balance, awareness and safety with " transfers, and overall activity tolerance in order to return to ADLs and recreational activities with less pain.     Pt's LBP was not formally assessed in this evaluation due to time constraint, but will be assessed more thoroughly in later sessions.  Prognosis: good    Goals:   Short term goal time span:  2-4 weeks  Patient progress towards short term goals:  1. Pt will be able to perform 6 MWT without LOB or requiring a rest break.  2. Pt will be able to perform sit<>stand without LOB and with controlled descent.  3. Pt will perform HEP without cueing demonstrating compliance.  Long term goal time span:  6-8 weeks  Patient progress towards Long Term goals:  1. Pt is to be able to walk for 700ft in 6MWT without LOB  2. Pt is to score >45 on the Sweet demonstrating decreased risk of falling  3. Pt is to perform sit<>stand CGA without use of hands     Plan:   Therapy options:  Physical therapy treatment to continue  Planned therapy interventions:  Manual Therapy (CPT 71696), Mechanical Traction (CPT 61834), Neuromuscular Re-education (CPT 38039), Therapeutic Exercise (CPT 01741) and Therapeutic Activities (CPT 59880)  Frequency:  2x week  Duration in weeks:  8  Plan details:  No estim (hx of prostate cancer)        Functional Limitation G-Codes and Severity Modifiers  Sweet Balance Total Score (0-56): 39  Lower Extremity Functional Scale Total: 32.5  Lb Higinio Low Back Pain and Disability Score: 54.17   Current:     Goal:       Referring provider co-signature:  I have reviewed this plan of care and my co-signature certifies the need for services.  Certification Dates:   From 11/29/18     To 01/24/19    Physician Signature: ________________________________ Date: ______________

## 2018-12-04 ENCOUNTER — PHYSICAL THERAPY (OUTPATIENT)
Dept: PHYSICAL THERAPY | Facility: MEDICAL CENTER | Age: 83
End: 2018-12-04
Attending: INTERNAL MEDICINE
Payer: MEDICARE

## 2018-12-04 DIAGNOSIS — I73.9 PAD (PERIPHERAL ARTERY DISEASE) (HCC): ICD-10-CM

## 2018-12-04 DIAGNOSIS — M48.061 SPINAL STENOSIS, LUMBAR REGION WITHOUT NEUROGENIC CLAUDICATION: ICD-10-CM

## 2018-12-04 PROCEDURE — 97110 THERAPEUTIC EXERCISES: CPT

## 2018-12-04 PROCEDURE — 97112 NEUROMUSCULAR REEDUCATION: CPT

## 2018-12-04 NOTE — OP THERAPY DAILY TREATMENT
Outpatient Physical Therapy  DAILY TREATMENT     [facility] Outpatient Physical Therapy  [address]  [place] [address]  Phone:  [phone]  Fax:  [phone]    Date: 12/04/2018    Patient: Selvin Braga  YOB: 1935  MRN: [id number]     Time Calculation  Start time: 1050  Stop time: 1121 Time Calculation (min): 31 minutes     Chief Complaint: Back Problem and Loss Of Balance    Visit #: 2    SUBJECTIVE:  I did pretty well until yesterday, but I had a bad day.  The exercise part, not the walking part.  Pain was about 7/10.  It does go away as soon as I sit down.    OBJECTIVE:  Current objective measures: able to walk 180 ft in juan without walker x 3 with tightness ending each walk          Therapeutic Exercises (CPT 03737):     1. Nu step, 5 min, S14 A15 R4    2. Calf stretch on slant board, 10 reps    3. Walking with walker, 180 ft x3    4. Sit to stand, 5/2    5. Sitting marches, 10    6. Press through feet sitting, 5, hold 2-3 sec each    Therapeutic Treatments and Modalities:     1. Neuromuscular Re-education (CPT 53057), 15 min, balance ex feet narrow, apart, staggered, gait with head neutral and with head turns    Time-based treatments/modalities:  Therapeutic exercise minutes (CPT 38358): 15 minutes  Neuromusc re-ed, balance, coor, post minutes (CPT 24813): 15 minutes       Pain rating before treatment: 2  Pain rating after treatment: 2    ASSESSMENT:   Response to treatment: some increased soreness temporarily, but always decreased with sitting after exercise    PLAN/RECOMMENDATIONS:   Plan for treatment: therapy treatment to continue next visit.  Planned interventions for next visit: continue with current treatment.

## 2018-12-06 ENCOUNTER — PHYSICAL THERAPY (OUTPATIENT)
Dept: PHYSICAL THERAPY | Facility: MEDICAL CENTER | Age: 83
End: 2018-12-06
Attending: INTERNAL MEDICINE
Payer: MEDICARE

## 2018-12-06 DIAGNOSIS — I73.9 PAD (PERIPHERAL ARTERY DISEASE) (HCC): ICD-10-CM

## 2018-12-06 DIAGNOSIS — M48.061 SPINAL STENOSIS, LUMBAR REGION WITHOUT NEUROGENIC CLAUDICATION: ICD-10-CM

## 2018-12-06 PROCEDURE — 97112 NEUROMUSCULAR REEDUCATION: CPT

## 2018-12-06 PROCEDURE — 97110 THERAPEUTIC EXERCISES: CPT

## 2018-12-06 NOTE — OP THERAPY DAILY TREATMENT
Outpatient Physical Therapy  DAILY TREATMENT     [facility] Outpatient Physical Therapy  [address]  [place] [address]  Phone:  [phone]  Fax:  [phone]    Date: 12/06/2018    Patient: Selvin Braga  YOB: 1935  MRN: [id number]     Time Calculation  Start time: 1020  Stop time: 1050 Time Calculation (min): 30 minutes     Chief Complaint: Loss Of Balance and Back Problem    Visit #: 3    SUBJECTIVE:  I am doing well.  I was a little tired after last visit, but no new pain.  Legs are a little stronger I think.  I am going to be moving to independent living apartment in [place] and want to switch to the PT up there.      OBJECTIVE:  Current objective measures: able to alt tap on 6 inch step without LOB, using hands intermittently in II bars.          Therapeutic Exercises (CPT 63581):     1. Nu step, 5 min, S14 A15 R4    2. Calf stretch on slant board, 10 reps    3. Walking with walker, 250 x 1    4. Gait fwd and back, L and R, x3 each, II bars, working on no hands    5. Standing marches, 10/2    6. Heel raises , 10/2    7. Step ups on 4 inch step, 10/3    Therapeutic Treatments and Modalities:     1. Neuromuscular Re-education (CPT 54780), 15 min, balance ex feet narrow, apart, staggered, gait with head neutral and with head turns, on foam pad in II bars with head turns, alt tap on 6 inch step    Time-based treatments/modalities:  Therapeutic exercise minutes (CPT 80502): 15 minutes  Neuromusc re-ed, balance, coor, post minutes (CPT 37849): 15 minutes       Pain rating before treatment: 0  Pain rating after treatment: 0    ASSESSMENT:   Response to treatment: able to march with legs higher with more consistency.    PLAN/RECOMMENDATIONS:   Plan for treatment: therapy treatment to continue next visit.  Planned interventions for next visit: continue with current treatment.

## 2018-12-11 ENCOUNTER — APPOINTMENT (OUTPATIENT)
Dept: PHYSICAL THERAPY | Facility: MEDICAL CENTER | Age: 83
End: 2018-12-11
Attending: INTERNAL MEDICINE
Payer: MEDICARE

## 2018-12-13 ENCOUNTER — PHYSICAL THERAPY (OUTPATIENT)
Dept: PHYSICAL THERAPY | Facility: MEDICAL CENTER | Age: 83
End: 2018-12-13
Attending: INTERNAL MEDICINE
Payer: MEDICARE

## 2018-12-13 DIAGNOSIS — I73.9 PAD (PERIPHERAL ARTERY DISEASE) (HCC): ICD-10-CM

## 2018-12-13 DIAGNOSIS — M48.061 SPINAL STENOSIS, LUMBAR REGION WITHOUT NEUROGENIC CLAUDICATION: ICD-10-CM

## 2018-12-13 PROCEDURE — 97110 THERAPEUTIC EXERCISES: CPT

## 2018-12-13 PROCEDURE — 97112 NEUROMUSCULAR REEDUCATION: CPT

## 2018-12-13 NOTE — OP THERAPY DAILY TREATMENT
Outpatient Physical Therapy  DAILY TREATMENT     [facility] Outpatient Physical Therapy  [address]  [place] [address]  Phone:  [phone]  Fax:  [phone]    Date: 12/13/2018    Patient: Selvin Braga  YOB: 1935  MRN: [id number]     Time Calculation  Start time: 1045  Stop time: 1120 Time Calculation (min): 35 minutes     Chief Complaint: No chief complaint on file.    Visit #: 4    SUBJECTIVE:  Tired from all the moving. Was squatting down and rolled back when packing some things. Didn't hurt myself or hit anything. New place I am moving to has a gym and I was wondering if I could use the treadmill.    OBJECTIVE:          Therapeutic Exercises (CPT 50438):     1. Nu step, 5 min, S14 A15 R4    2. Calf stretch on slant board, 10 reps    3. Walking with walker, 250 x 1, SPV    4. Amb over cones, 20 ft x 2, with 4WW SBA    5. Amb with cone toe tap R and L, 20ft x 1, with 4WW SBA    6. Amb with SPC, figure 8, SBA    7. Amb without AD, figure 8, SBA    Therapeutic Treatments and Modalities:     1. Neuromuscular Re-education (CPT 20985), 15 min, balance ex feet narrow, apart, staggered, gait with head neutral and with head turns, on foam pad in II bars with head turns, alt tap on 6 inch step    Time-based treatments/modalities:  Therapeutic exercise minutes (CPT 24428): 20 minutes  Neuromusc re-ed, balance, coor, post minutes (CPT 45285): 10 minutes       Pain rating before treatment: 2  Pain rating after treatment: 2    ASSESSMENT:   Response to treatment: Pt requires seated rest breaks due to tightness in B LE's. However, pain does go away with seated rest break. He was able to tolerate amb without AD today for short distances SBA wihtout LOB and increased B LE tightness. Pt was at end of session and fatigued when he mentioned the TM, PT recommended he first try with PT before starting on the TM on his own.     PLAN/RECOMMENDATIONS:   Plan for treatment:  therapy treatment to continue next visit.  Planned interventions for next visit: continue with current treatment. [facility] as he is moving to [place].

## 2018-12-18 ENCOUNTER — APPOINTMENT (OUTPATIENT)
Dept: PHYSICAL THERAPY | Facility: MEDICAL CENTER | Age: 83
End: 2018-12-18
Attending: INTERNAL MEDICINE
Payer: MEDICARE

## 2018-12-20 ENCOUNTER — APPOINTMENT (OUTPATIENT)
Dept: PHYSICAL THERAPY | Facility: MEDICAL CENTER | Age: 83
End: 2018-12-20
Attending: INTERNAL MEDICINE
Payer: MEDICARE

## 2018-12-27 ENCOUNTER — APPOINTMENT (OUTPATIENT)
Dept: PHYSICAL THERAPY | Facility: MEDICAL CENTER | Age: 83
End: 2018-12-27
Attending: INTERNAL MEDICINE
Payer: MEDICARE

## 2018-12-27 ENCOUNTER — HOSPITAL ENCOUNTER (OUTPATIENT)
Facility: MEDICAL CENTER | Age: 83
End: 2018-12-27
Attending: NURSE PRACTITIONER
Payer: MEDICARE

## 2018-12-27 ENCOUNTER — OFFICE VISIT (OUTPATIENT)
Dept: URGENT CARE | Facility: CLINIC | Age: 83
End: 2018-12-27
Payer: MEDICARE

## 2018-12-27 VITALS
SYSTOLIC BLOOD PRESSURE: 118 MMHG | HEIGHT: 71 IN | OXYGEN SATURATION: 93 % | DIASTOLIC BLOOD PRESSURE: 72 MMHG | BODY MASS INDEX: 25.34 KG/M2 | TEMPERATURE: 97.3 F | RESPIRATION RATE: 18 BRPM | WEIGHT: 181 LBS | HEART RATE: 84 BPM

## 2018-12-27 DIAGNOSIS — N39.0 RECURRENT URINARY TRACT INFECTION: ICD-10-CM

## 2018-12-27 LAB
APPEARANCE UR: NORMAL
BILIRUB UR STRIP-MCNC: NORMAL MG/DL
COLOR UR AUTO: YELLOW
GLUCOSE UR STRIP.AUTO-MCNC: NORMAL MG/DL
KETONES UR STRIP.AUTO-MCNC: NORMAL MG/DL
LEUKOCYTE ESTERASE UR QL STRIP.AUTO: NORMAL
NITRITE UR QL STRIP.AUTO: NORMAL
PH UR STRIP.AUTO: 5 [PH] (ref 5–8)
PROT UR QL STRIP: 30 MG/DL
RBC UR QL AUTO: NORMAL
SP GR UR STRIP.AUTO: 1.02
UROBILINOGEN UR STRIP-MCNC: 0.2 MG/DL

## 2018-12-27 PROCEDURE — 99214 OFFICE O/P EST MOD 30 MIN: CPT | Performed by: NURSE PRACTITIONER

## 2018-12-27 PROCEDURE — 87186 SC STD MICRODIL/AGAR DIL: CPT

## 2018-12-27 PROCEDURE — 81002 URINALYSIS NONAUTO W/O SCOPE: CPT | Performed by: NURSE PRACTITIONER

## 2018-12-27 PROCEDURE — 87086 URINE CULTURE/COLONY COUNT: CPT

## 2018-12-27 PROCEDURE — 87077 CULTURE AEROBIC IDENTIFY: CPT

## 2018-12-27 RX ORDER — CEFUROXIME AXETIL 250 MG/1
250 TABLET ORAL 2 TIMES DAILY
Qty: 20 TAB | Refills: 0 | Status: SHIPPED | OUTPATIENT
Start: 2018-12-27 | End: 2019-01-06

## 2018-12-27 NOTE — PROGRESS NOTES
Chief Complaint   Patient presents with   • UTI     x 1 day       HISTORY OF PRESENT ILLNESS: Patient is a 83 y.o. male who presents today due to two days of urinary burning, urgency, hematuria, and frequency. Reports some associated pelvic pressure. Denies hematuria, fever, flank pain, N/V. Denies a history of pyelonephritis or kidney stones. Admits to a history of recurrent UTIs, last one was one month ago. He notes a history of prostate cancer. He has a urologist he sees regularly. He is diabetic and his blood sugars have been within normal range since onset.     Patient Active Problem List    Diagnosis Date Noted   • Hyperlipidemia 07/21/2016     Priority: High   • Essential hypertension 09/02/2010     Priority: High   • GERD (gastroesophageal reflux disease) 04/20/2017     Priority: Medium   • History of prostate cancer 03/24/2017     Priority: Medium   • Type 2 diabetes mellitus with stage 3 chronic kidney disease, with long-term current use of insulin ([facility]) 03/24/2017     Priority: Medium   • History of DVT (deep vein thrombosis) 03/24/2017     Priority: Medium   • Chronic kidney disease (CKD), stage III (moderate) ([facility]) 07/21/2016     Priority: Medium   • History of prostatectomy 05/08/2015     Priority: Medium   • Anemia 04/20/2017     Priority: Low   • 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     Priority: Low   • Neuropathy ([facility]) 07/21/2016     Priority: Low   • Depression 07/21/2016     Priority: Low   • Spinal stenosis, lumbar region, without neurogenic claudication 12/13/2012     Priority: Low   • PAD (peripheral artery disease) ([facility]) 10/16/2018   • DNR (do not resuscitate) 10/16/2018   • Dyslipidemia 10/16/2018   • Pressure-related ear pain 04/04/2017   • Other specified disorders of the skin and subcutaneous tissue related to radiation 03/29/2017   • History of radiation therapy 03/24/2017   • Continuous leakage of urine 03/24/2017   • Health care maintenance 08/30/2016   • AK (actinic keratosis) 07/21/2016   •  Senile nuclear sclerosis 02/13/2013       Allergies:Augmentin; Latex; and Tape    Current Outpatient Prescriptions Ordered in [facility]   Medication Sig Dispense Refill   • Multiple Vitamins-Minerals (PRESERVISION AREDS 2+MULTI VIT PO) Take  by mouth.     • atorvastatin (LIPITOR) 40 MG Tab Take 1 Tab by mouth every day. 90 Tab 3   • metoprolol (LOPRESSOR) 25 MG Tab Take 0.5 Tabs by mouth 2 times a day. 90 Tab 3   • methenamine hip (HIPPREX) 1 GM Tab Take 1 g by mouth 2 times a day.     • Probiotic Product (PRO-BIOTIC BLEND) Cap Take  by mouth.     • insulin NPH (NOVOLIN N) 100 UNIT/ML Suspension Inject 10 Units as instructed Once. Before dinner     • clobetasol (TEMOVATE) 0.05 % Cream      • pantoprazole (PROTONIX) 40 MG Tablet Delayed Response Take 20 mg by mouth every day.     • glipiZIDE (GLUCOTROL) 10 MG Tab Take 10 mg by mouth 2 times a day. One po am, 1/2 po pm     • hydrochlorothiazide (HYDRODIURIL) 12.5 MG tablet Take 12.5 mg by mouth every day.     • Saxagliptin HCl 5 MG Tab Take 5 mg by mouth every day.     • lisinopril (PRINIVIL) 5 MG Tab Take 2.5 mg by mouth every day. Indications: High Blood Pressure     • ferrous sulfate 325 (65 FE) MG tablet Take 325 mg by mouth 2 Times a Day.     • Cholecalciferol (VITAMIN D) 2000 UNIT TABS Take 2,000 Units by mouth every day.     • ascorbic acid (ASCORBIC ACID) 500 MG TABS Take 500 mg by mouth every day.     • metformin (GLUCOPHAGE) 500 MG TABS Take 500 mg by mouth 2 times a day, with meals.     • fluoxetine (PROZAC) 20 MG CAPS Take 20 mg by mouth every day.       No current [facility]-ordered facility-administered medications on file.        Past Medical History:   Diagnosis Date   • AK (actinic keratosis) 7/21/2016   • Anemia    • Arrhythmia     PVC's   • Arthritis    • Lombardi esophagus    • CATARACT    • Chronic kidney disease (CKD), stage III (moderate) (HCC)    • Colon polyps    • Dental disorder     Dentures   • Depression 7/21/2016   • Diabetes 1984   • GERD  (gastroesophageal reflux disease)    • Hyperlipidemia    • Hypertension    • Neuropathy (HCC)    • Other specified disorder of intestines     alters between normal and diarrhea since radiation tx   • Personal history of venous thrombosis and embolism     leg   • Prostate cancer (HCC)     Status post prostatectomy   • Psychiatric problem     Depression, treated with Prozac.   • Sleep apnea     Does not use CPAP   • Snoring    • Unspecified urinary incontinence     spill from artifical urinary sphincter       Social History   Substance Use Topics   • Smoking status: Former Smoker     Packs/day: 2.00     Years: 30.00     Types: Cigarettes     Quit date: 3/5/1995   • Smokeless tobacco: Never Used      Comment: 1.5 ppd 25 yrs,quit    • Alcohol use No       Family Status   Relation Status   • Mo  at age 78   • Fa  at age 86        CHF   • Sis Alive   • Unknown (Not Specified)     Family History   Problem Relation Age of Onset   • Cancer Mother         ovarian   • Heart Disease Father    • Diabetes Father    • Diabetes Sister    • Hypertension Unknown    • Cancer Unknown         multiple cousins with prostate cancer.        ROS:  Review of Systems   Constitutional: Negative for fever, chills, weight loss and malaise/fatigue.   HENT: Negative for ear pain, nosebleeds, congestion, sore throat and neck pain.    Eyes: Negative for vision changes.   Neuro: Negative for headache, sensory changes, weakness, seizure, LOC.   Cardiovascular: Negative for chest pain, palpitations, orthopnea and leg swelling.   Respiratory: Negative for cough, sputum production, shortness of breath and wheezing.   Gastrointestinal: Positive for lower abdominal pressure. Negative for abdominal pain, nausea, vomiting or diarrhea.   Genitourinary: Positive for hematuria, dysuria, urgency and frequency. Negative for flank pain.   Skin: Negative for rash, diaphoresis.     Exam:  Blood pressure 118/72, pulse 84, temperature  "36.3 °C (97.3 °F), resp. rate 18, height 1.803 m (5' 11\"), weight 82.1 kg (181 lb), SpO2 93 %.  General: well-nourished, well-developed male in NAD  Head: normocephalic, atraumatic  Eyes: PERRLA, no conjunctival injection, acuity grossly intact, lids normal.  Lymph: no cervical adenopathy. No supraclavicular adenopathy.   Neuro: alert and oriented. Cranial nerves 1-12 grossly intact. No sensory deficit.   Cardiovascular: regular rate and rhythm. No edema.  Pulmonary: no distress. Chest is symmetrical with respiration, no wheezes, crackles, or rhonchi.   Abdomen: soft, non-tender, no guarding, no hepatosplenomegaly. No CVA tenderness.   Musculoskeletal: no clubbing, appropriate muscle tone, gait is stable.  Skin: warm, dry, intact, no clubbing, no cyanosis, no rashes.   Psych: appropriate mood, affect, judgement.         Assessment/Plan:  1. Recurrent urinary tract infection  cefUROXime (CEFTIN) 250 MG Tab    POCT Urinalysis    URINE CULTURE(NEW)           Previous clinic visit encounter reviewed and considered in medical decision making today. Ceftin as directed. Increase fluid intake. Urine sent for culture. Probiotic use encouraged. F/U urologist as soon as possible. STRICT ER precautions advised. Monitor BS closely.   Supportive care, differential diagnoses, and indications for immediate follow-up discussed with patient.   Pathogenesis of diagnosis discussed including typical length and natural progression.   Instructed to return to clinic or nearest emergency department for any change in condition, further concerns, or worsening of symptoms.  Patient states understanding of the plan of care and discharge instructions.  Instructed to make an appointment, for follow up, with his primary care provider.        Please note that this dictation was created using voice recognition software. I have made every reasonable attempt to correct obvious errors, but I expect that there are errors of grammar and possibly content " that I did not discover before finalizing the note.      HILLARY Prabhakar.

## 2018-12-29 ENCOUNTER — TELEPHONE (OUTPATIENT)
Dept: URGENT CARE | Facility: PHYSICIAN GROUP | Age: 83
End: 2018-12-29

## 2018-12-29 NOTE — TELEPHONE ENCOUNTER
The patient was called for re-evaluation, reports improvement. Urine culture positive for E coli with sensitivity to ceftin. Encouraged to call back to the clinic or return to clinic with any questions or concerns.       HILLARY Prabhakar.

## 2019-01-08 ENCOUNTER — PHYSICAL THERAPY (OUTPATIENT)
Dept: PHYSICAL THERAPY | Facility: REHABILITATION | Age: 84
End: 2019-01-08
Attending: INTERNAL MEDICINE
Payer: MEDICARE

## 2019-01-08 DIAGNOSIS — I73.9 PAD (PERIPHERAL ARTERY DISEASE) (HCC): ICD-10-CM

## 2019-01-08 DIAGNOSIS — M48.061 SPINAL STENOSIS, LUMBAR REGION WITHOUT NEUROGENIC CLAUDICATION: ICD-10-CM

## 2019-01-08 PROCEDURE — 97110 THERAPEUTIC EXERCISES: CPT

## 2019-01-08 PROCEDURE — 97140 MANUAL THERAPY 1/> REGIONS: CPT

## 2019-01-08 NOTE — OP THERAPY DAILY TREATMENT
Outpatient Physical Therapy  DAILY TREATMENT     [facility] Outpatient [facility] Therapy  [address]  [place] [address]  Phone:  [phone]  Fax:  [phone]    Date: 01/08/2019    Patient: Selvin Braga  YOB: 1935  MRN: [id number]     Time Calculation  Start time: 1330  Stop time: 1400 Time Calculation (min): 30 minutes     Chief Complaint: Back Problem and Loss Of Balance    Visit #: 5    SUBJECTIVE:  Legs occasionally feel heavy and difficult to . Been busy with moving to new assisted living community ap    OBJECTIVE:  P1: Across lower LS.  Ast: Walking.    Supine 90/90 Hip PROM ER/IR:  L 60 / 10  * R 40 / 20    Hamstring 90/90:  L -40  R -40          Therapeutic Exercises (CPT 21642):     1. Nu step, 5 min    2. Miguel Test stretch, 2x1 min ea, HEP    3. Swiss ball rolling, unilat, 2x12 ea    4. Glute set, 1x10    5. Calf stretch 1/2 FR, 2x30 sec ea, HEP    6. Walking with walker    Therapeutic Treatments and Modalities:     1. Manual Therapy (CPT 42585), 1. FR STM anterior hips/thighs. STM in Nikolas T position., // Improved ease with upright posture and walking., 2. Gentle PROM anna hips.    Time-based treatments/modalities:  Manual therapy minutes (CPT 37611): 10 minutes  Therapeutic exercise minutes (CPT 65917): 20 minutes       Pain rating before treatment: 2  Pain rating after treatment: 2    ASSESSMENT:   Response to treatment: Significant LE muscle length restrictions contributing to difficulty with upright posture during gait. Responded well to interventions with improved posture standing/walking.  Pt reported feeling less low back pain/tightness at end of session.    PLAN/RECOMMENDATIONS:   Plan for treatment: therapy treatment to continue next visit.  Planned interventions for next visit: continue with current treatment.

## 2019-01-10 ENCOUNTER — PHYSICAL THERAPY (OUTPATIENT)
Dept: PHYSICAL THERAPY | Facility: REHABILITATION | Age: 84
End: 2019-01-10
Attending: INTERNAL MEDICINE
Payer: MEDICARE

## 2019-01-10 DIAGNOSIS — I73.9 PAD (PERIPHERAL ARTERY DISEASE) (HCC): ICD-10-CM

## 2019-01-10 DIAGNOSIS — M48.061 SPINAL STENOSIS, LUMBAR REGION WITHOUT NEUROGENIC CLAUDICATION: ICD-10-CM

## 2019-01-10 PROCEDURE — 97116 GAIT TRAINING THERAPY: CPT

## 2019-01-10 PROCEDURE — 97110 THERAPEUTIC EXERCISES: CPT

## 2019-01-10 PROCEDURE — 97140 MANUAL THERAPY 1/> REGIONS: CPT

## 2019-01-10 NOTE — OP THERAPY DAILY TREATMENT
"  Outpatient Physical Therapy  DAILY TREATMENT     [facility] Outpatient Physical Therapy  [address]  [place] [address]  Phone:  [phone]  Fax:  [phone]    Date: 01/10/2019    Patient: Selvin Braga  YOB: 1935  MRN: [id number]     Time Calculation  Start time: 1330  Stop time: 1410 Time Calculation (min): 40 minutes     Chief Complaint: Back Problem and Loss Of Balance    Visit #: 6    SUBJECTIVE:  Legs occasionally feel heavy and difficult to . Been busy with moving to new assisted living community ap    OBJECTIVE:  P1: Across lower LS.  Ast: Walking.    Supine 90/90 Hip PROM ER/IR:  L 60 / 10  * R 40 / 20    Hamstring 90/90:  L -40  R -40          Therapeutic Exercises (CPT 64716):     1. Nu step, 5 min    2. Miguel Test stretch, 2x1 min ea, HEP    3. Swiss ball rolling, Anna 1x15.  Unilat 1x15.    4. Glute set, 1x10, HEP    5. Bosu straight body bridge, 1x10    6. Sit to stand, 1 UE assist by PT 1x5.  1x5 partial bilateral UE use on table., // Unable without UEs.    9. Calf stretch 1/2 FR    Therapeutic Treatments and Modalities:     1. Manual Therapy (CPT 33427), 1. FR STM anterior hips/thighs. STM in Nikolas T position., // Improved ease with upright posture and walking., 2. Gentle PROM anna hips.    3. Gait Training (CPT 66569), PB: walking c and s UE support. Mini squats, focus on hip flx/extn.    Time-based treatments/modalities:  Manual therapy minutes (CPT 17271): 10 minutes  Gait training minutes (CPT 26261): 10 minutes  Therapeutic exercise minutes (CPT 32778): 20 minutes       Pain rating before treatment: 2  Pain rating after treatment: 2    ASSESSMENT:   Response to treatment: Significant R adductor muscle guarding and length restriction, but improved with manual therapy. Responded well to interventions with improved posture standing/walking. Good tolerance for new exercises today. Increased leg \"tightness\" symptom after PB exercises and " down/back long hallway; symptoms resolved after sitting rest break.    PLAN/RECOMMENDATIONS:   Plan for treatment: therapy treatment to continue next visit.  Planned interventions for next visit: continue with current treatment.

## 2019-01-17 ENCOUNTER — PHYSICAL THERAPY (OUTPATIENT)
Dept: PHYSICAL THERAPY | Facility: REHABILITATION | Age: 84
End: 2019-01-17
Attending: INTERNAL MEDICINE
Payer: MEDICARE

## 2019-01-17 DIAGNOSIS — I73.9 PAD (PERIPHERAL ARTERY DISEASE) (HCC): ICD-10-CM

## 2019-01-17 DIAGNOSIS — M48.061 SPINAL STENOSIS, LUMBAR REGION WITHOUT NEUROGENIC CLAUDICATION: ICD-10-CM

## 2019-01-17 PROCEDURE — 97140 MANUAL THERAPY 1/> REGIONS: CPT

## 2019-01-17 PROCEDURE — 97110 THERAPEUTIC EXERCISES: CPT

## 2019-01-17 PROCEDURE — 97116 GAIT TRAINING THERAPY: CPT

## 2019-01-17 NOTE — OP THERAPY DAILY TREATMENT
Outpatient Physical Therapy  DAILY TREATMENT     [facility] Outpatient [facility] Therapy  [address]  [place] [address]  Phone:  [phone]  Fax:  [phone]    Date: 01/17/2019    Patient: Selvin Braga  YOB: 1935  MRN: [id number]     Time Calculation  Start time: 1330  Stop time: 1410 Time Calculation (min): 40 minutes     Chief Complaint: Back Problem and Loss Of Balance    Visit #: 7    SUBJECTIVE:  Felt fine after last session. Will be having surgery to place stent in RLE artery in early February.    OBJECTIVE:  P1: Across lower LS.  Ast: Walking.    *RLE SL Stance: very difficult, unable s UE support; significant R lateral pelvic shift and L hip drop.    Supine 90/90 Hip PROM ER/IR:  L 60 / 10  * R 40 / 20    Hamstring 90/90:  L -40  R -40          Therapeutic Exercises (CPT 98183):     1. Nu step, 5 min    2. Miguel Test stretch, 2x1 min ea, HEP    3. Swiss ball rolling, Anna 1x15    4. Glute set, 1x10, HEP    5. Bosu straight body bridge, ankle pumps, 1x10    6. Bridges, 1x10, No discomfort.    7. Shuttle press, 3.1 bands, 3x10, // No discomfort.    8. Sit to stand, 1 UE assist by PT 1x5.  1x5 partial bilateral UE use on table., Not done 1/17/19.    Therapeutic Treatments and Modalities:     1. Manual Therapy (CPT 38542), 1. FR STM anterior hips/thighs. STM in Nikolas T position., // Improved ease with upright posture and walking., 2. Gentle PROM anna hips.    3. Gait Training (CPT 47521), PB: walking c and s UE support., L steps, sagittal plane LLE advncmt, avoiding adduction., // R glute weakness and compensatory L hip drop in R stance phase contributing to L swing phase adduction.    Time-based treatments/modalities:  Manual therapy minutes (CPT 16084): 10 minutes  Gait training minutes (CPT 52978): 10 minutes  Therapeutic exercise minutes (CPT 93118): 20 minutes       Pain rating before treatment: 2  Pain rating after treatment: 2    ASSESSMENT:    Response to treatment:  Responded well to interventions with improved posture standing/walking. Good tolerance for exercises today, including shuttle press. Will benefit from continued skilled PT to address remaining hip mobility deficits and trunk and LE strength deficits to facilitate improved upright posture and ease with ambulation.    PLAN/RECOMMENDATIONS:   Plan for treatment: therapy treatment to continue next visit.  Planned interventions for next visit: continue with current treatment.

## 2019-01-22 ENCOUNTER — APPOINTMENT (OUTPATIENT)
Dept: PHYSICAL THERAPY | Facility: REHABILITATION | Age: 84
End: 2019-01-22
Attending: INTERNAL MEDICINE
Payer: MEDICARE

## 2019-01-24 ENCOUNTER — APPOINTMENT (OUTPATIENT)
Dept: PHYSICAL THERAPY | Facility: REHABILITATION | Age: 84
End: 2019-01-24
Attending: INTERNAL MEDICINE
Payer: MEDICARE

## 2019-02-04 ENCOUNTER — APPOINTMENT (OUTPATIENT)
Dept: ADMISSIONS | Facility: MEDICAL CENTER | Age: 84
End: 2019-02-04
Attending: SURGERY
Payer: MEDICARE

## 2019-02-14 ENCOUNTER — APPOINTMENT (OUTPATIENT)
Dept: RADIOLOGY | Facility: MEDICAL CENTER | Age: 84
End: 2019-02-14
Attending: SURGERY
Payer: MEDICARE

## 2019-02-14 ENCOUNTER — HOSPITAL ENCOUNTER (OUTPATIENT)
Facility: MEDICAL CENTER | Age: 84
End: 2019-02-14
Attending: SURGERY | Admitting: SURGERY
Payer: MEDICARE

## 2019-02-14 VITALS
WEIGHT: 177.47 LBS | OXYGEN SATURATION: 95 % | DIASTOLIC BLOOD PRESSURE: 55 MMHG | HEART RATE: 64 BPM | TEMPERATURE: 97.4 F | HEIGHT: 71 IN | BODY MASS INDEX: 24.85 KG/M2 | SYSTOLIC BLOOD PRESSURE: 162 MMHG | RESPIRATION RATE: 14 BRPM

## 2019-02-14 DIAGNOSIS — I70.209 ATHEROSCLEROSIS OF ARTERIES OF EXTREMITIES (HCC): ICD-10-CM

## 2019-02-14 DIAGNOSIS — I70.211 ATHEROSCLEROSIS OF NATIVE ARTERY OF RIGHT LOWER EXTREMITY WITH INTERMITTENT CLAUDICATION (HCC): ICD-10-CM

## 2019-02-14 LAB
ANION GAP SERPL CALC-SCNC: 12 MMOL/L (ref 0–11.9)
BASOPHILS # BLD AUTO: 0.5 % (ref 0–1.8)
BASOPHILS # BLD: 0.04 K/UL (ref 0–0.12)
BUN SERPL-MCNC: 24 MG/DL (ref 8–22)
CALCIUM SERPL-MCNC: 9 MG/DL (ref 8.5–10.5)
CHLORIDE SERPL-SCNC: 104 MMOL/L (ref 96–112)
CO2 SERPL-SCNC: 22 MMOL/L (ref 20–33)
CREAT SERPL-MCNC: 1.07 MG/DL (ref 0.5–1.4)
EOSINOPHIL # BLD AUTO: 0.29 K/UL (ref 0–0.51)
EOSINOPHIL NFR BLD: 3.9 % (ref 0–6.9)
ERYTHROCYTE [DISTWIDTH] IN BLOOD BY AUTOMATED COUNT: 51.5 FL (ref 35.9–50)
GLUCOSE SERPL-MCNC: 174 MG/DL (ref 65–99)
HCT VFR BLD AUTO: 33.9 % (ref 42–52)
HGB BLD-MCNC: 10.9 G/DL (ref 14–18)
IMM GRANULOCYTES # BLD AUTO: 0.1 K/UL (ref 0–0.11)
IMM GRANULOCYTES NFR BLD AUTO: 1.3 % (ref 0–0.9)
LYMPHOCYTES # BLD AUTO: 1.31 K/UL (ref 1–4.8)
LYMPHOCYTES NFR BLD: 17.4 % (ref 22–41)
MCH RBC QN AUTO: 28.5 PG (ref 27–33)
MCHC RBC AUTO-ENTMCNC: 32.2 G/DL (ref 33.7–35.3)
MCV RBC AUTO: 88.7 FL (ref 81.4–97.8)
MONOCYTES # BLD AUTO: 0.68 K/UL (ref 0–0.85)
MONOCYTES NFR BLD AUTO: 9 % (ref 0–13.4)
NEUTROPHILS # BLD AUTO: 5.1 K/UL (ref 1.82–7.42)
NEUTROPHILS NFR BLD: 67.9 % (ref 44–72)
NRBC # BLD AUTO: 0 K/UL
NRBC BLD-RTO: 0 /100 WBC
PLATELET # BLD AUTO: 149 K/UL (ref 164–446)
PMV BLD AUTO: 10.1 FL (ref 9–12.9)
POTASSIUM SERPL-SCNC: 3.6 MMOL/L (ref 3.6–5.5)
RBC # BLD AUTO: 3.82 M/UL (ref 4.7–6.1)
SODIUM SERPL-SCNC: 138 MMOL/L (ref 135–145)
WBC # BLD AUTO: 7.5 K/UL (ref 4.8–10.8)

## 2019-02-14 PROCEDURE — 80048 BASIC METABOLIC PNL TOTAL CA: CPT

## 2019-02-14 PROCEDURE — 160002 HCHG RECOVERY MINUTES (STAT)

## 2019-02-14 PROCEDURE — 700117 HCHG RX CONTRAST REV CODE 255: Performed by: SURGERY

## 2019-02-14 PROCEDURE — 76937 US GUIDE VASCULAR ACCESS: CPT

## 2019-02-14 PROCEDURE — 85025 COMPLETE CBC W/AUTO DIFF WBC: CPT

## 2019-02-14 RX ORDER — IODIXANOL 270 MG/ML
73 INJECTION, SOLUTION INTRAVASCULAR ONCE
Status: COMPLETED | OUTPATIENT
Start: 2019-02-14 | End: 2019-02-14

## 2019-02-14 RX ORDER — MIDAZOLAM HYDROCHLORIDE 1 MG/ML
INJECTION INTRAMUSCULAR; INTRAVENOUS
Status: COMPLETED
Start: 2019-02-14 | End: 2019-02-14

## 2019-02-14 RX ADMIN — IODIXANOL 73 ML: 270 INJECTION, SOLUTION INTRAVASCULAR at 10:29

## 2019-02-14 NOTE — DISCHARGE INSTRUCTIONS
ACTIVITY: Rest and take it easy for the first 24 hours.  A responsible adult is recommended to remain with you during that time.  It is normal to feel sleepy.  We encourage you to not do anything that requires balance, judgment or coordination.    MILD FLU-LIKE SYMPTOMS ARE NORMAL. YOU MAY EXPERIENCE GENERALIZED MUSCLE ACHES, THROAT IRRITATION, HEADACHE AND/OR SOME NAUSEA.    FOR 24 HOURS DO NOT:  Drive, operate machinery or run household appliances.  Drink beer or alcoholic beverages.   Make important decisions or sign legal documents.      DIET: To avoid nausea, slowly advance diet as tolerated, avoiding spicy or greasy foods for the first day.  Add more substantial food to your diet according to your physician's instructions.  Babies can be fed formula or breast milk as soon as they are hungry.  INCREASE FLUIDS AND FIBER TO AVOID CONSTIPATION.    SURGICAL DRESSING/BATHING:     Keep the dressing clean and dry for 24 hours.  May remove dressing tomorrow  and can shower.  Do not submerge site under water for 1 week.  No heavy lifting and limit movement for 2 days.      FOLLOW-UP APPOINTMENT:  A follow-up appointment should be arranged with your doctor; call to schedule.    You should CALL YOUR PHYSICIAN if you develop:  Fever greater than 101 degrees F.  Pain not relieved by medication, or persistent nausea or vomiting.  Excessive bleeding (blood soaking through dressing) or unexpected drainage from the wound.  Extreme redness or swelling around the incision site, drainage of pus or foul smelling drainage.  Inability to urinate or empty your bladder within 8 hours.  Problems with breathing or chest pain.    You should call 911 if you develop problems with breathing or chest pain.  If you are unable to contact your doctor or surgical center, you should go to the nearest emergency room or urgent care center.      Physician's telephone #: [phone]    If any questions arise, call your doctor.  If your doctor is not  available, please feel free to call the Surgical Center at [phone].  The Center is open Monday through Friday from 7AM to 7PM.  You can also call the HEALTH HOTLINE open 24 hours/day, 7 days/week and speak to a nurse at [phone], or toll free at [phone].    A registered nurse may call you a few days after your surgery to see how you are doing after your procedure.    MEDICATIONS: Resume taking daily medication.  Take prescribed pain medication with food.  If no medication is prescribed, you may take non-aspirin pain medication if needed.  PAIN MEDICATION CAN BE VERY CONSTIPATING.  Take a stool softener or laxative such as senokot, pericolace, or milk of magnesia if needed.      If your physician has prescribed pain medication that includes Acetaminophen (Tylenol), do not take additional Acetaminophen (Tylenol) while taking the prescribed medication.    Depression / Suicide Risk    As you are discharged from this [facility] facility, it is important to learn how to keep safe from harming yourself.    Recognize the warning signs:  · Abrupt changes in personality, positive or negative- including increase in energy   · Giving away possessions  · Change in eating patterns- significant weight changes-  positive or negative  · Change in sleeping patterns- unable to sleep or sleeping all the time   · Unwillingness or inability to communicate  · Depression  · Unusual sadness, discouragement and loneliness  · Talk of wanting to die  · Neglect of personal appearance   · Rebelliousness- reckless behavior  · Withdrawal from people/activities they love  · Confusion- inability to concentrate     If you or a loved one observes any of these behaviors or has concerns about self-harm, here's what you can do:  · Talk about it- your feelings and reasons for harming yourself  · Remove any means that you might use to hurt yourself (examples: pills, rope, extension cords, firearm)  · Get professional help from the  "community (Mental Health, Substance Abuse, psychological counseling)  · Do not be alone:Call your Safe Contact- someone whom you trust who will be there for you.  · Call your local CRISIS HOTLINE 264-0888 or 771-731-5128  · Call your local Children's Mobile Crisis Response Team [place] (709) 045-1178 or www.[employer]  · Call the toll free National Suicide Prevention Hotlines   · National Suicide Prevention Lifeline 169-501-SDPQ (9457)  · National Hope Line Network 800-SUICIDE (620-3897)                    Post Angiogram Groin Care Instructions     INSTRUCTIONS  2. Examine (look and feel) the site of your incision site TODAY so you can recognize changes that should be called to your doctor (see below).  3. Avoid straining either by lifting or pulling objects for 4-5 days. Avoid lifting over 5 pounds.   4. For at least 72 hours, if you should sneeze or cough, please hold pressure over your groin area.  5. If you should begin to have oozing from the catheterization site, please hold firm pressure and call your doctor's office immediately.  6. If profuse bleeding occurs from the catheterization site, hold firm pressure and call \"831\" immediately for assistance.  7. Remove bandage after 24 hours.     ACTIVITY  2. Limit activity as instructed by your doctor.  3. No driving or very limited driving with frequent stops for one week.   4. If you must take a long car ride, stop every hour and walk around the car.   5. Warm showers or baths are permitted after the bandage is removed. Avoid hot showers, baths, hot tubs, and swimming for one week.    PLEASE CALL YOUR DOCTOR IF:  1. Temperature elevation occurs.  2. Catheterization site becomes reddened or begins to drain.   3. Bruising appears to be new or not resolving. The bruise may move down your leg. This is normal.  4. The small round lump in the groin increases in size.  5. Any leg numbness, aching, or discomfort (immediately).  6. Increasing discomfort in the " leg at the insertion site.  7. Chest pains, even if relieved by Nitroglycerin.    MISCELLANEOUS INSTRUCTIONS  1. Bruising may occur as a result of heart catheterization. Some of the discoloration may travel down the leg, going from blue to green in color.  2. A small round lump under the catheterization site will remain for up to six weeks.  3. If any questions arise call your physician's office. You can also call the [employer] HOTLINE open 24 hours/day, 7 days/week and speak to a nurse at [phone], or toll free at [phone].   4. You should call 911 if you develop problems with breathing or chest pain.      I acknowledge receipt and understanding of these Home Care instructions.

## 2019-02-14 NOTE — OP REPORT
DATE OF SERVICE:  02/14/2019    PREPROCEDURE DIAGNOSIS:  Peripheral arterial disease.    POSTPROCEDURE DIAGNOSIS:  Peripheral arterial disease.    PROCEDURES PERFORMED.  1.  Ultrasound-guided access, left common femoral artery.  2.  Catheter placement, aorta.  3.  Aortogram.  4.  Bilateral lower extremity runoff.    SURGEON:  Alek Delgado MD    FIRST ASSISTANT:  None.    ANESTHESIA:  Local.    PROCEDURE IN DETAIL:  Patient was taken to the angiography suite at [facility], placed in the supine position.  After adequate   anesthesia, the patient's bilateral groins were shaved, prepped and draped in   sterile fashion.  Using ultrasound guidance, a thin-wall access needle was   inserted into the right common femoral artery and a wire was advanced into the   pararenal aorta followed by a flush angiogram catheter.  An aortogram was   performed.  Catheter was then brought down to the aortic bifurcation and   bilateral lower extremity images were performed.    FINDINGS:  The patient has a widely patent infrarenal aorta.  The patient has   renal arteries which are widely patent.  The common iliac artery on the right   side demonstrates a moderate stenosis proximally.  The right external iliac   artery is widely patent.  There is a significant stenosis within the right   common femoral artery and the profunda femoris artery and superficial femoral   artery are patent.  On the right side, there is diffuse irregular calcium   throughout the superficial femoral artery, creating areas of moderate   stenosis.  The right popliteal artery is also widely patent with proximal   2-vessel runoff to the peroneal and the anterior tibial artery.  On the   contralateral side, the left common iliac artery is widely patent as well as   the left external iliac artery.  The left profunda femoris and superficial   femoral arteries are also widely patent with diffuse areas of irregularity,   but no significant stenosis  noted.  Left popliteal artery is widely patent   with dominant peroneal artery runoff.  Posterior tibial artery is occluded.    Anterior tibial artery is small.  All sheaths, catheters, wires were retrieved   and Angio-Seal device was used in the groin.  Recommendation for the patient   will be a right common femoral endarterectomy with retrograde iliac stent.       ____________________________________     MD JUAN ALVES / CHINO    DD:  02/14/2019 12:09:54  DT:  02/14/2019 12:21:43    D#:  [id number]  Job#:  [id number]

## 2019-02-14 NOTE — H&P
Date of Service:  2/14/19     PCP: Laury Rees M.D.    CC:  Leg apin     HPI: This is a 83 y.o. male with PAD who has failed exercise program.    Plan - angio     ROS: As above. The remainder of a complete review of systems is negative in all systems except as noted.    PMHx:  Active Ambulatory Problems     Diagnosis Date Noted   • Essential hypertension 09/02/2010   • Spinal stenosis, lumbar region, without neurogenic claudication 12/13/2012   • Senile nuclear sclerosis 02/13/2013   • History of prostatectomy 05/08/2015   • Chronic kidney disease (CKD), stage III (moderate) ([facility]) 07/21/2016   • Neuropathy ([facility]) 07/21/2016   • Depression 07/21/2016   • AK (actinic keratosis) 07/21/2016   • Hyperlipidemia 07/21/2016   • Health care maintenance 08/30/2016   • History of prostate cancer 03/24/2017   • History of radiation therapy 03/24/2017   • Type 2 diabetes mellitus with stage 3 chronic kidney disease, with long-term current use of insulin ([facility]) 03/24/2017   • Continuous leakage of urine 03/24/2017   • History of DVT (deep vein thrombosis) 03/24/2017   • Other specified disorders of the skin and subcutaneous tissue related to radiation 03/29/2017   • 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   • Pressure-related ear pain 04/04/2017   • Anemia 04/20/2017   • GERD (gastroesophageal reflux disease) 04/20/2017   • PAD (peripheral artery disease) ([facility]) 10/16/2018   • DNR (do not resuscitate) 10/16/2018   • Dyslipidemia 10/16/2018     Resolved Ambulatory Problems     Diagnosis Date Noted   • Stress incontinence, male 09/26/2012   • Blood in stool 06/05/2013   • Neoplasm of digestive system 05/21/2014   • Small bowel obstruction ([facility]) 05/26/2014   • Nausea and vomiting in adult 05/26/2014   • Retention of urine 08/10/2015   • Skin lesion 07/21/2016   • Hematuria 03/29/2017   • UTI (urinary tract infection) 04/19/2017   • Elevated troponin 04/19/2017   • Sepsis ([facility]) 04/20/2017   • Thrombocytopenia ([facility]) 04/20/2017   • CKD (chronic  kidney disease), stage II 04/20/2017     Past Medical History:   Diagnosis Date   • AK (actinic keratosis) 7/21/2016   • Anemia    • Arrhythmia    • Arthritis    • Lombardi esophagus    • CATARACT    • Chronic kidney disease (CKD), stage III (moderate) (HCC)    • Colon polyps    • Dental disorder    • Depression 7/21/2016   • Diabetes 1984   • GERD (gastroesophageal reflux disease)    • High cholesterol    • Hyperlipidemia    • Hypertension    • Infectious disease    • Neuropathy (HCC)    • Other specified disorder of intestines    • Personal history of venous thrombosis and embolism 2010/1995   • Prostate cancer (HCC) 2001   • Psychiatric problem    • Sleep apnea    • Snoring    • Unspecified urinary incontinence    • Urinary bladder disorder        SHx:  Social History     Social History   • Marital status:      Spouse name: N/A   • Number of children: 5   • Years of education: N/A     Occupational History   • Not on file.     Social History Main Topics   • Smoking status: Former Smoker     Packs/day: 2.00     Years: 30.00     Types: Cigarettes     Quit date: 3/5/1995   • Smokeless tobacco: Never Used      Comment: 1.5 ppd 25 yrs,quit 1995   • Alcohol use No   • Drug use: No   • Sexual activity: Not on file     Other Topics Concern   • Not on file     Social History Narrative   • No narrative on file       FHx:  family history includes Cancer in his mother and unknown relative; Diabetes in his father and sister; Heart Disease in his father; Hypertension in his unknown relative.    Allergies:  Allergies   Allergen Reactions   • Augmentin Unspecified     Bleeding (rectal)  RXN=5 years ago   • Latex Unspecified     Blisters  RXN=ongoing   • Tape Rash     Rash-blisters-paper tape okay  RXN=ongoing       Medications:  No current facility-administered medications on file prior to encounter.      Current Outpatient Prescriptions on File Prior to Encounter   Medication Sig Dispense Refill   • Multiple  "Vitamins-Minerals (PRESERVISION AREDS 2+MULTI VIT PO) Take  by mouth.     • atorvastatin (LIPITOR) 40 MG Tab Take 1 Tab by mouth every day. 90 Tab 3   • metoprolol (LOPRESSOR) 25 MG Tab Take 0.5 Tabs by mouth 2 times a day. 90 Tab 3   • methenamine hip (HIPPREX) 1 GM Tab Take 1 g by mouth 2 times a day.     • Probiotic Product (PRO-BIOTIC BLEND) Cap Take  by mouth.     • clobetasol (TEMOVATE) 0.05 % Cream      • pantoprazole (PROTONIX) 40 MG Tablet Delayed Response Take 20 mg by mouth every day.     • glipiZIDE (GLUCOTROL) 10 MG Tab Take 10 mg by mouth 2 times a day. One po am, 1/2 po pm     • hydrochlorothiazide (HYDRODIURIL) 12.5 MG tablet Take 12.5 mg by mouth every day.     • Saxagliptin HCl 5 MG Tab Take 5 mg by mouth every day.     • lisinopril (PRINIVIL) 5 MG Tab Take 2.5 mg by mouth every day. Indications: High Blood Pressure     • ferrous sulfate 325 (65 FE) MG tablet Take 325 mg by mouth 2 Times a Day.     • Cholecalciferol (VITAMIN D) 2000 UNIT TABS Take 2,000 Units by mouth every day.     • metformin (GLUCOPHAGE) 500 MG TABS Take 500 mg by mouth 2 times a day, with meals.     • fluoxetine (PROZAC) 20 MG CAPS Take 20 mg by mouth every day.         Objective Exam:  Vitals:    02/04/19 1421 02/14/19 0828   BP:  144/53   Pulse:  74   Resp:  18   Temp:  36.9 °C (98.5 °F)   TempSrc:  Temporal   SpO2:  93%   Weight: 79.2 kg (174 lb 9.7 oz) 80.5 kg (177 lb 7.5 oz)   Height: 1.803 m (5' 11\") 1.803 m (5' 11\")       Review of Systems  Negative except for right leg pain       General: Awake and Alert  HEENT: normocephalic, atraumatic  Chest: Clear and equal bilaterally  CV: RRR  Abd: Soft, non-tender, nondistended  Ext: Warm, well perfused   Vasc: abscent pedal pulses   Neuro: Intact  Skin: normal    Laboratory--reviewed personally and are as follows:  Lab Results   Component Value Date/Time    WBC 7.5 02/14/2019 08:15 AM    RBC 3.82 (L) 02/14/2019 08:15 AM    HEMOGLOBIN 10.9 (L) 02/14/2019 08:15 AM    HEMATOCRIT " 33.9 (L) 02/14/2019 08:15 AM    MCV 88.7 02/14/2019 08:15 AM    MCH 28.5 02/14/2019 08:15 AM    MCHC 32.2 (L) 02/14/2019 08:15 AM    MPV 10.1 02/14/2019 08:15 AM    NEUTSPOLYS 67.90 02/14/2019 08:15 AM    LYMPHOCYTES 17.40 (L) 02/14/2019 08:15 AM    MONOCYTES 9.00 02/14/2019 08:15 AM    EOSINOPHILS 3.90 02/14/2019 08:15 AM    BASOPHILS 0.50 02/14/2019 08:15 AM    HYPOCHROMIA 1+ 06/24/2014 04:52 AM    ANISOCYTOSIS 1+ 04/09/2012 06:58 AM      Lab Results   Component Value Date/Time    SODIUM 138 02/14/2019 08:15 AM    POTASSIUM 3.6 02/14/2019 08:15 AM    CHLORIDE 104 02/14/2019 08:15 AM    CO2 22 02/14/2019 08:15 AM    GLUCOSE 174 (H) 02/14/2019 08:15 AM    BUN 24 (H) 02/14/2019 08:15 AM    CREATININE 1.07 02/14/2019 08:15 AM    CREATININE 1.0 02/08/2005 02:20 PM      Lab Results   Component Value Date/Time    PROTHROMBTM 13.9 04/21/2017 04:45 AM    INR 1.09 04/21/2017 04:45 AM        Radiology      MDM:  PAD    Plan- Angio today

## 2019-02-14 NOTE — PROGRESS NOTES
IR Procedure RN's Note:    Patient consent by Dr. Delgado in [facility] prior to start of procedure; pt and MD signed consent and in chart; verified by Ida CUNNINGHAM and Anahi ALLEN    Abdominal aortogram with run off and possible intervention done by Dr. Delgado; LEFT groin access site; pt assessed upon arrival, pt A/Ox4, pt aware of the procedure; NO intervention performed and NO SEDATION used for the procedure; pt appears comfortable throughout the procedure with no signs of distress or discomfort; pt will need further surgeries other than IR intervention at this time, pt to be discussed with MD during appointment time; LEFT femoral artery closure device - TERUMO AngioSeal VIP 6F REF#[id number] LOT#[id number]; access site CDI, soft with no signs of hematoma or bleeding, gauze and tegaderm for dressing; telephone report given to Nereyda; pt is awake and on RA post procedure and during transport; pt transported to [facility] for recovery.

## 2019-02-22 ENCOUNTER — OFFICE VISIT (OUTPATIENT)
Dept: INTERNAL MEDICINE | Facility: MEDICAL CENTER | Age: 84
End: 2019-02-22
Payer: MEDICARE

## 2019-02-22 VITALS
SYSTOLIC BLOOD PRESSURE: 126 MMHG | WEIGHT: 176.2 LBS | DIASTOLIC BLOOD PRESSURE: 61 MMHG | BODY MASS INDEX: 24.67 KG/M2 | OXYGEN SATURATION: 94 % | HEIGHT: 71 IN | HEART RATE: 63 BPM | TEMPERATURE: 98.1 F

## 2019-02-22 DIAGNOSIS — M81.8 AGE-RELATED OSTEOPOROSIS WITHOUT FRACTURE: ICD-10-CM

## 2019-02-22 DIAGNOSIS — I10 ESSENTIAL HYPERTENSION: ICD-10-CM

## 2019-02-22 DIAGNOSIS — N18.30 CHRONIC KIDNEY DISEASE (CKD), STAGE III (MODERATE) (HCC): ICD-10-CM

## 2019-02-22 DIAGNOSIS — R63.4 WEIGHT LOSS: ICD-10-CM

## 2019-02-22 DIAGNOSIS — Z79.4 TYPE 2 DIABETES MELLITUS WITH STAGE 3 CHRONIC KIDNEY DISEASE, WITH LONG-TERM CURRENT USE OF INSULIN (HCC): ICD-10-CM

## 2019-02-22 DIAGNOSIS — N30.40 RADIATION CYSTITIS: ICD-10-CM

## 2019-02-22 DIAGNOSIS — N18.30 TYPE 2 DIABETES MELLITUS WITH STAGE 3 CHRONIC KIDNEY DISEASE, WITH LONG-TERM CURRENT USE OF INSULIN (HCC): ICD-10-CM

## 2019-02-22 DIAGNOSIS — Z85.46 HISTORY OF PROSTATE CANCER: ICD-10-CM

## 2019-02-22 DIAGNOSIS — E11.22 TYPE 2 DIABETES MELLITUS WITH STAGE 3 CHRONIC KIDNEY DISEASE, WITH LONG-TERM CURRENT USE OF INSULIN (HCC): ICD-10-CM

## 2019-02-22 PROBLEM — M80.00XA AGE-RELATED OSTEOPOROSIS WITH CURRENT PATHOLOGICAL FRACTURE: Status: ACTIVE | Noted: 2019-02-22

## 2019-02-22 PROCEDURE — 99214 OFFICE O/P EST MOD 30 MIN: CPT | Performed by: INTERNAL MEDICINE

## 2019-02-22 ASSESSMENT — PAIN SCALES - GENERAL: PAINLEVEL: NO PAIN

## 2019-02-22 NOTE — PROGRESS NOTES
Established Patient    Selvin presents today with the following:    CC:DM/weight loss    HPI:   Selvin Braga is here for follow up visit.  DM-  As per patient, [facility] and was told to stop insulin, was having hypoglycemic episodes, Current  blood sugars 120s in AM, Bedtime- 198  Weight loss- slow gradual, appetite ok, now eating 3 meals, meals provided at facility, irregular bowel since radiation after dxed with prostate ca, radiation enteritis occ blood, but no significant change in bowel pattern, no nausea/vomiting.  CT done at [facility] as per pt w/o contrast and was tole to be Normal, no records for review though.  Our weight chart in here shows continued weight loss too/    Osteoporosis, reviewed bone density with him- does not want to be on Biphosphonates, has heard about jaw ON, discussed in detail the incident of side effects, will confer with dentist and re-discuss. For now, we discussed continuing vitamin d and calcium in diet.  Aware of fracture risk marco a hip.  According to the World Health Organization classification, bone mineral density of this patient is osteopenic for the lumbar spine and osteoporotic for the right proximal femur.    10-year Probability of Fracture:  Major Osteoporotic     18.1%  Hip     8.3%  Population      [place] ()    Based on right femur neck BMD    PAD/Claudication- PT did not help, had angio done, is awaiting call from cardiology for further intervention.      Patient Active Problem List    Diagnosis Date Noted   • Hyperlipidemia 07/21/2016     Priority: High   • Essential hypertension 09/02/2010     Priority: High   • GERD (gastroesophageal reflux disease) 04/20/2017     Priority: Medium   • History of prostate cancer 03/24/2017     Priority: Medium   • Type 2 diabetes mellitus with stage 3 chronic kidney disease, with long-term current use of insulin (HCC) 03/24/2017     Priority: Medium   • History of DVT (deep vein thrombosis) 03/24/2017     Priority: Medium    • Chronic kidney disease (CKD), stage III (moderate) ([facility]) 07/21/2016     Priority: Medium   • History of prostatectomy 05/08/2015     Priority: Medium   • Anemia 04/20/2017     Priority: Low   • 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     Priority: Low   • Neuropathy ([facility]) 07/21/2016     Priority: Low   • Depression 07/21/2016     Priority: Low   • Spinal stenosis, lumbar region, without neurogenic claudication 12/13/2012     Priority: Low   • PAD (peripheral artery disease) ([facility]) 10/16/2018   • DNR (do not resuscitate) 10/16/2018   • Dyslipidemia 10/16/2018   • Pressure-related ear pain 04/04/2017   • Other specified disorders of the skin and subcutaneous tissue related to radiation 03/29/2017   • History of radiation therapy 03/24/2017   • Continuous leakage of urine 03/24/2017   • Health care maintenance 08/30/2016   • AK (actinic keratosis) 07/21/2016   • Senile nuclear sclerosis 02/13/2013       Current Outpatient Prescriptions   Medication Sig Dispense Refill   • atorvastatin (LIPITOR) 40 MG Tab Take 1 Tab by mouth every day. 90 Tab 3   • metoprolol (LOPRESSOR) 25 MG Tab Take 0.5 Tabs by mouth 2 times a day. 90 Tab 3   • methenamine hip (HIPPREX) 1 GM Tab Take 1 g by mouth 2 times a day.     • Probiotic Product (PRO-BIOTIC BLEND) Cap Take  by mouth.     • clobetasol (TEMOVATE) 0.05 % Cream      • pantoprazole (PROTONIX) 40 MG Tablet Delayed Response Take 20 mg by mouth every day.     • glipiZIDE (GLUCOTROL) 10 MG Tab Take 10 mg by mouth 2 times a day. One po am, 1/2 po pm     • hydrochlorothiazide (HYDRODIURIL) 12.5 MG tablet Take 12.5 mg by mouth every day.     • Saxagliptin HCl 5 MG Tab Take 5 mg by mouth every day.     • lisinopril (PRINIVIL) 5 MG Tab Take 2.5 mg by mouth every day. Indications: High Blood Pressure     • ferrous sulfate 325 (65 FE) MG tablet Take 325 mg by mouth 2 Times a Day.     • Cholecalciferol (VITAMIN D) 2000 UNIT TABS Take 2,000 Units by mouth every day.     • metformin (GLUCOPHAGE) 500 MG  "TABS Take 500 mg by mouth 2 times a day, with meals.     • fluoxetine (PROZAC) 20 MG CAPS Take 20 mg by mouth every day.     • Multiple Vitamins-Minerals (PRESERVISION AREDS 2+MULTI VIT PO) Take  by mouth.       No current facility-administered medications for this visit.        Social History     Social History   • Marital status:      Spouse name: N/A   • Number of children: 5   • Years of education: N/A     Occupational History   • Not on file.     Social History Main Topics   • Smoking status: Former Smoker     Packs/day: 2.00     Years: 30.00     Types: Cigarettes     Quit date: 3/5/1995   • Smokeless tobacco: Never Used      Comment: 1.5 ppd 25 yrs,quit 1995   • Alcohol use No   • Drug use: No   • Sexual activity: Not on file     Other Topics Concern   • Not on file     Social History Narrative   • No narrative on file       Family History   Problem Relation Age of Onset   • Cancer Mother         ovarian   • Heart Disease Father    • Diabetes Father    • Diabetes Sister    • Hypertension Unknown    • Cancer Unknown         multiple cousins with prostate cancer.        ROS: A 14 system review done and negative except as HPI and below  /61 (BP Location: Left arm, Patient Position: Sitting)   Pulse 63   Temp 36.7 °C (98.1 °F) (Temporal)   Ht 1.803 m (5' 11\")   Wt 79.9 kg (176 lb 3.2 oz)   SpO2 94%   BMI 24.57 kg/m²     Physical Exam  General:  Alert and oriented, No apparent distress.    Eyes: Pupils equal and reactive. No scleral icterus.    Throat: Clear no erythema or exudates noted.    Neck: Supple. No lymphadenopathy noted. Thyroid not enlarged.    Lungs: Clear to auscultation and percussion bilaterally.    Cardiovascular: Regular rate and rhythm. No murmurs, rubs or gallops.    Abdomen:  Benign. No rebound or guarding noted.    Extremities: No clubbing, cyanosis, 2+ edema.    Skin: Clear. No rash or suspicious skin lesions noted.      Note: I have reviewed all pertinent labs and " diagnostic tests associated with this visit with specific comments listed under the assessment and plan below      Assessment and Plan    1. Type 2 diabetes mellitus with stage 3 chronic kidney disease, with long-term current use of insulin (HCC)  Agree with stopping insulin.  Advised keeping log.  For continued weight loss, will consider stopping metformin    2. Chronic kidney disease (CKD), stage III (moderate) (HCC)  Est/stable    3. History of prostate cancer/ Radiation cystitis  Sees urology    4. Essential hypertension  Est/stable    5. Weight loss  Advised adding snacks, w/u [facility]  Asked copy of records from patient  F/u in 2-3 months  6. Age-related osteoporosis without fracture  Refused escalation of Rx    Signed by: Laury Rees M.D.

## 2019-02-22 NOTE — PATIENT INSTRUCTIONS
Increase your fluid pill Hydrochlorothiazide to whole pill for the next 5 days, then decrease to 1/2 daily.  If your leg swelling gets worse, please call us.  Add snacks to your meals    I recommend getting 1200mg of calcium in your diet.    If you are unable to do this, then, it's ok to supplement the rest.    Here is a list of calcium rich foods (UpToDate, 2016):

## 2019-03-08 DIAGNOSIS — Z01.810 PRE-OPERATIVE CARDIOVASCULAR EXAMINATION: ICD-10-CM

## 2019-03-08 DIAGNOSIS — Z01.812 PRE-OPERATIVE LABORATORY EXAMINATION: ICD-10-CM

## 2019-03-08 LAB
ANION GAP SERPL CALC-SCNC: 6 MMOL/L (ref 0–11.9)
BUN SERPL-MCNC: 23 MG/DL (ref 8–22)
CALCIUM SERPL-MCNC: 9.8 MG/DL (ref 8.5–10.5)
CHLORIDE SERPL-SCNC: 105 MMOL/L (ref 96–112)
CO2 SERPL-SCNC: 25 MMOL/L (ref 20–33)
CREAT SERPL-MCNC: 1.04 MG/DL (ref 0.5–1.4)
ERYTHROCYTE [DISTWIDTH] IN BLOOD BY AUTOMATED COUNT: 52.1 FL (ref 35.9–50)
GLUCOSE SERPL-MCNC: 199 MG/DL (ref 65–99)
HCT VFR BLD AUTO: 34.6 % (ref 42–52)
HGB BLD-MCNC: 11.2 G/DL (ref 14–18)
MCH RBC QN AUTO: 28.8 PG (ref 27–33)
MCHC RBC AUTO-ENTMCNC: 32.4 G/DL (ref 33.7–35.3)
MCV RBC AUTO: 88.9 FL (ref 81.4–97.8)
PLATELET # BLD AUTO: 148 K/UL (ref 164–446)
PMV BLD AUTO: 9.5 FL (ref 9–12.9)
POTASSIUM SERPL-SCNC: 3.9 MMOL/L (ref 3.6–5.5)
RBC # BLD AUTO: 3.89 M/UL (ref 4.7–6.1)
SODIUM SERPL-SCNC: 136 MMOL/L (ref 135–145)
WBC # BLD AUTO: 6.6 K/UL (ref 4.8–10.8)

## 2019-03-08 PROCEDURE — 36415 COLL VENOUS BLD VENIPUNCTURE: CPT

## 2019-03-08 PROCEDURE — 85027 COMPLETE CBC AUTOMATED: CPT

## 2019-03-08 PROCEDURE — 80048 BASIC METABOLIC PNL TOTAL CA: CPT

## 2019-03-08 PROCEDURE — 93005 ELECTROCARDIOGRAM TRACING: CPT

## 2019-03-08 PROCEDURE — 93010 ELECTROCARDIOGRAM REPORT: CPT | Performed by: INTERNAL MEDICINE

## 2019-03-09 LAB — EKG IMPRESSION: NORMAL

## 2019-03-13 ENCOUNTER — HOSPITAL ENCOUNTER (INPATIENT)
Facility: MEDICAL CENTER | Age: 84
LOS: 2 days | DRG: 254 | End: 2019-03-15
Attending: SURGERY | Admitting: SURGERY
Payer: MEDICARE

## 2019-03-13 ENCOUNTER — APPOINTMENT (OUTPATIENT)
Dept: RADIOLOGY | Facility: MEDICAL CENTER | Age: 84
DRG: 254 | End: 2019-03-13
Attending: SURGERY
Payer: MEDICARE

## 2019-03-13 ENCOUNTER — ANESTHESIA EVENT (OUTPATIENT)
Dept: SURGERY | Facility: MEDICAL CENTER | Age: 84
DRG: 254 | End: 2019-03-13
Payer: MEDICARE

## 2019-03-13 ENCOUNTER — ANESTHESIA (OUTPATIENT)
Dept: SURGERY | Facility: MEDICAL CENTER | Age: 84
DRG: 254 | End: 2019-03-13
Payer: MEDICARE

## 2019-03-13 DIAGNOSIS — G89.18 POST-OP PAIN: ICD-10-CM

## 2019-03-13 LAB — GLUCOSE BLD-MCNC: 186 MG/DL (ref 65–99)

## 2019-03-13 PROCEDURE — 160041 HCHG SURGERY MINUTES - EA ADDL 1 MIN LEVEL 4: Performed by: SURGERY

## 2019-03-13 PROCEDURE — 501838 HCHG SUTURE GENERAL: Performed by: SURGERY

## 2019-03-13 PROCEDURE — 160035 HCHG PACU - 1ST 60 MINS PHASE I: Performed by: SURGERY

## 2019-03-13 PROCEDURE — 501837 HCHG SUTURE CV: Performed by: SURGERY

## 2019-03-13 PROCEDURE — 160036 HCHG PACU - EA ADDL 30 MINS PHASE I: Performed by: SURGERY

## 2019-03-13 PROCEDURE — 770006 HCHG ROOM/CARE - MED/SURG/GYN SEMI*

## 2019-03-13 PROCEDURE — C1769 GUIDE WIRE: HCPCS | Performed by: SURGERY

## 2019-03-13 PROCEDURE — 700105 HCHG RX REV CODE 258: Performed by: ANESTHESIOLOGY

## 2019-03-13 PROCEDURE — A6402 STERILE GAUZE <= 16 SQ IN: HCPCS | Performed by: SURGERY

## 2019-03-13 PROCEDURE — A9270 NON-COVERED ITEM OR SERVICE: HCPCS | Performed by: NURSE PRACTITIONER

## 2019-03-13 PROCEDURE — 501445 HCHG STAPLER, SKIN DISP: Performed by: SURGERY

## 2019-03-13 PROCEDURE — 700111 HCHG RX REV CODE 636 W/ 250 OVERRIDE (IP): Performed by: ANESTHESIOLOGY

## 2019-03-13 PROCEDURE — 500869 HCHG NEEDLE, THINWALL 19GA (COOK): Performed by: SURGERY

## 2019-03-13 PROCEDURE — C1768 GRAFT, VASCULAR: HCPCS | Performed by: SURGERY

## 2019-03-13 PROCEDURE — 160029 HCHG SURGERY MINUTES - 1ST 30 MINS LEVEL 4: Performed by: SURGERY

## 2019-03-13 PROCEDURE — 700101 HCHG RX REV CODE 250

## 2019-03-13 PROCEDURE — 502240 HCHG MISC OR SUPPLY RC 0272: Performed by: SURGERY

## 2019-03-13 PROCEDURE — 04UK0KZ SUPPLEMENT RIGHT FEMORAL ARTERY WITH NONAUTOLOGOUS TISSUE SUBSTITUTE, OPEN APPROACH: ICD-10-PCS | Performed by: SURGERY

## 2019-03-13 PROCEDURE — 500002 HCHG ADHESIVE, DERMABOND: Performed by: SURGERY

## 2019-03-13 PROCEDURE — 700101 HCHG RX REV CODE 250: Performed by: ANESTHESIOLOGY

## 2019-03-13 PROCEDURE — A4314 CATH W/DRAINAGE 2-WAY LATEX: HCPCS | Performed by: SURGERY

## 2019-03-13 PROCEDURE — 160048 HCHG OR STATISTICAL LEVEL 1-5: Performed by: SURGERY

## 2019-03-13 PROCEDURE — 04CK0ZZ EXTIRPATION OF MATTER FROM RIGHT FEMORAL ARTERY, OPEN APPROACH: ICD-10-PCS | Performed by: SURGERY

## 2019-03-13 PROCEDURE — 160009 HCHG ANES TIME/MIN: Performed by: SURGERY

## 2019-03-13 PROCEDURE — 82962 GLUCOSE BLOOD TEST: CPT

## 2019-03-13 PROCEDURE — 047C0DZ DILATION OF RIGHT COMMON ILIAC ARTERY WITH INTRALUMINAL DEVICE, OPEN APPROACH: ICD-10-PCS | Performed by: SURGERY

## 2019-03-13 PROCEDURE — 700111 HCHG RX REV CODE 636 W/ 250 OVERRIDE (IP)

## 2019-03-13 PROCEDURE — 160002 HCHG RECOVERY MINUTES (STAT): Performed by: SURGERY

## 2019-03-13 PROCEDURE — 700102 HCHG RX REV CODE 250 W/ 637 OVERRIDE(OP): Performed by: NURSE PRACTITIONER

## 2019-03-13 PROCEDURE — C1876 STENT, NON-COA/NON-COV W/DEL: HCPCS | Performed by: SURGERY

## 2019-03-13 DEVICE — PATCH .8X8CM XENOSURE BIOLOGIC VASCULAR---ORDER IN MULTIPLES OF 5---: Type: IMPLANTABLE DEVICE | Site: GROIN | Status: FUNCTIONAL

## 2019-03-13 DEVICE — IMPLANTABLE DEVICE: Type: IMPLANTABLE DEVICE | Site: GROIN | Status: FUNCTIONAL

## 2019-03-13 RX ORDER — ONDANSETRON 2 MG/ML
INJECTION INTRAMUSCULAR; INTRAVENOUS PRN
Status: DISCONTINUED | OUTPATIENT
Start: 2019-03-13 | End: 2019-03-13 | Stop reason: SURG

## 2019-03-13 RX ORDER — SCOLOPAMINE TRANSDERMAL SYSTEM 1 MG/1
1 PATCH, EXTENDED RELEASE TRANSDERMAL
Status: DISCONTINUED | OUTPATIENT
Start: 2019-03-13 | End: 2019-03-15 | Stop reason: HOSPADM

## 2019-03-13 RX ORDER — CEFAZOLIN SODIUM 1 G/3ML
INJECTION, POWDER, FOR SOLUTION INTRAMUSCULAR; INTRAVENOUS PRN
Status: DISCONTINUED | OUTPATIENT
Start: 2019-03-13 | End: 2019-03-13 | Stop reason: SURG

## 2019-03-13 RX ORDER — ONDANSETRON 2 MG/ML
4 INJECTION INTRAMUSCULAR; INTRAVENOUS
Status: DISCONTINUED | OUTPATIENT
Start: 2019-03-13 | End: 2019-03-13 | Stop reason: HOSPADM

## 2019-03-13 RX ORDER — HYDROMORPHONE HYDROCHLORIDE 1 MG/ML
0.2 INJECTION, SOLUTION INTRAMUSCULAR; INTRAVENOUS; SUBCUTANEOUS
Status: DISCONTINUED | OUTPATIENT
Start: 2019-03-13 | End: 2019-03-13 | Stop reason: HOSPADM

## 2019-03-13 RX ORDER — HYDROMORPHONE HYDROCHLORIDE 1 MG/ML
0.1 INJECTION, SOLUTION INTRAMUSCULAR; INTRAVENOUS; SUBCUTANEOUS
Status: DISCONTINUED | OUTPATIENT
Start: 2019-03-13 | End: 2019-03-13 | Stop reason: HOSPADM

## 2019-03-13 RX ORDER — HYDROCHLOROTHIAZIDE 12.5 MG/1
12.5 TABLET ORAL DAILY
Status: DISCONTINUED | OUTPATIENT
Start: 2019-03-13 | End: 2019-03-15 | Stop reason: HOSPADM

## 2019-03-13 RX ORDER — MEPERIDINE HYDROCHLORIDE 25 MG/ML
12.5 INJECTION INTRAMUSCULAR; INTRAVENOUS; SUBCUTANEOUS
Status: DISCONTINUED | OUTPATIENT
Start: 2019-03-13 | End: 2019-03-13 | Stop reason: HOSPADM

## 2019-03-13 RX ORDER — PHENYLEPHRINE HYDROCHLORIDE 10 MG/ML
INJECTION, SOLUTION INTRAMUSCULAR; INTRAVENOUS; SUBCUTANEOUS PRN
Status: DISCONTINUED | OUTPATIENT
Start: 2019-03-13 | End: 2019-03-13 | Stop reason: SURG

## 2019-03-13 RX ORDER — ACETAMINOPHEN 500 MG
1000 TABLET ORAL ONCE
Status: DISCONTINUED | OUTPATIENT
Start: 2019-03-13 | End: 2019-03-13 | Stop reason: HOSPADM

## 2019-03-13 RX ORDER — CLOPIDOGREL BISULFATE 75 MG/1
75 TABLET ORAL DAILY
Status: DISCONTINUED | OUTPATIENT
Start: 2019-03-13 | End: 2019-03-15 | Stop reason: HOSPADM

## 2019-03-13 RX ORDER — LIDOCAINE HYDROCHLORIDE 10 MG/ML
INJECTION, SOLUTION INFILTRATION; PERINEURAL
Status: COMPLETED
Start: 2019-03-13 | End: 2019-03-13

## 2019-03-13 RX ORDER — HYDROCODONE BITARTRATE AND ACETAMINOPHEN 5; 325 MG/1; MG/1
1-2 TABLET ORAL EVERY 4 HOURS PRN
Qty: 10 TAB | Refills: 0 | Status: SHIPPED | OUTPATIENT
Start: 2019-03-13 | End: 2019-03-15

## 2019-03-13 RX ORDER — CLOBETASOL PROPIONATE 0.5 MG/G
1 CREAM TOPICAL
COMMUNITY
End: 2020-01-31

## 2019-03-13 RX ORDER — HEPARIN SODIUM,PORCINE 1000/ML
VIAL (ML) INJECTION PRN
Status: DISCONTINUED | OUTPATIENT
Start: 2019-03-13 | End: 2019-03-13 | Stop reason: SURG

## 2019-03-13 RX ORDER — LISINOPRIL 5 MG/1
5 TABLET ORAL DAILY
Status: DISCONTINUED | OUTPATIENT
Start: 2019-03-13 | End: 2019-03-15 | Stop reason: HOSPADM

## 2019-03-13 RX ORDER — ACETAMINOPHEN 500 MG
1000 TABLET ORAL EVERY 6 HOURS
Status: DISCONTINUED | OUTPATIENT
Start: 2019-03-13 | End: 2019-03-15 | Stop reason: HOSPADM

## 2019-03-13 RX ORDER — OXYCODONE HYDROCHLORIDE 5 MG/1
5 TABLET ORAL
Status: DISCONTINUED | OUTPATIENT
Start: 2019-03-13 | End: 2019-03-15 | Stop reason: HOSPADM

## 2019-03-13 RX ORDER — OMEPRAZOLE 20 MG/1
20 CAPSULE, DELAYED RELEASE ORAL DAILY
Status: DISCONTINUED | OUTPATIENT
Start: 2019-03-13 | End: 2019-03-15 | Stop reason: HOSPADM

## 2019-03-13 RX ORDER — DIPHENHYDRAMINE HYDROCHLORIDE 50 MG/ML
25 INJECTION INTRAMUSCULAR; INTRAVENOUS EVERY 6 HOURS PRN
Status: DISCONTINUED | OUTPATIENT
Start: 2019-03-13 | End: 2019-03-15 | Stop reason: HOSPADM

## 2019-03-13 RX ORDER — DEXTROSE MONOHYDRATE 25 G/50ML
25 INJECTION, SOLUTION INTRAVENOUS
Status: DISCONTINUED | OUTPATIENT
Start: 2019-03-13 | End: 2019-03-15 | Stop reason: HOSPADM

## 2019-03-13 RX ORDER — HYDROMORPHONE HYDROCHLORIDE 1 MG/ML
0.4 INJECTION, SOLUTION INTRAMUSCULAR; INTRAVENOUS; SUBCUTANEOUS
Status: DISCONTINUED | OUTPATIENT
Start: 2019-03-13 | End: 2019-03-13 | Stop reason: HOSPADM

## 2019-03-13 RX ORDER — HALOPERIDOL 5 MG/ML
1 INJECTION INTRAMUSCULAR
Status: DISCONTINUED | OUTPATIENT
Start: 2019-03-13 | End: 2019-03-13 | Stop reason: HOSPADM

## 2019-03-13 RX ORDER — MIDAZOLAM HYDROCHLORIDE 1 MG/ML
1 INJECTION INTRAMUSCULAR; INTRAVENOUS
Status: DISCONTINUED | OUTPATIENT
Start: 2019-03-13 | End: 2019-03-13 | Stop reason: HOSPADM

## 2019-03-13 RX ORDER — SODIUM CHLORIDE, SODIUM LACTATE, POTASSIUM CHLORIDE, CALCIUM CHLORIDE 600; 310; 30; 20 MG/100ML; MG/100ML; MG/100ML; MG/100ML
INJECTION, SOLUTION INTRAVENOUS
Status: DISCONTINUED | OUTPATIENT
Start: 2019-03-13 | End: 2019-03-13 | Stop reason: SURG

## 2019-03-13 RX ORDER — DIPHENHYDRAMINE HYDROCHLORIDE 50 MG/ML
INJECTION INTRAMUSCULAR; INTRAVENOUS PRN
Status: DISCONTINUED | OUTPATIENT
Start: 2019-03-13 | End: 2019-03-13 | Stop reason: SURG

## 2019-03-13 RX ORDER — DEXAMETHASONE SODIUM PHOSPHATE 4 MG/ML
4 INJECTION, SOLUTION INTRA-ARTICULAR; INTRALESIONAL; INTRAMUSCULAR; INTRAVENOUS; SOFT TISSUE
Status: COMPLETED | OUTPATIENT
Start: 2019-03-13 | End: 2019-03-14

## 2019-03-13 RX ORDER — EPINEPHRINE 1 MG/ML(1)
AMPUL (ML) INJECTION PRN
Status: DISCONTINUED | OUTPATIENT
Start: 2019-03-13 | End: 2019-03-13 | Stop reason: SURG

## 2019-03-13 RX ORDER — SODIUM CHLORIDE, SODIUM LACTATE, POTASSIUM CHLORIDE, CALCIUM CHLORIDE 600; 310; 30; 20 MG/100ML; MG/100ML; MG/100ML; MG/100ML
INJECTION, SOLUTION INTRAVENOUS ONCE
Status: COMPLETED | OUTPATIENT
Start: 2019-03-13 | End: 2019-03-13

## 2019-03-13 RX ORDER — SODIUM CHLORIDE, SODIUM LACTATE, POTASSIUM CHLORIDE, CALCIUM CHLORIDE 600; 310; 30; 20 MG/100ML; MG/100ML; MG/100ML; MG/100ML
INJECTION, SOLUTION INTRAVENOUS CONTINUOUS
Status: DISCONTINUED | OUTPATIENT
Start: 2019-03-13 | End: 2019-03-13 | Stop reason: HOSPADM

## 2019-03-13 RX ORDER — SODIUM CHLORIDE 9 MG/ML
INJECTION, SOLUTION INTRAVENOUS EVERY 6 HOURS
Status: ACTIVE | OUTPATIENT
Start: 2019-03-13 | End: 2019-03-13

## 2019-03-13 RX ORDER — HALOPERIDOL 5 MG/ML
1 INJECTION INTRAMUSCULAR EVERY 6 HOURS PRN
Status: DISCONTINUED | OUTPATIENT
Start: 2019-03-13 | End: 2019-03-15 | Stop reason: HOSPADM

## 2019-03-13 RX ORDER — PROTAMINE SULFATE 10 MG/ML
INJECTION, SOLUTION INTRAVENOUS PRN
Status: DISCONTINUED | OUTPATIENT
Start: 2019-03-13 | End: 2019-03-13 | Stop reason: SURG

## 2019-03-13 RX ORDER — FLUOXETINE HYDROCHLORIDE 20 MG/1
20 CAPSULE ORAL DAILY
Status: DISCONTINUED | OUTPATIENT
Start: 2019-03-13 | End: 2019-03-15 | Stop reason: HOSPADM

## 2019-03-13 RX ORDER — ONDANSETRON 2 MG/ML
4 INJECTION INTRAMUSCULAR; INTRAVENOUS EVERY 4 HOURS PRN
Status: DISCONTINUED | OUTPATIENT
Start: 2019-03-13 | End: 2019-03-15 | Stop reason: HOSPADM

## 2019-03-13 RX ORDER — CLONIDINE HYDROCHLORIDE 0.1 MG/1
0.2 TABLET ORAL
Status: COMPLETED | OUTPATIENT
Start: 2019-03-13 | End: 2019-03-14

## 2019-03-13 RX ORDER — CLONIDINE HYDROCHLORIDE 0.1 MG/1
0.1 TABLET ORAL
Status: DISCONTINUED | OUTPATIENT
Start: 2019-06-12 | End: 2019-03-15 | Stop reason: HOSPADM

## 2019-03-13 RX ORDER — DIPHENHYDRAMINE HYDROCHLORIDE 50 MG/ML
12.5 INJECTION INTRAMUSCULAR; INTRAVENOUS
Status: DISCONTINUED | OUTPATIENT
Start: 2019-03-13 | End: 2019-03-13 | Stop reason: HOSPADM

## 2019-03-13 RX ORDER — METOPROLOL TARTRATE 1 MG/ML
1 INJECTION, SOLUTION INTRAVENOUS
Status: DISCONTINUED | OUTPATIENT
Start: 2019-03-13 | End: 2019-03-13 | Stop reason: HOSPADM

## 2019-03-13 RX ORDER — IODIXANOL 270 MG/ML
INJECTION, SOLUTION INTRAVASCULAR
Status: DISCONTINUED | OUTPATIENT
Start: 2019-03-13 | End: 2019-03-13 | Stop reason: HOSPADM

## 2019-03-13 RX ORDER — ATORVASTATIN CALCIUM 40 MG/1
40 TABLET, FILM COATED ORAL DAILY
Status: DISCONTINUED | OUTPATIENT
Start: 2019-03-13 | End: 2019-03-15 | Stop reason: HOSPADM

## 2019-03-13 RX ADMIN — PROTAMINE SULFATE 30 MG: 10 INJECTION, SOLUTION INTRAVENOUS at 14:16

## 2019-03-13 RX ADMIN — FENTANYL CITRATE 50 MCG: 50 INJECTION, SOLUTION INTRAMUSCULAR; INTRAVENOUS at 14:15

## 2019-03-13 RX ADMIN — ACETAMINOPHEN 1000 MG: 500 TABLET ORAL at 18:45

## 2019-03-13 RX ADMIN — HYDROCHLOROTHIAZIDE 12.5 MG: 12.5 TABLET ORAL at 18:42

## 2019-03-13 RX ADMIN — SITAGLIPTIN 100 MG: 100 TABLET, FILM COATED ORAL at 18:42

## 2019-03-13 RX ADMIN — LIDOCAINE HYDROCHLORIDE 0.5 ML: 10 INJECTION, SOLUTION INFILTRATION; PERINEURAL at 11:00

## 2019-03-13 RX ADMIN — Medication 20 MG: at 13:55

## 2019-03-13 RX ADMIN — METOPROLOL TARTRATE 12.5 MG: 25 TABLET, FILM COATED ORAL at 18:41

## 2019-03-13 RX ADMIN — OMEPRAZOLE 20 MG: 20 CAPSULE, DELAYED RELEASE ORAL at 18:40

## 2019-03-13 RX ADMIN — CLOPIDOGREL BISULFATE 75 MG: 75 TABLET ORAL at 18:40

## 2019-03-13 RX ADMIN — LISINOPRIL 5 MG: 5 TABLET ORAL at 18:41

## 2019-03-13 RX ADMIN — GLYCOPYRROLATE 0.4 MG: 0.2 INJECTION INTRAMUSCULAR; INTRAVENOUS at 13:45

## 2019-03-13 RX ADMIN — SODIUM CHLORIDE, POTASSIUM CHLORIDE, SODIUM LACTATE AND CALCIUM CHLORIDE: 600; 310; 30; 20 INJECTION, SOLUTION INTRAVENOUS at 13:10

## 2019-03-13 RX ADMIN — Medication 30 MG: at 13:43

## 2019-03-13 RX ADMIN — PROPOFOL 150 MG: 10 INJECTION, EMULSION INTRAVENOUS at 13:15

## 2019-03-13 RX ADMIN — Medication 20 MG: at 14:05

## 2019-03-13 RX ADMIN — ONDANSETRON 4 MG: 2 INJECTION INTRAMUSCULAR; INTRAVENOUS at 14:20

## 2019-03-13 RX ADMIN — Medication 20 MG: at 14:25

## 2019-03-13 RX ADMIN — PHENYLEPHRINE HYDROCHLORIDE 200 MCG: 10 INJECTION INTRAVENOUS at 13:25

## 2019-03-13 RX ADMIN — CEFAZOLIN 2 G: 330 INJECTION, POWDER, FOR SOLUTION INTRAMUSCULAR; INTRAVENOUS at 13:09

## 2019-03-13 RX ADMIN — DIPHENHYDRAMINE HYDROCHLORIDE 25 MG: 50 INJECTION INTRAMUSCULAR; INTRAVENOUS at 13:32

## 2019-03-13 RX ADMIN — SODIUM CHLORIDE, SODIUM LACTATE, POTASSIUM CHLORIDE, CALCIUM CHLORIDE: 600; 310; 30; 20 INJECTION, SOLUTION INTRAVENOUS at 11:00

## 2019-03-13 RX ADMIN — FENTANYL CITRATE 25 MCG: 50 INJECTION, SOLUTION INTRAMUSCULAR; INTRAVENOUS at 13:30

## 2019-03-13 RX ADMIN — FENTANYL CITRATE 50 MCG: 50 INJECTION, SOLUTION INTRAMUSCULAR; INTRAVENOUS at 14:05

## 2019-03-13 RX ADMIN — Medication 0.5 ML: at 11:00

## 2019-03-13 RX ADMIN — ATORVASTATIN CALCIUM 40 MG: 40 TABLET, FILM COATED ORAL at 18:40

## 2019-03-13 RX ADMIN — Medication 20 MG: at 14:15

## 2019-03-13 RX ADMIN — HEPARIN SODIUM 5000 UNITS: 1000 INJECTION, SOLUTION INTRAVENOUS; SUBCUTANEOUS at 13:47

## 2019-03-13 RX ADMIN — FENTANYL CITRATE 25 MCG: 50 INJECTION, SOLUTION INTRAMUSCULAR; INTRAVENOUS at 13:45

## 2019-03-13 RX ADMIN — FENTANYL CITRATE 50 MCG: 50 INJECTION, SOLUTION INTRAMUSCULAR; INTRAVENOUS at 13:55

## 2019-03-13 ASSESSMENT — COGNITIVE AND FUNCTIONAL STATUS - GENERAL
MOBILITY SCORE: 21
HELP NEEDED FOR BATHING: A LITTLE
DRESSING REGULAR LOWER BODY CLOTHING: A LITTLE
DAILY ACTIVITIY SCORE: 21
SUGGESTED CMS G CODE MODIFIER MOBILITY: CJ
CLIMB 3 TO 5 STEPS WITH RAILING: A LITTLE
STANDING UP FROM CHAIR USING ARMS: A LITTLE
WALKING IN HOSPITAL ROOM: A LITTLE
TOILETING: A LITTLE
SUGGESTED CMS G CODE MODIFIER DAILY ACTIVITY: CJ

## 2019-03-13 ASSESSMENT — PATIENT HEALTH QUESTIONNAIRE - PHQ9
1. LITTLE INTEREST OR PLEASURE IN DOING THINGS: NOT AT ALL
SUM OF ALL RESPONSES TO PHQ9 QUESTIONS 1 AND 2: 0
2. FEELING DOWN, DEPRESSED, IRRITABLE, OR HOPELESS: NOT AT ALL

## 2019-03-13 ASSESSMENT — PAIN SCALES - GENERAL: PAIN_LEVEL: 0

## 2019-03-13 ASSESSMENT — LIFESTYLE VARIABLES: EVER_SMOKED: NEVER

## 2019-03-13 NOTE — ANESTHESIA QCDR
2019 Qualified Clinical Data Registry (for Quality Improvement)     Postoperative nausea/vomiting risk protocol (Adult = 18 yrs and Pediatric 3-17 yrs)- (430 and 463)  General inhalation anesthetic (NOT TIVA) with PONV risk factors: Yes  Provision of anti-emetic therapy with at least 2 different classes of agents: Yes   Patient DID NOT receive anti-emetic therapy and reason is documented in Medical Record:  N/A    Multimodal Pain Management- (AQI59)  Patient undergoing Elective Surgery (i.e. Outpatient, or ASC, or Prescheduled Surgery prior to Hospital Admission): Yes  Use of Multimodal Pain Management, two or more drugs and/or interventions, NOT including systemic opioids: Yes   Exception: Documented allergy to multiple classes of analgesics:  N/A    PACU assessment of acute postoperative pain prior to Anesthesia Care End- Applies to Patients Age = 18- (ABG7)  Initial PACU pain score is which of the following: < 7/10  Patient unable to report pain score: N/A    Post-anesthetic transfer of care checklist/protocol to PACU/ICU- (426 and 427)  Upon conclusion of case, patient transferred to which of the following locations: PACU/Non-ICU  Use of transfer checklist/protocol: Yes  Exclusion: Service Performed in Patient Hospital Room (and thus did not require transfer): N/A    PACU Reintubation- (AQI31)  General anesthesia requiring endotracheal intubation (ETT) along with subsequent extubation in OR or PACU: No  Required reintubation in the PACU: N/A  Extubation was a planned trial documented in the medical record prior to removal of the original airway device: N/A    Unplanned admission to ICU related to anesthesia service up through end of PACU care- (MD51)  Unplanned admission to ICU (not initially anticipated at anesthesia start time):

## 2019-03-13 NOTE — OP REPORT
03/13/19    OPERATIVE NOTE    PRE-OPERATIVE DIAGNOSIS -     1. Severe Peripheral Arterial Disease   2. Short Distance Claudication     POST-OPERATIVE DIAGNOSIS - Same    PROCEDURE PERFORMED -     1. Right Common Femoral Endarterectomy with Bovine Patch Angioplasty   2. Catheter Placement Aorta   3. Pelvic Angiogram   4. PTA/Stent Right Common Iliac Artery (7mm X 37mm Balloon Expandable Stent)     SURGEON - Alek Delgado M.D.    ASSISTANT - JOAQUIM Moran    TYPE OF ANESTHESIA - General     ANESTHESIOLOGIST  - Dr. Gtz       SPECIMENS - None           PROCEDURE IN DETAIL -     Patient was taken to the hybrid operating room at [facility] placed in the supine position.  After adequate endotracheal intubation the patient's lower abdomen and bilateral groins and right thigh were prepped and draped in sterile fashion.  An incision was made in the patient's right groin overlying the inguinal ligament incision was carried down through subcutaneous tissue in the right common femoral artery was circumferentially dissected and isolated.  Dissection took place proximally to the proximal extent of the disease as well as distally down to the bifurcation.  5000 units of heparin was administered after an appropriate period of time the vessels were sequentially clamped.  A longitudinal arteriotomy was made in the common femoral artery.  The patient is hemodynamically significant atheromatous plaque was identified and endarterectomy was performed of the right common femoral artery.  After completing the endarterectomy and assuring that all atherosclerotic debris was cleared from the area and a bovine patch was sewn in circumferentially using 5-0 Prolene sutures.  Just prior to completing the patch all vessels were forward flushed and back flushed and the patch was complete.  Next a thinwall access and it was inserted through the patch and under fluoroscopic guidance a wire was advanced into the aorta.  A 6  Turkmen sheath was advanced into the right iliac system.  A pelvic angiogram was performed through the sheath was demonstrated the patient's right common iliac artery stenosis.  This stenosis was noted previously on his diagnostic angiogram several weeks ago.  A 7 mm x 37 mm express balloon expandable stent was advanced across the area of stenosis.  The stent was deployed and brought up to profile.  Completion angiogram demonstrated complete resolution of the stenoses.  The sheath was then removed and the defect in the patch was repaired using a 5-0 interrupted suture.  The area was irrigated hemostasis was assured.  2-0 Vicryl running sutures were used in layers to close the patient's right groin region.  300 polyps were also used.  A 4-0 strata fix suture was used to reapproximate the skin.  Sterile dressings were applied.      Alek Delgado M.D.

## 2019-03-13 NOTE — ANESTHESIA TIME REPORT
Times      Start Time                 End Time                    Dr. Scooby Bojorquez Name      3/13/2019 1:09 PM 3/13/2019 2:40 PM 38                                    Premium Reason  Non-Premium     Anesthesia Start and Stop Event Times     Date Time Event    3/13/2019 1309 Anesthesia Start     1439 Anesthesia Stop

## 2019-03-13 NOTE — ANESTHESIA PREPROCEDURE EVALUATION
Relevant Problems   (+) Essential hypertension   (+) GERD (gastroesophageal reflux disease)   (+) Type 2 diabetes mellitus with stage 3 chronic kidney disease, with long-term current use of insulin (HCC)       Physical Exam    Airway    Cardiovascular   Rhythm: regular     Dental   (+) upper dentures, lower dentures         Pulmonary    Abdominal    Neurological              Anesthesia Plan    ASA 3       Plan - general       Airway plan will be LMA        Induction: intravenous          Informed Consent:    Anesthetic plan and risks discussed with patient.

## 2019-03-13 NOTE — ANESTHESIA POSTPROCEDURE EVALUATION
Patient: Selvin Braga    Procedure Summary     Date:  03/13/19 Room / Location:  [facility] OR 19 / SURGERY [facility]    Anesthesia Start:  1309 Anesthesia Stop:  1439    Procedures:       FEMORAL ENDARTERECTOMY- COMMON (Right Groin)      ILIAC ANGIOPLASTY WITH STENT- RETROGRADES (Right Groin) Diagnosis:  (ATHEROSCLEROSIS, ARTERIAL INSUFFICIENCY, PAD)    Surgeon:  Alek Delgado M.D. Responsible Provider:  Pino Gtz M.D.    Anesthesia Type:  general ASA Status:  3          Final Anesthesia Type: general  Last vitals  BP   NIBP: 123/39 (03/13/19 1438)    Temp   37 °C (98.6 °F) (03/13/19 1437)    Pulse   Pulse: 90 (03/13/19 1438), Heart Rate (Monitored): 90 (03/13/19 1438)   Resp   (!) 10 (03/13/19 1438)    SpO2 98     Anesthesia Post Evaluation

## 2019-03-13 NOTE — ANESTHESIA POSTPROCEDURE EVALUATION
Patient: Selvin Braga    Procedure Summary     Date:  03/13/19 Room / Location:  [place] [address] / SURGERY [facility]    Anesthesia Start:  1309 Anesthesia Stop:  1439    Procedures:       FEMORAL ENDARTERECTOMY- COMMON (Right Groin)      ILIAC ANGIOPLASTY WITH STENT- RETROGRADES (Right Groin) Diagnosis:  (ATHEROSCLEROSIS, ARTERIAL INSUFFICIENCY, PAD)    Surgeon:  Alek Delgado M.D. Responsible Provider:  Pino Gtz M.D.    Anesthesia Type:  general ASA Status:  3          Final Anesthesia Type: general  Last vitals  BP   NIBP: 123/39 (03/13/19 1438)    Temp   37 °C (98.6 °F) (03/13/19 1437)    Pulse   Pulse: 90 (03/13/19 1438), Heart Rate (Monitored): 90 (03/13/19 1438)   Resp   (!) 10 (03/13/19 1438)    SpO2 98         Anesthesia Post Evaluation    Patient location during evaluation: bedside  Patient participation: complete - patient participated  Level of consciousness: awake and alert  Pain score: 0    Anesthetic complications: no  Cardiovascular status: adequate  Respiratory status: acceptable and nasal cannula  Hydration status: acceptable    PONV: none

## 2019-03-14 LAB
ANION GAP SERPL CALC-SCNC: 9 MMOL/L (ref 0–11.9)
BUN SERPL-MCNC: 22 MG/DL (ref 8–22)
CALCIUM SERPL-MCNC: 9.1 MG/DL (ref 8.5–10.5)
CHLORIDE SERPL-SCNC: 103 MMOL/L (ref 96–112)
CO2 SERPL-SCNC: 27 MMOL/L (ref 20–33)
CREAT SERPL-MCNC: 1.14 MG/DL (ref 0.5–1.4)
ERYTHROCYTE [DISTWIDTH] IN BLOOD BY AUTOMATED COUNT: 52.9 FL (ref 35.9–50)
GLUCOSE BLD-MCNC: 125 MG/DL (ref 65–99)
GLUCOSE BLD-MCNC: 206 MG/DL (ref 65–99)
GLUCOSE BLD-MCNC: 269 MG/DL (ref 65–99)
GLUCOSE BLD-MCNC: 277 MG/DL (ref 65–99)
GLUCOSE SERPL-MCNC: 170 MG/DL (ref 65–99)
HCT VFR BLD AUTO: 35.3 % (ref 42–52)
HGB BLD-MCNC: 11.2 G/DL (ref 14–18)
MCH RBC QN AUTO: 28.5 PG (ref 27–33)
MCHC RBC AUTO-ENTMCNC: 31.7 G/DL (ref 33.7–35.3)
MCV RBC AUTO: 89.8 FL (ref 81.4–97.8)
PLATELET # BLD AUTO: 133 K/UL (ref 164–446)
PMV BLD AUTO: 9.8 FL (ref 9–12.9)
POTASSIUM SERPL-SCNC: 4.5 MMOL/L (ref 3.6–5.5)
RBC # BLD AUTO: 3.93 M/UL (ref 4.7–6.1)
SODIUM SERPL-SCNC: 139 MMOL/L (ref 135–145)
WBC # BLD AUTO: 8.7 K/UL (ref 4.8–10.8)

## 2019-03-14 PROCEDURE — 80048 BASIC METABOLIC PNL TOTAL CA: CPT

## 2019-03-14 PROCEDURE — 700111 HCHG RX REV CODE 636 W/ 250 OVERRIDE (IP): Performed by: NURSE PRACTITIONER

## 2019-03-14 PROCEDURE — A9270 NON-COVERED ITEM OR SERVICE: HCPCS | Performed by: SURGERY

## 2019-03-14 PROCEDURE — A9270 NON-COVERED ITEM OR SERVICE: HCPCS | Performed by: NURSE PRACTITIONER

## 2019-03-14 PROCEDURE — 82962 GLUCOSE BLOOD TEST: CPT | Mod: 91

## 2019-03-14 PROCEDURE — 770006 HCHG ROOM/CARE - MED/SURG/GYN SEMI*

## 2019-03-14 PROCEDURE — 700102 HCHG RX REV CODE 250 W/ 637 OVERRIDE(OP): Performed by: SURGERY

## 2019-03-14 PROCEDURE — 700111 HCHG RX REV CODE 636 W/ 250 OVERRIDE (IP): Performed by: SURGERY

## 2019-03-14 PROCEDURE — 36415 COLL VENOUS BLD VENIPUNCTURE: CPT

## 2019-03-14 PROCEDURE — 85027 COMPLETE CBC AUTOMATED: CPT

## 2019-03-14 PROCEDURE — 700102 HCHG RX REV CODE 250 W/ 637 OVERRIDE(OP): Performed by: NURSE PRACTITIONER

## 2019-03-14 RX ORDER — DEXAMETHASONE SODIUM PHOSPHATE 4 MG/ML
4 INJECTION, SOLUTION INTRA-ARTICULAR; INTRALESIONAL; INTRAMUSCULAR; INTRAVENOUS; SOFT TISSUE ONCE
Status: COMPLETED | OUTPATIENT
Start: 2019-03-14 | End: 2019-03-14

## 2019-03-14 RX ORDER — CALCIUM CARBONATE 500 MG/1
500 TABLET, CHEWABLE ORAL 4 TIMES DAILY PRN
Status: DISCONTINUED | OUTPATIENT
Start: 2019-03-14 | End: 2019-03-15 | Stop reason: HOSPADM

## 2019-03-14 RX ORDER — CALCIUM CARBONATE 500 MG/1
500 TABLET, CHEWABLE ORAL DAILY
Status: DISCONTINUED | OUTPATIENT
Start: 2019-03-14 | End: 2019-03-14

## 2019-03-14 RX ADMIN — HYDROCHLOROTHIAZIDE 12.5 MG: 12.5 TABLET ORAL at 04:34

## 2019-03-14 RX ADMIN — LISINOPRIL 5 MG: 5 TABLET ORAL at 04:34

## 2019-03-14 RX ADMIN — METOPROLOL TARTRATE 12.5 MG: 25 TABLET, FILM COATED ORAL at 17:57

## 2019-03-14 RX ADMIN — CLOPIDOGREL BISULFATE 75 MG: 75 TABLET ORAL at 04:34

## 2019-03-14 RX ADMIN — INSULIN HUMAN 3 UNITS: 100 INJECTION, SOLUTION PARENTERAL at 20:26

## 2019-03-14 RX ADMIN — DEXAMETHASONE SODIUM PHOSPHATE 4 MG: 4 INJECTION, SOLUTION INTRA-ARTICULAR; INTRALESIONAL; INTRAMUSCULAR; INTRAVENOUS; SOFT TISSUE at 09:59

## 2019-03-14 RX ADMIN — INSULIN HUMAN 1 UNITS: 100 INJECTION, SOLUTION PARENTERAL at 08:19

## 2019-03-14 RX ADMIN — ONDANSETRON 4 MG: 2 INJECTION INTRAMUSCULAR; INTRAVENOUS at 08:14

## 2019-03-14 RX ADMIN — INSULIN HUMAN 3 UNITS: 100 INJECTION, SOLUTION PARENTERAL at 17:55

## 2019-03-14 RX ADMIN — ATORVASTATIN CALCIUM 40 MG: 40 TABLET, FILM COATED ORAL at 04:34

## 2019-03-14 RX ADMIN — METOPROLOL TARTRATE 12.5 MG: 25 TABLET, FILM COATED ORAL at 04:34

## 2019-03-14 RX ADMIN — ANTACID TABLETS 500 MG: 500 TABLET, CHEWABLE ORAL at 09:59

## 2019-03-14 RX ADMIN — ACETAMINOPHEN 1000 MG: 500 TABLET ORAL at 04:33

## 2019-03-14 RX ADMIN — ONDANSETRON 4 MG: 2 INJECTION INTRAMUSCULAR; INTRAVENOUS at 00:54

## 2019-03-14 RX ADMIN — FLUOXETINE HYDROCHLORIDE 20 MG: 20 CAPSULE ORAL at 04:34

## 2019-03-14 RX ADMIN — DIPHENHYDRAMINE HYDROCHLORIDE 25 MG: 50 INJECTION INTRAMUSCULAR; INTRAVENOUS at 13:48

## 2019-03-14 RX ADMIN — OMEPRAZOLE 20 MG: 20 CAPSULE, DELAYED RELEASE ORAL at 04:34

## 2019-03-14 RX ADMIN — SITAGLIPTIN 100 MG: 100 TABLET, FILM COATED ORAL at 04:34

## 2019-03-14 RX ADMIN — DEXAMETHASONE SODIUM PHOSPHATE 4 MG: 4 INJECTION, SOLUTION INTRAMUSCULAR; INTRAVENOUS at 04:36

## 2019-03-14 RX ADMIN — SCOPALAMINE 1 PATCH: 1 PATCH, EXTENDED RELEASE TRANSDERMAL at 04:44

## 2019-03-14 NOTE — CARE PLAN
Problem: Knowledge Deficit  Goal: Knowledge of disease process/condition, treatment plan, diagnostic tests, and medications will improve  Outcome: PROGRESSING AS EXPECTED  Discussed POC with pt. Questions were encouraged and all questions were answered. Pt verbalizes understanding of all teaching.     Problem: Pain Management  Goal: Pain level will decrease to patient's comfort goal  Outcome: PROGRESSING AS EXPECTED  Discussed pain management with the pt. Pt was educated on medication available in MAR and non-pharm ways of managing pain. Pt prefers to manage pain with ice packs at this time, but will update this RN if medication is needed to cover pain.

## 2019-03-14 NOTE — PROGRESS NOTES
Report received from night RN, assumed Care.   Patient is AOx4, responds appropriately.      Declines pain.  +nausea.  Medicated with zofran.   +doplered pedal and post tibial pulses to BLE  Incision to R groin with trace edema, incision is well approximated, dermabond HARIS.   PIV SL.  + flatus  Laguerre in place, + clear yellow urine.  On bed rest until MD clears.  0-1L NC with  in use.   Encouraged turn cough deep breathe.    Plan of care discussed, all questions answered.    Explained importance of calling before getting OOB and pt verbalizes understanding.       Call light and belongings within reach, treaded slipper socks on, bed alarm in use, SCD in use, bed in lowest locked position.  Hourly rounding in place, all needs met at this time

## 2019-03-14 NOTE — PROGRESS NOTES
Bedside Report received   Assumed care of patient at 1900.    Pt is A&O x 4.  Pain reported at 0/10. Ice packs at bedside for PRN pain.  Nausea denied  Tolerating Diet  Surgical incision to rt groin. Approximated with dermabond. No drainage noted.   + Urine output via indwelling catheter  - BM    + Flatus  Bedrest per MD order  SCD's in place per MD order  Bed in lowest position and locked.  Bed alarm in place and on   Pt resting comfortably now.  Review plan of care with patient  Call light within reach  Hourly rounds in place  All needs met at this time

## 2019-03-14 NOTE — PROGRESS NOTES
Patient had another 50mL round of emesis.  Dark brown, thin.    Continues to complain of nausea.  Up to edge of bed with a lot of belching.   Up to bathroom with small amount of stool, dark brown and formed.    Up with standby assist with personal cane.

## 2019-03-14 NOTE — PROGRESS NOTES
Patient received epinephrine intraop for hypotension and bradycardia. These were small doses of 20-30 mcg.  These were in correctly recorded on the anesthesia record as mg. Total dose was 110 mcg.

## 2019-03-14 NOTE — PROGRESS NOTES
Vascular Surgery     Awake alert  Nauseated this am   Incision looks good   Foot well perfused     S/P femoral endarterectomy and iliac stent     post-op nausea     OOB/ambulate   Zofran   Hopefully home tomorrow     Alek Delgado MD

## 2019-03-14 NOTE — PROGRESS NOTES
Patient arrived to floor via gurney.   Slid over to bed.   Educated on bed rest orders.   Hennessy in place.  No sticker when arrived on hennessy bag. Stat lock in place.  Clear yellow urine present.  R groin/abd site is well approximated, dermabond, HARIS.  Cold pack applied.   Personal cell phone at bedside along with clothes, cane, and .   Patient has dentures in.   Personal glasses on patient.   placed.  SCD placed.   Tolerating regular diet.   Educated on call light and call for assistance.   Bed alarm placed.   Room close to RN station.

## 2019-03-14 NOTE — PROGRESS NOTES
Patient had another round of emesis. 200mL of thin dark brown liquid.   Medicated with x1 decadron and tums.   Patient is resting in bed.

## 2019-03-14 NOTE — PROGRESS NOTES
· 2 RN skin check complete with ONEIDA Gonsalez.  · Skin assessed under all devices and bony prominences.  · Only significant findings are expected surgical incision to rt groin, scattered scabs and edema noted to lower back.

## 2019-03-15 VITALS
TEMPERATURE: 97.8 F | OXYGEN SATURATION: 95 % | HEIGHT: 71 IN | BODY MASS INDEX: 24.29 KG/M2 | RESPIRATION RATE: 18 BRPM | DIASTOLIC BLOOD PRESSURE: 55 MMHG | WEIGHT: 173.5 LBS | SYSTOLIC BLOOD PRESSURE: 99 MMHG | HEART RATE: 56 BPM

## 2019-03-15 LAB — GLUCOSE BLD-MCNC: 199 MG/DL (ref 65–99)

## 2019-03-15 PROCEDURE — 700111 HCHG RX REV CODE 636 W/ 250 OVERRIDE (IP): Performed by: NURSE PRACTITIONER

## 2019-03-15 PROCEDURE — 82962 GLUCOSE BLOOD TEST: CPT

## 2019-03-15 PROCEDURE — A9270 NON-COVERED ITEM OR SERVICE: HCPCS | Performed by: NURSE PRACTITIONER

## 2019-03-15 PROCEDURE — 700102 HCHG RX REV CODE 250 W/ 637 OVERRIDE(OP): Performed by: NURSE PRACTITIONER

## 2019-03-15 RX ADMIN — SITAGLIPTIN 100 MG: 100 TABLET, FILM COATED ORAL at 05:04

## 2019-03-15 RX ADMIN — ATORVASTATIN CALCIUM 40 MG: 40 TABLET, FILM COATED ORAL at 05:05

## 2019-03-15 RX ADMIN — OMEPRAZOLE 20 MG: 20 CAPSULE, DELAYED RELEASE ORAL at 05:04

## 2019-03-15 RX ADMIN — ONDANSETRON 4 MG: 2 INJECTION INTRAMUSCULAR; INTRAVENOUS at 05:04

## 2019-03-15 RX ADMIN — INSULIN HUMAN 1 UNITS: 100 INJECTION, SOLUTION PARENTERAL at 08:07

## 2019-03-15 RX ADMIN — FLUOXETINE HYDROCHLORIDE 20 MG: 20 CAPSULE ORAL at 05:05

## 2019-03-15 RX ADMIN — CLOPIDOGREL BISULFATE 75 MG: 75 TABLET ORAL at 05:04

## 2019-03-15 NOTE — PROGRESS NOTES
Bedside Report received   Assumed care of patient at 1900.    Pt is A&O x 4.  Pain reported at 0/10. Interventions declined.  Nausea denied at this time.  Tolerating Diet  Surgical incision to rt groin. HARIS. Approximated with dermabond. No drainage noted.   + Urine output via indwelling catheter  + BM    + Flatus  SCD's in place per MD order  Bed in lowest position and locked.  Bed alarm NA per Judson Shearer    Pt resting comfortably now.  Review plan of care with patient  Call light within reach  Hourly rounds in place  All needs met at this time

## 2019-03-15 NOTE — PROGRESS NOTES
"Discharge instructions reviewed with pt. IV d/c. Pt declining prescription for norco. Pt stating, \"I'm not in any pain. If anything changes I'll call his office.\" Pt d/c home with daughter in law. All personal belongings with pt. Pt escorted out via wheelchair with CNA.  "

## 2019-03-15 NOTE — CARE PLAN
Problem: Fluid Volume:  Goal: Will maintain balanced intake and output  Outcome: PROGRESSING SLOWER THAN EXPECTED  Pt experienced nausea and vomiting during previous shift and is thus afraid to drink adequate PO. Bowel sounds are present x 4 Q and pt is passing gas. This RN discussed anti-nausea medication available and encouraged the pt to continue drinking PO fluids.      Problem: Pain Management  Goal: Pain level will decrease to patient's comfort goal  Outcome: PROGRESSING AS EXPECTED  Pt states that pain is well controlled at 0/10 at this time. Pt declining all pain interventions at this time, but will let RN know when pain reaches a 4/10.

## 2019-03-15 NOTE — PROGRESS NOTES
Pt A&O x 4.  Declines pain at this time.   Right groin incision well approximated with dermabdond -HARIS.  Denies n/t. 1+ pulses noted. Extremity warm to touch.   Pt up ambulating unit with CNA this morning. Up standby assist with FWW.  Laguerre d/c. Penile clip in place. Urinal provided.  +bowel sounds. +flatus.  Plan for pt to d/c home today.  POC discussed with pt. All needs addressed at this time .

## 2019-03-16 LAB — GLUCOSE BLD-MCNC: 193 MG/DL (ref 65–99)

## 2019-03-22 ENCOUNTER — PATIENT OUTREACH (OUTPATIENT)
Dept: HEALTH INFORMATION MANAGEMENT | Facility: OTHER | Age: 84
End: 2019-03-22

## 2019-04-15 ENCOUNTER — PATIENT OUTREACH (OUTPATIENT)
Dept: HEALTH INFORMATION MANAGEMENT | Facility: OTHER | Age: 84
End: 2019-04-15

## 2019-04-16 ENCOUNTER — OFFICE VISIT (OUTPATIENT)
Dept: CARDIOLOGY | Facility: MEDICAL CENTER | Age: 84
End: 2019-04-16
Payer: MEDICARE

## 2019-04-16 VITALS
HEART RATE: 66 BPM | WEIGHT: 176 LBS | BODY MASS INDEX: 24.64 KG/M2 | OXYGEN SATURATION: 93 % | DIASTOLIC BLOOD PRESSURE: 58 MMHG | HEIGHT: 71 IN | SYSTOLIC BLOOD PRESSURE: 112 MMHG

## 2019-04-16 DIAGNOSIS — E78.2 MIXED HYPERLIPIDEMIA: ICD-10-CM

## 2019-04-16 DIAGNOSIS — I10 ESSENTIAL HYPERTENSION: ICD-10-CM

## 2019-04-16 DIAGNOSIS — I73.9 PAD (PERIPHERAL ARTERY DISEASE) (HCC): ICD-10-CM

## 2019-04-16 DIAGNOSIS — R60.9 EDEMA, UNSPECIFIED TYPE: Primary | ICD-10-CM

## 2019-04-16 PROCEDURE — 99214 OFFICE O/P EST MOD 30 MIN: CPT | Performed by: INTERNAL MEDICINE

## 2019-04-16 RX ORDER — HYDROCHLOROTHIAZIDE 12.5 MG/1
25 TABLET ORAL DAILY
Qty: 90 TAB | Refills: 3 | Status: SHIPPED | OUTPATIENT
Start: 2019-04-16 | End: 2019-04-16

## 2019-04-16 RX ORDER — HYDROCHLOROTHIAZIDE 25 MG/1
25 TABLET ORAL DAILY
Qty: 90 TAB | Refills: 3 | Status: SHIPPED | OUTPATIENT
Start: 2019-04-16 | End: 2019-04-16 | Stop reason: SDUPTHER

## 2019-04-16 RX ORDER — HYDROCHLOROTHIAZIDE 25 MG/1
25 TABLET ORAL DAILY
Qty: 90 TAB | Refills: 3 | Status: SHIPPED | OUTPATIENT
Start: 2019-04-16 | End: 2020-03-30 | Stop reason: SDUPTHER

## 2019-04-16 ASSESSMENT — ENCOUNTER SYMPTOMS
DIARRHEA: 1
FALLS: 1
HEARTBURN: 1
BLOOD IN STOOL: 1
BRUISES/BLEEDS EASILY: 1
DEPRESSION: 1

## 2019-04-16 NOTE — PROGRESS NOTES
Chief Complaint   Patient presents with   • HTN (Controlled)       Subjective:   Selvin Braga is an 82 -year-old man followed in cardiology clinic for aortic sclerosis and mild aortic insufficiency as well as peripheral artery disease status post femoral endarterectomy and PCI of his right SFA 3/2019.    He also has a history of a previous elevated troponin in the setting of sepsis in April 2017 (myocardial perfusion imaging 5/22/2017 negative for ischemia). He has dyslipidemia, diabetes.    He is doing overall quite well today and tells me that his low back pain is improved and nearly resolved after angioplasty and PCI to his right SFA and common femoral artery.    At the same time, he does tell me that over the last month or so he has increased lower extremity edema, 2+ bilaterally on physical examination as below.    He has no side effects with his medical regimen, and good control of his blood pressure at home as he does here in the office.    Past Medical History:   Diagnosis Date   • AK (actinic keratosis) 7/21/2016   • Anemia    • Arrhythmia     PVC's   • Arthritis     lower back   • Lombardi esophagus    • Cancer (HCC) 2001    prostate   • CATARACT     bilateral IOL   • Chronic kidney disease (CKD), stage III (moderate) ([facility])    • Colon polyps    • Dental disorder     Dentures   • Depression 7/21/2016   • Diabetes 1984    oral medication   • GERD (gastroesophageal reflux disease)    • High cholesterol    • Hyperlipidemia    • Hypertension    • Infectious disease     history of c-diff 2016   • Neuropathy ([facility])    • Other specified disorder of intestines     alters between normal and diarrhea since radiation tx   • Personal history of venous thrombosis and embolism 2010/1995    leg   • Prostate cancer (HCC) 2001    Status post prostatectomy   • Psychiatric problem     Depression, treated with Prozac.   • Sleep apnea     Does not use CPAP   • Snoring     sleep study done   • Unspecified urinary incontinence      spill from artifical urinary sphincter   • Urinary bladder disorder     uses external catheter at night, and clamp during day     Past Surgical History:   Procedure Laterality Date   • FEMORAL ENDARTERECTOMY Right 3/13/2019    Procedure: FEMORAL ENDARTERECTOMY- COMMON;  Surgeon: Deepa Delgado M.D.;  Location: [facility];  Service: General   • ILIAC ANGIOPLASTY WITH STENT Right 3/13/2019    Procedure: ILIAC ANGIOPLASTY WITH STENT- RETROGRADES;  Surgeon: Deepa Delgado M.D.;  Location: [facility];  Service: General   • ANKLE ORIF Right 10/28/2015    Procedure: ANKLE ORIF;  Surgeon: Venu Elizabeth M.D.;  Location: [facility];  Service:    • SPHINCTER PROSTHESIS REMOVAL  8/10/2015    Procedure: SPHINCTER PROSTHESIS REMOVAL;  Surgeon: Tuan Marie M.D.;  Location: [facility];  Service:    • LAPAROSCOPY  5/21/2014    Performed by Graham Peña M.D. at [facility]   • BOWEL RESECTION  5/21/2014    Performed by Graham Peña M.D. at [facility]   • COLONOSCOPY WITH APC  6/5/2013    Performed by Deepa Vela M.D. at [facility]   • CATARACT PHACO WITH IOL  2/27/2013    Performed by Clayton Noonan M.D. at SURGERY SAME DAY [facility]   • CATARACT PHACO WITH IOL  2/13/2013    Performed by Clayton Noonan M.D. at SURGERY SAME DAY [facility]   • LUMBAR FUSION POSTERIOR  12/13/2012    Procedure: L5-S1 removal of Jennings Fragment NON-INSTRUMENTAL;  Surgeon: Wil Martinez M.D.;  Location: [facility];  Service:    • LUMBAR DECOMPRESSION  12/13/2012    Procedure: 4-5;  Surgeon: Wil Martinez M.D.;  Location: [facility];  Service:    • SPHINCTER PROSTHESIS PLACEMENT  9/26/2012    Performed by GERSON Car M.D. at [facility]   • GROIN EXPLORATION  3/13/2012    Performed by DEEPA DELGADO at [facility]   • SPHINCTER PROSTHESIS PLACEMENT  2/24/2011     Performed by GERSON SEGUNDO at SURGERY [facility]   • CYSTOSCOPY  2/24/2011    Performed by GERSON SEGUNDO at SURGERY [facility]   • CYSTOSCOPY  2/2/2011    Performed by GERSON SEGUNDO at SURGERY [facility]   • SPHINCTER PROSTHESIS REMOVAL  6/3/2010    Performed by JOSH APARICIO at SURGERY [facility]   • ANGIOGRAM  1/25/2010    Performed by PETER NORMAN at SURGERY [facility]   • AORTOGRAM  1/25/2010    Performed by PETER NORMAN at SURGERY [facility]   • PROSTATECTOMY ROBOTIC  2/2001   • PB INSERT,INFLATABLE SPHINCTER  2001   • OTHER ABDOMINAL SURGERY     • OTHER ORTHOPEDIC SURGERY Left     hip & femur fx     Family History   Problem Relation Age of Onset   • Cancer Mother         ovarian   • Heart Disease Father    • Diabetes Father    • Diabetes Sister    • Hypertension Unknown    • Cancer Unknown         multiple cousins with prostate cancer.      Social History     Social History   • Marital status:      Spouse name: N/A   • Number of children: 5   • Years of education: N/A     Occupational History   • Not on file.     Social History Main Topics   • Smoking status: Former Smoker     Packs/day: 2.00     Years: 30.00     Types: Cigarettes     Quit date: 3/5/1995   • Smokeless tobacco: Never Used      Comment: 1.5 ppd 25 yrs,quit 1995   • Alcohol use No   • Drug use: No   • Sexual activity: Not on file     Other Topics Concern   • Not on file     Social History Narrative   • No narrative on file     Allergies   Allergen Reactions   • Augmentin Unspecified     Bleeding (rectal)  RXN=5 years ago   • Latex Unspecified     Blisters  RXN=ongoing   • Tape Rash     Rash-blisters-paper tape okay  RXN=ongoing     Outpatient Encounter Prescriptions as of 4/16/2019   Medication Sig Dispense Refill   • nystatin/triamcinolone (MYCOLOG) 805542-6.1 UNIT/GM-% Cream APPLY TO INVOLVED AREAS TWICE PER DAY  2   • hydroCHLOROthiazide (HYDRODIURIL) 25 MG Tab Take 1  Tab by mouth every day. 90 Tab 3   • clobetasol (TEMOVATE) 0.05 % Cream Apply 1 Application to affected area(s) every bedtime. Apply's to upper back     • metoprolol (LOPRESSOR) 25 MG Tab Take 12.5 mg by mouth 2 times a day.     • Multiple Vitamins-Minerals (PRESERVISION AREDS 2+MULTI VIT PO) Take 1 Cap by mouth every day.     • atorvastatin (LIPITOR) 40 MG Tab Take 1 Tab by mouth every day. 90 Tab 3   • methenamine hip (HIPPREX) 1 GM Tab Take 1 g by mouth 2 times a day.     • Probiotic Product (PRO-BIOTIC BLEND) Cap Take 1 Cap by mouth every day.     • pantoprazole (PROTONIX) 40 MG Tablet Delayed Response Take 40 mg by mouth every day.     • glipiZIDE (GLUCOTROL) 10 MG Tab Take 10 mg by mouth 2 times a day.     • Saxagliptin HCl 5 MG Tab Take 5 mg by mouth every day.     • lisinopril (PRINIVIL) 5 MG Tab Take 5 mg by mouth every day.     • ferrous sulfate 325 (65 FE) MG tablet Take 325 mg by mouth 2 Times a Day.     • Cholecalciferol (VITAMIN D) 2000 UNIT TABS Take 2,000 Units by mouth every day.     • metformin (GLUCOPHAGE) 500 MG TABS Take 500 mg by mouth 2 times a day, with meals.     • fluoxetine (PROZAC) 20 MG CAPS Take 20 mg by mouth every day.     • [DISCONTINUED] hydroCHLOROthiazide (HYDRODIURIL) 12.5 MG tablet Take 2 Tabs by mouth every day. 90 Tab 3   • [DISCONTINUED] hydroCHLOROthiazide (HYDRODIURIL) 25 MG Tab Take 1 Tab by mouth every day. 90 Tab 3   • [DISCONTINUED] hydrochlorothiazide (HYDRODIURIL) 12.5 MG tablet Take 12.5 mg by mouth every day.       No facility-administered encounter medications on file as of 4/16/2019.      Review of Systems   HENT: Positive for hearing loss.    Gastrointestinal: Positive for blood in stool, diarrhea and heartburn.   Genitourinary: Positive for hematuria (Mild presently, and associated with radiation cystitis).   Musculoskeletal: Positive for falls.        Back pain has essentially resolved since treatment of his PAD   Endo/Heme/Allergies: Bruises/bleeds easily.  "  Psychiatric/Behavioral: Positive for depression. Negative for suicidal ideas.   All other systems reviewed and are negative.       Objective:   /58 (BP Location: Left arm, Patient Position: Sitting, BP Cuff Size: Adult)   Pulse 66   Ht 1.803 m (5' 11\")   Wt 79.8 kg (176 lb)   SpO2 93%   BMI 24.55 kg/m²     Physical Exam   Constitutional: He is oriented to person, place, and time. He appears well-developed and well-nourished. No distress.   Pleasant, elderly man in no distress.  Physical examination is unchanged except where specified compared to my previous on 10/26/2018.   Eyes: Pupils are equal, round, and reactive to light. EOM are normal.   Neck: No JVD present.   Cardiovascular: Normal rate and regular rhythm.  Exam reveals no gallop and no friction rub.    Murmur heard.   Systolic (Blowing systolic murmur at cardiac apex and left lower sternal border) murmur is present with a grade of 2/6   Pulses:       Dorsalis pedis pulses are 1+ on the right side, and 1+ on the left side.   Pulmonary/Chest: Effort normal and breath sounds normal. No respiratory distress. He has no wheezes. He has no rales.   Abdominal: Soft. Bowel sounds are normal. He exhibits no distension.   Musculoskeletal: He exhibits edema (2+ bilateral lower extremity edema).   Full exam deferred, again ambulating with a walker   Neurological: He is alert and oriented to person, place, and time.   Skin: Skin is warm and dry. No rash noted. He is not diaphoretic. No erythema. No pallor.   Psychiatric: He has a normal mood and affect. Judgment and thought content normal.   Vitals reviewed.    Lab Results   Component Value Date/Time    WBC 8.7 03/14/2019 03:26 AM    RBC 3.93 (L) 03/14/2019 03:26 AM    HEMOGLOBIN 11.2 (L) 03/14/2019 03:26 AM    HEMATOCRIT 35.3 (L) 03/14/2019 03:26 AM    MCV 89.8 03/14/2019 03:26 AM    MCH 28.5 03/14/2019 03:26 AM    MCHC 31.7 (L) 03/14/2019 03:26 AM    MPV 9.8 03/14/2019 03:26 AM        Lab Results "   Component Value Date/Time    SODIUM 139 2019 03:26 AM    POTASSIUM 4.5 2019 03:26 AM    CHLORIDE 103 2019 03:26 AM    CO2 27 2019 03:26 AM    GLUCOSE 170 (H) 2019 03:26 AM    BUN 22 2019 03:26 AM    CREATININE 1.14 2019 03:26 AM    CREATININE 1.0 2005 02:20 PM        Lab Results   Component Value Date/Time    ASTSGOT 11 (L) 2018 07:06 AM    ALTSGPT 8 2018 07:06 AM        Lab Results   Component Value Date/Time    CHOLSTRLTOT 110 2018 07:06 AM    LDL 47 2018 07:06 AM    HDL 40 2018 07:06 AM    TRIGLYCERIDE 117 2018 07:06 AM         Results for orders placed or performed in visit on 05/15/17   EKG   Result Value Ref Range    Report       [facility]    Test Date:  2017-05-15  Pt Name:    KARINA SKAGGS                Department: [facility]  MRN:        [id number]                      Room:  Gender:     M                            Technician: PAT  :        [id number]                   Requested By:MONICA COWAN  Order #:    [id number]                    Reading MD: Monica Cowan MD    Measurements  Intervals                                Axis  Rate:       74                           P:          48  GA:         184                          QRS:        -19  QRSD:       88                           T:          69  QT:         400  QTc:        444    Interpretive Statements  SINUS RHYTHM  BORDERLINE LEFT AXIS DEVIATION  Compared to ECG 05/15/2017 13:56:57  No significant changes    Electronically Signed On 2017 16:41:06 PST by Monica Cowan MD     EKG   Result Value Ref Range    Report       [facility]    Test Date:  2017-05-15  Pt Name:    KARINA SKAGGS                Department: [facility]  MRN:        [id number]                      Room:  Gender:     M                            Technician: PAT  :        [id number]                   Requested By:MONICA COWAN  Order #:    [id number]                 "    Reading MD: Beth Huston MD    Measurements  Intervals                                Axis  Rate:       69                           P:          50  NM:         168                          QRS:        63  QRSD:       90                           T:          41  QT:         420  QTc:        450    Interpretive Statements  SINUS RHYTHM  Compared to ECG 08/03/2015 09:50:24  No significant changes    Electronically Signed On 5- 14:56:43 PDT by Beth Huston MD       Lower extremity angiogram, 1/25/2010:  \"IMPRESSION  1.  Mild aortoiliac and distal disease - no focal stenoses.  2.  Normal renal vasculature.  3.  Occluded right hypogastric vessel.  4.  Stenotic left hypogastric artery\"    Echocardiogram, 4/21/2017:  \"CONCLUSIONS  Normal left ventricular systolic function.  Left ventricular ejection fraction is visually estimated to be 65%.  Grade I diastolic dysfunction.  Mild mitral regurgitation.  Mild aortic insufficiency\"    Myocardial perfusion imaging, 5/22/2017:  \" NUCLEAR IMAGING INTERPRETATION   No evidence of significant jeopardized viable myocardium or prior myocardial    infarction.   Normal left ventricular size, ejection fraction, and wall motion\"    Assessment:     1. Edema, unspecified type  Basic Metabolic Panel    EC-ECHOCARDIOGRAM COMPLETE W/O CONT    hydroCHLOROthiazide (HYDRODIURIL) 25 MG Tab    DISCONTINUED: hydroCHLOROthiazide (HYDRODIURIL) 25 MG Tab   2. Essential hypertension     3. Mixed hyperlipidemia     4. PAD (peripheral artery disease) ([facility])         Medical Decision Making:  Today's Assessment / Status / Plan:     He does have increased bilateral lower extremity edema today of unclear etiology at the time of this note.  Certainly, he could have developed a component of heart failure with preserved ejection fraction.  I did repeat an echocardiogram to further evaluate.  Alternative etiologies could include hypoalbuminemia.  I did increase his HCTZ previously 12.5 mg p.o. daily " to 25 mg p.o. daily ordering a nonfasting chemistry panel 2 weeks after that medication change and asking him to check his blood pressure regularly with that.  Otherwise, his lipids were previously well controlled and he is symptomatically much improved after endarterectomy and angioplasty of his right femoral artery system.    Zeus Joyce MD  Cardiologist, [facility] [place]     Return in about 3 months (around 7/16/2019).

## 2019-04-16 NOTE — LETTER
Name:          Selvin Braga   YOB: 1935  Date:     04/16/2019      Laury Rees M.D.  [address]  [place] [address]     Zeus Joyce MD  [address], [address]  [place] [address]  Phone: [phone]  Back Line: [phone]  Fax: [phone]  E-mail: [email or web address]   Dear Dr. Rees,    We had the pleasure of seeing your patient, Selvin Braga, in Cardiology Clinic at [facility] today.    As you know, he is an 83-year-old man followed in cardiology clinic for aortic sclerosis and mild aortic insufficiency as well as peripheral artery disease status post femoral endarterectomy and PCI of his right SFA 3/2019.    He also has a history of a previous elevated troponin in the setting of sepsis in April 2017 (myocardial perfusion imaging 5/22/2017 negative for ischemia). He has dyslipidemia, diabetes.    He does have increased bilateral lower extremity edema today of unclear etiology at the time of this note.  Certainly, he could have developed a component of heart failure with preserved ejection fraction.  I did repeat an echocardiogram to further evaluate.  Alternative etiologies could include hypoalbuminemia.  I did increase his HCTZ previously 12.5 mg p.o. daily to 25 mg p.o. daily ordering a nonfasting chemistry panel 2 weeks after that medication change and asking him to check his blood pressure regularly with that.  Otherwise, his lipids were previously well controlled and he is symptomatically much improved after endarterectomy and angioplasty of his right femoral artery system.    Return in about 3 months (around 7/16/2019).    Thank you for the referral and please do not hesitate to contact me at any time. My contact information is listed above.    This note was dictated using Dragon speech recognition software.     A full note including my physical examination and a full list of rectified medications is available in our medical record, and can  be faxed as well.    Zeus Joyce MD  Cardiologist  [facility]

## 2019-04-18 NOTE — PROGRESS NOTES
Patient Selvin Braga was admitted to [facility] on 3/13/19 for femoral endarterectomy common.  The patient was discharged on 3/15/19 and instructed to follow up with his PCP and Surgeon.  The patient declined to fill his pain medication.  The patient followed up with his Surgeon on 4/9/19.  The patient declined to schedule a sooner appointment with his PCP. The patient also followed up with Cardiology on 4/16/19.  The patient is scheduled for 4 future appointments for Labs on 4/26/19, Ultrasound on 5/8/19, PCP on 5/31/19 and Cardiology on 7/19/19.  Low LACE score. No PPS conducted.

## 2019-04-19 ENCOUNTER — TELEPHONE (OUTPATIENT)
Dept: PHYSICAL THERAPY | Facility: REHABILITATION | Age: 84
End: 2019-04-19

## 2019-04-19 NOTE — DISCHARGE SUMMARY
DATE OF ADMISSION:  03/13/2019    DATE OF DISCHARGE:  03/15/2019    ADMITTING DIAGNOSES:  1.  Severe peripheral arterial disease.  2.  Short distance claudication.    DISCHARGE DIAGNOSES:  1.  Severe peripheral arterial disease.  2.  Short distance claudication.    HOSPITAL COURSE:  Patient underwent elective right common femoral   endarterectomy with bovine patch angioplasty, also underwent PTA and stenting   of the right common iliac artery.  Patient's postoperative course was   unremarkable.  His discomfort was significantly improved postoperatively.    Patient's incisions remained clean, dry and intact and the patient began   ambulating on postoperative day #2.  The patient was subsequently discharged   from the hospital to maintain on all of his previous home medications with the   addition of Plavix.  Patient was set up to see me in the office in 1 week for   a postoperative visit.       ____________________________________     MD ALEJANDRO ALVESH / NTS    DD:  04/18/2019 18:06:34  DT:  04/19/2019 01:37:07    D#:  [id number]  Job#:  [id number]  
DATE OF ADMISSION:  03/13/2019    DATE OF DISCHARGE:  03/15/2019    ADMITTING DIAGNOSES:  1.  Severe peripheral arterial disease.  2.  Short distance claudication.    DISCHARGE DIAGNOSES:  1.  Severe peripheral arterial disease.  2.  Short distance claudication.    HOSPITAL COURSE:  The patient underwent a right common femoral artery   endarterectomy with bovine patch angioplasty as well as angioplasty and   stenting of the right common iliac artery.  The patient's postoperative course   was uncomplicated.  The patient convalesced after surgery nicely.  His pain   in his lower extremities improved and his incision remained clean, dry and   intact during his hospitalization.  The patient was discharged home on all of   his home previous medications and an appointment was set up to see me in the   office in 2 weeks after discharge.       ____________________________________     MD JUAN ALVES / CHINO    DD:  04/08/2019 11:49:26  DT:  04/09/2019 02:47:47    D#:  [id number]  Job#:  [id number]  
details

## 2019-04-26 ENCOUNTER — HOSPITAL ENCOUNTER (OUTPATIENT)
Dept: LAB | Facility: MEDICAL CENTER | Age: 84
End: 2019-04-26
Attending: INTERNAL MEDICINE
Payer: MEDICARE

## 2019-04-26 DIAGNOSIS — R60.9 EDEMA, UNSPECIFIED TYPE: ICD-10-CM

## 2019-04-26 LAB
ANION GAP SERPL CALC-SCNC: 9 MMOL/L (ref 0–11.9)
BUN SERPL-MCNC: 29 MG/DL (ref 8–22)
CALCIUM SERPL-MCNC: 9.7 MG/DL (ref 8.5–10.5)
CHLORIDE SERPL-SCNC: 105 MMOL/L (ref 96–112)
CO2 SERPL-SCNC: 25 MMOL/L (ref 20–33)
CREAT SERPL-MCNC: 1.19 MG/DL (ref 0.5–1.4)
GLUCOSE SERPL-MCNC: 269 MG/DL (ref 65–99)
POTASSIUM SERPL-SCNC: 4 MMOL/L (ref 3.6–5.5)
SODIUM SERPL-SCNC: 139 MMOL/L (ref 135–145)

## 2019-04-26 PROCEDURE — 80048 BASIC METABOLIC PNL TOTAL CA: CPT

## 2019-04-26 PROCEDURE — 36415 COLL VENOUS BLD VENIPUNCTURE: CPT

## 2019-04-29 ENCOUNTER — OFFICE VISIT (OUTPATIENT)
Dept: URGENT CARE | Facility: CLINIC | Age: 84
End: 2019-04-29
Payer: MEDICARE

## 2019-04-29 VITALS
DIASTOLIC BLOOD PRESSURE: 64 MMHG | BODY MASS INDEX: 24.5 KG/M2 | HEIGHT: 71 IN | TEMPERATURE: 97.8 F | RESPIRATION RATE: 15 BRPM | SYSTOLIC BLOOD PRESSURE: 120 MMHG | OXYGEN SATURATION: 96 % | WEIGHT: 175 LBS | HEART RATE: 78 BPM

## 2019-04-29 DIAGNOSIS — H10.32 ACUTE BACTERIAL CONJUNCTIVITIS OF LEFT EYE: ICD-10-CM

## 2019-04-29 DIAGNOSIS — S05.02XA ABRASION OF LEFT CORNEA, INITIAL ENCOUNTER: ICD-10-CM

## 2019-04-29 PROCEDURE — 99214 OFFICE O/P EST MOD 30 MIN: CPT | Performed by: PHYSICIAN ASSISTANT

## 2019-04-29 RX ORDER — ERYTHROMYCIN 5 MG/G
OINTMENT OPHTHALMIC
Qty: 1 TUBE | Refills: 0 | Status: SHIPPED | OUTPATIENT
Start: 2019-04-29 | End: 2019-07-19

## 2019-04-29 ASSESSMENT — ENCOUNTER SYMPTOMS
CARDIOVASCULAR NEGATIVE: 1
NEUROLOGICAL NEGATIVE: 1
MUSCULOSKELETAL NEGATIVE: 1
CONSTITUTIONAL NEGATIVE: 1
RESPIRATORY NEGATIVE: 1

## 2019-04-29 NOTE — PROGRESS NOTES
Subjective:      Selvin Braga is a 83 y.o. male who presents with Eye Problem (felt something in the left eye, now mucus and discharge, red, vision feeling blurry on the eye)        Eye Problem      Patient presents today for about 4 days of left eye irritation, redness, FB sensation that in the last two days have included thick green/yellow crusts. Patient notes the eye has been irritated in general and he has been rubbing it a lot.  Itching and some pain.  He has had matting of his lashes as well.  No overt light sensitivity.  No symptoms in the right eye.  No URI/cough/cold symptoms. No fevers.  Feels eye is more blurry on affected side.  Hx of mild macular degeneration and cataract surgery in 2013.      Review of Systems   Constitutional: Negative.    HENT: Negative.    Eyes:        SEE HPI   Respiratory: Negative.    Cardiovascular: Negative.    Musculoskeletal: Negative.    Skin: Negative.    Neurological: Negative.    Endo/Heme/Allergies: Negative.        PMH:  has a past medical history of AK (actinic keratosis) (7/21/2016); Anemia; Arrhythmia; Arthritis; Lombardi esophagus; Cancer ([facility]) (2001); CATARACT; Chronic kidney disease (CKD), stage III (moderate) ([facility]); Colon polyps; Dental disorder; Depression (7/21/2016); Diabetes (1984); GERD (gastroesophageal reflux disease); High cholesterol; Hyperlipidemia; Hypertension; Infectious disease; Neuropathy ([facility]); Other specified disorder of intestines; Personal history of venous thrombosis and embolism (2010/1995); Prostate cancer ([facility]) (2001); Psychiatric problem; Sleep apnea; Snoring; Unspecified urinary incontinence; and Urinary bladder disorder.  MEDS:   Current Outpatient Prescriptions:   •  erythromycin 5 MG/GM Ointment, Apply thin layer to left lower lid QID 7 days., Disp: 1 Tube, Rfl: 0  •  hydroCHLOROthiazide (HYDRODIURIL) 25 MG Tab, Take 1 Tab by mouth every day., Disp: 90 Tab, Rfl: 3  •  clobetasol (TEMOVATE) 0.05 % Cream, Apply 1 Application to  affected area(s) every bedtime. Apply's to upper back, Disp: , Rfl:   •  metoprolol (LOPRESSOR) 25 MG Tab, Take 12.5 mg by mouth 2 times a day., Disp: , Rfl:   •  Multiple Vitamins-Minerals (PRESERVISION AREDS 2+MULTI VIT PO), Take 1 Cap by mouth every day., Disp: , Rfl:   •  atorvastatin (LIPITOR) 40 MG Tab, Take 1 Tab by mouth every day., Disp: 90 Tab, Rfl: 3  •  methenamine hip (HIPPREX) 1 GM Tab, Take 1 g by mouth 2 times a day., Disp: , Rfl:   •  Probiotic Product (PRO-BIOTIC BLEND) Cap, Take 1 Cap by mouth every day., Disp: , Rfl:   •  pantoprazole (PROTONIX) 40 MG Tablet Delayed Response, Take 40 mg by mouth every day., Disp: , Rfl:   •  glipiZIDE (GLUCOTROL) 10 MG Tab, Take 10 mg by mouth 2 times a day., Disp: , Rfl:   •  Saxagliptin HCl 5 MG Tab, Take 5 mg by mouth every day., Disp: , Rfl:   •  lisinopril (PRINIVIL) 5 MG Tab, Take 5 mg by mouth every day., Disp: , Rfl:   •  ferrous sulfate 325 (65 FE) MG tablet, Take 325 mg by mouth 2 Times a Day., Disp: , Rfl:   •  Cholecalciferol (VITAMIN D) 2000 UNIT TABS, Take 2,000 Units by mouth every day., Disp: , Rfl:   •  metformin (GLUCOPHAGE) 500 MG TABS, Take 500 mg by mouth 2 times a day, with meals., Disp: , Rfl:   •  fluoxetine (PROZAC) 20 MG CAPS, Take 20 mg by mouth every day., Disp: , Rfl:   •  nystatin/triamcinolone (MYCOLOG) 649197-3.1 UNIT/GM-% Cream, APPLY TO INVOLVED AREAS TWICE PER DAY, Disp: , Rfl: 2  ALLERGIES:   Allergies   Allergen Reactions   • Augmentin Unspecified     Bleeding (rectal)  RXN=5 years ago   • Latex Unspecified     Blisters  RXN=ongoing   • Tape Rash     Rash-blisters-paper tape okay  RXN=ongoing     SURGHX:   Past Surgical History:   Procedure Laterality Date   • FEMORAL ENDARTERECTOMY Right 3/13/2019    Procedure: FEMORAL ENDARTERECTOMY- COMMON;  Surgeon: Alek Delgado M.D.;  Location: SURGERY [facility];  Service: General   • ILIAC ANGIOPLASTY WITH STENT Right 3/13/2019    Procedure: ILIAC ANGIOPLASTY WITH STENT-  RETROGRADES;  Surgeon: Deepa Delgado M.D.;  Location: [facility];  Service: General   • ANKLE ORIF Right 10/28/2015    Procedure: ANKLE ORIF;  Surgeon: Venu Elizabeth M.D.;  Location: [facility];  Service:    • SPHINCTER PROSTHESIS REMOVAL  8/10/2015    Procedure: SPHINCTER PROSTHESIS REMOVAL;  Surgeon: Tuan Marie M.D.;  Location: [facility];  Service:    • LAPAROSCOPY  5/21/2014    Performed by Graham Peña M.D. at [facility]   • BOWEL RESECTION  5/21/2014    Performed by Graham Peña M.D. at [facility]   • COLONOSCOPY WITH APC  6/5/2013    Performed by Deepa Vela M.D. at [facility]   • CATARACT PHACO WITH IOL  2/27/2013    Performed by Clayton Noonan M.D. at SURGERY SAME DAY [facility]   • CATARACT PHACO WITH IOL  2/13/2013    Performed by Clayton Noonan M.D. at SURGERY SAME DAY [facility]   • LUMBAR FUSION POSTERIOR  12/13/2012    Procedure: L5-S1 removal of Jennings Fragment NON-INSTRUMENTAL;  Surgeon: Wil Martinez M.D.;  Location: [facility];  Service:    • LUMBAR DECOMPRESSION  12/13/2012    Procedure: 4-5;  Surgeon: Wil Martinez M.D.;  Location: [facility];  Service:    • SPHINCTER PROSTHESIS PLACEMENT  9/26/2012    Performed by GERSON Car M.D. at [facility]   • GROIN EXPLORATION  3/13/2012    Performed by DEEPA DELGADO at [facility]   • SPHINCTER PROSTHESIS PLACEMENT  2/24/2011    Performed by GERSON CAR at [facility]   • CYSTOSCOPY  2/24/2011    Performed by GERSON CAR at [facility]   • CYSTOSCOPY  2/2/2011    Performed by GERSON CAR at [facility]   • SPHINCTER PROSTHESIS REMOVAL  6/3/2010    Performed by JOSH APARICIO at [facility]   • ANGIOGRAM  1/25/2010    Performed by PETER NORMAN at SURGERY [facility]   • AORTOGRAM  1/25/2010     "Performed by PETER NORMAN at SURGERY [facility]S   • PROSTATECTOMY ROBOTIC  2/2001   • PB INSERT,INFLATABLE SPHINCTER  2001   • OTHER ABDOMINAL SURGERY     • OTHER ORTHOPEDIC SURGERY Left     hip & femur fx     SOCHX:  reports that he quit smoking about 24 years ago. His smoking use included Cigarettes. He has a 60.00 pack-year smoking history. He has never used smokeless tobacco. He reports that he does not drink alcohol or use drugs.  FH: Family history was reviewed, no pertinent findings to report   Objective:     /64 (BP Location: Left arm, Patient Position: Sitting, BP Cuff Size: Adult)   Pulse 78   Temp 36.6 °C (97.8 °F) (Temporal)   Resp 15   Ht 1.803 m (5' 11\")   Wt 79.4 kg (175 lb)   SpO2 96%   BMI 24.41 kg/m²      Physical Exam   Constitutional: He is oriented to person, place, and time. He appears well-developed and well-nourished. No distress.   HENT:   Head: Normocephalic and atraumatic.   Right Ear: External ear normal.   Left Ear: External ear normal.   Nose: Nose normal.   Mouth/Throat: Oropharynx is clear and moist.   Eyes: Pupils are equal, round, and reactive to light. EOM are normal. Right eye exhibits no discharge. Left eye exhibits discharge. Left conjunctiva has no hemorrhage. Left eye exhibits normal extraocular motion.       Neck: Normal range of motion. Neck supple.   Cardiovascular: Normal rate and regular rhythm.    Pulmonary/Chest: Effort normal and breath sounds normal.   Lymphadenopathy:     He has no cervical adenopathy.   Neurological: He is alert and oriented to person, place, and time.   Skin: Skin is warm and dry.   Psychiatric: He has a normal mood and affect. His behavior is normal.   Vitals reviewed.            Assessment/Plan:     1. Abrasion of left cornea, initial encounter  erythromycin 5 MG/GM Ointment   2. Acute bacterial conjunctivitis of left eye  erythromycin 5 MG/GM Ointment       -coverage as above.   -care at home with patching prn up to 24 " hours, ointment, hand hygiene and planned follow up with eye doctor this week or early next for recheck   -ER precautions    Supportive care, differential diagnoses, and indications for immediate follow-up discussed with patient.   Pathogenesis of diagnosis discussed including typical length and natural progression.   Instructed to return to clinic or nearest emergency department for any change in condition, further concerns, or worsening of symptoms.  Patient states understanding of the plan of care and discharge instructions.          Josseline Michael P.A.-C.           100

## 2019-05-08 ENCOUNTER — HOSPITAL ENCOUNTER (OUTPATIENT)
Dept: CARDIOLOGY | Facility: MEDICAL CENTER | Age: 84
End: 2019-05-08
Attending: INTERNAL MEDICINE
Payer: MEDICARE

## 2019-05-08 DIAGNOSIS — R60.9 EDEMA, UNSPECIFIED TYPE: ICD-10-CM

## 2019-05-08 PROCEDURE — 93306 TTE W/DOPPLER COMPLETE: CPT | Mod: 26 | Performed by: INTERNAL MEDICINE

## 2019-05-08 PROCEDURE — 93306 TTE W/DOPPLER COMPLETE: CPT

## 2019-05-09 LAB
LV EJECT FRACT  99904: 55
LV EJECT FRACT MOD 2C 99903: 59.09
LV EJECT FRACT MOD 4C 99902: 48.61
LV EJECT FRACT MOD BP 99901: 52.41

## 2019-05-23 DIAGNOSIS — E78.5 HYPERLIPIDEMIA, UNSPECIFIED HYPERLIPIDEMIA TYPE: ICD-10-CM

## 2019-05-23 RX ORDER — ATORVASTATIN CALCIUM 40 MG/1
40 TABLET, FILM COATED ORAL DAILY
Qty: 100 TAB | Refills: 3 | Status: ON HOLD | OUTPATIENT
Start: 2019-05-23 | End: 2020-02-10

## 2019-05-30 ENCOUNTER — TELEPHONE (OUTPATIENT)
Dept: CARDIOLOGY | Facility: MEDICAL CENTER | Age: 84
End: 2019-05-30

## 2019-05-30 NOTE — TELEPHONE ENCOUNTER
EC-ECHOCARDIOGRAM COMPLETE W/O CONT   Order: [id number]   Status:  Final result   Visible to patient:  Yes (MyChart)  Dx:  Edema, unspecified type   Notes recorded by Zeus Joyce M.D. on 5/29/2019 at 8:08 AM PDT    Normal heart muscle function, calcified valves are still functioning well and no different from the previous echocardiogram in 2017     Called patient and reviewed MD recommendations.  Pt verbalizes understanding and is appreciative of information given.

## 2019-05-31 ENCOUNTER — OFFICE VISIT (OUTPATIENT)
Dept: INTERNAL MEDICINE | Facility: MEDICAL CENTER | Age: 84
End: 2019-05-31
Payer: MEDICARE

## 2019-05-31 VITALS
DIASTOLIC BLOOD PRESSURE: 62 MMHG | HEART RATE: 65 BPM | WEIGHT: 177.1 LBS | BODY MASS INDEX: 24.79 KG/M2 | SYSTOLIC BLOOD PRESSURE: 120 MMHG | TEMPERATURE: 98.1 F | HEIGHT: 71 IN | OXYGEN SATURATION: 94 %

## 2019-05-31 DIAGNOSIS — D64.9 ANEMIA, UNSPECIFIED TYPE: ICD-10-CM

## 2019-05-31 DIAGNOSIS — Z79.4 TYPE 2 DIABETES MELLITUS WITH STAGE 3 CHRONIC KIDNEY DISEASE, WITH LONG-TERM CURRENT USE OF INSULIN (HCC): ICD-10-CM

## 2019-05-31 DIAGNOSIS — E78.2 MIXED HYPERLIPIDEMIA: ICD-10-CM

## 2019-05-31 DIAGNOSIS — I73.9 PAD (PERIPHERAL ARTERY DISEASE) (HCC): ICD-10-CM

## 2019-05-31 DIAGNOSIS — I10 ESSENTIAL HYPERTENSION: ICD-10-CM

## 2019-05-31 DIAGNOSIS — N18.30 CHRONIC KIDNEY DISEASE (CKD), STAGE III (MODERATE) (HCC): ICD-10-CM

## 2019-05-31 DIAGNOSIS — E11.22 TYPE 2 DIABETES MELLITUS WITH STAGE 3 CHRONIC KIDNEY DISEASE, WITH LONG-TERM CURRENT USE OF INSULIN (HCC): ICD-10-CM

## 2019-05-31 DIAGNOSIS — N18.30 TYPE 2 DIABETES MELLITUS WITH STAGE 3 CHRONIC KIDNEY DISEASE, WITH LONG-TERM CURRENT USE OF INSULIN (HCC): ICD-10-CM

## 2019-05-31 DIAGNOSIS — E53.8 B12 DEFICIENCY: ICD-10-CM

## 2019-05-31 DIAGNOSIS — Z90.79 HISTORY OF PROSTATECTOMY: ICD-10-CM

## 2019-05-31 PROCEDURE — 99214 OFFICE O/P EST MOD 30 MIN: CPT | Performed by: INTERNAL MEDICINE

## 2019-05-31 ASSESSMENT — PAIN SCALES - GENERAL: PAINLEVEL: NO PAIN

## 2019-05-31 NOTE — PROGRESS NOTES
Established Patient    Selvin presents today with the following:    CC: f/u chronic medical conditions/PAD  Chief Complaint   Patient presents with   • Follow-Up         HPI:   Selvin Braga is here for follow up of his chronic medical conditions.    Leg edema- improved on 25 mg of HCTZ  followed in cardiology clinic for aortic sclerosis and mild aortic insufficiency as well as peripheral artery disease status post femoral endarterectomy and PCI of his right SFA 3/2019.  Chronic conditions:  DM- reports compliance to meds, no hypoglycemia  Sees podiatrist  Numbness and coldness in feet better since SX  Last A1c 6.8 as per patient done at [facility]  Blood sugars been elevated since being off of insulin.    HTN  -stable  Depression- takes Prozac/family supportive  Prostate ca sp radiation/hormonal rx- has urinary/fecal complications post radiation- sees GI and urology, occasional blood in stool and hematuria  CKD- 2- not using nephrotoxins  Hyperlipidemia- currently off of statins  Eye- starting dry MD,on preservision    Patient Active Problem List    Diagnosis Date Noted   • Hyperlipidemia 07/21/2016     Priority: High   • Essential hypertension 09/02/2010     Priority: High   • GERD (gastroesophageal reflux disease) 04/20/2017     Priority: Medium   • History of prostate cancer 03/24/2017     Priority: Medium   • Type 2 diabetes mellitus with stage 3 chronic kidney disease, with long-term current use of insulin ([facility]) 03/24/2017     Priority: Medium   • History of DVT (deep vein thrombosis) 03/24/2017     Priority: Medium   • Chronic kidney disease (CKD), stage III (moderate) ([facility]) 07/21/2016     Priority: Medium   • History of prostatectomy 05/08/2015     Priority: Medium   • Anemia 04/20/2017     Priority: Low   • 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     Priority: Low   • Neuropathy ([facility]) 07/21/2016     Priority: Low   • Depression 07/21/2016     Priority: Low   • Spinal stenosis, lumbar region, without neurogenic  claudication 12/13/2012     Priority: Low   • Age-related osteoporosis with current pathological fracture 02/22/2019   • PAD (peripheral artery disease) (HCC) 10/16/2018   • DNR (do not resuscitate) 10/16/2018   • Dyslipidemia 10/16/2018   • Pressure-related ear pain 04/04/2017   • Other specified disorders of the skin and subcutaneous tissue related to radiation 03/29/2017   • History of radiation therapy 03/24/2017   • Continuous leakage of urine 03/24/2017   • Health care maintenance 08/30/2016   • AK (actinic keratosis) 07/21/2016   • Senile nuclear sclerosis 02/13/2013       Current Outpatient Prescriptions   Medication Sig Dispense Refill   • atorvastatin (LIPITOR) 40 MG Tab Take 1 Tab by mouth every day. 100 Tab 3   • erythromycin 5 MG/GM Ointment Apply thin layer to left lower lid QID 7 days. 1 Tube 0   • hydroCHLOROthiazide (HYDRODIURIL) 25 MG Tab Take 1 Tab by mouth every day. 90 Tab 3   • clobetasol (TEMOVATE) 0.05 % Cream Apply 1 Application to affected area(s) every bedtime. Apply's to upper back     • metoprolol (LOPRESSOR) 25 MG Tab Take 12.5 mg by mouth 2 times a day.     • Multiple Vitamins-Minerals (PRESERVISION AREDS 2+MULTI VIT PO) Take 1 Cap by mouth every day.     • methenamine hip (HIPPREX) 1 GM Tab Take 1 g by mouth 2 times a day.     • Probiotic Product (PRO-BIOTIC BLEND) Cap Take 1 Cap by mouth every day.     • pantoprazole (PROTONIX) 40 MG Tablet Delayed Response Take 40 mg by mouth every day.     • glipiZIDE (GLUCOTROL) 10 MG Tab Take 10 mg by mouth 2 times a day.     • Saxagliptin HCl 5 MG Tab Take 5 mg by mouth every day.     • lisinopril (PRINIVIL) 5 MG Tab Take 5 mg by mouth every day.     • ferrous sulfate 325 (65 FE) MG tablet Take 325 mg by mouth 2 Times a Day.     • Cholecalciferol (VITAMIN D) 2000 UNIT TABS Take 2,000 Units by mouth every day.     • metformin (GLUCOPHAGE) 500 MG TABS Take 500 mg by mouth 2 times a day, with meals.     • fluoxetine (PROZAC) 20 MG CAPS Take 20 mg  "by mouth every day.     • nystatin/triamcinolone (MYCOLOG) 047251-7.1 UNIT/GM-% Cream APPLY TO INVOLVED AREAS TWICE PER DAY  2     No current facility-administered medications for this visit.        Social History     Social History   • Marital status:      Spouse name: N/A   • Number of children: 5   • Years of education: N/A     Occupational History   • Not on file.     Social History Main Topics   • Smoking status: Former Smoker     Packs/day: 2.00     Years: 30.00     Types: Cigarettes     Quit date: 3/5/1995   • Smokeless tobacco: Never Used      Comment: 1.5 ppd 25 yrs,quit 1995   • Alcohol use No   • Drug use: No   • Sexual activity: Not on file     Other Topics Concern   • Not on file     Social History Narrative   • No narrative on file       Family History   Problem Relation Age of Onset   • Cancer Mother         ovarian   • Heart Disease Father    • Diabetes Father    • Diabetes Sister    • Hypertension Unknown    • Cancer Unknown         multiple cousins with prostate cancer.        ROS: A 14 system review done and negative except HPI  /62 (BP Location: Left arm, Patient Position: Sitting)   Pulse 65   Temp 36.7 °C (98.1 °F) (Temporal)   Ht 1.803 m (5' 11\")   Wt 80.3 kg (177 lb 1.6 oz)   SpO2 94%   BMI 24.70 kg/m²     Physical Exam  General:  Alert and oriented, No apparent distress.    Eyes: Pupils equal and reactive. No scleral icterus.    Throat: Clear no erythema or exudates noted.    Neck: Supple. No lymphadenopathy noted. Thyroid not enlarged.    Lungs: Clear to auscultation and percussion bilaterally.    Cardiovascular: Regular rate and rhythm. No murmurs, rubs or gallops.    Abdomen:  Benign. No rebound or guarding noted.    Extremities: No clubbing, cyanosis, edema.    Skin: skin bruising  Note: I have reviewed all pertinent labs and diagnostic tests associated with this visit with specific comments listed under the assessment and plan below      Assessment and Plan    1. " Essential hypertension  Est/sttable    2. History of prostatectomy  As per urology with complications as mentioned above    3. Type 2 diabetes mellitus with stage 3 chronic kidney disease, with long-term current use of insulin ([facility])  Discussed the concerns for hypoglycemia given his A1c  - CBC WITHOUT DIFFERENTIAL; Future  - Comp Metabolic Panel; Future  - Lipid Profile; Future  - HEMOGLOBIN A1C; Future    4. Chronic kidney disease (CKD), stage III (moderate) ([facility])  Est/stable    5. PAD (peripheral artery disease) ([facility])  Seeing cardiology    6. Anemia, unspecified type  Advised cutting down on iron to once daily  - FERRITIN; Future  - IRON/TOTAL IRON BIND; Future    7. B12 deficiency    - VITAMIN B12; Future    8. Mixed hyperlipidemia    - Lipid Profile; Future  Signed by: Laury Rees M.D.

## 2019-06-26 ENCOUNTER — HOSPITAL ENCOUNTER (OUTPATIENT)
Dept: LAB | Facility: MEDICAL CENTER | Age: 84
End: 2019-06-26
Attending: INTERNAL MEDICINE
Payer: MEDICARE

## 2019-06-26 DIAGNOSIS — D64.9 ANEMIA, UNSPECIFIED TYPE: ICD-10-CM

## 2019-06-26 DIAGNOSIS — E78.2 MIXED HYPERLIPIDEMIA: ICD-10-CM

## 2019-06-26 DIAGNOSIS — Z79.4 TYPE 2 DIABETES MELLITUS WITH STAGE 3 CHRONIC KIDNEY DISEASE, WITH LONG-TERM CURRENT USE OF INSULIN (HCC): ICD-10-CM

## 2019-06-26 DIAGNOSIS — E11.22 TYPE 2 DIABETES MELLITUS WITH STAGE 3 CHRONIC KIDNEY DISEASE, WITH LONG-TERM CURRENT USE OF INSULIN (HCC): ICD-10-CM

## 2019-06-26 DIAGNOSIS — N18.30 TYPE 2 DIABETES MELLITUS WITH STAGE 3 CHRONIC KIDNEY DISEASE, WITH LONG-TERM CURRENT USE OF INSULIN (HCC): ICD-10-CM

## 2019-06-26 DIAGNOSIS — E53.8 B12 DEFICIENCY: ICD-10-CM

## 2019-06-26 LAB
ALBUMIN SERPL BCP-MCNC: 3.5 G/DL (ref 3.2–4.9)
ALBUMIN/GLOB SERPL: 1.1 G/DL
ALP SERPL-CCNC: 86 U/L (ref 30–99)
ALT SERPL-CCNC: 11 U/L (ref 2–50)
ANION GAP SERPL CALC-SCNC: 9 MMOL/L (ref 0–11.9)
AST SERPL-CCNC: 12 U/L (ref 12–45)
BILIRUB SERPL-MCNC: 0.5 MG/DL (ref 0.1–1.5)
BUN SERPL-MCNC: 29 MG/DL (ref 8–22)
CALCIUM SERPL-MCNC: 10 MG/DL (ref 8.5–10.5)
CHLORIDE SERPL-SCNC: 105 MMOL/L (ref 96–112)
CHOLEST SERPL-MCNC: 108 MG/DL (ref 100–199)
CO2 SERPL-SCNC: 27 MMOL/L (ref 20–33)
CREAT SERPL-MCNC: 1.31 MG/DL (ref 0.5–1.4)
ERYTHROCYTE [DISTWIDTH] IN BLOOD BY AUTOMATED COUNT: 52.1 FL (ref 35.9–50)
EST. AVERAGE GLUCOSE BLD GHB EST-MCNC: 160 MG/DL
FASTING STATUS PATIENT QL REPORTED: NORMAL
FERRITIN SERPL-MCNC: 216 NG/ML (ref 22–322)
GLOBULIN SER CALC-MCNC: 3.2 G/DL (ref 1.9–3.5)
GLUCOSE SERPL-MCNC: 148 MG/DL (ref 65–99)
HBA1C MFR BLD: 7.2 % (ref 0–5.6)
HCT VFR BLD AUTO: 35 % (ref 42–52)
HDLC SERPL-MCNC: 40 MG/DL
HGB BLD-MCNC: 11.4 G/DL (ref 14–18)
IRON SATN MFR SERPL: 15 % (ref 15–55)
IRON SERPL-MCNC: 40 UG/DL (ref 50–180)
LDLC SERPL CALC-MCNC: 41 MG/DL
MCH RBC QN AUTO: 29.5 PG (ref 27–33)
MCHC RBC AUTO-ENTMCNC: 32.6 G/DL (ref 33.7–35.3)
MCV RBC AUTO: 90.7 FL (ref 81.4–97.8)
PLATELET # BLD AUTO: 165 K/UL (ref 164–446)
PMV BLD AUTO: 10 FL (ref 9–12.9)
POTASSIUM SERPL-SCNC: 4 MMOL/L (ref 3.6–5.5)
PROT SERPL-MCNC: 6.7 G/DL (ref 6–8.2)
RBC # BLD AUTO: 3.86 M/UL (ref 4.7–6.1)
SODIUM SERPL-SCNC: 141 MMOL/L (ref 135–145)
TIBC SERPL-MCNC: 266 UG/DL (ref 250–450)
TRIGL SERPL-MCNC: 133 MG/DL (ref 0–149)
VIT B12 SERPL-MCNC: 257 PG/ML (ref 211–911)
WBC # BLD AUTO: 6 K/UL (ref 4.8–10.8)

## 2019-06-26 PROCEDURE — 80061 LIPID PANEL: CPT

## 2019-06-26 PROCEDURE — 36415 COLL VENOUS BLD VENIPUNCTURE: CPT

## 2019-06-26 PROCEDURE — 82728 ASSAY OF FERRITIN: CPT

## 2019-06-26 PROCEDURE — 80053 COMPREHEN METABOLIC PANEL: CPT

## 2019-06-26 PROCEDURE — 82607 VITAMIN B-12: CPT

## 2019-06-26 PROCEDURE — 83550 IRON BINDING TEST: CPT

## 2019-06-26 PROCEDURE — 85027 COMPLETE CBC AUTOMATED: CPT

## 2019-06-26 PROCEDURE — 83540 ASSAY OF IRON: CPT

## 2019-06-26 PROCEDURE — 83036 HEMOGLOBIN GLYCOSYLATED A1C: CPT

## 2019-07-19 ENCOUNTER — OFFICE VISIT (OUTPATIENT)
Dept: CARDIOLOGY | Facility: MEDICAL CENTER | Age: 84
End: 2019-07-19
Payer: MEDICARE

## 2019-07-19 VITALS
BODY MASS INDEX: 24.58 KG/M2 | WEIGHT: 175.6 LBS | HEART RATE: 70 BPM | OXYGEN SATURATION: 95 % | HEIGHT: 71 IN | SYSTOLIC BLOOD PRESSURE: 118 MMHG | DIASTOLIC BLOOD PRESSURE: 70 MMHG

## 2019-07-19 DIAGNOSIS — Z86.718 HISTORY OF DVT (DEEP VEIN THROMBOSIS): ICD-10-CM

## 2019-07-19 DIAGNOSIS — N18.30 CHRONIC KIDNEY DISEASE (CKD), STAGE III (MODERATE) (HCC): ICD-10-CM

## 2019-07-19 DIAGNOSIS — E78.5 DYSLIPIDEMIA: ICD-10-CM

## 2019-07-19 DIAGNOSIS — D64.9 ANEMIA, UNSPECIFIED TYPE: ICD-10-CM

## 2019-07-19 DIAGNOSIS — I10 ESSENTIAL HYPERTENSION: ICD-10-CM

## 2019-07-19 DIAGNOSIS — M79.89 LEG SWELLING: ICD-10-CM

## 2019-07-19 DIAGNOSIS — I73.9 PAD (PERIPHERAL ARTERY DISEASE) (HCC): ICD-10-CM

## 2019-07-19 PROBLEM — R32 URINARY INCONTINENCE: Status: ACTIVE | Noted: 2017-03-24

## 2019-07-19 PROBLEM — K22.70 BARRETT'S ESOPHAGUS: Status: ACTIVE | Noted: 2019-07-19

## 2019-07-19 PROBLEM — K63.5 COLON POLYPS: Status: ACTIVE | Noted: 2019-07-19

## 2019-07-19 PROCEDURE — 99214 OFFICE O/P EST MOD 30 MIN: CPT | Performed by: INTERNAL MEDICINE

## 2019-07-19 ASSESSMENT — ENCOUNTER SYMPTOMS
HEMATOCHEZIA: 1
WEIGHT LOSS: 1
FALLS: 1
HEARTBURN: 1
BACK PAIN: 1
DEPRESSION: 1
DIARRHEA: 1

## 2019-07-19 NOTE — PROGRESS NOTES
Cardiology Follow-up Consultation Note    Date of note:    7/19/2019    Primary Care Provider: Laury Rees M.D.  Referring Provider: Zeus Jocye MD.     Patient Name: Selvin Braga   YOB: 1935  MRN:              [id number]    Chief Complaint: PVD.     History of Present Illness: Selvin Braga is a 83 y.o. male whose current medical problems include mild aortic insufficiency, peripheral artery disease status post femoral endarterectomy and PCI of his right SFA 3/2019, elevated troponin in the setting of sepsis in April 2017 (myocardial perfusion imaging 5/22/2017 negative for ischemia), provoked DVTs, dyslipidemia, and diabetes who is here for follow-up.    Last seen by Dr. Zeus Joyce on 4/16/2019.    Interim Events:  In terms of PVD, no more claudication.     In terms of hypertension, well controlled,.     In terms of previous elevated troponin, no angina.         Review of Systems   Constitution: Positive for weight loss.   Musculoskeletal: Positive for back pain and falls.   Gastrointestinal: Positive for diarrhea, heartburn and hematochezia (has had since prostate cancer resection. has had 4 colonoscopies, not interested in any more).   Genitourinary: Positive for hematuria (s/p prostate cancer, s/p multiple negative scopes).   Psychiatric/Behavioral: Positive for depression.     + incontinence due to prostate cancer treatment consequences.     All other systems reviewed and discussed using a comprehensive questionnaire and are negative.       Past Medical History:   Diagnosis Date   • AK (actinic keratosis) 7/21/2016   • Anemia    • Arrhythmia     PVC's   • Arthritis     lower back   • Lombardi esophagus    • Cancer (HCC) 2001    prostate   • CATARACT     bilateral IOL   • Chronic kidney disease (CKD), stage III (moderate) (HCC)    • Colon polyps    • Dental disorder     Dentures   • Depression 7/21/2016   • Diabetes 1984    oral medication   • GERD (gastroesophageal reflux  disease)    • Hyperlipidemia    • Hypertension    • Infectious disease     history of c-diff 2016   • Neuropathy (HCC)    • Other specified disorder of intestines     alters between normal and diarrhea since radiation tx   • Personal history of venous thrombosis and embolism 2010/1995    leg   • Prostate cancer (HCC) 2001    Status post prostatectomy   • Psychiatric problem     Depression, treated with Prozac.   • PVD (peripheral vascular disease) (HCC)     s/p femoral endarterectomy   • Sleep apnea     Does not use CPAP   • Unspecified urinary incontinence     spill from artifical urinary sphincter   • Urinary bladder disorder     uses external catheter at night, and clamp during day         Past Surgical History:   Procedure Laterality Date   • FEMORAL ENDARTERECTOMY Right 3/13/2019    Procedure: FEMORAL ENDARTERECTOMY- COMMON;  Surgeon: Alek Delgado M.D.;  Location: SURGERY [facility];  Service: General   • ILIAC ANGIOPLASTY WITH STENT Right 3/13/2019    Procedure: ILIAC ANGIOPLASTY WITH STENT- RETROGRADES;  Surgeon: Alek Delgado M.D.;  Location: SURGERY [facility];  Service: General   • ANKLE ORIF Right 10/28/2015    Procedure: ANKLE ORIF;  Surgeon: Venu Elizabeth M.D.;  Location: SURGERY [facility];  Service:    • SPHINCTER PROSTHESIS REMOVAL  8/10/2015    Procedure: SPHINCTER PROSTHESIS REMOVAL;  Surgeon: Tuan Marie M.D.;  Location: SURGERY [facility];  Service:    • LAPAROSCOPY  5/21/2014    Performed by Graham Peña M.D. at [facility]   • BOWEL RESECTION  5/21/2014    Performed by Graham Peña M.D. at [facility]   • COLONOSCOPY WITH APC  6/5/2013    Performed by Alek Vela M.D. at [facility]   • CATARACT PHACO WITH IOL  2/27/2013    Performed by Clayton Noonan M.D. at SURGERY SAME DAY [facility]   • CATARACT PHACO WITH IOL  2/13/2013    Performed by Clayton Noonan M.D. at SURGERY SAME DAY [facility]   •  LUMBAR FUSION POSTERIOR  12/13/2012    Procedure: L5-S1 removal of Jennings Fragment NON-INSTRUMENTAL;  Surgeon: Wil Martinez M.D.;  Location: SURGERY [facility];  Service:    • LUMBAR DECOMPRESSION  12/13/2012    Procedure: 4-5;  Surgeon: Wil Martinez M.D.;  Location: SURGERY [facility];  Service:    • SPHINCTER PROSTHESIS PLACEMENT  9/26/2012    Performed by GERSON Car M.D. at SURGERY [facility]   • GROIN EXPLORATION  3/13/2012    Performed by DEEPA PATRICIA at SURGERY [facility]   • SPHINCTER PROSTHESIS PLACEMENT  2/24/2011    Performed by GERSON CAR at [facility]   • CYSTOSCOPY  2/24/2011    Performed by GERSON CAR at [facility]   • CYSTOSCOPY  2/2/2011    Performed by GERSON CAR at SURGERY [facility]   • SPHINCTER PROSTHESIS REMOVAL  6/3/2010    Performed by JOSH APARICIO at SURGERY [facility]   • ANGIOGRAM  1/25/2010    Performed by PETER NORMAN at SURGERY [facility]   • AORTOGRAM  1/25/2010    Performed by PETER NORMAN at SURGERY [facility]   • PROSTATECTOMY ROBOTIC  2/2001   • PB INSERT,INFLATABLE SPHINCTER  2001   • OTHER ABDOMINAL SURGERY     • OTHER ORTHOPEDIC SURGERY Left     hip & femur fx         Current Outpatient Prescriptions   Medication Sig Dispense Refill   • atorvastatin (LIPITOR) 40 MG Tab Take 1 Tab by mouth every day. 100 Tab 3   • hydroCHLOROthiazide (HYDRODIURIL) 25 MG Tab Take 1 Tab by mouth every day. 90 Tab 3   • metoprolol (LOPRESSOR) 25 MG Tab Take 12.5 mg by mouth 2 times a day.     • Multiple Vitamins-Minerals (PRESERVISION AREDS 2+MULTI VIT PO) Take 1 Cap by mouth every day.     • methenamine hip (HIPPREX) 1 GM Tab Take 1 g by mouth 2 times a day.     • Probiotic Product (PRO-BIOTIC BLEND) Cap Take 1 Cap by mouth every day.     • pantoprazole (PROTONIX) 40 MG Tablet Delayed Response Take 40 mg by mouth every day.     • glipiZIDE (GLUCOTROL) 10 MG Tab Take 10 mg by  mouth 2 times a day.     • lisinopril (PRINIVIL) 5 MG Tab Take 5 mg by mouth every day.     • ferrous sulfate 325 (65 FE) MG tablet Take 325 mg by mouth 2 Times a Day.     • Cholecalciferol (VITAMIN D) 2000 UNIT TABS Take 2,000 Units by mouth every day.     • metformin (GLUCOPHAGE) 500 MG TABS Take 500 mg by mouth 2 times a day, with meals.     • fluoxetine (PROZAC) 20 MG CAPS Take 20 mg by mouth every day.     • nystatin/triamcinolone (MYCOLOG) 475195-3.1 UNIT/GM-% Cream APPLY TO INVOLVED AREAS TWICE PER DAY  2   • clobetasol (TEMOVATE) 0.05 % Cream Apply 1 Application to affected area(s) every bedtime. Apply's to upper back     • Saxagliptin HCl 5 MG Tab Take 5 mg by mouth every day.       No current facility-administered medications for this visit.          Allergies   Allergen Reactions   • Augmentin Unspecified     Bleeding (rectal)  RXN=5 years ago   • Latex Unspecified     Blisters  RXN=ongoing   • Tape Rash     Rash-blisters-paper tape okay  RXN=ongoing         Family History   Problem Relation Age of Onset   • Cancer Mother         ovarian   • Heart Disease Father    • Diabetes Father    • Diabetes Sister    • Hypertension Unknown    • Cancer Unknown         multiple cousins with prostate cancer.          Social History     Social History   • Marital status:      Spouse name: N/A   • Number of children: 5   • Years of education: N/A     Occupational History   • Not on file.     Social History Main Topics   • Smoking status: Former Smoker     Packs/day: 2.00     Years: 30.00     Types: Cigarettes     Quit date: 3/5/1995   • Smokeless tobacco: Never Used      Comment: 1.5 ppd 25 yrs,quit 1995   • Alcohol use No   • Drug use: No   • Sexual activity: Not on file     Other Topics Concern   • Not on file     Social History Narrative    Retired, previous  at [employer] [place].          Physical Exam:  Ambulatory Vitals  /70 (BP Location: Left arm, Patient Position: Sitting, BP Cuff Size:  "Adult)   Pulse 70   Ht 1.803 m (5' 11\")   Wt 79.7 kg (175 lb 9.6 oz)   SpO2 95%    Oxygen Therapy:  Pulse Oximetry: 95 %  BP Readings from Last 4 Encounters:   07/19/19 118/70   05/31/19 120/62   04/29/19 120/64   04/16/19 112/58       Weight/BMI: Body mass index is 24.49 kg/m².  Wt Readings from Last 4 Encounters:   07/19/19 79.7 kg (175 lb 9.6 oz)   05/31/19 80.3 kg (177 lb 1.6 oz)   04/29/19 79.4 kg (175 lb)   04/16/19 79.8 kg (176 lb)       General: No apparent distress  Eyes: pale conjunctiva  ENT: OP clear, normal external appearance of nose and ears  Neck: JVP 4 cm H2O, no carotid bruits  Lungs: normal respiratory effort, CTAB  Heart: RRR, 2/6 systolic murmur at apex,  no rubs or gallops, 2+ edema bilateral lower extremities. No LV/RV heave on cardiac palpatation. 2+ bilateral radial pulses.  2+ bilateral dp pulses.   Abdomen: soft, non tender, non distended, no masses, normal bowel sounds.  No HSM.  Extremities/MSK: no clubbing, no cyanosis  Neurological: No focal sensory deficits  Psychiatric: Appropriate affect, A/O x 3, intact judgement and insight  Skin: Warm extremities, pale    Lab Data Review:  Lab Results   Component Value Date/Time    CHOLSTRLTOT 108 06/26/2019 08:03 AM    LDL 41 06/26/2019 08:03 AM    HDL 40 06/26/2019 08:03 AM    TRIGLYCERIDE 133 06/26/2019 08:03 AM       Lab Results   Component Value Date/Time    SODIUM 141 06/26/2019 08:03 AM    POTASSIUM 4.0 06/26/2019 08:03 AM    CHLORIDE 105 06/26/2019 08:03 AM    CO2 27 06/26/2019 08:03 AM    GLUCOSE 148 (H) 06/26/2019 08:03 AM    BUN 29 (H) 06/26/2019 08:03 AM    CREATININE 1.31 06/26/2019 08:03 AM    CREATININE 1.0 02/08/2005 02:20 PM     Lab Results   Component Value Date/Time    ALKPHOSPHAT 86 06/26/2019 08:03 AM    ASTSGOT 12 06/26/2019 08:03 AM    ALTSGPT 11 06/26/2019 08:03 AM    TBILIRUBIN 0.5 06/26/2019 08:03 AM      Lab Results   Component Value Date/Time    WBC 6.0 06/26/2019 08:03 AM     No components found for: HBGA1C  No " components found for: TROPONIN  No components found for: BNP      Cardiac Imaging and Procedures Review:    EKG dated 3/8/2019 : My personal interpretation is NSR, normal EKG.     Echo dated 2019:   CONCLUSIONS  Normal left ventricular size, wall thickness, and systolic function.  Mitral annular calcification.  Aortic sclerosis without stenosis.  Compared to the images of the study done 17 - there has been no   significant change.     Nuclear Perfusion Imaging (2017):   Myocardial Perfusion   Report   NUCLEAR IMAGING INTERPRETATION   No evidence of significant jeopardized viable myocardium or prior myocardial    infarction.   Normal left ventricular size, ejection fraction, and wall motion.    Radiology test Review:  CXR:   2017  FINDINGS:  Cardiac silhouette is mildly enlarged.  No pulmonary infiltrates or consolidations are noted.  No pleural effusions are appreciated.  Elevation of the right hemidiaphragm is again noted. Scattered linear peripheral opacifications are again noted in the left lung base.        Medical Decision Makin. PAD (peripheral artery disease) (HCC)  Now asymptomatic, s/p R SFA PCI.  -aspirin 81mg PO Daily  -lipitor, lipids well controlled    2. History of DVT (deep vein thrombosis)  Twice, both provoked.   -CTM, no indication for prolonged anticoagulation at this time.     3. Essential hypertension  Well controlled  -continue current meds    4. Dyslipidemia  Well controlled  -continue lipitor    5. Chronic kidney disease (CKD), stage III (moderate) (HCC)  Stable,  -CMP in 3 months.     6. Anemia, unspecified type  Unclear cause, does have chronic hematuria/hematochezia. No reversible causes found on scopes. Certainly appears pale today. Unresponsive to iron.   -recheck CBC in 3 months, further work-up if not stable.     7. Leg swelling  Normal albumin and normal echocardiogram without e/o CHF. This is likely secondary to venous insufficiency in the setting of previous DVT is  both legs.  -CTM, compression stockings, leg elevation.       Return in about 1 year (around 7/19/2020).      Hadley Martinez MD, [facility], [facility]  [address] [address]  Phone: [phone]  Fax: [phone]

## 2019-09-12 ENCOUNTER — HOSPITAL ENCOUNTER (OUTPATIENT)
Dept: LAB | Facility: MEDICAL CENTER | Age: 84
End: 2019-09-12
Attending: UROLOGY
Payer: MEDICARE

## 2019-09-12 PROCEDURE — 87077 CULTURE AEROBIC IDENTIFY: CPT

## 2019-09-12 PROCEDURE — 87086 URINE CULTURE/COLONY COUNT: CPT

## 2019-09-12 PROCEDURE — 87186 SC STD MICRODIL/AGAR DIL: CPT

## 2019-09-13 LAB
AMBIGUOUS DTTM AMBI4: NORMAL
SIGNIFICANT IND 70042: NORMAL
SITE SITE: NORMAL
SOURCE SOURCE: NORMAL

## 2019-11-14 ENCOUNTER — HOSPITAL ENCOUNTER (OUTPATIENT)
Dept: LAB | Facility: MEDICAL CENTER | Age: 84
End: 2019-11-14
Attending: INTERNAL MEDICINE
Payer: MEDICARE

## 2019-11-14 DIAGNOSIS — I73.9 PAD (PERIPHERAL ARTERY DISEASE) (HCC): ICD-10-CM

## 2019-11-14 DIAGNOSIS — I10 ESSENTIAL HYPERTENSION: ICD-10-CM

## 2019-11-14 DIAGNOSIS — E78.5 DYSLIPIDEMIA: ICD-10-CM

## 2019-11-14 DIAGNOSIS — N18.30 CHRONIC KIDNEY DISEASE (CKD), STAGE III (MODERATE) (HCC): ICD-10-CM

## 2019-11-14 DIAGNOSIS — D64.9 ANEMIA, UNSPECIFIED TYPE: ICD-10-CM

## 2019-11-14 DIAGNOSIS — Z86.718 HISTORY OF DVT (DEEP VEIN THROMBOSIS): ICD-10-CM

## 2019-11-14 LAB
ANION GAP SERPL CALC-SCNC: 10 MMOL/L (ref 0–11.9)
BUN SERPL-MCNC: 32 MG/DL (ref 8–22)
CALCIUM SERPL-MCNC: 9.8 MG/DL (ref 8.5–10.5)
CHLORIDE SERPL-SCNC: 104 MMOL/L (ref 96–112)
CO2 SERPL-SCNC: 25 MMOL/L (ref 20–33)
CREAT SERPL-MCNC: 1.32 MG/DL (ref 0.5–1.4)
ERYTHROCYTE [DISTWIDTH] IN BLOOD BY AUTOMATED COUNT: 54.3 FL (ref 35.9–50)
GLUCOSE SERPL-MCNC: 245 MG/DL (ref 65–99)
HCT VFR BLD AUTO: 34.6 % (ref 42–52)
HGB BLD-MCNC: 11.2 G/DL (ref 14–18)
MCH RBC QN AUTO: 30.7 PG (ref 27–33)
MCHC RBC AUTO-ENTMCNC: 32.4 G/DL (ref 33.7–35.3)
MCV RBC AUTO: 94.8 FL (ref 81.4–97.8)
PLATELET # BLD AUTO: 197 K/UL (ref 164–446)
PMV BLD AUTO: 10.2 FL (ref 9–12.9)
POTASSIUM SERPL-SCNC: 4 MMOL/L (ref 3.6–5.5)
RBC # BLD AUTO: 3.65 M/UL (ref 4.7–6.1)
SODIUM SERPL-SCNC: 139 MMOL/L (ref 135–145)
WBC # BLD AUTO: 7.4 K/UL (ref 4.8–10.8)

## 2019-11-14 PROCEDURE — 36415 COLL VENOUS BLD VENIPUNCTURE: CPT

## 2019-11-14 PROCEDURE — 80048 BASIC METABOLIC PNL TOTAL CA: CPT

## 2019-11-14 PROCEDURE — 85027 COMPLETE CBC AUTOMATED: CPT

## 2019-11-16 ENCOUNTER — TELEPHONE (OUTPATIENT)
Dept: CARDIOLOGY | Facility: MEDICAL CENTER | Age: 84
End: 2019-11-16

## 2019-11-16 NOTE — TELEPHONE ENCOUNTER
Labs stable, no changes to meds at this time.     Glucose noted to be quite high, recommend close f/u with PCP to control diabetes. Please let him know, thanks!

## 2019-11-18 NOTE — TELEPHONE ENCOUNTER
Called pt, discussed labs per Dr Martinez and his recommendations, pt verbalizes understanding and will follow up w/ his PCP

## 2019-11-26 ENCOUNTER — HOSPITAL ENCOUNTER (OUTPATIENT)
Dept: LAB | Facility: MEDICAL CENTER | Age: 84
End: 2019-11-26
Attending: INTERNAL MEDICINE
Payer: MEDICARE

## 2019-11-26 LAB
ALBUMIN SERPL BCP-MCNC: 4 G/DL (ref 3.2–4.9)
ALBUMIN/GLOB SERPL: 1.4 G/DL
ALP SERPL-CCNC: 90 U/L (ref 30–99)
ALT SERPL-CCNC: 12 U/L (ref 2–50)
ANION GAP SERPL CALC-SCNC: 11 MMOL/L (ref 0–11.9)
AST SERPL-CCNC: 17 U/L (ref 12–45)
BASOPHILS # BLD AUTO: 0.8 % (ref 0–1.8)
BASOPHILS # BLD: 0.06 K/UL (ref 0–0.12)
BILIRUB SERPL-MCNC: 0.6 MG/DL (ref 0.1–1.5)
BUN SERPL-MCNC: 32 MG/DL (ref 8–22)
CALCIUM SERPL-MCNC: 9.7 MG/DL (ref 8.5–10.5)
CHLORIDE SERPL-SCNC: 104 MMOL/L (ref 96–112)
CO2 SERPL-SCNC: 24 MMOL/L (ref 20–33)
CREAT SERPL-MCNC: 1.19 MG/DL (ref 0.5–1.4)
EOSINOPHIL # BLD AUTO: 0.34 K/UL (ref 0–0.51)
EOSINOPHIL NFR BLD: 4.5 % (ref 0–6.9)
ERYTHROCYTE [DISTWIDTH] IN BLOOD BY AUTOMATED COUNT: 52.3 FL (ref 35.9–50)
EST. AVERAGE GLUCOSE BLD GHB EST-MCNC: 154 MG/DL
GLOBULIN SER CALC-MCNC: 2.8 G/DL (ref 1.9–3.5)
GLUCOSE SERPL-MCNC: 225 MG/DL (ref 65–99)
HBA1C MFR BLD: 7 % (ref 0–5.6)
HCT VFR BLD AUTO: 36.5 % (ref 42–52)
HGB BLD-MCNC: 11.8 G/DL (ref 14–18)
IMM GRANULOCYTES # BLD AUTO: 0.02 K/UL (ref 0–0.11)
IMM GRANULOCYTES NFR BLD AUTO: 0.3 % (ref 0–0.9)
LYMPHOCYTES # BLD AUTO: 1.44 K/UL (ref 1–4.8)
LYMPHOCYTES NFR BLD: 18.9 % (ref 22–41)
MCH RBC QN AUTO: 30.4 PG (ref 27–33)
MCHC RBC AUTO-ENTMCNC: 32.3 G/DL (ref 33.7–35.3)
MCV RBC AUTO: 94.1 FL (ref 81.4–97.8)
MONOCYTES # BLD AUTO: 0.77 K/UL (ref 0–0.85)
MONOCYTES NFR BLD AUTO: 10.1 % (ref 0–13.4)
NEUTROPHILS # BLD AUTO: 4.98 K/UL (ref 1.82–7.42)
NEUTROPHILS NFR BLD: 65.4 % (ref 44–72)
NRBC # BLD AUTO: 0 K/UL
NRBC BLD-RTO: 0 /100 WBC
PLATELET # BLD AUTO: 190 K/UL (ref 164–446)
PMV BLD AUTO: 9.9 FL (ref 9–12.9)
POTASSIUM SERPL-SCNC: 3.9 MMOL/L (ref 3.6–5.5)
PROT SERPL-MCNC: 6.8 G/DL (ref 6–8.2)
RBC # BLD AUTO: 3.88 M/UL (ref 4.7–6.1)
SODIUM SERPL-SCNC: 139 MMOL/L (ref 135–145)
WBC # BLD AUTO: 7.6 K/UL (ref 4.8–10.8)

## 2019-11-26 PROCEDURE — 83036 HEMOGLOBIN GLYCOSYLATED A1C: CPT

## 2019-11-26 PROCEDURE — 36415 COLL VENOUS BLD VENIPUNCTURE: CPT

## 2019-11-26 PROCEDURE — 80053 COMPREHEN METABOLIC PANEL: CPT

## 2019-11-26 PROCEDURE — 85025 COMPLETE CBC W/AUTO DIFF WBC: CPT

## 2020-01-03 ENCOUNTER — PHYSICAL THERAPY (OUTPATIENT)
Dept: PHYSICAL THERAPY | Facility: REHABILITATION | Age: 85
End: 2020-01-03
Attending: INTERNAL MEDICINE
Payer: MEDICARE

## 2020-01-03 DIAGNOSIS — Z91.81 HISTORY OF FALLING: ICD-10-CM

## 2020-01-03 DIAGNOSIS — R26.2 DIFFICULTY WALKING: ICD-10-CM

## 2020-01-03 DIAGNOSIS — M48.061 SPINAL STENOSIS, LUMBAR REGION WITHOUT NEUROGENIC CLAUDICATION: ICD-10-CM

## 2020-01-03 PROCEDURE — 97162 PT EVAL MOD COMPLEX 30 MIN: CPT

## 2020-01-03 PROCEDURE — 97110 THERAPEUTIC EXERCISES: CPT

## 2020-01-03 ASSESSMENT — BALANCE ASSESSMENTS
BALANCE - SITTING DYNAMIC: FAIR +
BALANCE - STANDING DYNAMIC: POOR +
BALANCE - SITTING STATIC: NORMAL
BALANCE - STANDING STATIC: FAIR -

## 2020-01-03 ASSESSMENT — ACTIVITIES OF DAILY LIVING (ADL)
POOR_BALANCE: 1
AMBULATION_WITH_ASSISTIVE_DEVICE: SUPERVISION
AMBULATION_WITHOUT_ASSISTIVE_DEVICE: STAND BY ASSIST

## 2020-01-03 ASSESSMENT — ENCOUNTER SYMPTOMS
PAIN LOCATION: LOW BACK
PAIN SCALE: 3
ALLEVIATING FACTORS: SITTING DOWN
EXACERBATED BY: WALKING
PAIN TIMING: CONSTANT
QUALITY: ACHING

## 2020-01-03 NOTE — OP THERAPY EVALUATION
Outpatient Physical Therapy  INITIAL NEUROLOGICAL EVALUATION    [facility] Outpatient Physical Therapy [place]  [address], [address]  [facility] [address]  Phone:  [phone]  Fax:  [phone]    Date of Evaluation: 01/03/2020    Patient: Selvin Braga  YOB: 1935  MRN: [id number]     Referring Provider: Vikram Green M.D.  [facility]  [facility] [address]  [place] [address]   Referring Diagnosis History of falling [Z91.81]     Time Calculation  Start time: 1330  Stop time: 1435 Time Calculation (min): 65 minutes       Physical Therapy Occurrence Codes    Date physical therapy care plan established or reviewed:  1/3/20   Date physical therapy treatment started:  1/3/20          Chief Complaint: Loss Of Balance and Difficulty Walking    Visit Diagnoses     ICD-10-CM   1. History of falling Z91.81   2. Difficulty walking R26.2   3. 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 He states he has had 3 falls in last year- last fall 2 months ago: reaching into moving box and lost balance. He states that he is unable to get up from ground once he falls, but he does wear a Life Alert button. He states that he has recently had more difficulty lifting his legs (R>L) and he particularly noticed this after standing for 2 hours trying to get his cable switched over to his new apartment. He states he has to use hands to lift legs. He states his ankles also feel unsteady and stiff. He states he will occasionally get dizzy with sit to stand, but denies dizziness with rolling in bed. He has 4WW that he has used for a couple years, but over the last couple months he has had to use 4WW at all times including in his apartments due to his balance. He also has [facility] that he uses for shopping or long distances. He states he will easily fatigue and he does spend most of  his time sitting at home. Other household DME: shower bench, elevated toilet seat.   Pt does have h/o numbness in B feet due to diabetes. He denies pain in feet and B ankles. He states he sees a podiatrist regularly for foot sensation and testing. He wears SAS shoes which were recommended by his podiatrist, but he states he does not wear a specific diabetic shoe.    Pt with also h/o of lumbar arthritis and lumbar fusion by Dr. Martinez that he states did not help with his pain. He states his LBP is worse with walking.   Pt with h/o prostate cancer with radiation. Pt states he has been incontinent since surgical procedure to remove cancer in 2001 and will clamp or self cath. He states more recently he has had difficulty with bowel control and was told this is a result of the aggressive radiation therapy he received post surgery.     PMH: FEMORAL ENDARTERECTOMY 3/2019, ILIAC ANGIOPLASTY WITH STENT 3/2019, lumbar fusion 2012, diabetes, PVD, R ankle fracture ORIF, R knee cap fracture x2, L knee cap fracture x1 due to falls. H/o DVT, last bone density testing reveal osteopenia in spine and osteoporosis in R femur, prostate cancer  Headaches:  no headaches  Ear problems: Hearing aides- dog just ate and waitign for new pair.  Sleep disturbance: Occasional difficulty falling asleep.  Pain:     Current pain rating:  3    Location:  Low back     Quality:  Aching    Pain timing:  Constant    Relieving factors:  Sitting down    Aggravating factors:  Walking  Social Support:     Lives in:  Apartment (on first floor)    Lives with:  Alone (Does have 1 son that lives in town)  Diagnostic Tests:     Diagnostic Tests Comments:  Bone density 2018: According to the World Health Organization classification, bone mineral density of this patient is osteopenic for the lumbar spine and osteoporotic for the right proximal femur.  Treatments:     Previous treatment:  Physical therapy  Patient Goals:     Patient goals for therapy:  Improved  balance and increased strength    Other patient goals:  Work on balance and get more motion in ankles      Past Medical History:   Diagnosis Date   • AK (actinic keratosis) 7/21/2016   • Anemia    • Arrhythmia     PVC's   • Arthritis     lower back   • Lombardi esophagus    • Cancer ([facility]) 2001    prostate   • CATARACT     bilateral IOL   • Chronic kidney disease (CKD), stage III (moderate) ([facility])    • Colon polyps    • Dental disorder     Dentures   • Depression 7/21/2016   • Diabetes 1984    oral medication   • GERD (gastroesophageal reflux disease)    • Hyperlipidemia    • Hypertension    • Infectious disease     history of c-diff 2016   • Neuropathy ([facility])    • Other specified disorder of intestines     alters between normal and diarrhea since radiation tx   • Personal history of venous thrombosis and embolism 2010/1995    leg   • Prostate cancer ([facility]) 2001    Status post prostatectomy   • Psychiatric problem     Depression, treated with Prozac.   • PVD (peripheral vascular disease) ([facility])     s/p femoral endarterectomy   • Sleep apnea     Does not use CPAP   • Unspecified urinary incontinence     spill from artifical urinary sphincter   • Urinary bladder disorder     uses external catheter at night, and clamp during day     Past Surgical History:   Procedure Laterality Date   • FEMORAL ENDARTERECTOMY Right 3/13/2019    Procedure: FEMORAL ENDARTERECTOMY- COMMON;  Surgeon: Alek Delgado M.D.;  Location: [facility];  Service: General   • ILIAC ANGIOPLASTY WITH STENT Right 3/13/2019    Procedure: ILIAC ANGIOPLASTY WITH STENT- RETROGRADES;  Surgeon: Alek Delgado M.D.;  Location: [facility];  Service: General   • ANKLE ORIF Right 10/28/2015    Procedure: ANKLE ORIF;  Surgeon: Venu Elizabeth M.D.;  Location: [facility];  Service:    • SPHINCTER PROSTHESIS REMOVAL  8/10/2015    Procedure: SPHINCTER PROSTHESIS REMOVAL;  Surgeon: Tuan Marie M.D.;  Location: [facility]  ORS;  Service:    • LAPAROSCOPY  5/21/2014    Performed by Graham Peña M.D. at SURGERY [facility]   • BOWEL RESECTION  5/21/2014    Performed by Graham Peña M.D. at [facility]   • COLONOSCOPY WITH APC  6/5/2013    Performed by Deepa Vela M.D. at SURGERY [facility]   • CATARACT PHACO WITH IOL  2/27/2013    Performed by Clayton Noonan M.D. at SURGERY SAME DAY [facility]   • CATARACT PHACO WITH IOL  2/13/2013    Performed by Clayton Noonan M.D. at SURGERY SAME DAY [facility] ORS   • LUMBAR FUSION POSTERIOR  12/13/2012    Procedure: L5-S1 removal of Jennings Fragment NON-INSTRUMENTAL;  Surgeon: iWl Martinez M.D.;  Location: SURGERY [facility];  Service:    • LUMBAR DECOMPRESSION  12/13/2012    Procedure: 4-5;  Surgeon: Wil Martinez M.D.;  Location: SURGERY [facility];  Service:    • SPHINCTER PROSTHESIS PLACEMENT  9/26/2012    Performed by GERSON Car M.D. at SURGERY [facility]   • GROIN EXPLORATION  3/13/2012    Performed by DEEPA PATRICIA at [facility]   • SPHINCTER PROSTHESIS PLACEMENT  2/24/2011    Performed by GERSON CAR at [facility]   • CYSTOSCOPY  2/24/2011    Performed by GERSON CAR at [facility]   • CYSTOSCOPY  2/2/2011    Performed by GERSON CAR at SURGERY [facility]   • SPHINCTER PROSTHESIS REMOVAL  6/3/2010    Performed by JOSH APARICIO at SURGERY [facility]   • ANGIOGRAM  1/25/2010    Performed by PETER NORMAN at SURGERY [facility]   • AORTOGRAM  1/25/2010    Performed by PETER NORMAN at [facility]   • PROSTATECTOMY ROBOTIC  2/2001   • PB INSERT,INFLATABLE SPHINCTER  2001   • OTHER ABDOMINAL SURGERY     • OTHER ORTHOPEDIC SURGERY Left     hip & femur fx     Social History     Tobacco Use   • Smoking status: Former Smoker     Packs/day: 2.00     Years: 30.00     Pack years: 60.00     Types: Cigarettes     Last attempt  to quit: 3/5/1995     Years since quittin.8   • Smokeless tobacco: Never Used   • Tobacco comment: 1.5 ppd 25 yrs,quit    Substance Use Topics   • Alcohol use: No     Alcohol/week: 0.0 oz     Family and Occupational History     Patient does not qualify to have social determinant information on file (likely too young).   Socioeconomic History   • Marital status:      Spouse name: Not on file   • Number of children: 5   • Years of education: Not on file   • Highest education level: Not on file   Occupational History   • Not on file       Objective:   Active Range of Motion:   Lower extremity (left):     Hip flexion: Within functional limits    Hip abduction: Within functional limits    Hip adduction: Within functional limits    Knee flexion: Within functional limits    AROM Left Knee Extension: seated leg extension: 6 degrees from 0.    AROM Left Ankle Dorsiflexion: 10 degrees woth knee ext and knee flexion.    Ankle plantar flexion: Within functional limits  Lower extremity (right):     Hip flexion: Below functional limits    Hip abduction:Within functional limits    Hip adduction: Within functional limits    Knee flexion: Within functional limits    AROM Right Knee Extension: seated leg extension: 10 degrees from 0     AROM Right Ankle Dorsiflexion: knee extended: 0 degrees. knee flexed: 5 degrees.    Ankle plantar flexion: Within functional limits      Strength:   Lower extremity (left):     Hip flexion: 3+    Hip abduction: 3-    Hip adduction: 3    Knee flexion: 3-    Knee extension: 4    Ankle dorsiflexion: 3    Ankle plantar flexion: 3  Lower extremity (right):     Hip flexion: 3-    Hip abduction: 3-    Hip adduction: 3    Knee flexion: 3-    Knee extension: 4-    Ankle dorsiflexion: 3-    Ankle plantar flexion: 2+    Tone, Sensation and Coordination:     Coordination   Upper extremity (left):     Finger touch to nose: Within functional limits  Upper extremity (right):     Finger touch to nose:  "Within functional limits  Lower extremity (left):     Slow alternating movements: Within functional limits    Rapid alternating movements: Within functional limits    Toe tapping: Within functional limits    Heel to shin: Within functional limits  Lower extremity (right):     Slow alternating movements: Within functional limits    Rapid alternating movements: Within functional limits    Toe tapping: Within functional limits (decreased ROM )    Heel to shin: Within functional limits (decreased hip flexion ROM)    Postural Control (Balance)     Sitting (static): Normal    Sitting (dynamic): Fair +    Standing (static): Fair -    Standing (dynamic): Poor +    Ambulation: Level Surfaces   Ambulation with assistive device: supervision  Ambulation without assistive device: stand by assist    Observational Gait     Walking speed and stride length below functional limits.      Additional Observational Gait Details  B LE ER present (\"duck walk\")    Balance/Gait Comments   TUG: without AD: 18 seconds SBA (CGA for turn). With 4WW: 15 seconds SBA        Therapeutic Exercises (CPT 20362):     1. Seated, heel raise/toe raise x20, windshield wiper x20 ea foot, seated soleus stretch (heel slide) 10\" x3 ea, seated hamstring stretch 10\" x3 ea, LAQ x10 ea, seated march x10 alternating    14. UPOC 2/28/20      Therapeutic Exercise Summary: Review of HEP, pt provided with a handout  Review of current risk for falls, continued need for AD, ways to decrease risk for falls at home (no throw rugs, lighted paths)      Time-based treatments/modalities:  Therapeutic exercise minutes (CPT 43343): 15 minutes       Assessment, Response and Plan:   Impairments: abnormal gait, abnormal or restricted ROM, activity intolerance, impaired balance, impaired physical strength, limited mobility and pain with function    Assessment details:  Pt is a pleasant 83 yo male with complex PMH that presents to PT with difficulty walking, decreased LE strength, and " decreased balance. Per BBS score and TUG pt is at high risk for falls and continued use of AD for ambulation is warranted at this time. Pt would benefit from skilled PT to address above listed functional impairments, improve balance and decrease risk for falls, and enhance QOL.   Barriers to therapy:  Comorbidities  Prognosis: fair    Goals:   Short Term Goals:   1. Pt will be independent and compliant with HEP for ROM and strength  2. Pt will be able to perform sit to stand transfer without LOB and with controlled descent  Short term goal time span:  2-4 weeks      Long Term Goals:    1. Increase B LE strength by 1/2 MMT grade  2. Increase R DF ROM to 7 degrees to allow for improved foot clearance during ambulation  3. Pt will perform sit to stand transfer without LOB and without use of hands to demo increased balance and strength  4. Increase BBS score to 40/56 to demo improvement in balance  5. Improve TUG to >13.5 seconds to demo improvement in balance and functional mobility  Long term goal time span:  6-8 weeks    Plan:   Therapy options:  Physical therapy treatment to continue  Planned therapy interventions:  Manual Therapy (CPT 08685), Gait Training (CPT 03811), Neuromuscular Re-education (CPT 53667), Therapeutic Activities (CPT 35837) and Therapeutic Exercise (CPT 52361)  Frequency:  2x week  Duration in weeks:  8  Duration in visits:  16  Discussed with:  Patient  Plan details:  No estim (hx of prostate cancer)      Functional Assessment Used  PT Functional Assessment Tool Used: Sweet Balance Scale  PT Functional Assessment Score: 31/56       Referring provider co-signature:  I have reviewed this plan of care and my co-signature certifies the need for services.  Certification Dates:   From 01/03/20     To 02/28/20    Physician Signature: ________________________________ Date: ______________

## 2020-01-06 ENCOUNTER — PHYSICAL THERAPY (OUTPATIENT)
Dept: PHYSICAL THERAPY | Facility: REHABILITATION | Age: 85
End: 2020-01-06
Attending: INTERNAL MEDICINE
Payer: MEDICARE

## 2020-01-06 DIAGNOSIS — R26.2 DIFFICULTY WALKING: ICD-10-CM

## 2020-01-06 DIAGNOSIS — M48.061 SPINAL STENOSIS, LUMBAR REGION WITHOUT NEUROGENIC CLAUDICATION: ICD-10-CM

## 2020-01-06 DIAGNOSIS — Z91.81 HISTORY OF FALLING: ICD-10-CM

## 2020-01-06 PROCEDURE — 97110 THERAPEUTIC EXERCISES: CPT

## 2020-01-06 PROCEDURE — 97112 NEUROMUSCULAR REEDUCATION: CPT

## 2020-01-06 NOTE — OP THERAPY DAILY TREATMENT
"  Outpatient Physical Therapy  DAILY TREATMENT     [facility] Outpatient Physical Therapy [place]  [address], [address]  [facility] [address]  Phone:  [phone]  Fax:  [phone]    Date: 01/06/2020    Patient: Selvin Braga  YOB: 1935  MRN: [id number]     Time Calculation  Start time: 1345  Stop time: 1415 Time Calculation (min): 30 minutes       Chief Complaint: Fall; Loss Of Balance; and Difficulty Walking    Visit #: 2    SUBJECTIVE:  Feel like ankles are moving better with exercises you gave me.     OBJECTIVE:            Therapeutic Exercises (CPT 09069):     1. Seated, HS curl OTB x10 B, no skin contact with band, hip add with pillow 3\" x10, hip abd OTB x10, no skin contact with band, seated hamstring stretch 10\" x3 ea, end of session    7. Nu-Step, lvl 3 x5 min, steps: 300    9. Sit to stand, x5, 24.5 inch surface    14. UPOC 2/28/20      Therapeutic Exercise Summary: SpO2 after Nu-step: 95%, HR after Nu-step: 84 bpm    Therapeutic Treatments and Modalities:     1. Neuromuscular Re-education (CPT 55709), see blow, x15 min    Therapeutic Treatment and Modalities Summary: Balance activities in parallel bars SBA to CGA as follows: Standing HR/TR x10, toe tap x10 ea, walking fwd/back x5 ea, march x10ea. Romberg Eo and EC x30\" ea. Tandem R and L EO 30\" ea   Amb with 4WW SBA: amb with head turns (R/L, Up/down, all R and all L)      Time-based treatments/modalities:  Therapeutic exercise minutes (CPT 06689): 15 minutes  Neuromusc re-ed, balance, coor, post minutes (CPT 18226): 15 minutes           ASSESSMENT:   Response to treatment: Pt fatigued after there ex, required 1 min rest break after Nu-step but SpO2 and HR WNL. Pt also fatigued with standing balance activities: required 3 seated rest breask during performance. VC's required to increase R ankle DF to improve toe clearance, difficulty R>L.     PLAN/RECOMMENDATIONS:   Plan for treatment: therapy treatment to continue next visit.  Planned " interventions for next visit: continue with current treatment.

## 2020-01-10 ENCOUNTER — HOSPITAL ENCOUNTER (OUTPATIENT)
Dept: RADIOLOGY | Facility: MEDICAL CENTER | Age: 85
End: 2020-01-10
Attending: SURGERY
Payer: MEDICARE

## 2020-01-10 ENCOUNTER — PHYSICAL THERAPY (OUTPATIENT)
Dept: PHYSICAL THERAPY | Facility: REHABILITATION | Age: 85
End: 2020-01-10
Attending: INTERNAL MEDICINE
Payer: MEDICARE

## 2020-01-10 DIAGNOSIS — M48.061 SPINAL STENOSIS, LUMBAR REGION WITHOUT NEUROGENIC CLAUDICATION: ICD-10-CM

## 2020-01-10 DIAGNOSIS — Z91.81 HISTORY OF FALLING: ICD-10-CM

## 2020-01-10 DIAGNOSIS — I70.213 ATHEROSCLEROSIS OF NATIVE ARTERY OF BOTH LOWER EXTREMITIES WITH INTERMITTENT CLAUDICATION (HCC): ICD-10-CM

## 2020-01-10 DIAGNOSIS — R26.2 DIFFICULTY WALKING: ICD-10-CM

## 2020-01-10 PROCEDURE — 97110 THERAPEUTIC EXERCISES: CPT

## 2020-01-10 PROCEDURE — 93922 UPR/L XTREMITY ART 2 LEVELS: CPT

## 2020-01-10 PROCEDURE — 97112 NEUROMUSCULAR REEDUCATION: CPT

## 2020-01-10 PROCEDURE — 93922 UPR/L XTREMITY ART 2 LEVELS: CPT | Mod: 26 | Performed by: INTERNAL MEDICINE

## 2020-01-10 PROCEDURE — 93926 LOWER EXTREMITY STUDY: CPT | Mod: 26,RT | Performed by: INTERNAL MEDICINE

## 2020-01-10 PROCEDURE — 93926 LOWER EXTREMITY STUDY: CPT | Mod: RT

## 2020-01-10 NOTE — OP THERAPY DAILY TREATMENT
"  Outpatient Physical Therapy  DAILY TREATMENT     [facility] Outpatient Physical Therapy [place]  [address]  [facility] [address]  Phone:  [phone]  Fax:  [phone]    Date: 01/10/2020    Patient: Selvin Braga  YOB: 1935  MRN: [id number]     Time Calculation  Start time: 1255  Stop time: 1320 Time Calculation (min): 25 minutes       Chief Complaint: Fall; Loss Of Balance; and Difficulty Walking    Visit #: 3    SUBJECTIVE:  Was a little sore after last session. Pain in legs is less since starting PT.     OBJECTIVE:          Therapeutic Exercises (CPT 71203):     1. Seated, HS curl OTB x10 B, no skin contact with band, ankle inv/eversion OTB x10 B, no skin contact with band, seated hamstring stretch 10\" x3 ea    4. Sit to stand, x10 , 24.0\" surface, use of UE as needed    5. Standing hip abd, x10 alternating legs    7. Nu-Step, not available today    14. UPOC 2/28/20    Therapeutic Treatments and Modalities:     1. Neuromuscular Re-education (CPT 57939), see blow, x15 min    Therapeutic Treatment and Modalities Summary: Balance activities in parallel bars SBA to CGA as follows: toe tap x10 ea, walking fwd/back x5 ea, march x10ea, side step x5 ll bar lengths Romberg EO and EC x30\" ea. Tandem R and L EO 30\" ea, SLS R and L       Time-based treatments/modalities:  Therapeutic exercise minutes (CPT 20905): 10 minutes  Neuromusc re-ed, balance, coor, post minutes (CPT 35618): 15 minutes           ASSESSMENT:   Response to treatment: Pt continues to demo R LE strength, most present at R ankle, which likely contributes to his decreased balance and activity tolerance. He was able to complete today's session with only 1 seated rest break compared to 3 at last session.     PLAN/RECOMMENDATIONS:   Plan for treatment: therapy treatment to continue next visit.  Planned interventions for next visit: continue with current treatment.       "

## 2020-01-13 ENCOUNTER — TELEPHONE (OUTPATIENT)
Dept: PHYSICAL THERAPY | Facility: REHABILITATION | Age: 85
End: 2020-01-13

## 2020-01-13 ENCOUNTER — APPOINTMENT (OUTPATIENT)
Dept: PHYSICAL THERAPY | Facility: REHABILITATION | Age: 85
End: 2020-01-13
Attending: INTERNAL MEDICINE
Payer: MEDICARE

## 2020-01-17 ENCOUNTER — APPOINTMENT (OUTPATIENT)
Dept: PHYSICAL THERAPY | Facility: REHABILITATION | Age: 85
End: 2020-01-17
Attending: INTERNAL MEDICINE
Payer: MEDICARE

## 2020-01-21 ENCOUNTER — HOSPITAL ENCOUNTER (OUTPATIENT)
Dept: LAB | Facility: MEDICAL CENTER | Age: 85
End: 2020-01-21
Attending: PLASTIC SURGERY
Payer: MEDICARE

## 2020-01-21 ENCOUNTER — APPOINTMENT (OUTPATIENT)
Dept: PHYSICAL THERAPY | Facility: REHABILITATION | Age: 85
End: 2020-01-21
Attending: INTERNAL MEDICINE
Payer: MEDICARE

## 2020-01-21 ENCOUNTER — TELEPHONE (OUTPATIENT)
Dept: PHYSICAL THERAPY | Facility: REHABILITATION | Age: 85
End: 2020-01-21

## 2020-01-21 LAB
BUN SERPL-MCNC: 32 MG/DL (ref 8–22)
CREAT SERPL-MCNC: 1.37 MG/DL (ref 0.5–1.4)

## 2020-01-21 PROCEDURE — 84520 ASSAY OF UREA NITROGEN: CPT

## 2020-01-21 PROCEDURE — 82565 ASSAY OF CREATININE: CPT

## 2020-01-21 PROCEDURE — 36415 COLL VENOUS BLD VENIPUNCTURE: CPT

## 2020-01-21 NOTE — OP THERAPY DISCHARGE SUMMARY
Outpatient Physical Therapy  DISCHARGE SUMMARY NOTE      [facility] [place]  [address]  [facility] [address]  Phone:  [phone]  Fax:  [phone]    Date of Visit: 01/21/2020    Patient: Selvin Braga  YOB: 1935  MRN: [id number]     Referring Provider: Vikram Green M.D.  [address]  [address]  [place] [address]   Referring Diagnosis History of falling [Z91.81]     Physical Therapy Occurrence Codes    Date physical therapy care plan established or reviewed:  1/3/20   Date physical therapy treatment started:  1/3/20          Functional Assessment Used    At initial evaluation: BBS: 31/56    Your patient is being discharged from Physical Therapy with the following comments:   · Per pt he is scheduled for surgery    Comments:   Selvin was seen for 3 PT sessions re: his balance and gait. He continued to have R LE weakness for which he contacted his previous surgeon and per pt he is now scheduled for surgery.      Limitations Remaining:  Pt continued to be at risk for falls, use of AD is recommended at this time.     Recommendations:  Continue with MD follow-ups. Thank you.     Jazzmine Walter, PT, DPT    Date: 1/21/2020

## 2020-01-24 ENCOUNTER — APPOINTMENT (OUTPATIENT)
Dept: PHYSICAL THERAPY | Facility: REHABILITATION | Age: 85
End: 2020-01-24
Attending: INTERNAL MEDICINE
Payer: MEDICARE

## 2020-01-24 ENCOUNTER — HOSPITAL ENCOUNTER (OUTPATIENT)
Dept: RADIOLOGY | Facility: MEDICAL CENTER | Age: 85
End: 2020-01-24
Attending: SURGERY
Payer: MEDICARE

## 2020-01-24 DIAGNOSIS — I70.213 ATHEROSCLEROSIS OF NATIVE ARTERY OF BOTH LOWER EXTREMITIES WITH INTERMITTENT CLAUDICATION (HCC): ICD-10-CM

## 2020-01-24 PROCEDURE — 700117 HCHG RX CONTRAST REV CODE 255: Performed by: SURGERY

## 2020-01-24 PROCEDURE — 75635 CT ANGIO ABDOMINAL ARTERIES: CPT

## 2020-01-24 RX ADMIN — IOHEXOL 100 ML: 350 INJECTION, SOLUTION INTRAVENOUS at 11:15

## 2020-01-24 RX ADMIN — IOHEXOL 100 ML: 350 INJECTION, SOLUTION INTRAVENOUS at 16:00

## 2020-01-31 DIAGNOSIS — Z01.810 PRE-OPERATIVE CARDIOVASCULAR EXAMINATION: ICD-10-CM

## 2020-01-31 DIAGNOSIS — Z01.812 PRE-OPERATIVE LABORATORY EXAMINATION: ICD-10-CM

## 2020-01-31 LAB
ANION GAP SERPL CALC-SCNC: 10 MMOL/L (ref 0–11.9)
BUN SERPL-MCNC: 31 MG/DL (ref 8–22)
CALCIUM SERPL-MCNC: 10.1 MG/DL (ref 8.5–10.5)
CHLORIDE SERPL-SCNC: 102 MMOL/L (ref 96–112)
CO2 SERPL-SCNC: 26 MMOL/L (ref 20–33)
CREAT SERPL-MCNC: 1.37 MG/DL (ref 0.5–1.4)
EKG IMPRESSION: NORMAL
ERYTHROCYTE [DISTWIDTH] IN BLOOD BY AUTOMATED COUNT: 49.9 FL (ref 35.9–50)
GLUCOSE SERPL-MCNC: 216 MG/DL (ref 65–99)
HCT VFR BLD AUTO: 34.5 % (ref 42–52)
HGB BLD-MCNC: 11 G/DL (ref 14–18)
MCH RBC QN AUTO: 29.3 PG (ref 27–33)
MCHC RBC AUTO-ENTMCNC: 31.9 G/DL (ref 33.7–35.3)
MCV RBC AUTO: 92 FL (ref 81.4–97.8)
PLATELET # BLD AUTO: 199 K/UL (ref 164–446)
PMV BLD AUTO: 9.3 FL (ref 9–12.9)
POTASSIUM SERPL-SCNC: 4.2 MMOL/L (ref 3.6–5.5)
RBC # BLD AUTO: 3.75 M/UL (ref 4.7–6.1)
SODIUM SERPL-SCNC: 138 MMOL/L (ref 135–145)
WBC # BLD AUTO: 7.2 K/UL (ref 4.8–10.8)

## 2020-01-31 PROCEDURE — 80048 BASIC METABOLIC PNL TOTAL CA: CPT

## 2020-01-31 PROCEDURE — 93010 ELECTROCARDIOGRAM REPORT: CPT | Performed by: INTERNAL MEDICINE

## 2020-01-31 PROCEDURE — 36415 COLL VENOUS BLD VENIPUNCTURE: CPT

## 2020-01-31 PROCEDURE — 93005 ELECTROCARDIOGRAM TRACING: CPT

## 2020-01-31 PROCEDURE — 85027 COMPLETE CBC AUTOMATED: CPT

## 2020-01-31 RX ORDER — LISINOPRIL 2.5 MG/1
2.5 TABLET ORAL DAILY
COMMUNITY
End: 2021-01-08 | Stop reason: SDUPTHER

## 2020-01-31 RX ORDER — ALOGLIPTIN 12.5 MG/1
12.5 TABLET, FILM COATED ORAL EVERY EVENING
COMMUNITY
End: 2021-01-14

## 2020-01-31 NOTE — OR NURSING
Preadmit note:  Patient following aspirin instructions per MD.  Pt reports that he may have issues with transportation after surgery.  Dr. Delgado's office updated 1/31/2020 @ 7210.

## 2020-02-02 ENCOUNTER — HOSPITAL ENCOUNTER (OUTPATIENT)
Facility: MEDICAL CENTER | Age: 85
End: 2020-02-02
Attending: FAMILY MEDICINE
Payer: MEDICARE

## 2020-02-02 ENCOUNTER — OFFICE VISIT (OUTPATIENT)
Dept: URGENT CARE | Facility: PHYSICIAN GROUP | Age: 85
End: 2020-02-02
Payer: MEDICARE

## 2020-02-02 VITALS
WEIGHT: 175 LBS | SYSTOLIC BLOOD PRESSURE: 136 MMHG | TEMPERATURE: 97 F | HEIGHT: 71 IN | HEART RATE: 68 BPM | RESPIRATION RATE: 18 BRPM | OXYGEN SATURATION: 95 % | BODY MASS INDEX: 24.5 KG/M2 | DIASTOLIC BLOOD PRESSURE: 62 MMHG

## 2020-02-02 DIAGNOSIS — N30.00 ACUTE CYSTITIS WITHOUT HEMATURIA: ICD-10-CM

## 2020-02-02 DIAGNOSIS — N30.00 ACUTE CYSTITIS WITHOUT HEMATURIA: Primary | ICD-10-CM

## 2020-02-02 LAB
APPEARANCE UR: NORMAL
BILIRUB UR STRIP-MCNC: NORMAL MG/DL
COLOR UR AUTO: YELLOW
GLUCOSE UR STRIP.AUTO-MCNC: NORMAL MG/DL
KETONES UR STRIP.AUTO-MCNC: NORMAL MG/DL
LEUKOCYTE ESTERASE UR QL STRIP.AUTO: NORMAL
NITRITE UR QL STRIP.AUTO: NORMAL
PH UR STRIP.AUTO: 5.5 [PH] (ref 5–8)
PROT UR QL STRIP: NORMAL MG/DL
RBC UR QL AUTO: NORMAL
SP GR UR STRIP.AUTO: 1.01
UROBILINOGEN UR STRIP-MCNC: 0.2 MG/DL

## 2020-02-02 PROCEDURE — 99214 OFFICE O/P EST MOD 30 MIN: CPT | Performed by: FAMILY MEDICINE

## 2020-02-02 PROCEDURE — 87186 SC STD MICRODIL/AGAR DIL: CPT | Mod: 91

## 2020-02-02 PROCEDURE — 87077 CULTURE AEROBIC IDENTIFY: CPT

## 2020-02-02 PROCEDURE — 87086 URINE CULTURE/COLONY COUNT: CPT

## 2020-02-02 PROCEDURE — 81002 URINALYSIS NONAUTO W/O SCOPE: CPT | Performed by: FAMILY MEDICINE

## 2020-02-02 RX ORDER — SULFAMETHOXAZOLE AND TRIMETHOPRIM 800; 160 MG/1; MG/1
1 TABLET ORAL 2 TIMES DAILY
Qty: 14 TAB | Refills: 0 | Status: SHIPPED | OUTPATIENT
Start: 2020-02-02 | End: 2020-02-09

## 2020-02-02 NOTE — PROGRESS NOTES
"Subjective:      CC:  presents with Dysuria            Dysuria   This is a new problem. The current episode started in the past 2 days.   He c/o \"CLOUDY\" urine.   He gets frequent UTIs and states that this is typically the first sign.          . There has been no fever. Pt is not sexually active. There is no history of pyelonephritis. Associated symptoms include frequency and urgency. Pertinent negatives include no chills, discharge, flank pain, nausea or vomiting. Pt has tried nothing for the symptoms. There is no history of recurrent UTIs.     Social History     Socioeconomic History   • Marital status:      Spouse name: Not on file   • Number of children: 5   • Years of education: Not on file   • Highest education level: Not on file   Occupational History   • Not on file   Social Needs   • Financial resource strain: Not on file   • Food insecurity:     Worry: Not on file     Inability: Not on file   • Transportation needs:     Medical: Not on file     Non-medical: Not on file   Tobacco Use   • Smoking status: Former Smoker     Packs/day: 2.00     Years: 30.00     Pack years: 60.00     Types: Cigarettes     Last attempt to quit: 3/5/1995     Years since quittin.9   • Smokeless tobacco: Never Used   • Tobacco comment: 1.5 ppd 25 yrs,quit    Substance and Sexual Activity   • Alcohol use: No     Alcohol/week: 0.0 oz   • Drug use: No   • Sexual activity: Not on file   Lifestyle   • Physical activity:     Days per week: Not on file     Minutes per session: Not on file   • Stress: Not on file   Relationships   • Social connections:     Talks on phone: Not on file     Gets together: Not on file     Attends Quaker service: Not on file     Active member of club or organization: Not on file     Attends meetings of clubs or organizations: Not on file     Relationship status: Not on file   • Intimate partner violence:     Fear of current or ex partner: Not on file     Emotionally abused: Not on file     " Physically abused: Not on file     Forced sexual activity: Not on file   Other Topics Concern   • Not on file   Social History Narrative    Retired, previous  at [employer].          Family History   Problem Relation Age of Onset   • Cancer Mother         ovarian   • Heart Disease Father    • Diabetes Father    • Diabetes Sister    • Hypertension Other    • Cancer Other         multiple cousins with prostate cancer.          Allergies   Allergen Reactions   • Augmentin Unspecified     Bleeding (rectal)  RXN=5 years ago   • Latex Unspecified     Blisters  RXN=ongoing   • Tape Rash     Rash-blisters-paper tape okay  RXN=ongoing           Current Outpatient Medications on File Prior to Visit   Medication Sig Dispense Refill   • lisinopril (PRINIVIL) 2.5 MG Tab Take 2.5 mg by mouth every day.     • aspirin 81 MG tablet Take 1 Tab by mouth every day. 90 Tab 3   • atorvastatin (LIPITOR) 40 MG Tab Take 1 Tab by mouth every day. 100 Tab 3   • hydroCHLOROthiazide (HYDRODIURIL) 25 MG Tab Take 1 Tab by mouth every day. 90 Tab 3   • metoprolol (LOPRESSOR) 25 MG Tab Take 12.5 mg by mouth 2 times a day.     • Multiple Vitamins-Minerals (PRESERVISION AREDS 2+MULTI VIT PO) Take 1 Cap by mouth 2 Times a Day.     • pantoprazole (PROTONIX) 40 MG Tablet Delayed Response Take 40 mg by mouth every day.     • Cholecalciferol (VITAMIN D) 2000 UNIT TABS Take 2,000 Units by mouth every day.     • metformin (GLUCOPHAGE) 500 MG TABS Take 500 mg by mouth 2 times a day, with meals.     • fluoxetine (PROZAC) 20 MG CAPS Take 20 mg by mouth every day.     • D-Mannose 500 MG Cap Take 500 mg by mouth 3 times a day.     • Alogliptin Benzoate 12.5 MG Tab Take 12.5 mg by mouth every day.     • glipiZIDE (GLUCOTROL) 10 MG Tab Take 10 mg by mouth 2 times a day.       No current facility-administered medications on file prior to visit.        Review of Systems   Constitutional: Negative for chills.   Respiratory: Negative for shortness  "of breath.    Cardiovascular: Negative for chest pain.   Gastrointestinal: Negative for nausea, vomiting and abdominal pain.   Genitourinary: Positive for  urgency and frequency. Negative for flank pain.   Skin: Negative for rash.   Neurological: Negative for dizziness and headaches.   All other systems reviewed and are negative.         Objective:      /62   Pulse 68   Temp 36.1 °C (97 °F) (Temporal)   Resp 18   Ht 1.803 m (5' 11\")   Wt 79.4 kg (175 lb)   SpO2 95%       Physical Exam   Constitutional: pt is oriented to person, place, and time. Pt appears well-developed and well-nourished. No distress.   HENT:   Head: Normocephalic and atraumatic.   Mouth/Throat: Mucous membranes are normal.   Eyes: Conjunctivae and EOM are normal. Pupils are equal, round, and reactive to light. Right eye exhibits no discharge. Left eye exhibits no discharge. No scleral icterus.   Neck: Normal range of motion. Neck supple.   Cardiovascular: Normal rate, regular rhythm, normal heart sounds and intact distal pulses.    No murmur heard.  Pulmonary/Chest: Effort normal and breath sounds normal. No respiratory distress. Pt has no wheezes,  rales.   Abdominal: Bowel sounds are normal. Pt exhibits no distension and no mass. There is no tenderness. There is no rebound, no guarding and no CVA tenderness.   Neurological: pt is alert and oriented to person, place, and time.   Skin: Skin is warm and dry.   Psychiatric: behavior is normal.   Nursing note and vitals reviewed.           Assessment/Plan:     1. Acute cystitis without hematuria   UA c/w UTI  Urine cx ordered  - sulfamethoxazole-trimethoprim (BACTRIM DS) 800-160 MG tablet; Take 1 Tab by mouth 2 times a day for 7 days.  Dispense: 14 Tab; Refill: 0    Follow up in one week if no improvement, sooner if symptoms worsen.         "

## 2020-02-04 ENCOUNTER — TELEPHONE (OUTPATIENT)
Dept: URGENT CARE | Facility: PHYSICIAN GROUP | Age: 85
End: 2020-02-04

## 2020-02-04 NOTE — TELEPHONE ENCOUNTER
----- Message from Epi Harding M.D. sent at 2/3/2020  7:43 PM PST -----  Urine culture positive for infection      Plan:      Continue abx and

## 2020-02-09 ENCOUNTER — ANESTHESIA EVENT (OUTPATIENT)
Dept: SURGERY | Facility: MEDICAL CENTER | Age: 85
DRG: 254 | End: 2020-02-09
Payer: MEDICARE

## 2020-02-09 NOTE — ANESTHESIA PREPROCEDURE EVALUATION
, proceed to OR when K result back and wnl    Relevant Problems   NEURO   (+) History of DVT (deep vein thrombosis)   (+) History of prostate cancer      CARDIAC   (+) Essential hypertension   (+) PAD (peripheral artery disease) ([facility])      GI   (+) GERD (gastroesophageal reflux disease)         (+) Chronic kidney disease (CKD), stage III (moderate) ([facility])      ENDO   (+) Type 2 diabetes mellitus with stage 3 chronic kidney disease, with long-term current use of insulin ([facility])       Physical Exam    Airway   Mallampati: III  TM distance: >3 FB  Neck ROM: full       Cardiovascular - normal exam  Rhythm: regular  Rate: normal  (-) murmur     Dental - normal exam  (+) upper dentures, lower dentures         Pulmonary - normal exam  Breath sounds clear to auscultation     Abdominal    Neurological - normal exam               Anesthesia Plan    ASA 3   ASA physical status 3 criteria: hypertension - poorly controlled and diabetes - poorly controlled    Plan - general       Airway plan will be ETT      Plan Factors:   Patient was previously instructed to abstain from smoking on day of procedure.  Patient did not smoke on day of procedure.      Induction: intravenous          Informed Consent:    Anesthetic plan and risks discussed with patient.    Use of blood products discussed with: patient whom.

## 2020-02-10 ENCOUNTER — ANESTHESIA (OUTPATIENT)
Dept: SURGERY | Facility: MEDICAL CENTER | Age: 85
DRG: 254 | End: 2020-02-10
Payer: MEDICARE

## 2020-02-10 ENCOUNTER — HOSPITAL ENCOUNTER (INPATIENT)
Facility: MEDICAL CENTER | Age: 85
LOS: 1 days | DRG: 254 | End: 2020-02-11
Attending: SURGERY | Admitting: SURGERY
Payer: MEDICARE

## 2020-02-10 DIAGNOSIS — G89.18 POST-OP PAIN: ICD-10-CM

## 2020-02-10 LAB
GLUCOSE BLD-MCNC: 161 MG/DL (ref 65–99)
GLUCOSE BLD-MCNC: 184 MG/DL (ref 65–99)
GLUCOSE BLD-MCNC: 202 MG/DL (ref 65–99)
GLUCOSE BLD-MCNC: 204 MG/DL (ref 65–99)
GLUCOSE BLD-MCNC: 284 MG/DL (ref 65–99)
POTASSIUM BLD-SCNC: 4.4 MMOL/L (ref 3.6–5.5)
POTASSIUM SERPL-SCNC: 4.4 MMOL/L (ref 3.6–5.5)

## 2020-02-10 PROCEDURE — 501837 HCHG SUTURE CV: Performed by: SURGERY

## 2020-02-10 PROCEDURE — 160035 HCHG PACU - 1ST 60 MINS PHASE I: Performed by: SURGERY

## 2020-02-10 PROCEDURE — 160036 HCHG PACU - EA ADDL 30 MINS PHASE I: Performed by: SURGERY

## 2020-02-10 PROCEDURE — A9270 NON-COVERED ITEM OR SERVICE: HCPCS | Performed by: ANESTHESIOLOGY

## 2020-02-10 PROCEDURE — 502240 HCHG MISC OR SUPPLY RC 0272: Performed by: SURGERY

## 2020-02-10 PROCEDURE — 700102 HCHG RX REV CODE 250 W/ 637 OVERRIDE(OP): Performed by: NURSE PRACTITIONER

## 2020-02-10 PROCEDURE — 700101 HCHG RX REV CODE 250: Performed by: ANESTHESIOLOGY

## 2020-02-10 PROCEDURE — 160029 HCHG SURGERY MINUTES - 1ST 30 MINS LEVEL 4: Performed by: SURGERY

## 2020-02-10 PROCEDURE — A9270 NON-COVERED ITEM OR SERVICE: HCPCS | Performed by: NURSE PRACTITIONER

## 2020-02-10 PROCEDURE — 700105 HCHG RX REV CODE 258: Performed by: SURGERY

## 2020-02-10 PROCEDURE — 700111 HCHG RX REV CODE 636 W/ 250 OVERRIDE (IP): Performed by: ANESTHESIOLOGY

## 2020-02-10 PROCEDURE — 82962 GLUCOSE BLOOD TEST: CPT

## 2020-02-10 PROCEDURE — 501838 HCHG SUTURE GENERAL: Performed by: SURGERY

## 2020-02-10 PROCEDURE — 700101 HCHG RX REV CODE 250: Performed by: SURGERY

## 2020-02-10 PROCEDURE — A9270 NON-COVERED ITEM OR SERVICE: HCPCS | Performed by: SURGERY

## 2020-02-10 PROCEDURE — 700111 HCHG RX REV CODE 636 W/ 250 OVERRIDE (IP)

## 2020-02-10 PROCEDURE — 160048 HCHG OR STATISTICAL LEVEL 1-5: Performed by: SURGERY

## 2020-02-10 PROCEDURE — 160041 HCHG SURGERY MINUTES - EA ADDL 1 MIN LEVEL 4: Performed by: SURGERY

## 2020-02-10 PROCEDURE — 041K09H BYPASS RIGHT FEMORAL ARTERY TO RIGHT FEMORAL ARTERY WITH AUTOLOGOUS VENOUS TISSUE, OPEN APPROACH: ICD-10-PCS | Performed by: SURGERY

## 2020-02-10 PROCEDURE — 700111 HCHG RX REV CODE 636 W/ 250 OVERRIDE (IP): Performed by: SURGERY

## 2020-02-10 PROCEDURE — 501445 HCHG STAPLER, SKIN DISP: Performed by: SURGERY

## 2020-02-10 PROCEDURE — 06BP0ZZ EXCISION OF RIGHT SAPHENOUS VEIN, OPEN APPROACH: ICD-10-PCS | Performed by: SURGERY

## 2020-02-10 PROCEDURE — 770006 HCHG ROOM/CARE - MED/SURG/GYN SEMI*

## 2020-02-10 PROCEDURE — 160009 HCHG ANES TIME/MIN: Performed by: SURGERY

## 2020-02-10 PROCEDURE — 84132 ASSAY OF SERUM POTASSIUM: CPT | Mod: 91

## 2020-02-10 PROCEDURE — 700102 HCHG RX REV CODE 250 W/ 637 OVERRIDE(OP): Performed by: ANESTHESIOLOGY

## 2020-02-10 PROCEDURE — 160002 HCHG RECOVERY MINUTES (STAT): Performed by: SURGERY

## 2020-02-10 PROCEDURE — 700102 HCHG RX REV CODE 250 W/ 637 OVERRIDE(OP): Performed by: SURGERY

## 2020-02-10 RX ORDER — GLIPIZIDE 10 MG/1
10 TABLET ORAL
Status: DISCONTINUED | OUTPATIENT
Start: 2020-02-10 | End: 2020-02-11 | Stop reason: HOSPADM

## 2020-02-10 RX ORDER — OXYCODONE HCL 5 MG/5 ML
5 SOLUTION, ORAL ORAL
Status: DISCONTINUED | OUTPATIENT
Start: 2020-02-10 | End: 2020-02-10 | Stop reason: HOSPADM

## 2020-02-10 RX ORDER — HYDROCODONE BITARTRATE AND ACETAMINOPHEN 5; 325 MG/1; MG/1
1-2 TABLET ORAL EVERY 4 HOURS PRN
Qty: 10 TAB | Refills: 0 | Status: SHIPPED | OUTPATIENT
Start: 2020-02-10 | End: 2020-02-12

## 2020-02-10 RX ORDER — ASPIRIN 81 MG/1
81 TABLET, CHEWABLE ORAL DAILY
Status: DISCONTINUED | OUTPATIENT
Start: 2020-02-10 | End: 2020-02-11 | Stop reason: HOSPADM

## 2020-02-10 RX ORDER — DIPHENHYDRAMINE HYDROCHLORIDE 50 MG/ML
12.5 INJECTION INTRAMUSCULAR; INTRAVENOUS
Status: DISCONTINUED | OUTPATIENT
Start: 2020-02-10 | End: 2020-02-10 | Stop reason: HOSPADM

## 2020-02-10 RX ORDER — LIDOCAINE HYDROCHLORIDE 20 MG/ML
INJECTION, SOLUTION EPIDURAL; INFILTRATION; INTRACAUDAL; PERINEURAL PRN
Status: DISCONTINUED | OUTPATIENT
Start: 2020-02-10 | End: 2020-02-10 | Stop reason: SURG

## 2020-02-10 RX ORDER — ATORVASTATIN CALCIUM 40 MG/1
40 TABLET, FILM COATED ORAL EVERY EVENING
Status: DISCONTINUED | OUTPATIENT
Start: 2020-02-10 | End: 2020-02-11 | Stop reason: HOSPADM

## 2020-02-10 RX ORDER — ONDANSETRON 2 MG/ML
INJECTION INTRAMUSCULAR; INTRAVENOUS PRN
Status: DISCONTINUED | OUTPATIENT
Start: 2020-02-10 | End: 2020-02-10 | Stop reason: SURG

## 2020-02-10 RX ORDER — CLONIDINE HYDROCHLORIDE 0.1 MG/1
0.1 TABLET ORAL
Status: DISCONTINUED | OUTPATIENT
Start: 2020-02-10 | End: 2020-02-11 | Stop reason: HOSPADM

## 2020-02-10 RX ORDER — SCOLOPAMINE TRANSDERMAL SYSTEM 1 MG/1
1 PATCH, EXTENDED RELEASE TRANSDERMAL
Status: DISCONTINUED | OUTPATIENT
Start: 2020-02-10 | End: 2020-02-11 | Stop reason: HOSPADM

## 2020-02-10 RX ORDER — SULFAMETHOXAZOLE AND TRIMETHOPRIM 800; 160 MG/1; MG/1
1 TABLET ORAL 2 TIMES DAILY
COMMUNITY
Start: 2020-02-02 | End: 2020-07-22

## 2020-02-10 RX ORDER — DEXAMETHASONE SODIUM PHOSPHATE 4 MG/ML
INJECTION, SOLUTION INTRA-ARTICULAR; INTRALESIONAL; INTRAMUSCULAR; INTRAVENOUS; SOFT TISSUE PRN
Status: DISCONTINUED | OUTPATIENT
Start: 2020-02-10 | End: 2020-02-10 | Stop reason: SURG

## 2020-02-10 RX ORDER — HEPARIN SODIUM 1000 [USP'U]/ML
INJECTION, SOLUTION INTRAVENOUS; SUBCUTANEOUS PRN
Status: DISCONTINUED | OUTPATIENT
Start: 2020-02-10 | End: 2020-02-10 | Stop reason: SURG

## 2020-02-10 RX ORDER — ONDANSETRON 2 MG/ML
4 INJECTION INTRAMUSCULAR; INTRAVENOUS EVERY 4 HOURS PRN
Status: DISCONTINUED | OUTPATIENT
Start: 2020-02-10 | End: 2020-02-11 | Stop reason: HOSPADM

## 2020-02-10 RX ORDER — SODIUM CHLORIDE, SODIUM LACTATE, POTASSIUM CHLORIDE, CALCIUM CHLORIDE 600; 310; 30; 20 MG/100ML; MG/100ML; MG/100ML; MG/100ML
INJECTION, SOLUTION INTRAVENOUS CONTINUOUS
Status: ACTIVE | OUTPATIENT
Start: 2020-02-10 | End: 2020-02-10

## 2020-02-10 RX ORDER — SODIUM CHLORIDE 9 MG/ML
INJECTION, SOLUTION INTRAVENOUS EVERY 6 HOURS
Status: ACTIVE | OUTPATIENT
Start: 2020-02-10 | End: 2020-02-10

## 2020-02-10 RX ORDER — CEFAZOLIN SODIUM 1 G/3ML
INJECTION, POWDER, FOR SOLUTION INTRAMUSCULAR; INTRAVENOUS PRN
Status: DISCONTINUED | OUTPATIENT
Start: 2020-02-10 | End: 2020-02-10 | Stop reason: SURG

## 2020-02-10 RX ORDER — ONDANSETRON 2 MG/ML
4 INJECTION INTRAMUSCULAR; INTRAVENOUS
Status: DISCONTINUED | OUTPATIENT
Start: 2020-02-10 | End: 2020-02-10 | Stop reason: HOSPADM

## 2020-02-10 RX ORDER — OXYCODONE HCL 5 MG/5 ML
10 SOLUTION, ORAL ORAL
Status: DISCONTINUED | OUTPATIENT
Start: 2020-02-10 | End: 2020-02-10 | Stop reason: HOSPADM

## 2020-02-10 RX ORDER — LISINOPRIL 5 MG/1
2.5 TABLET ORAL DAILY
Status: DISCONTINUED | OUTPATIENT
Start: 2020-02-10 | End: 2020-02-11 | Stop reason: HOSPADM

## 2020-02-10 RX ORDER — ATORVASTATIN CALCIUM 40 MG/1
40 TABLET, FILM COATED ORAL EVERY EVENING
COMMUNITY
End: 2020-07-13 | Stop reason: SDUPTHER

## 2020-02-10 RX ORDER — LIDOCAINE HYDROCHLORIDE 10 MG/ML
INJECTION, SOLUTION EPIDURAL; INFILTRATION; INTRACAUDAL; PERINEURAL
Status: COMPLETED
Start: 2020-02-10 | End: 2020-02-10

## 2020-02-10 RX ORDER — ALOGLIPTIN 12.5 MG/1
12.5 TABLET, FILM COATED ORAL EVERY EVENING
Status: DISCONTINUED | OUTPATIENT
Start: 2020-02-10 | End: 2020-02-10

## 2020-02-10 RX ORDER — FLUOXETINE HYDROCHLORIDE 20 MG/1
20 CAPSULE ORAL DAILY
Status: DISCONTINUED | OUTPATIENT
Start: 2020-02-10 | End: 2020-02-11 | Stop reason: HOSPADM

## 2020-02-10 RX ORDER — HALOPERIDOL 5 MG/ML
1 INJECTION INTRAMUSCULAR EVERY 6 HOURS PRN
Status: DISCONTINUED | OUTPATIENT
Start: 2020-02-10 | End: 2020-02-11 | Stop reason: HOSPADM

## 2020-02-10 RX ORDER — ROCURONIUM BROMIDE 10 MG/ML
INJECTION, SOLUTION INTRAVENOUS PRN
Status: DISCONTINUED | OUTPATIENT
Start: 2020-02-10 | End: 2020-02-10 | Stop reason: SURG

## 2020-02-10 RX ORDER — HYDRALAZINE HYDROCHLORIDE 20 MG/ML
5 INJECTION INTRAMUSCULAR; INTRAVENOUS
Status: DISCONTINUED | OUTPATIENT
Start: 2020-02-10 | End: 2020-02-10 | Stop reason: HOSPADM

## 2020-02-10 RX ORDER — DIPHENHYDRAMINE HYDROCHLORIDE 50 MG/ML
25 INJECTION INTRAMUSCULAR; INTRAVENOUS EVERY 6 HOURS PRN
Status: DISCONTINUED | OUTPATIENT
Start: 2020-02-10 | End: 2020-02-11 | Stop reason: HOSPADM

## 2020-02-10 RX ORDER — LABETALOL HYDROCHLORIDE 5 MG/ML
10 INJECTION, SOLUTION INTRAVENOUS EVERY 4 HOURS PRN
Status: DISCONTINUED | OUTPATIENT
Start: 2020-02-10 | End: 2020-02-11 | Stop reason: HOSPADM

## 2020-02-10 RX ORDER — OMEPRAZOLE 20 MG/1
20 CAPSULE, DELAYED RELEASE ORAL DAILY
Status: DISCONTINUED | OUTPATIENT
Start: 2020-02-10 | End: 2020-02-11 | Stop reason: HOSPADM

## 2020-02-10 RX ORDER — PROTAMINE SULFATE 10 MG/ML
INJECTION, SOLUTION INTRAVENOUS PRN
Status: DISCONTINUED | OUTPATIENT
Start: 2020-02-10 | End: 2020-02-10 | Stop reason: SURG

## 2020-02-10 RX ORDER — PANTOPRAZOLE SODIUM 40 MG/1
40 TABLET, DELAYED RELEASE ORAL DAILY
Status: DISCONTINUED | OUTPATIENT
Start: 2020-02-10 | End: 2020-02-10

## 2020-02-10 RX ORDER — DEXAMETHASONE SODIUM PHOSPHATE 4 MG/ML
4 INJECTION, SOLUTION INTRA-ARTICULAR; INTRALESIONAL; INTRAMUSCULAR; INTRAVENOUS; SOFT TISSUE
Status: DISCONTINUED | OUTPATIENT
Start: 2020-02-10 | End: 2020-02-11 | Stop reason: HOSPADM

## 2020-02-10 RX ORDER — OXYCODONE HYDROCHLORIDE 5 MG/1
5 TABLET ORAL
Status: DISCONTINUED | OUTPATIENT
Start: 2020-02-10 | End: 2020-02-11 | Stop reason: HOSPADM

## 2020-02-10 RX ORDER — HYDRALAZINE HYDROCHLORIDE 20 MG/ML
10 INJECTION INTRAMUSCULAR; INTRAVENOUS EVERY 4 HOURS PRN
Status: DISCONTINUED | OUTPATIENT
Start: 2020-02-10 | End: 2020-02-11 | Stop reason: HOSPADM

## 2020-02-10 RX ORDER — SODIUM CHLORIDE 9 MG/ML
INJECTION, SOLUTION INTRAVENOUS CONTINUOUS
Status: DISCONTINUED | OUTPATIENT
Start: 2020-02-10 | End: 2020-02-10 | Stop reason: HOSPADM

## 2020-02-10 RX ORDER — ACETAMINOPHEN 500 MG
1000 TABLET ORAL ONCE
Status: COMPLETED | OUTPATIENT
Start: 2020-02-10 | End: 2020-02-10

## 2020-02-10 RX ORDER — CLONIDINE HYDROCHLORIDE 0.1 MG/1
0.2 TABLET ORAL
Status: DISCONTINUED | OUTPATIENT
Start: 2020-02-10 | End: 2020-02-11 | Stop reason: HOSPADM

## 2020-02-10 RX ORDER — HYDROCHLOROTHIAZIDE 25 MG/1
25 TABLET ORAL DAILY
Status: DISCONTINUED | OUTPATIENT
Start: 2020-02-10 | End: 2020-02-11 | Stop reason: HOSPADM

## 2020-02-10 RX ORDER — HALOPERIDOL 5 MG/ML
1 INJECTION INTRAMUSCULAR
Status: DISCONTINUED | OUTPATIENT
Start: 2020-02-10 | End: 2020-02-10 | Stop reason: HOSPADM

## 2020-02-10 RX ADMIN — INSULIN HUMAN 2 UNITS: 100 INJECTION, SOLUTION PARENTERAL at 22:08

## 2020-02-10 RX ADMIN — DEXAMETHASONE SODIUM PHOSPHATE 4 MG: 4 INJECTION, SOLUTION INTRA-ARTICULAR; INTRALESIONAL; INTRAMUSCULAR; INTRAVENOUS; SOFT TISSUE at 07:44

## 2020-02-10 RX ADMIN — FENTANYL CITRATE 50 MCG: 50 INJECTION, SOLUTION INTRAMUSCULAR; INTRAVENOUS at 07:41

## 2020-02-10 RX ADMIN — METOPROLOL TARTRATE 12.5 MG: 25 TABLET, FILM COATED ORAL at 17:47

## 2020-02-10 RX ADMIN — SUGAMMADEX 200 MG: 100 INJECTION, SOLUTION INTRAVENOUS at 09:06

## 2020-02-10 RX ADMIN — EPHEDRINE SULFATE 5 MG: 50 INJECTION, SOLUTION INTRAVENOUS at 08:41

## 2020-02-10 RX ADMIN — INSULIN HUMAN 3 UNITS: 100 INJECTION, SOLUTION PARENTERAL at 17:45

## 2020-02-10 RX ADMIN — PROTAMINE SULFATE 25 MG: 10 INJECTION, SOLUTION INTRAVENOUS at 08:55

## 2020-02-10 RX ADMIN — LIDOCAINE HYDROCHLORIDE 40 MG: 20 INJECTION, SOLUTION EPIDURAL; INFILTRATION; INTRACAUDAL at 07:41

## 2020-02-10 RX ADMIN — PROPOFOL 50 MG: 10 INJECTION, EMULSION INTRAVENOUS at 07:41

## 2020-02-10 RX ADMIN — ACETAMINOPHEN 1000 MG: 500 TABLET ORAL at 07:23

## 2020-02-10 RX ADMIN — ASPIRIN 81 MG 81 MG: 81 TABLET ORAL at 13:13

## 2020-02-10 RX ADMIN — PROTAMINE SULFATE 10 MG: 10 INJECTION, SOLUTION INTRAVENOUS at 08:49

## 2020-02-10 RX ADMIN — HYDROCHLOROTHIAZIDE 25 MG: 25 TABLET ORAL at 13:13

## 2020-02-10 RX ADMIN — LISINOPRIL 2.5 MG: 5 TABLET ORAL at 13:13

## 2020-02-10 RX ADMIN — Medication 0.5 ML: at 07:22

## 2020-02-10 RX ADMIN — LIDOCAINE HYDROCHLORIDE 0.5 ML: 10 INJECTION, SOLUTION EPIDURAL; INFILTRATION; INTRACAUDAL; PERINEURAL at 07:22

## 2020-02-10 RX ADMIN — FENTANYL CITRATE 25 MCG: 50 INJECTION, SOLUTION INTRAMUSCULAR; INTRAVENOUS at 08:56

## 2020-02-10 RX ADMIN — ROCURONIUM BROMIDE 30 MG: 10 INJECTION, SOLUTION INTRAVENOUS at 07:41

## 2020-02-10 RX ADMIN — CEFAZOLIN 2 G: 330 INJECTION, POWDER, FOR SOLUTION INTRAMUSCULAR; INTRAVENOUS at 07:36

## 2020-02-10 RX ADMIN — GLIPIZIDE 10 MG: 10 TABLET ORAL at 17:48

## 2020-02-10 RX ADMIN — HEPARIN SODIUM 8000 UNITS: 1000 INJECTION, SOLUTION INTRAVENOUS; SUBCUTANEOUS at 08:19

## 2020-02-10 RX ADMIN — ATORVASTATIN CALCIUM 40 MG: 40 TABLET, FILM COATED ORAL at 17:48

## 2020-02-10 RX ADMIN — OMEPRAZOLE 20 MG: 20 CAPSULE, DELAYED RELEASE ORAL at 13:12

## 2020-02-10 RX ADMIN — SODIUM CHLORIDE, POTASSIUM CHLORIDE, SODIUM LACTATE AND CALCIUM CHLORIDE: 600; 310; 30; 20 INJECTION, SOLUTION INTRAVENOUS at 07:23

## 2020-02-10 RX ADMIN — METFORMIN HYDROCHLORIDE 500 MG: 500 TABLET ORAL at 17:48

## 2020-02-10 RX ADMIN — FENTANYL CITRATE 25 MCG: 50 INJECTION, SOLUTION INTRAMUSCULAR; INTRAVENOUS at 08:02

## 2020-02-10 RX ADMIN — ROCURONIUM BROMIDE 20 MG: 10 INJECTION, SOLUTION INTRAVENOUS at 08:39

## 2020-02-10 RX ADMIN — EPHEDRINE SULFATE 10 MG: 50 INJECTION, SOLUTION INTRAVENOUS at 08:07

## 2020-02-10 RX ADMIN — ONDANSETRON 4 MG: 2 INJECTION INTRAMUSCULAR; INTRAVENOUS at 08:56

## 2020-02-10 ASSESSMENT — LIFESTYLE VARIABLES
EVER FELT BAD OR GUILTY ABOUT YOUR DRINKING: NO
CONSUMPTION TOTAL: NEGATIVE
HAVE YOU EVER FELT YOU SHOULD CUT DOWN ON YOUR DRINKING: NO
TOTAL SCORE: 0
HAVE PEOPLE ANNOYED YOU BY CRITICIZING YOUR DRINKING: NO
ALCOHOL_USE: NO
ON A TYPICAL DAY WHEN YOU DRINK ALCOHOL HOW MANY DRINKS DO YOU HAVE: 0
TOTAL SCORE: 0
AVERAGE NUMBER OF DAYS PER WEEK YOU HAVE A DRINK CONTAINING ALCOHOL: 0
HOW MANY TIMES IN THE PAST YEAR HAVE YOU HAD 5 OR MORE DRINKS IN A DAY: 0
TOTAL SCORE: 0
EVER_SMOKED: YES
EVER_SMOKED: YES
DOES PATIENT WANT TO STOP DRINKING: NO
DOES PATIENT WANT TO TALK TO SOMEONE ABOUT QUITTING: NO
EVER HAD A DRINK FIRST THING IN THE MORNING TO STEADY YOUR NERVES TO GET RID OF A HANGOVER: NO

## 2020-02-10 ASSESSMENT — COGNITIVE AND FUNCTIONAL STATUS - GENERAL
DAILY ACTIVITIY SCORE: 22
DRESSING REGULAR LOWER BODY CLOTHING: A LITTLE
MOBILITY SCORE: 20
CLIMB 3 TO 5 STEPS WITH RAILING: A LITTLE
WALKING IN HOSPITAL ROOM: A LITTLE
HELP NEEDED FOR BATHING: A LITTLE
SUGGESTED CMS G CODE MODIFIER DAILY ACTIVITY: CJ
STANDING UP FROM CHAIR USING ARMS: A LITTLE
SUGGESTED CMS G CODE MODIFIER MOBILITY: CJ
MOVING FROM LYING ON BACK TO SITTING ON SIDE OF FLAT BED: A LITTLE

## 2020-02-10 ASSESSMENT — COPD QUESTIONNAIRES
HAVE YOU SMOKED AT LEAST 100 CIGARETTES IN YOUR ENTIRE LIFE: YES
DURING THE PAST 4 WEEKS HOW MUCH DID YOU FEEL SHORT OF BREATH: NONE/LITTLE OF THE TIME
COPD SCREENING SCORE: 4
COPD SCREENING SCORE: 4
HAVE YOU SMOKED AT LEAST 100 CIGARETTES IN YOUR ENTIRE LIFE: YES
DURING THE PAST 4 WEEKS HOW MUCH DID YOU FEEL SHORT OF BREATH: NONE/LITTLE OF THE TIME
DO YOU EVER COUGH UP ANY MUCUS OR PHLEGM?: NO/ONLY WITH OCCASIONAL COLDS OR INFECTIONS
DO YOU EVER COUGH UP ANY MUCUS OR PHLEGM?: NO/ONLY WITH OCCASIONAL COLDS OR INFECTIONS
IN THE PAST 12 MONTHS DO YOU DO LESS THAN YOU USED TO BECAUSE OF YOUR BREATHING PROBLEMS: DISAGREE/UNSURE

## 2020-02-10 ASSESSMENT — PATIENT HEALTH QUESTIONNAIRE - PHQ9
1. LITTLE INTEREST OR PLEASURE IN DOING THINGS: NOT AT ALL
2. FEELING DOWN, DEPRESSED, IRRITABLE, OR HOPELESS: NOT AT ALL
SUM OF ALL RESPONSES TO PHQ9 QUESTIONS 1 AND 2: 0

## 2020-02-10 ASSESSMENT — PAIN SCALES - GENERAL: PAIN_LEVEL: 2

## 2020-02-10 NOTE — PROGRESS NOTES
Received report from PAC-U.  Pt arrived to T404 bed 1 via gurney  No immediate distress.  A&Ox4  Denies pain  Denies n/v  Prevena vac to Right groin  Oriented to room, educated on welcome packet, white board, TV, call light, call before fall, plan of care, oral care expectations, ambulation goals, and up to chair for all meals goal.  Call light and personal belongings within reach.  Fall precautions and hourly rounding in place

## 2020-02-10 NOTE — ANESTHESIA POSTPROCEDURE EVALUATION
Patient: Selvin Braga    Procedure Summary     Date:  02/10/20 Room / Location:  [facility] 09 / SURGERY [facility]    Anesthesia Start:  0736 Anesthesia Stop:  0918    Procedure:  ENDARTERECTOMY, FEMORAL- COMMON FEMORAL ARTERY BYPASS WITH VEIN (Right Groin) Diagnosis:  (ATHEROSCLEROSIS, ARTERIAL INSUFFICIENCY STATUS POST RIGHT FEMORAL ENDARTERECTOMY WITH RETROGRADE ILIAC STENT)    Surgeon:  Alek Delgado M.D. Responsible Provider:  Coby Jasso M.D.    Anesthesia Type:  general ASA Status:  3          Final Anesthesia Type: general  Last vitals  BP   Blood Pressure : (!) 131/29(RN notified )    Temp   36.3 °C (97.3 °F)    Pulse   Pulse: 73   Resp   16    SpO2   97 %      Anesthesia Post Evaluation    Patient location during evaluation: PACU  Patient participation: complete - patient participated  Level of consciousness: awake and alert  Pain score: 2    Airway patency: patent  Anesthetic complications: no  Cardiovascular status: hemodynamically stable  Respiratory status: acceptable  Hydration status: euvolemic    PONV: none           Nurse Pain Score: 5 (NPRS)

## 2020-02-10 NOTE — PROGRESS NOTES
2 RN skin check complete with ONEIDA Urena    Skin fragile and loose throughout  Left arm scattered bruising  Right hand scab   Penis shaft has Cunningham clamp in place.  Small scab on shaft in front of clamp.  Sacrum very red, but blanching  Right groin prevena vac to surgical incision CDI  No other skin issues noted

## 2020-02-10 NOTE — OP REPORT
02/10/20    OPERATIVE NOTE    PRE-OPERATIVE DIAGNOSIS -     1.  Peripheral arterial disease  2.  Right lower extremity claudication  3.  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  Right common femoral artery interposition bypass using reversed ipsilateral greater saphenous vein  2.  Resection of right femoral artery  3.  Harvesting of right greater saphenous vein    SURGEON - Alek Delgado M.D.    ASSISTANT - JOAQUIM Moran    TYPE OF ANESTHESIA - General     ANESTHESIOLOGIST  -associated      SPECIMENS -none    INDICATIONS - 84 y.o. who had a right femoral endarterectomy and bovine patch angioplasty performed less than a year ago.  In surveillance the patient was noted to have a high-grade stenosis within the right femoral artery.  A follow-up CTA demonstrates what appears to be intimal hyperplasia throughout the endarterectomized artery in the right femoral artery.  Plan for today is to resect the artery and perform a a vein interposition bypass.      PROCEDURE IN DETAIL -     Patient was taken to the operating suite placed in the supine position.  After adequate anesthesia the patient's bilateral groins were shaved prepped draped in sterile fashion.  An incision was made in longitudinal fashion overlying the right common femoral artery.  Incision was carried down through the subcutaneous tissue to the patient's femoral artery.  There was dense scar tissue throughout the right groin incision.  Through that dense scar tissue the distal external iliac/proximal common femoral artery was dissected from the surrounding tissue and isolated just at the level of the inguinal ligament.  That vessel was encircled using a vessel loop.  Dissection took place distally along the common femoral artery down to the common femoral artery bifurcation.  The proximal superficial femoral and profunda femoris arteries were  circumferentially dissected and isolated.  Next my attention turned toward identifying saphenous vein for conduit.  Through the same incision just extending that incision distally the proximal right greater saphenous vein was identified.  It was noted to be of ask adequate size and quality for conduit use.  Saphenous vein was dissected proximally and distally and after an adequate length of vein was exposed it was then harvested.  3-0 silk ties were placed proximally and distally and the vein was excised.  Next 8000 units of heparin was administered after an appropriate period of time the distal external iliac/proximal common femoral artery was crossclamped.  The profunda femoris and proximal superficial femoral artery were occluded as well.  A longitudinal arteriotomy was made in the common femoral artery through the previously placed patch.  There was dense intimal hyperplastic material noted throughout the endarterectomized artery.  The common femoral artery was then resected using Metzenbaum scissors.  The proximal vessel was spatulated and the vein graft was placed in reverse fashion and spatulated as well.  An end-to-end anastomosis was completed between the saphenous vein in the proximal common femoral artery using a 5-0 Prolene suture in running fashion.  After that anastomosis was complete the common femoral bifurcation was spatulated as well as the vein graft.  An end-to-end anastomosis was completed between the saphenous vein graft and the common femoral bifurcation using a 5-0 Prolene suture in a running fashion.  Just prior to completing the anastomosis all vessels were vigorously flushed.  The anastomosis was completed and flow was established first through the profunda femoris artery and subsequently through the superficial femoral artery.  Hemostasis was assured.  50 mg of protamine was administered.  2-0 Vicryl running suture was used to reapproximate the subcutaneous tissue and 2 separate layers.   A 4-0 strata fix suture was then used to reapproximate the skin incision.  A Antonietta was then placed over the closed incision.  Patient was taken to the recovery room in stable condition.      Alek Delgado M.D.

## 2020-02-10 NOTE — ANESTHESIA PROCEDURE NOTES
Arterial Line  Performed by: Coby Jasso M.D.  Authorized by: Coby Jasso M.D.     Start Time:  2/10/2020 7:46 AM  End Time:  2/10/2020 7:47 AM  Localization: ultrasound guidance and surface landmarks  Image captured, interpreted and electronically stored.  Patient Location:  OR  Indication: continuous blood pressure monitoring    Catheter Size:  20 G  Seldinger Technique?: Yes    Laterality:  Left  Site:  Radial artery  Line Secured:  Antimicrobial disc, tape and transparent dressing  Events: patient tolerated procedure well with no complications     Successful first attempt, u/s image in chart

## 2020-02-10 NOTE — OR NURSING
FS/BS at 0925,no coverage needed.Pt's. son updated,still await for GSU bed.Right groin PREVENA drain remains patent,PPand PT pulses/doppler.Bed rest for 6 hours instructed.Pt. denies any pain,tolerating sips of water well.

## 2020-02-10 NOTE — PROGRESS NOTES
Future appt: Your appointments     Date & Time Appointment Department [facility])    Aug 01, 2019  8:00 AM CDT Laboratory Visit with [facility] ([facility])            [facility]  [facility] [place]  [address] rec updated and complete  Allergies reviewed  Interviewed pt with son at bedside with permission from pt.

## 2020-02-10 NOTE — CARE PLAN
Problem: Safety  Goal: Will remain free from injury  Outcome: PROGRESSING AS EXPECTED     Problem: Knowledge Deficit  Goal: Knowledge of disease process/condition, treatment plan, diagnostic tests, and medications will improve  Outcome: PROGRESSING AS EXPECTED  Note:   Educated on POC, encouraged and answered questions as needed

## 2020-02-10 NOTE — ANESTHESIA PROCEDURE NOTES
Airway  Date/Time: 2/10/2020 7:43 AM  Performed by: Coby Jasso M.D.  Authorized by: Coby Jasso M.D.     Location:  OR  Urgency:  Elective  Difficult Airway: No    Indications for Airway Management:  Anesthesia  Spontaneous Ventilation: absent    Sedation Level:  Deep  Preoxygenated: Yes    Patient Position:  Sniffing  Final Airway Type:  Endotracheal airway  Final Endotracheal Airway:  ETT  Cuffed: Yes    Technique Used for Successful ETT Placement:  Direct laryngoscopy  Insertion Site:  Oral  Blade Type:  Christen  Laryngoscope Blade/Videolaryngoscope Blade Size:  4  ETT Size (mm):  7.0  Measured from:  Teeth  ETT to Teeth (cm):  23  Placement Verified by: auscultation and capnometry    Cormack-Lehane Classification:  Grade I - full view of glottis  Number of Attempts at Approach:  1   Atraumatic, DLx1, bite block placed, eyes taped, all PPP, BSequalBL

## 2020-02-10 NOTE — ANESTHESIA TIME REPORT
Anesthesia Start and Stop Event Times     Date Time Event    2/10/2020 0715 Ready for Procedure     0736 Anesthesia Start     0918 Anesthesia Stop        Responsible Staff  02/10/20    Name Role Begin End    Coby Jasso M.D. Anesth 0736 0918        Preop Diagnosis (Free Text):  Pre-op Diagnosis     ATHEROSCLEROSIS, ARTERIAL INSUFFICIENCY STATUS POST RIGHT FEMORAL ENDARTERECTOMY WITH RETROGRADE ILIAC STENT        Preop Diagnosis (Codes):    Post op Diagnosis  Arterial insufficiency (HCC)      Premium Reason  Non-Premium    Comments:

## 2020-02-11 VITALS
HEART RATE: 71 BPM | OXYGEN SATURATION: 94 % | DIASTOLIC BLOOD PRESSURE: 44 MMHG | RESPIRATION RATE: 20 BRPM | BODY MASS INDEX: 23.95 KG/M2 | SYSTOLIC BLOOD PRESSURE: 126 MMHG | WEIGHT: 171.08 LBS | HEIGHT: 71 IN | TEMPERATURE: 97.7 F

## 2020-02-11 LAB — GLUCOSE BLD-MCNC: 227 MG/DL (ref 65–99)

## 2020-02-11 PROCEDURE — A9270 NON-COVERED ITEM OR SERVICE: HCPCS | Performed by: SURGERY

## 2020-02-11 PROCEDURE — A9270 NON-COVERED ITEM OR SERVICE: HCPCS | Performed by: NURSE PRACTITIONER

## 2020-02-11 PROCEDURE — 700102 HCHG RX REV CODE 250 W/ 637 OVERRIDE(OP): Performed by: SURGERY

## 2020-02-11 PROCEDURE — 82962 GLUCOSE BLOOD TEST: CPT

## 2020-02-11 PROCEDURE — 700102 HCHG RX REV CODE 250 W/ 637 OVERRIDE(OP): Performed by: NURSE PRACTITIONER

## 2020-02-11 RX ADMIN — GLIPIZIDE 10 MG: 10 TABLET ORAL at 05:28

## 2020-02-11 RX ADMIN — INSULIN HUMAN 2 UNITS: 100 INJECTION, SOLUTION PARENTERAL at 12:21

## 2020-02-11 RX ADMIN — LISINOPRIL 2.5 MG: 5 TABLET ORAL at 05:28

## 2020-02-11 RX ADMIN — ASPIRIN 81 MG 81 MG: 81 TABLET ORAL at 05:28

## 2020-02-11 RX ADMIN — HYDROCHLOROTHIAZIDE 25 MG: 25 TABLET ORAL at 05:28

## 2020-02-11 RX ADMIN — METFORMIN HYDROCHLORIDE 500 MG: 500 TABLET ORAL at 08:17

## 2020-02-11 RX ADMIN — OMEPRAZOLE 20 MG: 20 CAPSULE, DELAYED RELEASE ORAL at 05:28

## 2020-02-11 RX ADMIN — METOPROLOL TARTRATE 12.5 MG: 25 TABLET, FILM COATED ORAL at 05:28

## 2020-02-11 RX ADMIN — FLUOXETINE HYDROCHLORIDE 20 MG: 20 CAPSULE ORAL at 05:28

## 2020-02-11 NOTE — DISCHARGE INSTRUCTIONS
Discharge Instructions    Discharged to home by car with relative. Discharged via wheelchair, hospital escort: Yes.  Special equipment needed: Walker    Be sure to schedule a follow-up appointment with your primary care doctor or any specialists as instructed.     Discharge Plan:   Diet Plan: Discussed  Activity Level: Discussed  Confirmed Follow up Appointment: Patient to Call and Schedule Appointment  Confirmed Symptoms Management: Discussed  Medication Reconciliation Updated: Yes  Influenza Vaccine Indication: Not indicated: Previously immunized this influenza season and > 8 years of age    I understand that a diet low in cholesterol, fat, and sodium is recommended for good health. Unless I have been given specific instructions below for another diet, I accept this instruction as my diet prescription.   Other diet: Diabetic diet as tolerated    Special Instructions:   Monitor for signs and symptoms of infection (fever, chills, nausea, vomiting)  Monitor incisions for swelling, hardness, redness, or drainage  Call MD office if prevena Wound Vac is alarming (check the batteries first)     Discharge Instructions After Peripheral Artery Bypass Surgery   You had a procedure known as peripheral artery bypass surgery. Peripheral arteries send blood to your legs and feet. Over time, your artery walls may thicken and build up with a fatty substance (plaque). As plaque builds up in an artery, blood flow can be reduced or even blocked. This can cause peripheral artery disease (PAD). Surgery to go around (bypass) this blockage is called peripheral artery bypass surgery. A surgeon stitches a special tube (graft) into the artery above and below the blockage. This creates a new path for blood flow.  Activity  · Talk with your doctor about what you can and can’t do as you recover.  · Don’t drive for at least 7 days after your surgery or while you are taking opioid pain medicine (or if you are still having a lot of leg  pain).  · Expect to start walking soon after surgery. Walking helps reduce swelling and helps your cut (incision) heal. But you will likely have some leg swelling after surgery.  · Don’t stand or sit with your feet down for long. When you sit, raise your feet as high as you comfortably can.    Home care  · Check your incision every day for signs of infection such as swelling, redness, warmth, or drainage.  · Don’t bathe or soak in a tub or go swimming until your incisions are well healed (usually at leas  2 weeks). You can shower to keep your incisions clean. Just make sure you dry them well afterward.  · Take your medicines exactly as directed. Don’t skip doses.  · Prevent skin burns by testing the temperature of shower water before you get in.  · Wear slippers or shoes when walking. Don’t go barefoot or wear open-toed shoes.  · Learn to take your pulse in your leg and your foot. Keep a record of your results. Ask your doctor or nurse which pulse changes might be a problem.    Follow-up  · See your doctor to have your stitches or staples removed 10 to 14 days after your surgery.    When to call your healthcare provider  Call your provider right away if you have any of the following:  · Fever of 100.4°F ( 38°C) or higher, or as advised by your provider  · Signs of infection (redness, swelling, or warmth at the incision site)  · Drainage from your incision  · Changes in color, temperature, feeling, or movement in either foot  · Increasing pain or numbness in your foot or leg  · Leg swelling that doesn't get better overnight  · Chest pain or trouble breathing      Vacuum-Assisted Closure Therapy  Vacuum-assisted closure (VAC) therapy uses a device that removes fluid and germs from wounds to help them heal.   Vacuum-assisted therapy helps the wound stay clean and healthy while the open wound slowly grows back together.  Vacuum-assisted closure therapy uses a bandage (dressing) that is made of foam. Then, a drape is  placed over the wound. This drape sticks to your skin to keep air out, and to protect the wound. A tube is hooked up to a small pump and is attached to the drape. The pump sucks out the fluid and germs.   HOW DOES IT WORK?   The vacuum pump pulls fluid through the foam dressing. The dressing may wrinkle during this process. The fluid goes into the tube and away from the wound. The fluid then goes into a container. The fluid in the container must be replaced if it is full. The pulling from the pump helps to close the wound and bring better circulation to the wound area.  The foam dressing covers and protects the wound. It helps your wound heal faster.   HOW DOES IT FEEL?   · You might feel a little pulling when the pump is on.  · You might also feel a mild vibrating sensation.    · You might feel some discomfort when the dressing is taken off.    Depression / Suicide Risk    As you are discharged from this [facility] facility, it is important to learn how to keep safe from harming yourself.    Recognize the warning signs:  · Abrupt changes in personality, positive or negative- including increase in energy   · Giving away possessions  · Change in eating patterns- significant weight changes-  positive or negative  · Change in sleeping patterns- unable to sleep or sleeping all the time   · Unwillingness or inability to communicate  · Depression  · Unusual sadness, discouragement and loneliness  · Talk of wanting to die  · Neglect of personal appearance   · Rebelliousness- reckless behavior  · Withdrawal from people/activities they love  · Confusion- inability to concentrate     If you or a loved one observes any of these behaviors or has concerns about self-harm, here's what you can do:  · Talk about it- your feelings and reasons for harming yourself  · Remove any means that you might use to hurt yourself (examples: pills, rope, extension cords, firearm)  · Get professional help from the community (Mental Health,  Substance Abuse, psychological counseling)  · Do not be alone:Call your Safe Contact- someone whom you trust who will be there for you.  · Call your local CRISIS HOTLINE 327-6056 or 970-459-6689  · Call your local Children's Mobile Crisis Response Team [place] (429) 291-9921 or www.COVEGA  · Call the toll free National Suicide Prevention Hotlines   · National Suicide Prevention Lifeline 544-282-GWVV (0594)  · National Hope Line Network 800-SUICIDE (226-8035)

## 2020-02-11 NOTE — DISCHARGE PLANNING
This RN CM met with pt at bedside to assist him with discharge. Per pt his daughter is going to pick him up around 12;30 to 13:00 to transport him to home. Per pt he does not need any help with prescriptions. Pt has no other concerns.

## 2020-02-11 NOTE — DISCHARGE SUMMARY
Discharge Summary    CHIEF COMPLAINT ON ADMISSION  PAD    Reason for Admission  ATHEROSCLEROSIS, ARTERIAL INSUFFIC*     Admission Date  2/10/2020    CODE STATUS  Full Code    HPI & HOSPITAL COURSE  This is a 84 y.o. male here with PAD who underwent a right femoral artery interposition vein graft.  Doing well. Ready for discharge           Therefore, he is discharged in good and stable condition to home with close outpatient follow-up.    The patient recovered much more quickly than anticipated on admission.    Discharge Date  2-11-20    FOLLOW UP ITEMS POST DISCHARGE  One week     DISCHARGE DIAGNOSES  Active Problems:    * No active hospital problems. *  Resolved Problems:    * No resolved hospital problems. *      FOLLOW UP  No future appointments.  Alek Delgado M.D.  [address] [facility] [id number]  [address]  [facility] [id number] [phone]    In 1 week        MEDICATIONS ON DISCHARGE     Medication List      START taking these medications      Instructions   HYDROcodone-acetaminophen 5-325 MG Tabs per tablet  Commonly known as:  NORCO   Take 1-2 Tabs by mouth every four hours as needed for up to 2 days.  Dose:  1-2 Tab        CONTINUE taking these medications      Instructions   Alogliptin Benzoate 12.5 MG Tabs   Take 12.5 mg by mouth every evening.  Dose:  12.5 mg     aspirin 81 MG tablet   Take 1 Tab by mouth every day.  Dose:  81 mg     atorvastatin 40 MG Tabs  Commonly known as:  LIPITOR   Take 40 mg by mouth every evening.  Dose:  40 mg     D-Mannose 500 MG Caps   Take 500 mg by mouth 3 times a day.  Dose:  500 mg     FLUoxetine 20 MG Caps  Commonly known as:  PROZAC   Take 20 mg by mouth every day.  Dose:  20 mg     glipiZIDE 10 MG Tabs  Commonly known as:  GLUCOTROL   Take 10 mg by mouth 2 times a day.  Dose:  10 mg     hydroCHLOROthiazide 25 MG Tabs  Commonly known as:  HYDRODIURIL   Take 1 Tab by mouth every day.  Dose:  25 mg     lisinopril 2.5 MG Tabs  Commonly known as:  PRINIVIL   Take 2.5 mg by mouth every  day.  Dose:  2.5 mg     metFORMIN 500 MG Tabs  Commonly known as:  GLUCOPHAGE   Take 500 mg by mouth 2 times a day, with meals.  Dose:  500 mg     metoprolol 25 MG Tabs  Commonly known as:  LOPRESSOR   Take 12.5 mg by mouth 2 times a day.  Dose:  12.5 mg     pantoprazole 40 MG Tbec  Commonly known as:  PROTONIX   Take 40 mg by mouth every day.  Dose:  40 mg     PRESERVISION AREDS 2+MULTI VIT PO   Take 1 Cap by mouth 2 Times a Day.  Dose:  1 Cap     sulfamethoxazole-trimethoprim 800-160 MG tablet  Commonly known as:  BACTRIM DS   Take 1 Tab by mouth 2 times a day. Pt started on 2/2/2020 for 7 day course.  Dose:  1 Tab     vitamin D 2000 UNIT Tabs   Take 2,000 Units by mouth every day.  Dose:  2,000 Units            Allergies  Allergies   Allergen Reactions   • Augmentin Unspecified     Bleeding (rectal)  RXN=5 years ago   • Latex Unspecified     Blisters  RXN=ongoing   • Tape Rash     Rash-blisters-paper tape okay  RXN=ongoing       DIET  Orders Placed This Encounter   Procedures   • Diet Order Diabetic     Standing Status:   Standing     Number of Occurrences:   1     Order Specific Question:   Diet:     Answer:   Diabetic [3]     Order Specific Question:   Miscellaneous modifications:     Answer:   Lactose Free per PT [7]       ACTIVITY  As tolerated.  Weight bearing as tolerated    CONSULTATIONS  none    PROCEDURES  1.  Right common femoral artery interposition bypass using reversed ipsilateral greater saphenous vein  2.  Resection of right femoral artery  3.  Harvesting of right greater saphenous vein    LABORATORY  Lab Results   Component Value Date    SODIUM 138 01/31/2020    POTASSIUM 4.4 02/10/2020    CHLORIDE 102 01/31/2020    CO2 26 01/31/2020    GLUCOSE 216 (H) 01/31/2020    BUN 31 (H) 01/31/2020    CREATININE 1.37 01/31/2020    CREATININE 1.0 02/08/2005        Lab Results   Component Value Date    WBC 7.2 01/31/2020    HEMOGLOBIN 11.0 (L) 01/31/2020    HEMATOCRIT 34.5 (L) 01/31/2020    PLATELETCT 199  01/31/2020        Total time of the discharge process exceeds 20 minutes.

## 2020-02-11 NOTE — DISCHARGE PLANNING
Care Transition Team Assessment    This RN ENDY met with pt at bedside for this assessment. Pt verified accuracy of the Face Sheet Demographics. Pt denies any history of SA and MH Disorder. Pt denies any financial concern at this time. Pt has a PCP and AD. Pt's preferred pharmacy is [facility] over at [place], [place].   Per pt he has good family support and that her daughter can provide transport for pt to home today. Pt needs assistance with some ADLs and IADls prior to this hospitalization.    Information Source  Orientation : Oriented x 4  Information Given By: Patient  Who is responsible for making decisions for patient? : Patient    Readmission Evaluation  Is this a readmission?: No    Elopement Risk  Legal Hold: No  Ambulatory or Self Mobile in Wheelchair: Yes  Disoriented: No  Psychiatric Symptoms: None  History of Wandering: No  Elopement this Admit: No  Vocalizing Wanting to Leave: No  Displays Behaviors, Body Language Wanting to Leave: No-Not at Risk for Elopement  Elopement Risk: Not at Risk for Elopement    Interdisciplinary Discharge Planning  Primary Care Physician: Vikram Green  Patient or legal guardian wants to designate a caregiver (see row info): No  Support Systems: Children, Family Member(s)  Do You Take your Prescribed Medications Regularly: Yes  Able to Return to Previous ADL's: Yes  Mobility Issues: Yes  Prior Services: Unable To Determine At This Time  Patient Expects to be Discharged to: (Home with Help)  Assistance Needed: No  Durable Medical Equipment: (Four wheel walker)    Discharge Preparedness  What is your plan after discharge?: Home with help  What are your discharge supports?: Child  Difficulity with ADLs: Walking  Difficulity with IADLs: Cooking, Laundry, Shopping     Finances  Financial Barriers to Discharge: No  Prescription Coverage: Yes    Vision / Hearing Impairment  Vision Impairment : Yes  Right Eye Vision: Impaired, Wears Glasses  Left Eye Vision: Impaired, Wears  Glasses  Hearing Impairment : Yes  Hearing Impairment: Both Ears, Hearing Device Not Available  Does Pt Need Special Equipment for the Hearing Impaired?: No     Advance Directive  Advance Directive?: DPOA for Health Care  Durable Power of  Name and Contact : Bushra Callejas / [phone]    Domestic Abuse  Have you ever been the victim of abuse or violence?: No  Physical Abuse or Sexual Abuse: No  Verbal Abuse or Emotional Abuse: No  Possible Abuse Reported to:: Not Applicable    Psychological Assessment  History of Substance Abuse: None  History of Psychiatric Problems: No  Non-compliant with Treatment: No  Newly Diagnosed Illness: Yes    Discharge Risks or Barriers  Discharge risks or barriers?: None    Anticipated Discharge Information  Anticipated discharge disposition: Home  Discharge Address: [address] [place] [address] [address] [place] [id number]

## 2020-02-11 NOTE — PROGRESS NOTES
Received report from day shift RN. Assumed patient care at 1900  AOx4  Pain rated at 1/10, declines intervention at this time   0.5 L of O2 via nasal cannula   R groin incision with pervena wound vac in place  Post tibial and petal pulses on the right heard via doppler  No complaints of nausea at this time, diabetic diet  Ambulates x1 assist with four wheel walker  +void +flatus +BM (smear)  High fall risk, bed alarm on  Call light within reach  Bed locked and in low position   POC discussed with patient  All needs met at this time.

## 2020-02-11 NOTE — PROGRESS NOTES
Discharging Patient home per physician order.  Discharged with Daughter.  Demonstrated understanding of discharge instructions, follow up appointments, home medications, prescriptions, home care for surgical wound and nursing care instructions for prevena.  Ambulating with 1 person assistance and 4WW, voiding with incontinence, pain well controlled, tolerating oral medications, oxygen saturation greater than 90%, tolerating diet.  Educational handouts given and discussed.  Verbalized understanding of discharge instructions and educational handouts.  All questions answered.  Belongings with patient at time of discharge.

## 2020-02-11 NOTE — CARE PLAN
Problem: Safety  Goal: Will remain free from falls  Outcome: PROGRESSING AS EXPECTED     Patient educated to call for assistance before getting out of bed, verbalizes understanding     Problem: Venous Thromboembolism (VTW)/Deep Vein Thrombosis (DVT) Prevention:  Goal: Patient will participate in Venous Thrombosis (VTE)/Deep Vein Thrombosis (DVT)Prevention Measures  Outcome: PROGRESSING AS EXPECTED     SCDs on while patient is in bed

## 2020-02-11 NOTE — PROGRESS NOTES
Report received. Assessment completed.  Pt is A&O x4. Pt on room air.   Denies pain and nausea.  Prevena wound vac in place to Right groin  +BM today.   +void-has cunningham clamp in place for incontinence.  Pt removes and urinates Q2.  Tolerating diet  Pt up with 1 assist and home 4 wheel walker.   Call light within reach. All needs met at this time. Fall Precautions and hourly rounding in place.  Bed alarm on

## 2020-02-12 ENCOUNTER — PATIENT OUTREACH (OUTPATIENT)
Dept: HEALTH INFORMATION MANAGEMENT | Facility: OTHER | Age: 85
End: 2020-02-12

## 2020-02-12 LAB — GLUCOSE BLD-MCNC: 139 MG/DL (ref 65–99)

## 2020-03-11 ENCOUNTER — PATIENT OUTREACH (OUTPATIENT)
Dept: HEALTH INFORMATION MANAGEMENT | Facility: OTHER | Age: 85
End: 2020-03-11

## 2020-03-18 NOTE — PROGRESS NOTES
An 84-year-old male was an elective admission to [facility] from 2/10/2020 to 2/11/2020 to treat Peripheral vascular angioplasty status with implants and grafts. The patient was discharged Home. No additional medical conditions were listed. The patient was not under clinical case management.     The patient was ordered to start/continue to take the following medication: Hydrocodone-acetaminophen. The patient did not fill any of the discharge order medications. The patient did not fill Hydrocodone-acetaminophen because Patient Refused.     The patient was ordered to follow-up with PCP and Specialist. The patient had the following appointments:     1) 2/20/2020 @ 10:15 Alek Delgado general surgery - CONFIRMED AS KEPT     2) [APPOINTMENT DATE] Alek Delgado general surgery - PATIENT DECLINED APPOINTMENT  The patient has no future appointments scheduled.

## 2020-03-30 DIAGNOSIS — R60.9 EDEMA, UNSPECIFIED TYPE: ICD-10-CM

## 2020-03-30 RX ORDER — HYDROCHLOROTHIAZIDE 25 MG/1
25 TABLET ORAL DAILY
Qty: 100 TAB | Refills: 3 | Status: SHIPPED | OUTPATIENT
Start: 2020-03-30 | End: 2020-10-12

## 2020-04-25 ENCOUNTER — OFFICE VISIT (OUTPATIENT)
Dept: URGENT CARE | Facility: PHYSICIAN GROUP | Age: 85
End: 2020-04-25
Payer: MEDICARE

## 2020-04-25 ENCOUNTER — HOSPITAL ENCOUNTER (OUTPATIENT)
Facility: MEDICAL CENTER | Age: 85
End: 2020-04-25
Attending: FAMILY MEDICINE
Payer: MEDICARE

## 2020-04-25 VITALS
WEIGHT: 172 LBS | HEIGHT: 71 IN | TEMPERATURE: 98.6 F | BODY MASS INDEX: 24.08 KG/M2 | OXYGEN SATURATION: 96 % | RESPIRATION RATE: 16 BRPM | SYSTOLIC BLOOD PRESSURE: 116 MMHG | DIASTOLIC BLOOD PRESSURE: 54 MMHG | HEART RATE: 72 BPM

## 2020-04-25 DIAGNOSIS — N30.00 ACUTE CYSTITIS WITHOUT HEMATURIA: ICD-10-CM

## 2020-04-25 DIAGNOSIS — N30.00 ACUTE CYSTITIS WITHOUT HEMATURIA: Primary | ICD-10-CM

## 2020-04-25 LAB
APPEARANCE UR: NORMAL
BILIRUB UR STRIP-MCNC: NEGATIVE MG/DL
COLOR UR AUTO: NORMAL
GLUCOSE UR STRIP.AUTO-MCNC: NEGATIVE MG/DL
KETONES UR STRIP.AUTO-MCNC: NORMAL MG/DL
LEUKOCYTE ESTERASE UR QL STRIP.AUTO: NORMAL
NITRITE UR QL STRIP.AUTO: POSITIVE
PH UR STRIP.AUTO: 5 [PH] (ref 5–8)
PROT UR QL STRIP: 100 MG/DL
RBC UR QL AUTO: NORMAL
SP GR UR STRIP.AUTO: 1.02
UROBILINOGEN UR STRIP-MCNC: 0.2 MG/DL

## 2020-04-25 PROCEDURE — 87077 CULTURE AEROBIC IDENTIFY: CPT

## 2020-04-25 PROCEDURE — 99212 OFFICE O/P EST SF 10 MIN: CPT | Performed by: FAMILY MEDICINE

## 2020-04-25 PROCEDURE — 87186 SC STD MICRODIL/AGAR DIL: CPT

## 2020-04-25 PROCEDURE — 81002 URINALYSIS NONAUTO W/O SCOPE: CPT | Performed by: FAMILY MEDICINE

## 2020-04-25 PROCEDURE — 87086 URINE CULTURE/COLONY COUNT: CPT

## 2020-04-25 RX ORDER — CEFDINIR 300 MG/1
300 CAPSULE ORAL 2 TIMES DAILY
Qty: 14 CAP | Refills: 0 | Status: SHIPPED | OUTPATIENT
Start: 2020-04-25 | End: 2020-10-12

## 2020-04-25 ASSESSMENT — ENCOUNTER SYMPTOMS
FATIGUE: 0
NAUSEA: 0
VISUAL CHANGE: 0
CHILLS: 0
CHANGE IN BOWEL HABIT: 0
ARTHRALGIAS: 0
DIAPHORESIS: 0
VOMITING: 0
ABDOMINAL PAIN: 0
NUMBNESS: 0
HEADACHES: 0
SWOLLEN GLANDS: 0
SORE THROAT: 0
COUGH: 0
JOINT SWELLING: 0
WEAKNESS: 0
FEVER: 0
ANOREXIA: 0
VERTIGO: 0
MYALGIAS: 0
NECK PAIN: 0

## 2020-04-25 ASSESSMENT — FIBROSIS 4 INDEX: FIB4 SCORE: 2.05

## 2020-04-25 NOTE — PROGRESS NOTES
Subjective:      Selvin Braga is a 84 y.o. male who presents with No chief complaint on file.            UTI   This is a recurrent problem. The current episode started in the past 7 days. The problem occurs constantly. The problem has been unchanged. Associated symptoms include urinary symptoms. Pertinent negatives include no abdominal pain, anorexia, arthralgias, change in bowel habit, chest pain, chills, congestion, coughing, diaphoresis, fatigue, fever, headaches, joint swelling, myalgias, nausea, neck pain, numbness, rash, sore throat, swollen glands, vertigo, visual change, vomiting or weakness. Nothing aggravates the symptoms. He has tried nothing for the symptoms. The treatment provided no relief.       Review of Systems   Constitutional: Negative for chills, diaphoresis, fatigue and fever.   HENT: Negative for congestion and sore throat.    Respiratory: Negative for cough.    Cardiovascular: Negative for chest pain.   Gastrointestinal: Negative for abdominal pain, anorexia, change in bowel habit, nausea and vomiting.   Genitourinary: Positive for dysuria, frequency and urgency.   Musculoskeletal: Negative for arthralgias, joint swelling, myalgias and neck pain.   Skin: Negative for rash.   Neurological: Negative for vertigo, weakness, numbness and headaches.          Objective:     There were no vitals taken for this visit.     Physical Exam  Vitals signs reviewed.   Constitutional:       Appearance: Normal appearance.   HENT:      Head: Normocephalic and atraumatic.      Nose: Nose normal.   Eyes:      Extraocular Movements: Extraocular movements intact.   Neck:      Musculoskeletal: Normal range of motion.   Cardiovascular:      Rate and Rhythm: Normal rate.      Pulses: Normal pulses.   Pulmonary:      Effort: Pulmonary effort is normal.   Musculoskeletal: Normal range of motion.   Skin:     General: Skin is warm.      Capillary Refill: Capillary refill takes less than 2 seconds.   Neurological:       General: No focal deficit present.      Mental Status: He is alert.   Psychiatric:         Mood and Affect: Mood normal.                 Assessment/Plan:       1. 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

## 2020-06-11 ENCOUNTER — HOSPITAL ENCOUNTER (OUTPATIENT)
Dept: LAB | Facility: MEDICAL CENTER | Age: 85
End: 2020-06-11
Attending: PHYSICIAN ASSISTANT
Payer: MEDICARE

## 2020-06-11 PROCEDURE — 87186 SC STD MICRODIL/AGAR DIL: CPT

## 2020-06-11 PROCEDURE — 87077 CULTURE AEROBIC IDENTIFY: CPT

## 2020-06-11 PROCEDURE — 87086 URINE CULTURE/COLONY COUNT: CPT

## 2020-07-13 DIAGNOSIS — E78.5 DYSLIPIDEMIA: Primary | ICD-10-CM

## 2020-07-13 RX ORDER — ATORVASTATIN CALCIUM 40 MG/1
40 TABLET, FILM COATED ORAL EVERY EVENING
Qty: 90 TAB | Refills: 0 | Status: SHIPPED | OUTPATIENT
Start: 2020-07-13 | End: 2020-07-14 | Stop reason: SDUPTHER

## 2020-07-14 DIAGNOSIS — E78.5 DYSLIPIDEMIA: ICD-10-CM

## 2020-07-14 RX ORDER — ATORVASTATIN CALCIUM 40 MG/1
40 TABLET, FILM COATED ORAL EVERY EVENING
Qty: 100 TAB | Refills: 3 | Status: SHIPPED | OUTPATIENT
Start: 2020-07-14 | End: 2021-04-13

## 2020-07-17 ENCOUNTER — HOSPITAL ENCOUNTER (OUTPATIENT)
Dept: LAB | Facility: MEDICAL CENTER | Age: 85
End: 2020-07-17
Attending: UROLOGY
Payer: MEDICARE

## 2020-07-17 PROCEDURE — 87186 SC STD MICRODIL/AGAR DIL: CPT

## 2020-07-17 PROCEDURE — 87086 URINE CULTURE/COLONY COUNT: CPT

## 2020-07-17 PROCEDURE — 87077 CULTURE AEROBIC IDENTIFY: CPT

## 2020-07-18 LAB
AMBIGUOUS DTTM AMBI4: NORMAL
SIGNIFICANT IND 70042: NORMAL
SITE SITE: NORMAL
SOURCE SOURCE: NORMAL

## 2020-07-22 ENCOUNTER — OFFICE VISIT (OUTPATIENT)
Dept: CARDIOLOGY | Facility: MEDICAL CENTER | Age: 85
End: 2020-07-22
Payer: MEDICARE

## 2020-07-22 VITALS
WEIGHT: 172.8 LBS | DIASTOLIC BLOOD PRESSURE: 60 MMHG | HEIGHT: 71 IN | OXYGEN SATURATION: 93 % | HEART RATE: 80 BPM | BODY MASS INDEX: 24.19 KG/M2 | SYSTOLIC BLOOD PRESSURE: 122 MMHG

## 2020-07-22 DIAGNOSIS — I73.9 PAD (PERIPHERAL ARTERY DISEASE) (HCC): ICD-10-CM

## 2020-07-22 DIAGNOSIS — Z79.4 TYPE 2 DIABETES MELLITUS WITH STAGE 3 CHRONIC KIDNEY DISEASE, WITH LONG-TERM CURRENT USE OF INSULIN (HCC): ICD-10-CM

## 2020-07-22 DIAGNOSIS — I10 ESSENTIAL HYPERTENSION: ICD-10-CM

## 2020-07-22 DIAGNOSIS — Z66 DO NOT RESUSCITATE STATUS: ICD-10-CM

## 2020-07-22 DIAGNOSIS — N18.30 CHRONIC KIDNEY DISEASE (CKD), STAGE III (MODERATE): ICD-10-CM

## 2020-07-22 DIAGNOSIS — N18.30 TYPE 2 DIABETES MELLITUS WITH STAGE 3 CHRONIC KIDNEY DISEASE, WITH LONG-TERM CURRENT USE OF INSULIN (HCC): ICD-10-CM

## 2020-07-22 DIAGNOSIS — E11.22 TYPE 2 DIABETES MELLITUS WITH STAGE 3 CHRONIC KIDNEY DISEASE, WITH LONG-TERM CURRENT USE OF INSULIN (HCC): ICD-10-CM

## 2020-07-22 DIAGNOSIS — Z86.718 HISTORY OF DVT (DEEP VEIN THROMBOSIS): ICD-10-CM

## 2020-07-22 DIAGNOSIS — E78.5 DYSLIPIDEMIA: ICD-10-CM

## 2020-07-22 PROCEDURE — 99214 OFFICE O/P EST MOD 30 MIN: CPT | Performed by: INTERNAL MEDICINE

## 2020-07-22 ASSESSMENT — ENCOUNTER SYMPTOMS
BRUISES/BLEEDS EASILY: 1
FALLS: 1
DIARRHEA: 1
DEPRESSION: 1
HEARTBURN: 1
HEMATOCHEZIA: 1
BACK PAIN: 1

## 2020-07-22 ASSESSMENT — FIBROSIS 4 INDEX: FIB4 SCORE: 2.05

## 2020-07-22 NOTE — PROGRESS NOTES
Cardiology Follow-up Consultation Note    Date of note:   7/22/2020  Primary Care Provider: Laury Rees M.D.  Referring Provider: Zeus Joyce MD.     Patient Name: Selvin Braga   YOB: 1935  MRN:              [id number]    Chief Complaint: PVD.     History of Present Illness: Selvin Braga is a 84 y.o. male whose current medical problems include mild aortic insufficiency, peripheral artery disease status post femoral endarterectomy and PCI of his right SFA 3/2019 and then right common femoral artery bypass grafting 2/2020 by Dr. Delgado, elevated troponin in the setting of sepsis in April 2017 (myocardial perfusion imaging 5/22/2017 negative for ischemia), provoked DVTs, dyslipidemia, and diabetes who is here for follow-up.    At our visit,  7/19/2019:  In terms of PVD, no more claudication.      In terms of previous elevated troponin, no angina.     Interim Events:  In terms of PVD, had a bypass surgery for his leg. Much improved.     + decreased balance.     In terms of hypertension, well controlled.    Dyslipidemia well controlled.     + foot swelling.     Review of Systems   HENT: Positive for hearing loss.    Hematologic/Lymphatic: Bruises/bleeds easily.   Skin: Positive for itching.   Musculoskeletal: Positive for back pain and falls.   Gastrointestinal: Positive for diarrhea, heartburn and hematochezia (has had since prostate cancer resection. has had 4 colonoscopies, not interested in any more).   Genitourinary: Positive for hematuria (s/p prostate cancer, s/p multiple negative scopes).   Psychiatric/Behavioral: Positive for depression.   Allergic/Immunologic: Positive for environmental allergies.     + incontinence due to prostate cancer treatment consequences.     All other systems reviewed and discussed using a comprehensive questionnaire and are negative.         Past Medical History:   Diagnosis Date   • AK (actinic keratosis) 7/21/2016   • Anemia    • Arrhythmia      PVC's   • Arthritis     lower back   • Lombardi esophagus    • Cancer (HCC) 2001    prostate   • CATARACT     bilateral IOL   • Chronic kidney disease (CKD), stage III (moderate) ([facility])    • Colon polyps    • Dental disorder     Dentures   • Depression 7/21/2016   • Diabetes 1984    oral medication   • GERD (gastroesophageal reflux disease)    • High cholesterol    • Hyperlipidemia    • Hypertension    • Infectious disease     history of c-diff 2016   • Neuropathy ([facility])    • Other specified disorder of intestines     alters between normal and diarrhea since radiation tx   • Personal history of venous thrombosis and embolism 2010/1995    leg   • Prostate cancer ([facility]) 2001    Status post prostatectomy   • Psychiatric problem     Depression, treated with Prozac.   • PVD (peripheral vascular disease) ([facility])     s/p femoral endarterectomy   • Sleep apnea     Does not use CPAP   • Snoring     sleep study done   • Unspecified urinary incontinence     spill from artifical urinary sphincter   • Urinary bladder disorder     uses external catheter at night, and clamp during day         Past Surgical History:   Procedure Laterality Date   • FEMORAL ENDARTERECTOMY Right 2/10/2020    Procedure: ENDARTERECTOMY, FEMORAL- COMMON FEMORAL ARTERY BYPASS WITH VEIN;  Surgeon: Alek Delgado M.D.;  Location: SURGERY [facility];  Service: General   • FEMORAL ENDARTERECTOMY Right 3/13/2019    Procedure: FEMORAL ENDARTERECTOMY- COMMON;  Surgeon: Alek Delgado M.D.;  Location: [facility];  Service: General   • ILIAC ANGIOPLASTY WITH STENT Right 3/13/2019    Procedure: ILIAC ANGIOPLASTY WITH STENT- RETROGRADES;  Surgeon: Alek Delgado M.D.;  Location: SURGERY [facility];  Service: General   • ANKLE ORIF Right 10/28/2015    Procedure: ANKLE ORIF;  Surgeon: Venu Elizabeth M.D.;  Location: SURGERY [facility];  Service:    • SPHINCTER PROSTHESIS REMOVAL  8/10/2015    Procedure: SPHINCTER PROSTHESIS REMOVAL;  Surgeon:  Tuan Marie M.D.;  Location: [facility];  Service:    • LAPAROSCOPY  5/21/2014    Performed by Graham Peña M.D. at SURGERY [facility]   • BOWEL RESECTION  5/21/2014    Performed by Graham Peña M.D. at [facility]   • COLONOSCOPY WITH APC  6/5/2013    Performed by Deepa Vela M.D. at [facility]   • CATARACT PHACO WITH IOL  2/27/2013    Performed by Clayton Noonan M.D. at SURGERY SAME DAY [facility]   • CATARACT PHACO WITH IOL  2/13/2013    Performed by Clayton Noonan M.D. at SURGERY SAME DAY [facility]   • LUMBAR FUSION POSTERIOR  12/13/2012    Procedure: L5-S1 removal of Jennings Fragment NON-INSTRUMENTAL;  Surgeon: Wil Martinez M.D.;  Location: [facility];  Service:    • LUMBAR DECOMPRESSION  12/13/2012    Procedure: 4-5;  Surgeon: Wil Martinez M.D.;  Location: [facility];  Service:    • SPHINCTER PROSTHESIS PLACEMENT  9/26/2012    Performed by GERSON Car M.D. at SURGERY [facility]   • GROIN EXPLORATION  3/13/2012    Performed by DEEPA PATRICIA at [facility]   • SPHINCTER PROSTHESIS PLACEMENT  2/24/2011    Performed by GERSON CAR at [facility]   • CYSTOSCOPY  2/24/2011    Performed by GERSON CAR at [facility]   • CYSTOSCOPY  2/2/2011    Performed by GERSON CAR at [facility]   • SPHINCTER PROSTHESIS REMOVAL  6/3/2010    Performed by JOSH APARICIO at [facility]   • ANGIOGRAM  1/25/2010    Performed by PETER NORMAN at [facility]   • AORTOGRAM  1/25/2010    Performed by PETER NORMAN at [facility]   • PROSTATECTOMY ROBOTIC  2/2001   • PB INSERT,INFLATABLE SPHINCTER  2001   • OTHER ORTHOPEDIC SURGERY Left     hip & femur fx         Current Outpatient Medications   Medication Sig Dispense Refill   • atorvastatin (LIPITOR) 40 MG Tab Take 1 Tab by mouth every evening. 100  Tab 3   • cefdinir (OMNICEF) 300 MG Cap Take 1 Cap by mouth 2 times a day. 14 Cap 0   • hydroCHLOROthiazide (HYDRODIURIL) 25 MG Tab Take 1 Tab by mouth every day. 100 Tab 3   • D-Mannose 500 MG Cap Take 500 mg by mouth 3 times a day.     • Alogliptin Benzoate 12.5 MG Tab Take 12.5 mg by mouth every evening.     • lisinopril (PRINIVIL) 2.5 MG Tab Take 2.5 mg by mouth every day.     • aspirin 81 MG tablet Take 1 Tab by mouth every day. 90 Tab 3   • Multiple Vitamins-Minerals (PRESERVISION AREDS 2+MULTI VIT PO) Take 1 Cap by mouth 2 Times a Day.     • pantoprazole (PROTONIX) 40 MG Tablet Delayed Response Take 40 mg by mouth every day.     • glipiZIDE (GLUCOTROL) 10 MG Tab Take 10 mg by mouth 2 times a day.     • Cholecalciferol (VITAMIN D) 2000 UNIT TABS Take 2,000 Units by mouth every day.     • metformin (GLUCOPHAGE) 500 MG TABS Take 500 mg by mouth 2 times a day, with meals.     • fluoxetine (PROZAC) 20 MG CAPS Take 20 mg by mouth every day.     • metoprolol (LOPRESSOR) 25 MG Tab TAKE 1/2 TABLET BY MOUTH 2 TIMES A  Tab 3     No current facility-administered medications for this visit.          Allergies   Allergen Reactions   • Augmentin Unspecified     Bleeding (rectal)  RXN=5 years ago   • Latex Unspecified     Blisters  RXN=ongoing   • Tape Rash     Rash-blisters-paper tape okay  RXN=ongoing         Family History   Problem Relation Age of Onset   • Cancer Mother         ovarian   • Heart Disease Father    • Diabetes Father    • Diabetes Sister    • Hypertension Other    • Cancer Other         multiple cousins with prostate cancer.          Social History     Socioeconomic History   • Marital status:      Spouse name: Not on file   • Number of children: 5   • Years of education: Not on file   • Highest education level: Not on file   Occupational History   • Not on file   Social Needs   • Financial resource strain: Not on file   • Food insecurity     Worry: Not on file     Inability: Not on file   •  "Transportation needs     Medical: Not on file     Non-medical: Not on file   Tobacco Use   • Smoking status: Former Smoker     Packs/day: 2.00     Years: 30.00     Pack years: 60.00     Types: Cigarettes     Last attempt to quit: 3/5/1995     Years since quittin.4   • Smokeless tobacco: Never Used   • Tobacco comment: 1.5 ppd 25 yrs,quit    Substance and Sexual Activity   • Alcohol use: No     Alcohol/week: 0.0 oz   • Drug use: No   • Sexual activity: Not on file   Lifestyle   • Physical activity     Days per week: Not on file     Minutes per session: Not on file   • Stress: Not on file   Relationships   • Social connections     Talks on phone: Not on file     Gets together: Not on file     Attends Anabaptism service: Not on file     Active member of club or organization: Not on file     Attends meetings of clubs or organizations: Not on file     Relationship status: Not on file   • Intimate partner violence     Fear of current or ex partner: Not on file     Emotionally abused: Not on file     Physically abused: Not on file     Forced sexual activity: Not on file   Other Topics Concern   • Not on file   Social History Narrative    Retired, previous  at [employer].          Physical Exam:  Ambulatory Vitals  /60 (BP Location: Left arm, Patient Position: Sitting, BP Cuff Size: Adult)   Pulse 80   Ht 1.803 m (5' 11\")   Wt 78.4 kg (172 lb 12.8 oz)   SpO2 93%    Oxygen Therapy:  Pulse Oximetry: 93 %  BP Readings from Last 4 Encounters:   20 122/60   20 116/54   20 126/44   20 136/62       Weight/BMI: Body mass index is 24.1 kg/m².  Wt Readings from Last 4 Encounters:   20 78.4 kg (172 lb 12.8 oz)   20 78 kg (172 lb)   02/10/20 77.6 kg (171 lb 1.2 oz)   20 79.4 kg (175 lb)       General: No apparent distress  Eyes: pale conjunctiva  ENT: OP covered by mask,  normal external appearance of nose and ears  Neck: JVP <8 cm H2O, no carotid " bruits  Lungs: normal respiratory effort, CTAB  Heart: RRR, 2/6 systolic murmur at apex,  no rubs or gallops, 1-2+ edema bilateral lower extremities. No LV/RV heave on cardiac palpatation. 2+ bilateral radial pulses.    Abdomen: soft, non tender, non distended, no masses, normal bowel sounds.  No HSM.  Extremities/MSK: no clubbing, no cyanosis  Neurological: No focal sensory deficits  Psychiatric: Appropriate affect, A/O x 3, intact judgement and insight  Skin: Warm extremities, pale    Exam repeated in full and unchanged except for as noted above.      Lab Data Review:  Lab Results   Component Value Date/Time    CHOLSTRLTOT 108 06/26/2019 08:03 AM    LDL 41 06/26/2019 08:03 AM    HDL 40 06/26/2019 08:03 AM    TRIGLYCERIDE 133 06/26/2019 08:03 AM       Lab Results   Component Value Date/Time    SODIUM 138 01/31/2020 03:02 PM    POTASSIUM 4.4 02/10/2020 07:00 AM    CHLORIDE 102 01/31/2020 03:02 PM    CO2 26 01/31/2020 03:02 PM    GLUCOSE 216 (H) 01/31/2020 03:02 PM    BUN 31 (H) 01/31/2020 03:02 PM    CREATININE 1.37 01/31/2020 03:02 PM    CREATININE 1.0 02/08/2005 02:20 PM     Lab Results   Component Value Date/Time    ALKPHOSPHAT 90 11/26/2019 08:19 AM    ASTSGOT 17 11/26/2019 08:19 AM    ALTSGPT 12 11/26/2019 08:19 AM    TBILIRUBIN 0.6 11/26/2019 08:19 AM      Lab Results   Component Value Date/Time    WBC 7.2 01/31/2020 03:02 PM     No components found for: HBGA1C  No components found for: TROPONIN  No components found for: BNP      Cardiac Imaging and Procedures Review:    EKG dated 3/8/2019 : My personal interpretation is NSR, normal EKG.     Echo dated 5/9/2019:   CONCLUSIONS  Normal left ventricular size, wall thickness, and systolic function.  Mitral annular calcification.  Aortic sclerosis without stenosis.  Compared to the images of the study done 4/21/17 - there has been no   significant change.     Nuclear Perfusion Imaging (5/2017):   Myocardial Perfusion   Report   NUCLEAR IMAGING INTERPRETATION   No  evidence of significant jeopardized viable myocardium or prior myocardial    infarction.   Normal left ventricular size, ejection fraction, and wall motion.    Radiology test Review:  CXR:   2017  FINDINGS:  Cardiac silhouette is mildly enlarged.  No pulmonary infiltrates or consolidations are noted.  No pleural effusions are appreciated.  Elevation of the right hemidiaphragm is again noted. Scattered linear peripheral opacifications are again noted in the left lung base.      CTA aorta 2020:  IMPRESSION:     1.  There is moderate to severe atherosclerosis.  2.  There is no evidence of thoracoabdominal aneurysm or dissection.  3.   There are variable amounts of stenosis involving the pelvic vasculature and lower extremity vasculature as discussed above in further detail.  4.  There is estimated 50% stenosis of the origin of the celiac axis and SMA with extensive calcific plaque.  5.  There is bilateral renal artery stenosis, right greater than left.  6.  There is underlying emphysema and chronic interstitial lung disease.  7.  There are postoperative changes consistent with prior prostatectomy.  8.  There is a probable right inguinal postoperative seroma or liquefied hematoma versus less likely thrombosed pseudoaneurysm.  9.  There are bilateral simple appearing renal cysts.    Op Note Dr. Delgado 2/10/2020:  PROCEDURE PERFORMED -      1.  Right common femoral artery interposition bypass using reversed ipsilateral greater saphenous vein  2.  Resection of right femoral artery  3.  Harvesting of right greater saphenous vein    Medical Decision Makin. PAD (peripheral artery disease) (HCC)  Now asymptomatic, s/p R SFA PCI previously and recent bypass grafting.   -aspirin 81mg PO Daily  -lipitor, lipids well controlled    2. History of DVT (deep vein thrombosis)  Twice, both provoked.   -CTM, no indication for prolonged anticoagulation at this time.     3. Essential hypertension  Well controlled  -continue  current meds  -check BMP    4. Dyslipidemia  Well controlled  -continue lipitor    5. Chronic kidney disease (CKD), stage III (moderate) (HCC)  Stable,  -check BMP.     6. Anemia, unspecified type  Unclear cause, does have chronic hematuria/hematochezia. No reversible causes found on scopes. Previously unresponsive to iron.   -recheck CBC months, further work-up if not stable.     7. Leg swelling  Normal albumin and normal echocardiogram without e/o CHF. This is likely secondary to venous insufficiency in the setting of previous DVT is both legs.  -CTM, compression stockings, leg elevation.     8. Goals of care - DNR, POLST and AD scanned in.       Return in about 1 year (around 7/22/2021).      Hadley Martinez MD, [facility]  [address] [address]  Phone: [phone]  Fax: [phone]

## 2020-08-12 ENCOUNTER — HOSPITAL ENCOUNTER (OUTPATIENT)
Dept: LAB | Facility: MEDICAL CENTER | Age: 85
End: 2020-08-12
Attending: INTERNAL MEDICINE
Payer: MEDICARE

## 2020-08-12 DIAGNOSIS — Z86.718 HISTORY OF DVT (DEEP VEIN THROMBOSIS): ICD-10-CM

## 2020-08-12 DIAGNOSIS — N18.30 CHRONIC KIDNEY DISEASE (CKD), STAGE III (MODERATE): ICD-10-CM

## 2020-08-12 DIAGNOSIS — I73.9 PAD (PERIPHERAL ARTERY DISEASE) (HCC): ICD-10-CM

## 2020-08-12 DIAGNOSIS — Z79.4 TYPE 2 DIABETES MELLITUS WITH STAGE 3 CHRONIC KIDNEY DISEASE, WITH LONG-TERM CURRENT USE OF INSULIN (HCC): ICD-10-CM

## 2020-08-12 DIAGNOSIS — E11.22 TYPE 2 DIABETES MELLITUS WITH STAGE 3 CHRONIC KIDNEY DISEASE, WITH LONG-TERM CURRENT USE OF INSULIN (HCC): ICD-10-CM

## 2020-08-12 DIAGNOSIS — I10 ESSENTIAL HYPERTENSION: ICD-10-CM

## 2020-08-12 DIAGNOSIS — N18.30 TYPE 2 DIABETES MELLITUS WITH STAGE 3 CHRONIC KIDNEY DISEASE, WITH LONG-TERM CURRENT USE OF INSULIN (HCC): ICD-10-CM

## 2020-08-12 LAB
ANION GAP SERPL CALC-SCNC: 15 MMOL/L (ref 7–16)
BUN SERPL-MCNC: 39 MG/DL (ref 8–22)
CALCIUM SERPL-MCNC: 10 MG/DL (ref 8.5–10.5)
CHLORIDE SERPL-SCNC: 102 MMOL/L (ref 96–112)
CHOLEST SERPL-MCNC: 114 MG/DL (ref 100–199)
CO2 SERPL-SCNC: 21 MMOL/L (ref 20–33)
CREAT SERPL-MCNC: 1.9 MG/DL (ref 0.5–1.4)
ERYTHROCYTE [DISTWIDTH] IN BLOOD BY AUTOMATED COUNT: 55.6 FL (ref 35.9–50)
EST. AVERAGE GLUCOSE BLD GHB EST-MCNC: 160 MG/DL
FASTING STATUS PATIENT QL REPORTED: NORMAL
GLUCOSE SERPL-MCNC: 200 MG/DL (ref 65–99)
HBA1C MFR BLD: 7.2 % (ref 0–5.6)
HCT VFR BLD AUTO: 32.1 % (ref 42–52)
HDLC SERPL-MCNC: 46 MG/DL
HGB BLD-MCNC: 10.1 G/DL (ref 14–18)
LDLC SERPL CALC-MCNC: 46 MG/DL
MAGNESIUM SERPL-MCNC: 1.9 MG/DL (ref 1.5–2.5)
MCH RBC QN AUTO: 29.3 PG (ref 27–33)
MCHC RBC AUTO-ENTMCNC: 31.5 G/DL (ref 33.7–35.3)
MCV RBC AUTO: 93 FL (ref 81.4–97.8)
PLATELET # BLD AUTO: 178 K/UL (ref 164–446)
PMV BLD AUTO: 9.9 FL (ref 9–12.9)
POTASSIUM SERPL-SCNC: 4.8 MMOL/L (ref 3.6–5.5)
RBC # BLD AUTO: 3.45 M/UL (ref 4.7–6.1)
SODIUM SERPL-SCNC: 138 MMOL/L (ref 135–145)
TRIGL SERPL-MCNC: 108 MG/DL (ref 0–149)
WBC # BLD AUTO: 7.5 K/UL (ref 4.8–10.8)

## 2020-08-12 PROCEDURE — 83735 ASSAY OF MAGNESIUM: CPT

## 2020-08-12 PROCEDURE — 85027 COMPLETE CBC AUTOMATED: CPT

## 2020-08-12 PROCEDURE — 80061 LIPID PANEL: CPT

## 2020-08-12 PROCEDURE — 36415 COLL VENOUS BLD VENIPUNCTURE: CPT

## 2020-08-12 PROCEDURE — 80048 BASIC METABOLIC PNL TOTAL CA: CPT

## 2020-08-12 PROCEDURE — 83036 HEMOGLOBIN GLYCOSYLATED A1C: CPT

## 2020-08-18 ENCOUNTER — TELEPHONE (OUTPATIENT)
Dept: CARDIOLOGY | Facility: MEDICAL CENTER | Age: 85
End: 2020-08-18

## 2020-08-18 DIAGNOSIS — I10 ESSENTIAL HYPERTENSION: ICD-10-CM

## 2020-08-18 DIAGNOSIS — N18.30 CHRONIC KIDNEY DISEASE (CKD), STAGE III (MODERATE): ICD-10-CM

## 2020-08-18 NOTE — TELEPHONE ENCOUNTER
Acute kidney injury noted on labs. Please have him stop hydrochlorothiazide and also septa if he's on bactrim. Can we find out why he's on bactrim, and if for a urologic issue, we need to let his urologist know to consider an alternative agent.     Please have him get a recheck of BMP in 1 week.     If BP after stopping hctz is consistently >130/85, let us know and we can switch metoprolol to carvedilol 6.25mg PO bid.  Thanks!

## 2020-08-19 NOTE — TELEPHONE ENCOUNTER
"Spoke to patient and discussed results and recommendations. Patient states that he is on a \"sulfa\" med prescribed by Dr. Marie for recurring UTIs but does not remember the exact name of it. Advised patient to reach out to urology and discuss KAVITA.    Patient repeated recommendations and verbalized understanding. Patient requested lab order for repeat BMP and lab results to be mailed. Patient was appreciative for the phone call.    BMP ordered and mailed with recent lab results.    To Dr. Martinez: Patient asked if he will have a different recommendation prescribed to control his leg edema. Please advise.  "

## 2020-08-21 NOTE — TELEPHONE ENCOUNTER
Per telephone conversation with Dr. Martinez, patient should use compression stockings and elevation, as his swelling is from DVTs. Advise patient to monitor weight and if he begins to experience HF symptoms, to call us.    Spoke to patient to discuss recommendations. Patient verbalized understanding and was appreciative for the phone call.

## 2020-08-27 ENCOUNTER — HOSPITAL ENCOUNTER (OUTPATIENT)
Dept: LAB | Facility: MEDICAL CENTER | Age: 85
End: 2020-08-27
Attending: INTERNAL MEDICINE
Payer: MEDICARE

## 2020-08-27 DIAGNOSIS — N18.30 CHRONIC KIDNEY DISEASE (CKD), STAGE III (MODERATE): ICD-10-CM

## 2020-08-27 DIAGNOSIS — I10 ESSENTIAL HYPERTENSION: ICD-10-CM

## 2020-08-27 PROCEDURE — 36415 COLL VENOUS BLD VENIPUNCTURE: CPT

## 2020-08-27 PROCEDURE — 80048 BASIC METABOLIC PNL TOTAL CA: CPT

## 2020-08-28 LAB
ANION GAP SERPL CALC-SCNC: 10 MMOL/L (ref 7–16)
BUN SERPL-MCNC: 23 MG/DL (ref 8–22)
CALCIUM SERPL-MCNC: 9.6 MG/DL (ref 8.5–10.5)
CHLORIDE SERPL-SCNC: 104 MMOL/L (ref 96–112)
CO2 SERPL-SCNC: 22 MMOL/L (ref 20–33)
CREAT SERPL-MCNC: 1.32 MG/DL (ref 0.5–1.4)
GLUCOSE SERPL-MCNC: 199 MG/DL (ref 65–99)
POTASSIUM SERPL-SCNC: 5 MMOL/L (ref 3.6–5.5)
SODIUM SERPL-SCNC: 136 MMOL/L (ref 135–145)

## 2020-09-11 ENCOUNTER — TELEPHONE (OUTPATIENT)
Dept: CARDIOLOGY | Facility: MEDICAL CENTER | Age: 85
End: 2020-09-11

## 2020-09-11 DIAGNOSIS — I10 ESSENTIAL HYPERTENSION: ICD-10-CM

## 2020-09-11 NOTE — TELEPHONE ENCOUNTER
----- Message from Miya Purcell R.N. sent at 8/28/2020  9:30 AM PDT -----  Labs improved. No FV scheduled.

## 2020-09-11 NOTE — TELEPHONE ENCOUNTER
KAVITA resolved. Continue off hctz. Let's check on his BP and if it is consistently >130/85, we can switch metoprolol to carvedilol 6.25mg PO bid.    Thanks!

## 2020-09-14 NOTE — TELEPHONE ENCOUNTER
Spoke with patient. Patient reports SBP for the past two weeks has steadily increased from the 120s into the 130s-140s.    Advised patient to stay of HCTZ and that new findings would be reported to Dr. Martinez for recommendations. Advised that Dr. Martinez is off post-call and Wednesday may be the earliest Dr. Martinez will be able to address this.    Patient verbalized understanding and was appreciative for the phone call.    To Dr. Martinez: Please advise.

## 2020-09-18 RX ORDER — CARVEDILOL 6.25 MG/1
6.25 TABLET ORAL 2 TIMES DAILY WITH MEALS
Qty: 180 TAB | Refills: 3 | Status: SHIPPED | OUTPATIENT
Start: 2020-09-18 | End: 2021-01-08 | Stop reason: SDUPTHER

## 2020-09-19 NOTE — TELEPHONE ENCOUNTER
Spoke to patient and discussed changes. Received update on BP. Averaging mid-130s/50s-60s.    Verified pharmacy with patient. Rx sent.     Patient appreciative for the phone call.

## 2020-09-26 ENCOUNTER — IMMUNIZATION (OUTPATIENT)
Dept: SOCIAL WORK | Facility: CLINIC | Age: 85
End: 2020-09-26
Payer: MEDICARE

## 2020-09-26 DIAGNOSIS — Z23 NEED FOR VACCINATION: ICD-10-CM

## 2020-09-26 PROCEDURE — G0008 ADMIN INFLUENZA VIRUS VAC: HCPCS | Performed by: REGISTERED NURSE

## 2020-09-26 PROCEDURE — 90662 IIV NO PRSV INCREASED AG IM: CPT | Performed by: REGISTERED NURSE

## 2020-10-12 ENCOUNTER — OFFICE VISIT (OUTPATIENT)
Dept: MEDICAL GROUP | Facility: MEDICAL CENTER | Age: 85
End: 2020-10-12
Payer: MEDICARE

## 2020-10-12 VITALS
RESPIRATION RATE: 16 BRPM | SYSTOLIC BLOOD PRESSURE: 130 MMHG | DIASTOLIC BLOOD PRESSURE: 78 MMHG | HEIGHT: 71 IN | BODY MASS INDEX: 24.08 KG/M2 | OXYGEN SATURATION: 97 % | WEIGHT: 172 LBS | TEMPERATURE: 97.6 F | HEART RATE: 64 BPM

## 2020-10-12 DIAGNOSIS — E78.5 DYSLIPIDEMIA: ICD-10-CM

## 2020-10-12 DIAGNOSIS — K22.719 BARRETT'S ESOPHAGUS WITH DYSPLASIA: ICD-10-CM

## 2020-10-12 DIAGNOSIS — I10 ESSENTIAL HYPERTENSION: ICD-10-CM

## 2020-10-12 DIAGNOSIS — Z85.46 HISTORY OF PROSTATE CANCER: ICD-10-CM

## 2020-10-12 DIAGNOSIS — F33.42 RECURRENT MAJOR DEPRESSIVE DISORDER, IN FULL REMISSION (HCC): ICD-10-CM

## 2020-10-12 DIAGNOSIS — E11.42 TYPE 2 DIABETES MELLITUS WITH DIABETIC POLYNEUROPATHY, WITHOUT LONG-TERM CURRENT USE OF INSULIN (HCC): ICD-10-CM

## 2020-10-12 DIAGNOSIS — N18.30 STAGE 3 CHRONIC KIDNEY DISEASE, UNSPECIFIED WHETHER STAGE 3A OR 3B CKD: ICD-10-CM

## 2020-10-12 DIAGNOSIS — M81.0 AGE-RELATED OSTEOPOROSIS WITHOUT CURRENT PATHOLOGICAL FRACTURE: ICD-10-CM

## 2020-10-12 DIAGNOSIS — N39.0 RECURRENT UTI: ICD-10-CM

## 2020-10-12 PROBLEM — N30.40 IRRADIATION CYSTITIS: Status: RESOLVED | Noted: 2017-03-29 | Resolved: 2020-10-12

## 2020-10-12 PROBLEM — K21.9 GERD (GASTROESOPHAGEAL REFLUX DISEASE): Status: RESOLVED | Noted: 2017-04-20 | Resolved: 2020-10-12

## 2020-10-12 PROBLEM — K63.5 COLON POLYPS: Status: RESOLVED | Noted: 2019-07-19 | Resolved: 2020-10-12

## 2020-10-12 PROBLEM — Z92.3 HISTORY OF RADIATION THERAPY: Status: RESOLVED | Noted: 2017-03-24 | Resolved: 2020-10-12

## 2020-10-12 PROBLEM — Z79.4 TYPE 2 DIABETES MELLITUS WITH STAGE 3 CHRONIC KIDNEY DISEASE, WITH LONG-TERM CURRENT USE OF INSULIN (HCC): Status: RESOLVED | Noted: 2017-03-24 | Resolved: 2020-10-12

## 2020-10-12 PROBLEM — T70.0XXA OTITIC BAROTRAUMA: Status: RESOLVED | Noted: 2017-04-04 | Resolved: 2020-10-12

## 2020-10-12 PROBLEM — M80.00XA AGE-RELATED OSTEOPOROSIS WITH CURRENT PATHOLOGICAL FRACTURE: Status: RESOLVED | Noted: 2019-02-22 | Resolved: 2020-10-12

## 2020-10-12 PROBLEM — E11.22 TYPE 2 DIABETES MELLITUS WITH STAGE 3 CHRONIC KIDNEY DISEASE, WITH LONG-TERM CURRENT USE OF INSULIN (HCC): Status: RESOLVED | Noted: 2017-03-24 | Resolved: 2020-10-12

## 2020-10-12 PROBLEM — R32 URINARY INCONTINENCE: Status: RESOLVED | Noted: 2017-03-24 | Resolved: 2020-10-12

## 2020-10-12 PROBLEM — L59.8 OTHER SPECIFIED DISORDERS OF THE SKIN AND SUBCUTANEOUS TISSUE RELATED TO RADIATION: Status: RESOLVED | Noted: 2017-03-29 | Resolved: 2020-10-12

## 2020-10-12 PROCEDURE — 99214 OFFICE O/P EST MOD 30 MIN: CPT | Performed by: FAMILY MEDICINE

## 2020-10-12 RX ORDER — SULFAMETHOXAZOLE AND TRIMETHOPRIM 400; 80 MG/1; MG/1
TABLET ORAL
COMMUNITY
End: 2021-01-19

## 2020-10-12 RX ORDER — PANTOPRAZOLE SODIUM 20 MG/1
20 TABLET, DELAYED RELEASE ORAL DAILY
COMMUNITY
Start: 2020-10-12

## 2020-10-12 RX ORDER — SULFAMETHOXAZOLE AND TRIMETHOPRIM 800; 160 MG/1; MG/1
TABLET ORAL
COMMUNITY
End: 2020-10-12

## 2020-10-12 RX ORDER — LOPERAMIDE HYDROCHLORIDE 2 MG/1
2 CAPSULE ORAL 4 TIMES DAILY PRN
COMMUNITY
End: 2022-01-01

## 2020-10-12 RX ORDER — GABAPENTIN 100 MG/1
100 CAPSULE ORAL 3 TIMES DAILY
Qty: 270 CAP | Refills: 0 | Status: SHIPPED | OUTPATIENT
Start: 2020-10-12 | End: 2020-11-23

## 2020-10-12 RX ORDER — UBIDECARENONE 75 MG
100 CAPSULE ORAL DAILY
COMMUNITY
End: 2020-10-12

## 2020-10-12 RX ORDER — LANOLIN ALCOHOL/MO/W.PET/CERES
1000 CREAM (GRAM) TOPICAL DAILY
COMMUNITY

## 2020-10-12 RX ORDER — SULFAMETHOXAZOLE AND TRIMETHOPRIM 400; 80 MG/1; MG/1
TABLET ORAL
COMMUNITY
Start: 2020-10-05 | End: 2020-10-12

## 2020-10-12 ASSESSMENT — PATIENT HEALTH QUESTIONNAIRE - PHQ9
7. TROUBLE CONCENTRATING ON THINGS, SUCH AS READING THE NEWSPAPER OR WATCHING TELEVISION: NOT AT ALL
3. TROUBLE FALLING OR STAYING ASLEEP OR SLEEPING TOO MUCH: NOT AT ALL
1. LITTLE INTEREST OR PLEASURE IN DOING THINGS: NOT AT ALL
2. FEELING DOWN, DEPRESSED, IRRITABLE, OR HOPELESS: NOT AT ALL
5. POOR APPETITE OR OVEREATING: NOT AT ALL
8. MOVING OR SPEAKING SO SLOWLY THAT OTHER PEOPLE COULD HAVE NOTICED. OR THE OPPOSITE, BEING SO FIGETY OR RESTLESS THAT YOU HAVE BEEN MOVING AROUND A LOT MORE THAN USUAL: NOT AT ALL
SUM OF ALL RESPONSES TO PHQ9 QUESTIONS 1 AND 2: 0
6. FEELING BAD ABOUT YOURSELF - OR THAT YOU ARE A FAILURE OR HAVE LET YOURSELF OR YOUR FAMILY DOWN: NOT AL ALL
4. FEELING TIRED OR HAVING LITTLE ENERGY: SEVERAL DAYS
9. THOUGHTS THAT YOU WOULD BE BETTER OFF DEAD, OR OF HURTING YOURSELF: NOT AT ALL
SUM OF ALL RESPONSES TO PHQ QUESTIONS 1-9: 1

## 2020-10-12 ASSESSMENT — FIBROSIS 4 INDEX: FIB4 SCORE: 2.29

## 2020-10-12 NOTE — PROGRESS NOTES
cc: Diabetes    Subjective:     Selvin Braga is a 84 y.o. male presenting to [facility] care:    1.  Diabetes: Has a history of diabetes for many years, has been stable.  Is currently on alogliptin 12.5 mg daily, glipizide 10 mg twice a day, metformin 500 milligrams twice a day.  Denies any side effects.  Blood sugars have been well controlled.  He takes a baby aspirin as well.  He does have neuropathy in his feet bilaterally from the diabetes.  Is wondering if there is a medication for this.  He does use a rolling walker, does have some weakness in his legs as well.  He is not interested in physical therapy.    2.  Hypertension: Has a history of hypertension, has been stable.  Is currently on carvedilol 6.25 mg twice a day, lisinopril 2.5 mg daily.  Denies any side effects.  Denies any chest pain, shortness of breath, lightheadedness, dizziness.  He does have intermittent leg swelling.  This is been stable.    3.  Dyslipidemia: Has a history of dyslipidemia, has been stable.  Is currently on atorvastatin 40 mg daily.    4.  Prostate cancer: Has a history of prostate cancer.  He underwent a prostatectomy, radiation therapy, hormones.  He sees urology regularly.  He has residual urinary incontinence and stool incontinence at times.  He has a Becerril clamp during the daytime, uses a catheter at night.  He also uses Imodium as needed.  He was also getting recurrent UTIs from the catheter, is currently on Bactrim daily.    5.  Depression: Has a history of depression for years, has been stable.  Is currently on fluoxetine 20 mg daily.  He feels like his depression has been well controlled.  Denies any side effects.    6.  Lombardi's: He reports a history of Lombardi's esophagus, is currently on pantoprazole.  Denies any cough, weight changes, abdominal pain.    Review of systems:  See above.  Positive for osteoporosis, but he declines treatment.  Positive for intermittent back pain, manageable without medications.   "All other systems are reviewed and are negative      Current Outpatient Medications:   •  sulfamethoxazole-trimethoprim (BACTRIM) 400-80 MG Tab, sulfamethoxazole 400 mg-trimethoprim 80 mg tablet  TAKE 2 TABLETS BY MOUTH EVERY 12 HOURS, Disp: , Rfl:   •  loperamide (IMODIUM) 2 MG Cap, Take 2 mg by mouth 4 times a day as needed for Diarrhea., Disp: , Rfl:   •  pantoprazole (PROTONIX) 20 MG tablet, Take 1 Tab by mouth every day., Disp: , Rfl:   •  Cyanocobalamin (VITAMIN B-12) 1000 MCG Tab, Take 1,000 mcg by mouth every day., Disp: , Rfl:   •  gabapentin (NEURONTIN) 100 MG Cap, Take 1 Cap by mouth 3 times a day., Disp: 270 Cap, Rfl: 0  •  carvedilol (COREG) 6.25 MG Tab, Take 1 Tab by mouth 2 times a day, with meals., Disp: 180 Tab, Rfl: 3  •  atorvastatin (LIPITOR) 40 MG Tab, Take 1 Tab by mouth every evening., Disp: 100 Tab, Rfl: 3  •  D-Mannose 500 MG Cap, Take 500 mg by mouth 3 times a day., Disp: , Rfl:   •  Alogliptin Benzoate 12.5 MG Tab, Take 12.5 mg by mouth every evening., Disp: , Rfl:   •  lisinopril (PRINIVIL) 2.5 MG Tab, Take 2.5 mg by mouth every day., Disp: , Rfl:   •  aspirin 81 MG tablet, Take 1 Tab by mouth every day., Disp: 90 Tab, Rfl: 3  •  Multiple Vitamins-Minerals (PRESERVISION AREDS 2+MULTI VIT PO), Take 1 Cap by mouth 2 Times a Day., Disp: , Rfl:   •  glipiZIDE (GLUCOTROL) 10 MG Tab, Take 10 mg by mouth 2 times a day., Disp: , Rfl:   •  Cholecalciferol (VITAMIN D) 2000 UNIT TABS, Take 2,000 Units by mouth every day., Disp: , Rfl:   •  metformin (GLUCOPHAGE) 500 MG TABS, Take 500 mg by mouth 2 times a day, with meals., Disp: , Rfl:   •  fluoxetine (PROZAC) 20 MG CAPS, Take 20 mg by mouth every day., Disp: , Rfl:     Allergies, past medical history, past surgical history, family history, social history reviewed and updated    Objective:     Vitals: /78   Pulse 64   Temp 36.4 °C (97.6 °F)   Resp 16   Ht 1.803 m (5' 11\")   Wt 78 kg (172 lb)   SpO2 97%   BMI 23.99 kg/m²   General: " Alert, pleasant, NAD  HEENT: Normocephalic.    Heart: Regular rate and rhythm.  S1 and S2 normal.  No murmurs appreciated.  Respiratory: Normal respiratory effort.  Clear to auscultation bilaterally.  Abdomen: Non-distended, soft  Skin: Warm, dry, no rashes.  Extremities: Mild edema around the ankles bilaterally.  Psych:  Affect/mood is normal, judgement is good, memory is intact, grooming is appropriate.    Assessment/Plan:     Selvin was seen today for establish care.    Diagnoses and all orders for this visit:    Type 2 diabetes mellitus with diabetic polyneuropathy, without long-term current use of insulin (HCC)  New problem, stable.  Last A1c was about 2 months ago.  Continue current medications.  We also discussed a trial of gabapentin for his neuropathy.  Discussed side effects, dizziness, falls.  We will plan to start at 100 mg daily, then titrate up to 3 times a day.  follow-up in 3 months.  -     gabapentin (NEURONTIN) 100 MG Cap; Take 1 Cap by mouth 3 times a day.    Essential hypertension  New problem, stable, continue current medication    Dyslipidemia  New problem, stable, continue atorvastatin    History of prostate cancer  New problem, stable, managed by urology    Recurrent UTI  New problem, stable, continue Bactrim    Stage 3 chronic kidney disease, unspecified whether stage 3a or 3b CKD  New problem, stable, continue to monitor    Lombardi's esophagus with dysplasia  New problem, stable, continue pantoprazole    Recurrent major depressive disorder, in full remission (HCC)  New problem, stable, continue fluoxetine    Age-related osteoporosis without current pathological fracture  New problem.  Discussed treatment options, he is not interested.        Return in about 3 months (around 1/12/2021) for routine follow up.        Annual Health Assessment Questions:     1.  Are you currently engaging in any exercise or physical activity? No     2.  How would you describe your mood or emotional well-being  today? good     3.  Have you had any falls in the last year? Yes     4.  Have you noticed any problems with your balance or had difficulty walking? Yes     5.  In the last six months have you experienced any leakage of urine? Yes     6. DPA/Advanced Directive: Patient has Advanced Directive on file.

## 2020-10-12 NOTE — PROGRESS NOTES
Annual Health Assessment Questions:    1.  Are you currently engaging in any exercise or physical activity? No    2.  How would you describe your mood or emotional well-being today? good    3.  Have you had any falls in the last year? Yes    4.  Have you noticed any problems with your balance or had difficulty walking? Yes    5.  In the last six months have you experienced any leakage of urine? Yes    6. DPA/Advanced Directive: Patient has Advanced Directive on file.

## 2020-11-23 RX ORDER — GABAPENTIN 100 MG/1
100 CAPSULE ORAL 3 TIMES DAILY
Qty: 270 CAP | Refills: 3 | Status: SHIPPED | OUTPATIENT
Start: 2020-11-23 | End: 2021-01-14

## 2021-01-01 ENCOUNTER — HOME CARE VISIT (OUTPATIENT)
Dept: HOME HEALTH SERVICES | Facility: HOME HEALTHCARE | Age: 86
End: 2021-01-01
Payer: MEDICARE

## 2021-01-01 ENCOUNTER — OFFICE VISIT (OUTPATIENT)
Dept: MEDICAL GROUP | Facility: MEDICAL CENTER | Age: 86
End: 2021-01-01
Payer: MEDICARE

## 2021-01-01 ENCOUNTER — HOME CARE VISIT (OUTPATIENT)
Dept: HOME HEALTH SERVICES | Facility: HOME HEALTHCARE | Age: 86
End: 2021-01-01

## 2021-01-01 ENCOUNTER — OFFICE VISIT (OUTPATIENT)
Dept: NEPHROLOGY | Facility: MEDICAL CENTER | Age: 86
End: 2021-01-01
Payer: MEDICARE

## 2021-01-01 ENCOUNTER — HOSPITAL ENCOUNTER (OUTPATIENT)
Dept: LAB | Facility: MEDICAL CENTER | Age: 86
End: 2021-11-03
Attending: INTERNAL MEDICINE
Payer: MEDICARE

## 2021-01-01 ENCOUNTER — OFFICE VISIT (OUTPATIENT)
Dept: DERMATOLOGY | Facility: IMAGING CENTER | Age: 86
End: 2021-01-01
Payer: MEDICARE

## 2021-01-01 ENCOUNTER — TELEPHONE (OUTPATIENT)
Dept: MEDICAL GROUP | Facility: MEDICAL CENTER | Age: 86
End: 2021-01-01

## 2021-01-01 ENCOUNTER — DOCUMENTATION (OUTPATIENT)
Dept: VASCULAR LAB | Facility: MEDICAL CENTER | Age: 86
End: 2021-01-01

## 2021-01-01 ENCOUNTER — HOSPITAL ENCOUNTER (OUTPATIENT)
Facility: MEDICAL CENTER | Age: 86
End: 2021-08-25
Attending: FAMILY MEDICINE
Payer: MEDICARE

## 2021-01-01 ENCOUNTER — TELEPHONE (OUTPATIENT)
Dept: NEPHROLOGY | Facility: MEDICAL CENTER | Age: 86
End: 2021-01-01

## 2021-01-01 ENCOUNTER — HOSPITAL ENCOUNTER (OUTPATIENT)
Dept: LAB | Facility: MEDICAL CENTER | Age: 86
End: 2021-08-06
Attending: INTERNAL MEDICINE
Payer: MEDICARE

## 2021-01-01 ENCOUNTER — TELEPHONE (OUTPATIENT)
Dept: CARDIOLOGY | Facility: MEDICAL CENTER | Age: 86
End: 2021-01-01

## 2021-01-01 ENCOUNTER — OFFICE VISIT (OUTPATIENT)
Dept: CARDIOLOGY | Facility: MEDICAL CENTER | Age: 86
End: 2021-01-01
Payer: MEDICARE

## 2021-01-01 ENCOUNTER — HOME HEALTH ADMISSION (OUTPATIENT)
Dept: HOME HEALTH SERVICES | Facility: HOME HEALTHCARE | Age: 86
End: 2021-01-01
Payer: MEDICARE

## 2021-01-01 ENCOUNTER — HOSPITAL ENCOUNTER (OUTPATIENT)
Dept: LAB | Facility: MEDICAL CENTER | Age: 86
End: 2021-12-01
Attending: NURSE PRACTITIONER
Payer: MEDICARE

## 2021-01-01 VITALS
OXYGEN SATURATION: 95 % | HEART RATE: 72 BPM | RESPIRATION RATE: 18 BRPM | SYSTOLIC BLOOD PRESSURE: 128 MMHG | DIASTOLIC BLOOD PRESSURE: 64 MMHG | TEMPERATURE: 98.3 F

## 2021-01-01 VITALS
DIASTOLIC BLOOD PRESSURE: 68 MMHG | RESPIRATION RATE: 18 BRPM | HEART RATE: 76 BPM | SYSTOLIC BLOOD PRESSURE: 128 MMHG | OXYGEN SATURATION: 99 % | TEMPERATURE: 98.4 F

## 2021-01-01 VITALS
BODY MASS INDEX: 21.37 KG/M2 | DIASTOLIC BLOOD PRESSURE: 58 MMHG | HEIGHT: 73 IN | TEMPERATURE: 98.2 F | SYSTOLIC BLOOD PRESSURE: 132 MMHG | OXYGEN SATURATION: 92 % | RESPIRATION RATE: 18 BRPM | HEART RATE: 68 BPM

## 2021-01-01 VITALS
OXYGEN SATURATION: 100 % | SYSTOLIC BLOOD PRESSURE: 126 MMHG | DIASTOLIC BLOOD PRESSURE: 76 MMHG | HEART RATE: 82 BPM | RESPIRATION RATE: 18 BRPM | TEMPERATURE: 98.7 F

## 2021-01-01 VITALS
DIASTOLIC BLOOD PRESSURE: 72 MMHG | BODY MASS INDEX: 22.68 KG/M2 | HEIGHT: 71 IN | OXYGEN SATURATION: 95 % | HEART RATE: 74 BPM | RESPIRATION RATE: 16 BRPM | TEMPERATURE: 97.6 F | SYSTOLIC BLOOD PRESSURE: 122 MMHG | WEIGHT: 162 LBS

## 2021-01-01 VITALS
RESPIRATION RATE: 18 BRPM | TEMPERATURE: 97.5 F | SYSTOLIC BLOOD PRESSURE: 122 MMHG | DIASTOLIC BLOOD PRESSURE: 74 MMHG | OXYGEN SATURATION: 95 % | HEART RATE: 70 BPM

## 2021-01-01 VITALS
OXYGEN SATURATION: 92 % | SYSTOLIC BLOOD PRESSURE: 136 MMHG | HEART RATE: 77 BPM | TEMPERATURE: 97.8 F | DIASTOLIC BLOOD PRESSURE: 60 MMHG | RESPIRATION RATE: 18 BRPM

## 2021-01-01 VITALS
WEIGHT: 166 LBS | TEMPERATURE: 97.5 F | DIASTOLIC BLOOD PRESSURE: 70 MMHG | BODY MASS INDEX: 23.24 KG/M2 | OXYGEN SATURATION: 93 % | HEART RATE: 78 BPM | SYSTOLIC BLOOD PRESSURE: 122 MMHG | HEIGHT: 71 IN

## 2021-01-01 VITALS
OXYGEN SATURATION: 91 % | RESPIRATION RATE: 18 BRPM | HEART RATE: 79 BPM | DIASTOLIC BLOOD PRESSURE: 66 MMHG | TEMPERATURE: 97.8 F | SYSTOLIC BLOOD PRESSURE: 130 MMHG

## 2021-01-01 VITALS
TEMPERATURE: 97.7 F | SYSTOLIC BLOOD PRESSURE: 150 MMHG | DIASTOLIC BLOOD PRESSURE: 76 MMHG | HEART RATE: 77 BPM | RESPIRATION RATE: 18 BRPM | OXYGEN SATURATION: 94 %

## 2021-01-01 VITALS
HEART RATE: 77 BPM | TEMPERATURE: 98.4 F | RESPIRATION RATE: 16 BRPM | SYSTOLIC BLOOD PRESSURE: 146 MMHG | OXYGEN SATURATION: 94 % | DIASTOLIC BLOOD PRESSURE: 68 MMHG

## 2021-01-01 VITALS
RESPIRATION RATE: 16 BRPM | OXYGEN SATURATION: 91 % | TEMPERATURE: 97.2 F | SYSTOLIC BLOOD PRESSURE: 140 MMHG | HEART RATE: 68 BPM | DIASTOLIC BLOOD PRESSURE: 60 MMHG

## 2021-01-01 VITALS
SYSTOLIC BLOOD PRESSURE: 150 MMHG | HEART RATE: 84 BPM | OXYGEN SATURATION: 95 % | RESPIRATION RATE: 18 BRPM | TEMPERATURE: 97.7 F | DIASTOLIC BLOOD PRESSURE: 88 MMHG

## 2021-01-01 VITALS
TEMPERATURE: 98 F | DIASTOLIC BLOOD PRESSURE: 68 MMHG | RESPIRATION RATE: 16 BRPM | SYSTOLIC BLOOD PRESSURE: 120 MMHG | HEART RATE: 72 BPM | OXYGEN SATURATION: 93 %

## 2021-01-01 VITALS
OXYGEN SATURATION: 93 % | DIASTOLIC BLOOD PRESSURE: 76 MMHG | HEART RATE: 76 BPM | SYSTOLIC BLOOD PRESSURE: 136 MMHG | TEMPERATURE: 97.8 F | RESPIRATION RATE: 18 BRPM

## 2021-01-01 VITALS
SYSTOLIC BLOOD PRESSURE: 108 MMHG | HEART RATE: 82 BPM | DIASTOLIC BLOOD PRESSURE: 64 MMHG | TEMPERATURE: 98.1 F | BODY MASS INDEX: 23.15 KG/M2 | HEIGHT: 71 IN | WEIGHT: 165.38 LBS | OXYGEN SATURATION: 95 %

## 2021-01-01 VITALS
OXYGEN SATURATION: 92 % | RESPIRATION RATE: 20 BRPM | HEART RATE: 80 BPM | DIASTOLIC BLOOD PRESSURE: 64 MMHG | TEMPERATURE: 98 F | SYSTOLIC BLOOD PRESSURE: 140 MMHG

## 2021-01-01 VITALS
DIASTOLIC BLOOD PRESSURE: 60 MMHG | RESPIRATION RATE: 18 BRPM | TEMPERATURE: 98.3 F | OXYGEN SATURATION: 96 % | SYSTOLIC BLOOD PRESSURE: 110 MMHG | HEART RATE: 70 BPM

## 2021-01-01 VITALS
DIASTOLIC BLOOD PRESSURE: 62 MMHG | RESPIRATION RATE: 18 BRPM | TEMPERATURE: 98.3 F | OXYGEN SATURATION: 95 % | SYSTOLIC BLOOD PRESSURE: 120 MMHG | HEART RATE: 74 BPM

## 2021-01-01 VITALS
HEIGHT: 71 IN | TEMPERATURE: 97.6 F | OXYGEN SATURATION: 92 % | BODY MASS INDEX: 22.68 KG/M2 | SYSTOLIC BLOOD PRESSURE: 124 MMHG | HEART RATE: 64 BPM | RESPIRATION RATE: 16 BRPM | WEIGHT: 162 LBS | DIASTOLIC BLOOD PRESSURE: 70 MMHG

## 2021-01-01 VITALS
SYSTOLIC BLOOD PRESSURE: 126 MMHG | DIASTOLIC BLOOD PRESSURE: 64 MMHG | RESPIRATION RATE: 18 BRPM | HEART RATE: 74 BPM | TEMPERATURE: 98.6 F | OXYGEN SATURATION: 91 %

## 2021-01-01 VITALS
DIASTOLIC BLOOD PRESSURE: 66 MMHG | OXYGEN SATURATION: 94 % | RESPIRATION RATE: 16 BRPM | BODY MASS INDEX: 22.82 KG/M2 | SYSTOLIC BLOOD PRESSURE: 116 MMHG | WEIGHT: 163 LBS | HEIGHT: 71 IN | HEART RATE: 79 BPM

## 2021-01-01 VITALS
OXYGEN SATURATION: 93 % | SYSTOLIC BLOOD PRESSURE: 138 MMHG | HEIGHT: 71 IN | RESPIRATION RATE: 19 BRPM | TEMPERATURE: 98.1 F | HEART RATE: 78 BPM | DIASTOLIC BLOOD PRESSURE: 62 MMHG | WEIGHT: 162 LBS | BODY MASS INDEX: 22.68 KG/M2

## 2021-01-01 VITALS
TEMPERATURE: 98 F | OXYGEN SATURATION: 96 % | SYSTOLIC BLOOD PRESSURE: 142 MMHG | DIASTOLIC BLOOD PRESSURE: 60 MMHG | HEART RATE: 70 BPM | RESPIRATION RATE: 18 BRPM

## 2021-01-01 VITALS
RESPIRATION RATE: 20 BRPM | DIASTOLIC BLOOD PRESSURE: 64 MMHG | SYSTOLIC BLOOD PRESSURE: 144 MMHG | TEMPERATURE: 98 F | OXYGEN SATURATION: 93 % | HEART RATE: 68 BPM

## 2021-01-01 VITALS
SYSTOLIC BLOOD PRESSURE: 156 MMHG | RESPIRATION RATE: 20 BRPM | HEART RATE: 93 BPM | OXYGEN SATURATION: 96 % | TEMPERATURE: 97.6 F | DIASTOLIC BLOOD PRESSURE: 70 MMHG

## 2021-01-01 DIAGNOSIS — N18.32 STAGE 3B CHRONIC KIDNEY DISEASE: ICD-10-CM

## 2021-01-01 DIAGNOSIS — D48.5 NEOPLASM OF UNCERTAIN BEHAVIOR OF SKIN: ICD-10-CM

## 2021-01-01 DIAGNOSIS — R60.0 EDEMA OF RIGHT LOWER LEG: ICD-10-CM

## 2021-01-01 DIAGNOSIS — I10 ESSENTIAL HYPERTENSION: ICD-10-CM

## 2021-01-01 DIAGNOSIS — I73.9 PAD (PERIPHERAL ARTERY DISEASE) (HCC): ICD-10-CM

## 2021-01-01 DIAGNOSIS — E55.9 VITAMIN D DEFICIENCY: ICD-10-CM

## 2021-01-01 DIAGNOSIS — D64.9 ANEMIA, UNSPECIFIED TYPE: ICD-10-CM

## 2021-01-01 DIAGNOSIS — E11.42 TYPE 2 DIABETES MELLITUS WITH DIABETIC POLYNEUROPATHY, WITHOUT LONG-TERM CURRENT USE OF INSULIN (HCC): ICD-10-CM

## 2021-01-01 DIAGNOSIS — E85.89 OTHER AMYLOIDOSIS (HCC): ICD-10-CM

## 2021-01-01 DIAGNOSIS — E78.5 DYSLIPIDEMIA: ICD-10-CM

## 2021-01-01 DIAGNOSIS — L98.491 SKIN ULCER, LIMITED TO BREAKDOWN OF SKIN (HCC): ICD-10-CM

## 2021-01-01 DIAGNOSIS — N39.0 RECURRENT UTI: ICD-10-CM

## 2021-01-01 DIAGNOSIS — L29.9 ITCHING: ICD-10-CM

## 2021-01-01 DIAGNOSIS — R23.8 BULLAE: ICD-10-CM

## 2021-01-01 DIAGNOSIS — R31.9 HEMATURIA, UNSPECIFIED TYPE: ICD-10-CM

## 2021-01-01 DIAGNOSIS — R30.0 DYSURIA: ICD-10-CM

## 2021-01-01 DIAGNOSIS — I35.1 NONRHEUMATIC AORTIC VALVE INSUFFICIENCY: ICD-10-CM

## 2021-01-01 DIAGNOSIS — I87.8 VENOUS STASIS: ICD-10-CM

## 2021-01-01 DIAGNOSIS — L28.1 PICKER'S NODULES: ICD-10-CM

## 2021-01-01 LAB
25(OH)D3 SERPL-MCNC: 45 NG/ML (ref 30–100)
ANION GAP SERPL CALC-SCNC: 11 MMOL/L (ref 7–16)
ANION GAP SERPL CALC-SCNC: 13 MMOL/L (ref 7–16)
APPEARANCE UR: NORMAL
BACTERIA UR CULT: ABNORMAL
BACTERIA UR CULT: ABNORMAL
BILIRUB UR STRIP-MCNC: NORMAL MG/DL
BUN SERPL-MCNC: 36 MG/DL (ref 8–22)
BUN SERPL-MCNC: 38 MG/DL (ref 8–22)
CALCIUM SERPL-MCNC: 9.6 MG/DL (ref 8.5–10.5)
CALCIUM SERPL-MCNC: 9.6 MG/DL (ref 8.5–10.5)
CHLORIDE SERPL-SCNC: 103 MMOL/L (ref 96–112)
CHLORIDE SERPL-SCNC: 108 MMOL/L (ref 96–112)
CHOLEST SERPL-MCNC: 116 MG/DL (ref 100–199)
CO2 SERPL-SCNC: 23 MMOL/L (ref 20–33)
CO2 SERPL-SCNC: 27 MMOL/L (ref 20–33)
COLOR UR AUTO: YELLOW
CREAT SERPL-MCNC: 1.49 MG/DL (ref 0.5–1.4)
CREAT SERPL-MCNC: 1.74 MG/DL (ref 0.5–1.4)
CREAT UR-MCNC: 45.06 MG/DL
ERYTHROCYTE [DISTWIDTH] IN BLOOD BY AUTOMATED COUNT: 49 FL (ref 35.9–50)
ERYTHROCYTE [DISTWIDTH] IN BLOOD BY AUTOMATED COUNT: 61.6 FL (ref 35.9–50)
FASTING STATUS PATIENT QL REPORTED: NORMAL
GLUCOSE SERPL-MCNC: 144 MG/DL (ref 65–99)
GLUCOSE SERPL-MCNC: 196 MG/DL (ref 65–99)
GLUCOSE UR STRIP.AUTO-MCNC: NORMAL MG/DL
HBA1C MFR BLD: 7.5 % (ref 0–5.6)
HCT VFR BLD AUTO: 28.1 % (ref 42–52)
HCT VFR BLD AUTO: 31.2 % (ref 42–52)
HDLC SERPL-MCNC: 51 MG/DL
HGB BLD-MCNC: 8.6 G/DL (ref 14–18)
HGB BLD-MCNC: 9.6 G/DL (ref 14–18)
INT CON NEG: NEGATIVE
INT CON POS: POSITIVE
KETONES UR STRIP.AUTO-MCNC: NORMAL MG/DL
LDLC SERPL CALC-MCNC: 47 MG/DL
LEUKOCYTE ESTERASE UR QL STRIP.AUTO: NORMAL
MCH RBC QN AUTO: 26.2 PG (ref 27–33)
MCH RBC QN AUTO: 26.3 PG (ref 27–33)
MCHC RBC AUTO-ENTMCNC: 30.6 G/DL (ref 33.7–35.3)
MCHC RBC AUTO-ENTMCNC: 30.8 G/DL (ref 33.7–35.3)
MCV RBC AUTO: 85.5 FL (ref 81.4–97.8)
MCV RBC AUTO: 85.7 FL (ref 81.4–97.8)
MICROALBUMIN UR-MCNC: 3.6 MG/DL
MICROALBUMIN/CREAT UR: 80 MG/G (ref 0–30)
NITRITE UR QL STRIP.AUTO: NORMAL
PH UR STRIP.AUTO: 5.5 [PH] (ref 5–8)
PLATELET # BLD AUTO: 172 K/UL (ref 164–446)
PLATELET # BLD AUTO: 203 K/UL (ref 164–446)
PMV BLD AUTO: 10 FL (ref 9–12.9)
PMV BLD AUTO: 9.8 FL (ref 9–12.9)
POTASSIUM SERPL-SCNC: 3.7 MMOL/L (ref 3.6–5.5)
POTASSIUM SERPL-SCNC: 4.1 MMOL/L (ref 3.6–5.5)
PROT UR QL STRIP: NORMAL MG/DL
RBC # BLD AUTO: 3.28 M/UL (ref 4.7–6.1)
RBC # BLD AUTO: 3.65 M/UL (ref 4.7–6.1)
RBC UR QL AUTO: NORMAL
SIGNIFICANT IND 70042: ABNORMAL
SITE SITE: ABNORMAL
SODIUM SERPL-SCNC: 142 MMOL/L (ref 135–145)
SODIUM SERPL-SCNC: 143 MMOL/L (ref 135–145)
SOURCE SOURCE: ABNORMAL
SP GR UR STRIP.AUTO: 1.01
TRIGL SERPL-MCNC: 92 MG/DL (ref 0–149)
UROBILINOGEN UR STRIP-MCNC: 0.2 MG/DL
WBC # BLD AUTO: 5.9 K/UL (ref 4.8–10.8)
WBC # BLD AUTO: 6 K/UL (ref 4.8–10.8)

## 2021-01-01 PROCEDURE — A6403 STERILE GAUZE>16 <= 48 SQ IN: HCPCS

## 2021-01-01 PROCEDURE — A6452 HIGH COMPRES BAND W>=3"<5"YD: HCPCS

## 2021-01-01 PROCEDURE — G0299 HHS/HOSPICE OF RN EA 15 MIN: HCPCS

## 2021-01-01 PROCEDURE — 99214 OFFICE O/P EST MOD 30 MIN: CPT | Performed by: INTERNAL MEDICINE

## 2021-01-01 PROCEDURE — 82306 VITAMIN D 25 HYDROXY: CPT

## 2021-01-01 PROCEDURE — G0493 RN CARE EA 15 MIN HH/HOSPICE: HCPCS

## 2021-01-01 PROCEDURE — G0495 RN CARE TRAIN/EDU IN HH: HCPCS

## 2021-01-01 PROCEDURE — 83036 HEMOGLOBIN GLYCOSYLATED A1C: CPT | Performed by: FAMILY MEDICINE

## 2021-01-01 PROCEDURE — 81002 URINALYSIS NONAUTO W/O SCOPE: CPT | Performed by: FAMILY MEDICINE

## 2021-01-01 PROCEDURE — 665001 SOC-HOME HEALTH

## 2021-01-01 PROCEDURE — 82043 UR ALBUMIN QUANTITATIVE: CPT

## 2021-01-01 PROCEDURE — A6214 FOAM DRG > 48 SQ IN W/BORDER: HCPCS

## 2021-01-01 PROCEDURE — 36415 COLL VENOUS BLD VENIPUNCTURE: CPT

## 2021-01-01 PROCEDURE — G0180 MD CERTIFICATION HHA PATIENT: HCPCS | Performed by: FAMILY MEDICINE

## 2021-01-01 PROCEDURE — 99203 OFFICE O/P NEW LOW 30 MIN: CPT | Performed by: DERMATOLOGY

## 2021-01-01 PROCEDURE — 99214 OFFICE O/P EST MOD 30 MIN: CPT | Performed by: NURSE PRACTITIONER

## 2021-01-01 PROCEDURE — A6219 GAUZE <= 16 SQ IN W/BORDER: HCPCS

## 2021-01-01 PROCEDURE — 82570 ASSAY OF URINE CREATININE: CPT

## 2021-01-01 PROCEDURE — 80061 LIPID PANEL: CPT

## 2021-01-01 PROCEDURE — 87086 URINE CULTURE/COLONY COUNT: CPT

## 2021-01-01 PROCEDURE — 85027 COMPLETE CBC AUTOMATED: CPT

## 2021-01-01 PROCEDURE — 99214 OFFICE O/P EST MOD 30 MIN: CPT | Performed by: FAMILY MEDICINE

## 2021-01-01 PROCEDURE — 80048 BASIC METABOLIC PNL TOTAL CA: CPT

## 2021-01-01 PROCEDURE — 87186 SC STD MICRODIL/AGAR DIL: CPT

## 2021-01-01 PROCEDURE — A6456 ZINC PASTE BAND W >=3"<5"/YD: HCPCS

## 2021-01-01 PROCEDURE — 99213 OFFICE O/P EST LOW 20 MIN: CPT | Performed by: DERMATOLOGY

## 2021-01-01 PROCEDURE — 87077 CULTURE AEROBIC IDENTIFY: CPT

## 2021-01-01 PROCEDURE — A4450 NON-WATERPROOF TAPE: HCPCS

## 2021-01-01 RX ORDER — CEFUROXIME AXETIL 250 MG/1
250 TABLET ORAL
COMMUNITY
Start: 2021-01-01 | End: 2021-01-01

## 2021-01-01 RX ORDER — CEPHALEXIN 250 MG/1
250 CAPSULE ORAL
COMMUNITY
Start: 2021-01-01 | End: 2021-01-01

## 2021-01-01 RX ORDER — GRANULES FOR ORAL 3 G/1
POWDER ORAL
COMMUNITY
Start: 2021-01-01 | End: 2021-01-01

## 2021-01-01 RX ORDER — DOXYCYCLINE HYCLATE 100 MG
100 TABLET ORAL 2 TIMES DAILY
Qty: 20 TABLET | Refills: 0 | Status: SHIPPED | OUTPATIENT
Start: 2021-01-01 | End: 2021-01-01

## 2021-01-01 RX ORDER — GRANULES FOR ORAL 3 G/1
3 POWDER ORAL ONCE
Qty: 1 EACH | Refills: 0 | Status: SHIPPED | OUTPATIENT
Start: 2021-01-01 | End: 2021-01-01

## 2021-01-01 RX ORDER — DOXYCYCLINE HYCLATE 100 MG
100 TABLET ORAL 2 TIMES DAILY
Qty: 20 TABLET | Refills: 0 | Status: SHIPPED | OUTPATIENT
Start: 2021-01-01 | End: 2021-01-01 | Stop reason: SDUPTHER

## 2021-01-01 RX ORDER — METHENAMINE HIPPURATE 1000 MG/1
TABLET ORAL
COMMUNITY
Start: 2021-01-01 | End: 2022-01-01

## 2021-01-01 ASSESSMENT — ENCOUNTER SYMPTOMS
VOMITING: DENIES
MUSCLE WEAKNESS: 1
VOMITING: DENIES
FEVER: 0
PERSON REPORTING PAIN: PATIENT
VOMITING: NO
NAUSEA: DENIES
SHORTNESS OF BREATH: T
VOMITING: DENIES
MUSCLE WEAKNESS: 1
LIMITED RANGE OF MOTION: 1
CHILLS: 0
LOWEST PAIN SEVERITY IN PAST 24 HOURS: 0/10
DENIES PAIN: 1
PERSON REPORTING PAIN: PATIENT
MUSCLE WEAKNESS: 1
SHORTNESS OF BREATH: 0
VOMITING: DENIES
NAUSEA: DENIES
DENIES PAIN: 1
NAUSEA: DENIES
LOWEST PAIN SEVERITY IN PAST 24 HOURS: 0/10
PERSON REPORTING PAIN: PATIENT
PAIN SEVERITY GOAL: 0/10
LIMITED RANGE OF MOTION: 1
NAUSEA: DENIES
MUSCLE WEAKNESS: 1
VOMITING: DENIES
DENIES PAIN: 1
MUSCLE WEAKNESS: 1
NAUSEA: NO
NAUSEA: DENIES
PERSON REPORTING PAIN: PATIENT
LIMITED RANGE OF MOTION: 1
NAUSEA: NO
DENIES PAIN: 1
LOWEST PAIN SEVERITY IN PAST 24 HOURS: 0/10
VOMITING: DENIES
PERSON REPORTING PAIN: PATIENT
CHILLS: 0
LIMITED RANGE OF MOTION: 1
VOMITING: DENIES
DENIES PAIN: 1
PAIN SEVERITY GOAL: 0/10
DENIES PAIN: 1
COUGH: 0
PERSON REPORTING PAIN: PATIENT
COUGH: 0
DENIES PAIN: 1
LIMITED RANGE OF MOTION: 1
LIMITED RANGE OF MOTION: 1
NAUSEA: NO
PERSON REPORTING PAIN: PATIENT
PERSON REPORTING PAIN: PATIENT
FEVER: 0
VOMITING: 0
HYPERTENSION: 1
MUSCLE WEAKNESS: 1
DENIES PAIN: 1
SHORTNESS OF BREATH: T
NAUSEA: DENIES
MUSCLE WEAKNESS: 1
MUSCLE WEAKNESS: 1
PAIN SEVERITY GOAL: 0/10
DENIES PAIN: 1
LIMITED RANGE OF MOTION: 1
MUSCLE WEAKNESS: 1
DENIES PAIN: 1
HYPERTENSION: 1
PERSON REPORTING PAIN: PATIENT
DENIES PAIN: 1
MUSCLE WEAKNESS: 1
ASSOCIATED SYMPTOMS: DENIES
PERSON REPORTING PAIN: PATIENT
MUSCLE WEAKNESS: 1
MUSCLE WEAKNESS: 1
NAUSEA: 0
NAUSEA: DENIES
HIGHEST PAIN SEVERITY IN PAST 24 HOURS: 0/10
NAUSEA: DENIES
VOMITING: 0
LIMITED RANGE OF MOTION: 1
VOMITING: DENIES
NAUSEA: 0
MUSCLE WEAKNESS: 1
HIGHEST PAIN SEVERITY IN PAST 24 HOURS: 0/10
PERSON REPORTING PAIN: PATIENT
NAUSEA: DENIES
VOMITING: NO
DENIES PAIN: 1
NAUSEA: DENIES
MUSCLE WEAKNESS: 1
PAIN PRESENCE EVALUATION: DENIES PAIN
VOMITING: DENIES
PERSON REPORTING PAIN: PATIENT
SHORTNESS OF BREATH: 0
VOMITING: NO
MUSCLE WEAKNESS: 1
DENIES PAIN: 1
HIGHEST PAIN SEVERITY IN PAST 24 HOURS: 0/10
DENIES PAIN: 1
DENIES PAIN: 1
VOMITING: DENIES
DENIES PAIN: 1
PERSON REPORTING PAIN: PATIENT

## 2021-01-01 ASSESSMENT — ACTIVITIES OF DAILY LIVING (ADL)
HOME_HEALTH_OASIS: 00
OASIS_M1830: 01
OASIS_M1830: 03

## 2021-01-01 ASSESSMENT — PATIENT HEALTH QUESTIONNAIRE - PHQ9
1. LITTLE INTEREST OR PLEASURE IN DOING THINGS: 00
CLINICAL INTERPRETATION OF PHQ2 SCORE: 0
CLINICAL INTERPRETATION OF PHQ2 SCORE: 0
2. FEELING DOWN, DEPRESSED, IRRITABLE, OR HOPELESS: 00

## 2021-01-01 ASSESSMENT — FIBROSIS 4 INDEX
FIB4 SCORE: 2.03
FIB4 SCORE: 2.4
FIB4 SCORE: 2.4
FIB4 SCORE: 2.03
FIB4 SCORE: 2.03
FIB4 SCORE: 2.4

## 2021-01-05 ENCOUNTER — TELEPHONE (OUTPATIENT)
Dept: CARDIOLOGY | Facility: MEDICAL CENTER | Age: 86
End: 2021-01-05

## 2021-01-05 DIAGNOSIS — N18.30 STAGE 3 CHRONIC KIDNEY DISEASE, UNSPECIFIED WHETHER STAGE 3A OR 3B CKD: ICD-10-CM

## 2021-01-05 DIAGNOSIS — I10 ESSENTIAL HYPERTENSION: ICD-10-CM

## 2021-01-05 NOTE — TELEPHONE ENCOUNTER
TO: 3:00p/Mon/Office  NM: Javier Braga   PH: [phone]   PT NM: Javier Braga   : 37   REG DR: Dr Martinez   RE: Questions regarding high blood  pressure meds.   DISP HIST: 2021 02:52P [facility]

## 2021-01-06 NOTE — TELEPHONE ENCOUNTER
Called patient back regarding BP medications.   Swelling in his feet, cant wear stockings as he cannot get them on.  Was taking 25mg HCTZ, Dr. Martinez discontinued at last OV, now he is having trouble getting BP under 140.  Keeps a log of BP, takes it in the morning  12/31 154/64  69  1/1    147/66   71  1/2   147/64   72  1/3   144/60   72  1/4   135/56    75  1/5    127/50    79  Has increased over time, sometimes up to 170, then retake it 4-6 times before it comes down.   Denies, SOB, weight gain, balance is not good, uses a walker to get around.  Advised him to monitor his BP and write down the first reading he gets as he states that is when it is high, verbalized understanding.   Confirmed still taking lisinopril and Coreg as prescribed.   Routed to MADDY for recommendations.

## 2021-01-07 NOTE — TELEPHONE ENCOUNTER
Please have him increase lisinopril to 5mg PO Daily and coreg to 12.5mg PO bid. BMP in 1 week and let's schedule a f/u with me or an MARIELA next week as well.     Thanks!

## 2021-01-08 RX ORDER — CARVEDILOL 12.5 MG/1
12.5 TABLET ORAL 2 TIMES DAILY WITH MEALS
Qty: 180 TAB | Refills: 1 | Status: SHIPPED | OUTPATIENT
Start: 2021-01-08 | End: 2021-01-15

## 2021-01-08 RX ORDER — LISINOPRIL 5 MG/1
5 TABLET ORAL DAILY
Qty: 90 TAB | Refills: 1 | Status: SHIPPED | OUTPATIENT
Start: 2021-01-08 | End: 2021-01-15

## 2021-01-08 NOTE — TELEPHONE ENCOUNTER
Called patient to discuss recommendations.   Discussed and clarified medication adjustments.   Discussed lab work, order placed, will complete on Wednesday or Thursday.  FV scheduled for Friday 1/15, with .  Patient stated his pharmacy also changed to [facility] in [place].  Clarified his phone number as well, he stated his number [phone] is no longer a valid number, will have it removed.

## 2021-01-11 DIAGNOSIS — Z23 NEED FOR VACCINATION: ICD-10-CM

## 2021-01-12 ENCOUNTER — HOSPITAL ENCOUNTER (OUTPATIENT)
Dept: LAB | Facility: MEDICAL CENTER | Age: 86
End: 2021-01-12
Attending: INTERNAL MEDICINE
Payer: MEDICARE

## 2021-01-12 DIAGNOSIS — N18.30 STAGE 3 CHRONIC KIDNEY DISEASE, UNSPECIFIED WHETHER STAGE 3A OR 3B CKD: ICD-10-CM

## 2021-01-12 DIAGNOSIS — I10 ESSENTIAL HYPERTENSION: ICD-10-CM

## 2021-01-12 LAB
ANION GAP SERPL CALC-SCNC: 11 MMOL/L (ref 7–16)
BUN SERPL-MCNC: 37 MG/DL (ref 8–22)
CALCIUM SERPL-MCNC: 8.8 MG/DL (ref 8.4–10.2)
CHLORIDE SERPL-SCNC: 106 MMOL/L (ref 96–112)
CO2 SERPL-SCNC: 17 MMOL/L (ref 20–33)
CREAT SERPL-MCNC: 2.73 MG/DL (ref 0.5–1.4)
FASTING STATUS PATIENT QL REPORTED: NORMAL
GLUCOSE SERPL-MCNC: 171 MG/DL (ref 65–99)
POTASSIUM SERPL-SCNC: 4.9 MMOL/L (ref 3.6–5.5)
SODIUM SERPL-SCNC: 134 MMOL/L (ref 135–145)

## 2021-01-12 PROCEDURE — 36415 COLL VENOUS BLD VENIPUNCTURE: CPT

## 2021-01-12 PROCEDURE — 80048 BASIC METABOLIC PNL TOTAL CA: CPT

## 2021-01-14 ENCOUNTER — OFFICE VISIT (OUTPATIENT)
Dept: MEDICAL GROUP | Facility: MEDICAL CENTER | Age: 86
End: 2021-01-14
Payer: MEDICARE

## 2021-01-14 ENCOUNTER — TELEPHONE (OUTPATIENT)
Dept: CARDIOLOGY | Facility: MEDICAL CENTER | Age: 86
End: 2021-01-14

## 2021-01-14 VITALS
DIASTOLIC BLOOD PRESSURE: 76 MMHG | HEART RATE: 80 BPM | SYSTOLIC BLOOD PRESSURE: 132 MMHG | RESPIRATION RATE: 16 BRPM | TEMPERATURE: 97.6 F | WEIGHT: 168 LBS | HEIGHT: 71 IN | OXYGEN SATURATION: 96 % | BODY MASS INDEX: 23.52 KG/M2

## 2021-01-14 DIAGNOSIS — N18.4 STAGE 4 CHRONIC KIDNEY DISEASE (HCC): ICD-10-CM

## 2021-01-14 DIAGNOSIS — I10 ESSENTIAL HYPERTENSION: ICD-10-CM

## 2021-01-14 DIAGNOSIS — E11.42 TYPE 2 DIABETES MELLITUS WITH DIABETIC POLYNEUROPATHY, WITHOUT LONG-TERM CURRENT USE OF INSULIN (HCC): ICD-10-CM

## 2021-01-14 DIAGNOSIS — N39.0 RECURRENT UTI: ICD-10-CM

## 2021-01-14 DIAGNOSIS — Z85.46 HISTORY OF PROSTATE CANCER: ICD-10-CM

## 2021-01-14 DIAGNOSIS — M79.89 LEG SWELLING: ICD-10-CM

## 2021-01-14 LAB
HBA1C MFR BLD: 6 % (ref 0–5.6)
INT CON NEG: NEGATIVE
INT CON POS: POSITIVE

## 2021-01-14 PROCEDURE — 99214 OFFICE O/P EST MOD 30 MIN: CPT | Performed by: FAMILY MEDICINE

## 2021-01-14 PROCEDURE — 83036 HEMOGLOBIN GLYCOSYLATED A1C: CPT | Performed by: FAMILY MEDICINE

## 2021-01-14 ASSESSMENT — FIBROSIS 4 INDEX: FIB4 SCORE: 2.32

## 2021-01-14 NOTE — PROGRESS NOTES
cc:  diabetes    Subjective:     Selvin Braga is a 85 y.o. male presenting for a follow-up of his diabetes.  Has been stable.  His blood sugars at home range from  fasting.  His A1c today is 6.0.  Denies any hypoglycemic episodes.  He has noticed increasing leg swelling over the past 6 months since his cardiologist stopped his hydrochlorothiazide.  He does have an appointment with them tomorrow.  His most recent GFR dropped to 22, from 52.  He denies any NSAID use.  He reports that he has been staying well-hydrated.  He continues to see urology for his recurrent UTI/history of prostate cancer.    Review of systems:  See above.       Current Outpatient Medications:   •  carvedilol (COREG) 12.5 MG Tab, Take 1 Tab by mouth 2 times a day, with meals., Disp: 180 Tab, Rfl: 1  •  lisinopril (PRINIVIL) 5 MG Tab, Take 1 Tab by mouth every day., Disp: 90 Tab, Rfl: 1  •  sulfamethoxazole-trimethoprim (BACTRIM) 400-80 MG Tab, sulfamethoxazole 400 mg-trimethoprim 80 mg tablet  TAKE 2 TABLETS BY MOUTH EVERY 12 HOURS, Disp: , Rfl:   •  loperamide (IMODIUM) 2 MG Cap, Take 2 mg by mouth 4 times a day as needed for Diarrhea., Disp: , Rfl:   •  pantoprazole (PROTONIX) 20 MG tablet, Take 1 Tab by mouth every day., Disp: , Rfl:   •  Cyanocobalamin (VITAMIN B-12) 1000 MCG Tab, Take 1,000 mcg by mouth every day., Disp: , Rfl:   •  atorvastatin (LIPITOR) 40 MG Tab, Take 1 Tab by mouth every evening., Disp: 100 Tab, Rfl: 3  •  D-Mannose 500 MG Cap, Take 500 mg by mouth 3 times a day., Disp: , Rfl:   •  aspirin 81 MG tablet, Take 1 Tab by mouth every day., Disp: 90 Tab, Rfl: 3  •  Multiple Vitamins-Minerals (PRESERVISION AREDS 2+MULTI VIT PO), Take 1 Cap by mouth 2 Times a Day., Disp: , Rfl:   •  glipiZIDE (GLUCOTROL) 10 MG Tab, Take 10 mg by mouth 2 times a day., Disp: , Rfl:   •  Cholecalciferol (VITAMIN D) 2000 UNIT TABS, Take 2,000 Units by mouth every day., Disp: , Rfl:   •  fluoxetine (PROZAC) 20 MG CAPS, Take 20 mg by mouth  "every day., Disp: , Rfl:     Allergies, past medical history, past surgical history, family history, social history reviewed and updated    Objective:     Vitals: /76   Pulse 80   Temp 36.4 °C (97.6 °F)   Resp 16   Ht 1.803 m (5' 11\")   Wt 76.2 kg (168 lb)   SpO2 96%   BMI 23.43 kg/m²   General: Alert, pleasant, NAD  HEENT: Normocephalic.  Heart: Regular rate and rhythm.  S1 and S2 normal.  No murmurs appreciated.  Respiratory: Normal respiratory effort.  Clear to auscultation bilaterally.  Abdomen: Non-distended, soft  Skin: Warm, dry  Extremities: 2+ pitting edema to his knees bilaterally  Psych:  Affect/mood is normal, judgement is good, memory is intact, grooming is appropriate.    Assessment/Plan:     Selvin was seen today for follow-up.    Diagnoses and all orders for this visit:    Type 2 diabetes mellitus with diabetic polyneuropathy, without long-term current use of insulin (HCC)  Stable, well controlled although his anemia may be affecting his A1c.  As his kidney function has declined, we will stop his Metformin and alogliptin.  Continue with the glipizide for now.    -     POCT Hemoglobin A1C  -     REFERRAL TO NEPHROLOGY    Leg swelling  Stage 4 chronic kidney disease (HCC)  Worsening.  Referral to nephrology placed.  Recommended that he elevate his legs, try Ace bandages as he does not tolerate compression stockings.  Will defer diuretics to cardiology and nephrology  -     REFERRAL TO NEPHROLOGY    Essential hypertension  Stable, is on carvedilol    Recurrent UTI  History of prostate cancer  Stable, managed by urology, is on bactrim  -     REFERRAL TO NEPHROLOGY          Return in about 3 months (around 4/14/2021) for routine follow up.      Annual Health Assessment Questions:     1.  Are you currently engaging in any exercise or physical activity? No     2.  How would you describe your mood or emotional well-being today? good     3.  Have you had any falls in the last year? No     4.  Have " you noticed any problems with your balance or had difficulty walking? Yes     5.  In the last six months have you experienced any leakage of urine? Yes     6. DPA/Advanced Directive: Patient has Advanced Directive on file.

## 2021-01-15 ENCOUNTER — OFFICE VISIT (OUTPATIENT)
Dept: CARDIOLOGY | Facility: MEDICAL CENTER | Age: 86
End: 2021-01-15
Payer: MEDICARE

## 2021-01-15 VITALS
HEIGHT: 71 IN | WEIGHT: 170 LBS | OXYGEN SATURATION: 100 % | DIASTOLIC BLOOD PRESSURE: 60 MMHG | BODY MASS INDEX: 23.8 KG/M2 | HEART RATE: 70 BPM | SYSTOLIC BLOOD PRESSURE: 180 MMHG

## 2021-01-15 DIAGNOSIS — Z86.718 HISTORY OF DVT (DEEP VEIN THROMBOSIS): ICD-10-CM

## 2021-01-15 DIAGNOSIS — N17.0 ACUTE RENAL FAILURE WITH ACUTE TUBULAR NECROSIS SUPERIMPOSED ON STAGE 3A CHRONIC KIDNEY DISEASE (HCC): ICD-10-CM

## 2021-01-15 DIAGNOSIS — E78.5 DYSLIPIDEMIA: ICD-10-CM

## 2021-01-15 DIAGNOSIS — N18.31 ACUTE RENAL FAILURE WITH ACUTE TUBULAR NECROSIS SUPERIMPOSED ON STAGE 3A CHRONIC KIDNEY DISEASE (HCC): ICD-10-CM

## 2021-01-15 DIAGNOSIS — I73.9 PAD (PERIPHERAL ARTERY DISEASE) (HCC): ICD-10-CM

## 2021-01-15 DIAGNOSIS — E11.42 TYPE 2 DIABETES MELLITUS WITH DIABETIC POLYNEUROPATHY, WITHOUT LONG-TERM CURRENT USE OF INSULIN (HCC): ICD-10-CM

## 2021-01-15 DIAGNOSIS — N18.30 STAGE 3 CHRONIC KIDNEY DISEASE, UNSPECIFIED WHETHER STAGE 3A OR 3B CKD: ICD-10-CM

## 2021-01-15 DIAGNOSIS — I10 ESSENTIAL HYPERTENSION: ICD-10-CM

## 2021-01-15 PROBLEM — N17.9 ACUTE RENAL FAILURE SUPERIMPOSED ON STAGE 3A CHRONIC KIDNEY DISEASE (HCC): Status: ACTIVE | Noted: 2021-01-15

## 2021-01-15 PROCEDURE — 99214 OFFICE O/P EST MOD 30 MIN: CPT | Performed by: PHYSICIAN ASSISTANT

## 2021-01-15 RX ORDER — CARVEDILOL 12.5 MG/1
12.5 TABLET ORAL 2 TIMES DAILY WITH MEALS
Qty: 180 TAB | Refills: 1 | Status: SHIPPED | OUTPATIENT
Start: 2021-01-15 | End: 2021-04-13

## 2021-01-15 ASSESSMENT — ENCOUNTER SYMPTOMS
SHORTNESS OF BREATH: 0
ABDOMINAL PAIN: 0
VOMITING: 0
FLANK PAIN: 0
PALPITATIONS: 0
BLURRED VISION: 0
DIZZINESS: 0
NEAR-SYNCOPE: 0
BLOATING: 0
NAUSEA: 0
MYALGIAS: 0
SNORING: 0
DIAPHORESIS: 0
WEAKNESS: 0
BRUISES/BLEEDS EASILY: 0
DYSPNEA ON EXERTION: 0
DECREASED APPETITE: 0
FEVER: 0
ORTHOPNEA: 0
SYNCOPE: 0

## 2021-01-15 ASSESSMENT — FIBROSIS 4 INDEX: FIB4 SCORE: 2.32

## 2021-01-15 NOTE — PATIENT INSTRUCTIONS
1)  No more lisinopril for now    2)  Continue taking increased dose of carvedilol 12.5 mg twice a day    3)  Stop the sulfa antibiotic, I will communicate with Dr. Marie to see if there is something different he wants you to take.     4)  See the nephrologist.

## 2021-01-15 NOTE — TELEPHONE ENCOUNTER
----- Message from Samaria Escobar R.N. sent at 1/12/2021  8:07 AM PST -----  Please Advise.  FV scheduled 01/15/2021 with ALFREDO

## 2021-01-15 NOTE — PROGRESS NOTES
Cardiology Clinic Follow-up Note    Date of note:    1/15/2021  Primary Care Provider: Jackie Oquendo M.D.    Name:             Selvin Braga  YOB: 1935  MRN:               [id number]    CC: Essential hypertension, CKD    Primary Cardiologist: Dr. ARAM Martinez    Patient HPI:   Selvin Braga is a 85 y.o. male with PMH including Essential hypertension, CKD, PAD with hx of R superfical femoral endarterectomy in 3/2019 and  common femoral artery bypass 2/2020 by Dr. Delgado.  Hx of provoked DVT, Dyslipidemia and Diabetes mellitus type II.      Interim History:  Mr. Braga was last seen in this cardiology office by Dr. Martinez on 7/22/20.      Patient had KAVITA back in 8/2020, his HCTZ was held and his renal function improved.  Patient called 1/7/20 with c/o elevated BP, his lisinopril was increased to 5 and coreg to 12.5.  Unfortunately BMP on 1/12/20 showed KAVITA with Cr 2.73, BUN 37.   His lisinopril has been held since 1/12.    Patient states he has NOT been taking HCTZ.    He has been referred to nephrology, has not scheduled appt yet.    States he has been taking Bactrim - per Dr. Marie at [facility]- for recurrent UTI (6 in 6 months) since around October.    Patient states his UOP is much reduced and the color is dark.  Also has considerable BLE edema today.    Saw his PCP yesterday. She took him off his metformin.    BP log showing SBP mostly in the 140s.  States he has been drinking lots of water.    Review of Systems   Constitution: Negative for decreased appetite, diaphoresis, fever and malaise/fatigue.   Eyes: Negative for blurred vision.   Cardiovascular: Positive for leg swelling. Negative for chest pain, dyspnea on exertion, near-syncope, orthopnea, palpitations and syncope.   Respiratory: Negative for shortness of breath and snoring.    Hematologic/Lymphatic: Negative for bleeding problem. Does not bruise/bleed easily.   Skin: Negative for rash.   Musculoskeletal: Negative for joint  "pain and myalgias.   Gastrointestinal: Negative for bloating, abdominal pain, nausea and vomiting.   Genitourinary: Negative for dysuria, flank pain and hematuria.        Reduced UOP, dark color.   Neurological: Negative for dizziness and weakness.        Past medical history, social history and family history reviewed.  No changes other than what is outlined in HPI.    Current Outpatient Medications   Medication Sig Dispense Refill   • sulfamethoxazole-trimethoprim (BACTRIM) 400-80 MG Tab sulfamethoxazole 400 mg-trimethoprim 80 mg tablet   TAKE 2 TABLETS BY MOUTH EVERY 12 HOURS     • loperamide (IMODIUM) 2 MG Cap Take 2 mg by mouth 4 times a day as needed for Diarrhea.     • pantoprazole (PROTONIX) 20 MG tablet Take 1 Tab by mouth every day.     • Cyanocobalamin (VITAMIN B-12) 1000 MCG Tab Take 1,000 mcg by mouth every day.     • atorvastatin (LIPITOR) 40 MG Tab Take 1 Tab by mouth every evening. 100 Tab 3   • aspirin 81 MG tablet Take 1 Tab by mouth every day. 90 Tab 3   • Multiple Vitamins-Minerals (PRESERVISION AREDS 2+MULTI VIT PO) Take 1 Cap by mouth 2 Times a Day.     • glipiZIDE (GLUCOTROL) 10 MG Tab Take 10 mg by mouth 2 times a day.     • Cholecalciferol (VITAMIN D) 2000 UNIT TABS Take 2,000 Units by mouth every day.     • fluoxetine (PROZAC) 20 MG CAPS Take 20 mg by mouth every day.       No current facility-administered medications for this visit.        Allergies   Allergen Reactions   • Augmentin Unspecified     Bleeding (rectal)  RXN=5 years ago   • Latex Unspecified     Blisters  RXN=ongoing   • Tape Rash     Rash-blisters-paper tape okay  RXN=ongoing         Physical Exam:  Ambulatory Vitals  BP (!) 180/60 (BP Location: Left arm, Patient Position: Sitting)   Pulse 70   Ht 1.803 m (5' 11\")   Wt 77.1 kg (170 lb)   SpO2 100%    BP Readings from Last 4 Encounters:   01/15/21 (!) 180/60   01/14/21 132/76   10/12/20 130/78   07/22/20 122/60       Weight/BMI: Body mass index is 23.71 kg/m².  Wt " Readings from Last 4 Encounters:   01/15/21 77.1 kg (170 lb)   01/14/21 76.2 kg (168 lb)   10/12/20 78 kg (172 lb)   07/22/20 78.4 kg (172 lb 12.8 oz)       General: no apparent distress  Lungs: normal effort, without crackles, no wheezing or rhonchi.  Heart: normal rate, regular rhythm, no murmur, no rub  Ext:  2.5+ bilateral lower extremity pitting edema.  Abdomen: soft, non tender, non distended,  Neurological: No focal deficits, no facial asymmetry.  Normal speech.  Psychiatric: Appropriate affect, alert and oriented x 3.   Skin: Warm extremities, no rash.      Lab Data Review:  Lab Results   Component Value Date/Time    CHOLSTRLTOT 114 08/12/2020 09:17 AM    LDL 46 08/12/2020 09:17 AM    HDL 46 08/12/2020 09:17 AM    TRIGLYCERIDE 108 08/12/2020 09:17 AM       Lab Results   Component Value Date/Time    SODIUM 134 (L) 01/12/2021 07:09 AM    POTASSIUM 4.9 01/12/2021 07:09 AM    CHLORIDE 106 01/12/2021 07:09 AM    CO2 17 (L) 01/12/2021 07:09 AM    GLUCOSE 171 (H) 01/12/2021 07:09 AM    BUN 37 (H) 01/12/2021 07:09 AM    CREATININE 2.73 (H) 01/12/2021 07:09 AM    CREATININE 1.0 02/08/2005 02:20 PM     Lab Results   Component Value Date/Time    ALKPHOSPHAT 90 11/26/2019 08:19 AM    ASTSGOT 17 11/26/2019 08:19 AM    ALTSGPT 12 11/26/2019 08:19 AM    TBILIRUBIN 0.6 11/26/2019 08:19 AM      Lab Results   Component Value Date/Time    WBC 7.5 08/12/2020 09:17 AM         Cardiac Imaging and Procedures Review:      Echo 5/8/19:  CONCLUSIONS  Normal left ventricular size, wall thickness, and systolic function.  Mitral annular calcification.  Aortic sclerosis without stenosis.  Compared to the images of the study done 4/21/17 - there has been no   significant change.     Stress Test 5/22/17:   NUCLEAR IMAGING INTERPRETATION   No evidence of significant jeopardized viable myocardium or prior myocardial    infarction.   Normal left ventricular size, ejection fraction, and wall motion.        Assessment and Clinical Decision  [place]   Essential hypertension   -    Likely elevated in the setting of volume overload  -    Continue coreg 12.5 BID.     2   Acute on chronic renal failure, stage III  -    Cr from 1.32 to 2.73  -    As not been on HCTZ for several months.  -    Hold metformin and lisinopril for now - can hopefully restart if renal fcn improves  -    Suspicious for Bactrim induced nephropathy  -    No s/sx of obstruction, but could also consider renal US if not improved off Bactrim.    3   Diabetes mellitus type II  -    A1 C 6.0    4   Dyslipidemia  -    Continue atorvastatin 40    5   Hx of provoked DVT  -    Not on chronic anticoagulation    6   PAD  -    R superfical femoral endarterectomy in 3/2019  -    R common femoral artery bypass 2020 by Dr. Delgado    Will communicate with Dr. Marie regarding d/c Bactrim.  I had to , will update chart and patient once I have communicated with Dr. Marie.    Ángela Colón PA-C     [facility]    Addendum 1/15/21 2042:  I spoke with MA at Dr. Marie's office.  He is in surgery today, will LM and hopefully speak with him on Monday.  Instructed patient to hold his Bactrim for now pending further instructions next week.

## 2021-01-15 NOTE — TELEPHONE ENCOUNTER
Called and spoke to the patient, he stated he already took the lisinopril today, but was advised to stop taking and to come to his appt today with .  Verbalized understanding and confirmed his is aware of the referral for nephrology but has not from their office yet.

## 2021-01-15 NOTE — TELEPHONE ENCOUNTER
Severe KAVITA noted.     Please call mary jane first thing tomorrow and have him stop his lisinopril.     He has an appointment with Ángela tomorrow, please ensure he goes to this to discuss his BP regimen further.     Obstructive nephropathy should be ruled out. He is already referred to nephrology. For BP control, I would not use diuretics (which have caused KAVITA in the past, hctz, or spironolactone/lasix) unless he is clearly in CHF.  Consider nitrates, hydralazine, or clonidine.

## 2021-01-20 ENCOUNTER — OFFICE VISIT (OUTPATIENT)
Dept: NEPHROLOGY | Facility: MEDICAL CENTER | Age: 86
End: 2021-01-20
Payer: MEDICARE

## 2021-01-20 VITALS
RESPIRATION RATE: 18 BRPM | SYSTOLIC BLOOD PRESSURE: 142 MMHG | HEART RATE: 71 BPM | HEIGHT: 71 IN | OXYGEN SATURATION: 98 % | BODY MASS INDEX: 24.36 KG/M2 | WEIGHT: 174 LBS | TEMPERATURE: 97.5 F | DIASTOLIC BLOOD PRESSURE: 60 MMHG

## 2021-01-20 DIAGNOSIS — N13.30 HYDRONEPHROSIS, UNSPECIFIED HYDRONEPHROSIS TYPE: ICD-10-CM

## 2021-01-20 DIAGNOSIS — R60.9 EDEMA, UNSPECIFIED TYPE: ICD-10-CM

## 2021-01-20 DIAGNOSIS — I10 ESSENTIAL HYPERTENSION: ICD-10-CM

## 2021-01-20 DIAGNOSIS — N17.9 AKI (ACUTE KIDNEY INJURY) (HCC): ICD-10-CM

## 2021-01-20 DIAGNOSIS — E11.69 TYPE 2 DIABETES MELLITUS WITH OTHER SPECIFIED COMPLICATION, WITHOUT LONG-TERM CURRENT USE OF INSULIN (HCC): ICD-10-CM

## 2021-01-20 PROCEDURE — 99204 OFFICE O/P NEW MOD 45 MIN: CPT | Performed by: INTERNAL MEDICINE

## 2021-01-20 RX ORDER — FUROSEMIDE 40 MG/1
40 TABLET ORAL DAILY
Qty: 90 TAB | Refills: 11 | Status: SHIPPED | OUTPATIENT
Start: 2021-01-20 | End: 2021-02-09

## 2021-01-20 ASSESSMENT — ENCOUNTER SYMPTOMS
VOMITING: 0
SHORTNESS OF BREATH: 0
HYPERTENSION: 1
COUGH: 0
FEVER: 0
CHILLS: 0
NAUSEA: 0

## 2021-01-20 ASSESSMENT — FIBROSIS 4 INDEX: FIB4 SCORE: 2.32

## 2021-01-20 NOTE — PROGRESS NOTES
Subjective:      Javier Braga is a 85 y.o. male who presents with Hypertension (diabetic nephropathy) and Chronic Kidney Disease            Patient is a pleasant 85-year-old gentleman with a past medical history significant for longstanding diabetes, hypertension, peripheral vascular disease.  He has been seeing Dr. Marie from urology for recurrent UTIs, was treated with a course of Bactrim, patient also was found to have left hydronephrosis.  His situation has been complicated by acute kidney injury with a creatinine up to 2.7 mg/dL and January 14, his creatinine in August 2020 was 1.37  Patient has no recent use of NSAIDs or IV contrast exposure    Hypertension  This is a chronic problem. The current episode started more than 1 year ago. The problem has been waxing and waning since onset. The problem is uncontrolled. Associated symptoms include peripheral edema. Pertinent negatives include no chest pain, malaise/fatigue or shortness of breath. Risk factors for coronary artery disease include diabetes mellitus and male gender. Past treatments include nothing. The current treatment provides moderate improvement. Compliance problems include diet.  Hypertensive end-organ damage includes kidney disease. Identifiable causes of hypertension include chronic renal disease.   Chronic Kidney Disease  This is a chronic problem. The current episode started more than 1 year ago. The problem occurs constantly. The problem has been waxing and waning. Associated symptoms include urinary symptoms. Pertinent negatives include no chest pain, chills, coughing, fever, nausea or vomiting.       Review of Systems   Constitutional: Negative for chills, fever and malaise/fatigue.   Respiratory: Negative for cough and shortness of breath.    Cardiovascular: Positive for leg swelling. Negative for chest pain.   Gastrointestinal: Negative for nausea and vomiting.   Genitourinary: Negative for dysuria, frequency and urgency.   All other  "systems reviewed and are negative.         Objective:     /60 (BP Location: Right arm, Patient Position: Sitting, BP Cuff Size: Adult)   Pulse 71   Temp 36.4 °C (97.5 °F) (Temporal)   Resp 18   Ht 1.803 m (5' 11\")   Wt 78.9 kg (174 lb)   SpO2 98%   BMI 24.27 kg/m²      Physical Exam  Vitals signs and nursing note reviewed.   Constitutional:       General: He is awake.      Appearance: He is not ill-appearing.   HENT:      Head: Normocephalic and atraumatic.      Right Ear: External ear normal.      Left Ear: External ear normal.      Nose: Nose normal.      Mouth/Throat:      Pharynx: No oropharyngeal exudate or posterior oropharyngeal erythema.   Eyes:      General:         Right eye: No discharge.         Left eye: No discharge.      Conjunctiva/sclera: Conjunctivae normal.   Neck:      Musculoskeletal: No neck rigidity or muscular tenderness.   Cardiovascular:      Rate and Rhythm: Normal rate and regular rhythm.   Pulmonary:      Effort: Pulmonary effort is normal. No respiratory distress.      Breath sounds: Normal breath sounds. No wheezing.      Comments: Coarse BS  Abdominal:      General: Abdomen is flat. Bowel sounds are normal.   Musculoskeletal:         General: No tenderness.      Right lower leg: Edema present.      Left lower leg: Edema present.   Skin:     General: Skin is warm and dry.      Coloration: Skin is not jaundiced.   Neurological:      General: No focal deficit present.      Mental Status: He is alert and oriented to person, place, and time. Mental status is at baseline.   Psychiatric:         Mood and Affect: Mood normal.         Behavior: Behavior normal.         Thought Content: Thought content normal.       Past Medical History:   Diagnosis Date   • AK (actinic keratosis) 7/21/2016   • Anemia    • Arrhythmia     PVC's   • Arthritis     lower back   • Lombardi esophagus    • CATARACT     bilateral IOL   • Chronic kidney disease (CKD), stage III (moderate)    • Colon polyps  "   • Dental disorder     Dentures   • Depression 2016   • Diabetes 1984    oral medication   • GERD (gastroesophageal reflux disease)    • Hyperlipidemia    • Hypertension    • Infectious disease     history of c-diff    • Neuropathy ([facility])    • Other specified disorder of intestines     alters between normal and diarrhea since radiation tx   • Personal history of venous thrombosis and embolism     leg   • Prostate cancer ([facility])     Status post prostatectomy   • PVD (peripheral vascular disease) ([facility])     s/p femoral endarterectomy   • Sleep apnea     Does not use CPAP   • Unspecified urinary incontinence     spill from artifical urinary sphincter   • Urinary bladder disorder     uses external catheter at night, and clamp during day     Social History     Socioeconomic History   • Marital status:      Spouse name: Not on file   • Number of children: 5   • Years of education: Not on file   • Highest education level: Not on file   Occupational History   • Not on file   Social Needs   • Financial resource strain: Not on file   • Food insecurity     Worry: Not on file     Inability: Not on file   • Transportation needs     Medical: Not on file     Non-medical: Not on file   Tobacco Use   • Smoking status: Former Smoker     Packs/day: 2.00     Years: 30.00     Pack years: 60.00     Types: Cigarettes     Quit date: 3/5/1995     Years since quittin.8   • Smokeless tobacco: Never Used   • Tobacco comment: 1.5 ppd 25 yrs,quit    Substance and Sexual Activity   • Alcohol use: No     Alcohol/week: 0.0 oz   • Drug use: No   • Sexual activity: Not on file   Lifestyle   • Physical activity     Days per week: Not on file     Minutes per session: Not on file   • Stress: Not on file   Relationships   • Social connections     Talks on phone: Not on file     Gets together: Not on file     Attends Jewish service: Not on file     Active member of club or organization: Not on file     Attends  meetings of clubs or organizations: Not on file     Relationship status: Not on file   • Intimate partner violence     Fear of current or ex partner: Not on file     Emotionally abused: Not on file     Physically abused: Not on file     Forced sexual activity: Not on file   Other Topics Concern   • Not on file   Social History Narrative    Retired, previous  at [employer].      Family History   Problem Relation Age of Onset   • Cancer Mother         ovarian   • Heart Disease Father    • Diabetes Father    • Diabetes Sister    • Hypertension Other    • Cancer Other         multiple cousins with prostate cancer.      Recent Labs     08/12/20  0917 08/27/20  1101 01/12/21  0709   HDL 46  --   --    TRIGLYCERIDE 108  --   --    SODIUM 138 136 134*   POTASSIUM 4.8 5.0 4.9   CHLORIDE 102 104 106   CO2 21 22 17*   BUN 39* 23* 37*   CREATININE 1.90* 1.32 2.73*                 Assessment/Plan:        1. KAVITA (acute kidney injury) (HCC)  The etiology is not very clear most likely multifactorial  Patient does have obstructive uropathy, also there is a possibility of ATN from urinary tract infection, or acute interstitial nephritis from Bactrim (however patient does not have any skin rash)  Patient has no uremic symptoms  I agree with the decision to discontinue Bactrim  Recheck labs  Renal dose all medication  Avoid nephrotoxins  Consider a kidney biopsy if no significant improvement    2. Hydronephrosis, unspecified hydronephrosis type  Patient to follow-up with Dr. Marie  Is scheduled to have an abdominal CT scan without contrast we will reviewed the results    3. Essential hypertension  Uncontrolled  Patient was advised to be on low-sodium diet  Start furosemide  Patient check blood pressure at home regularly and call us with the results    4. Edema, unspecified type  Patient was advised to be a low-sodium diet  Start furosemide, patient was advised about the possible side effects, was advised to call us  if he has any lightheadedness or dizziness  Recheck labs after starting furosemide    5. Type 2 diabetes mellitus with other specified complication, without long-term current use of insulin (HCC)

## 2021-01-21 ENCOUNTER — HOSPITAL ENCOUNTER (OUTPATIENT)
Dept: LAB | Facility: MEDICAL CENTER | Age: 86
End: 2021-01-21
Attending: INTERNAL MEDICINE
Payer: MEDICARE

## 2021-01-21 DIAGNOSIS — N17.9 AKI (ACUTE KIDNEY INJURY) (HCC): ICD-10-CM

## 2021-01-21 DIAGNOSIS — I10 ESSENTIAL HYPERTENSION: ICD-10-CM

## 2021-01-21 DIAGNOSIS — R60.9 EDEMA, UNSPECIFIED TYPE: ICD-10-CM

## 2021-01-21 LAB
ANION GAP SERPL CALC-SCNC: 10 MMOL/L (ref 7–16)
BUN SERPL-MCNC: 33 MG/DL (ref 8–22)
CALCIUM SERPL-MCNC: 9.2 MG/DL (ref 8.5–10.5)
CHLORIDE SERPL-SCNC: 108 MMOL/L (ref 96–112)
CO2 SERPL-SCNC: 20 MMOL/L (ref 20–33)
CREAT SERPL-MCNC: 1.7 MG/DL (ref 0.5–1.4)
CREAT UR-MCNC: 30.01 MG/DL
ERYTHROCYTE [DISTWIDTH] IN BLOOD BY AUTOMATED COUNT: 51.6 FL (ref 35.9–50)
GLUCOSE SERPL-MCNC: 274 MG/DL (ref 65–99)
HCT VFR BLD AUTO: 25.7 % (ref 42–52)
HGB BLD-MCNC: 8 G/DL (ref 14–18)
MCH RBC QN AUTO: 28.6 PG (ref 27–33)
MCHC RBC AUTO-ENTMCNC: 31.1 G/DL (ref 33.7–35.3)
MCV RBC AUTO: 91.8 FL (ref 81.4–97.8)
MICROALBUMIN UR-MCNC: 11.7 MG/DL
MICROALBUMIN/CREAT UR: 390 MG/G (ref 0–30)
PLATELET # BLD AUTO: 198 K/UL (ref 164–446)
PMV BLD AUTO: 9.8 FL (ref 9–12.9)
POTASSIUM SERPL-SCNC: 4.5 MMOL/L (ref 3.6–5.5)
RBC # BLD AUTO: 2.8 M/UL (ref 4.7–6.1)
SODIUM SERPL-SCNC: 138 MMOL/L (ref 135–145)
WBC # BLD AUTO: 5.4 K/UL (ref 4.8–10.8)

## 2021-01-21 PROCEDURE — 36415 COLL VENOUS BLD VENIPUNCTURE: CPT

## 2021-01-21 PROCEDURE — 80048 BASIC METABOLIC PNL TOTAL CA: CPT

## 2021-01-21 PROCEDURE — 82043 UR ALBUMIN QUANTITATIVE: CPT

## 2021-01-21 PROCEDURE — 82570 ASSAY OF URINE CREATININE: CPT

## 2021-01-21 PROCEDURE — 85027 COMPLETE CBC AUTOMATED: CPT

## 2021-01-22 ENCOUNTER — HOSPITAL ENCOUNTER (OUTPATIENT)
Dept: RADIOLOGY | Facility: MEDICAL CENTER | Age: 86
End: 2021-01-22
Attending: UROLOGY
Payer: MEDICARE

## 2021-01-22 DIAGNOSIS — Z87.442 PERSONAL HISTORY OF URINARY CALCULI: ICD-10-CM

## 2021-01-22 PROCEDURE — 74176 CT ABD & PELVIS W/O CONTRAST: CPT

## 2021-01-27 ENCOUNTER — APPOINTMENT (OUTPATIENT)
Dept: NEPHROLOGY | Facility: MEDICAL CENTER | Age: 86
End: 2021-01-27
Payer: MEDICARE

## 2021-02-09 ENCOUNTER — OFFICE VISIT (OUTPATIENT)
Dept: NEPHROLOGY | Facility: MEDICAL CENTER | Age: 86
End: 2021-02-09
Payer: MEDICARE

## 2021-02-09 VITALS
BODY MASS INDEX: 23.38 KG/M2 | WEIGHT: 167 LBS | HEIGHT: 71 IN | HEART RATE: 80 BPM | SYSTOLIC BLOOD PRESSURE: 126 MMHG | DIASTOLIC BLOOD PRESSURE: 60 MMHG | TEMPERATURE: 97.2 F | OXYGEN SATURATION: 94 %

## 2021-02-09 DIAGNOSIS — N17.9 AKI (ACUTE KIDNEY INJURY) (HCC): ICD-10-CM

## 2021-02-09 DIAGNOSIS — I10 ESSENTIAL HYPERTENSION: ICD-10-CM

## 2021-02-09 DIAGNOSIS — E11.69 TYPE 2 DIABETES MELLITUS WITH OTHER SPECIFIED COMPLICATION, WITHOUT LONG-TERM CURRENT USE OF INSULIN (HCC): ICD-10-CM

## 2021-02-09 DIAGNOSIS — R60.9 EDEMA, UNSPECIFIED TYPE: ICD-10-CM

## 2021-02-09 DIAGNOSIS — N13.30 HYDRONEPHROSIS, UNSPECIFIED HYDRONEPHROSIS TYPE: ICD-10-CM

## 2021-02-09 PROCEDURE — 99214 OFFICE O/P EST MOD 30 MIN: CPT | Performed by: INTERNAL MEDICINE

## 2021-02-09 RX ORDER — CEPHALEXIN 250 MG/1
250 CAPSULE ORAL 4 TIMES DAILY
COMMUNITY
End: 2021-04-12

## 2021-02-09 RX ORDER — FUROSEMIDE 40 MG/1
40 TABLET ORAL 2 TIMES DAILY
Qty: 180 TAB | Refills: 3 | Status: SHIPPED | OUTPATIENT
Start: 2021-02-09 | End: 2022-01-01

## 2021-02-09 ASSESSMENT — ENCOUNTER SYMPTOMS
CHILLS: 0
SHORTNESS OF BREATH: 0
VOMITING: 0
COUGH: 0
NAUSEA: 0
FEVER: 0
HYPERTENSION: 1

## 2021-02-09 ASSESSMENT — FIBROSIS 4 INDEX: FIB4 SCORE: 2.08

## 2021-02-09 NOTE — PROGRESS NOTES
"Subjective:      Javier Braga is a 85 y.o. male who presents with Chronic Kidney Disease and Hypertension            Patient has a history of left-sided hydronephrosis, he is under the care of Dr. Marie, recent acute kidney injury on chronic kidney disease stage III, creatinine has improved, patient continued to have lower extremity edema    Chronic Kidney Disease  This is a chronic problem. The current episode started more than 1 year ago. The problem occurs constantly. The problem has been waxing and waning. Pertinent negatives include no chest pain, chills, coughing, fever, nausea, urinary symptoms or vomiting.   Hypertension  This is a chronic problem. The current episode started more than 1 year ago. The problem is unchanged. The problem is controlled. Associated symptoms include peripheral edema. Pertinent negatives include no chest pain, malaise/fatigue or shortness of breath. Risk factors for coronary artery disease include male gender. Past treatments include beta blockers and diuretics. The current treatment provides significant improvement. There are no compliance problems.  Hypertensive end-organ damage includes kidney disease.       Review of Systems   Constitutional: Negative for chills, fever and malaise/fatigue.   Respiratory: Negative for cough and shortness of breath.    Cardiovascular: Positive for leg swelling. Negative for chest pain.   Gastrointestinal: Negative for nausea and vomiting.   Genitourinary: Negative for dysuria, frequency and urgency.          Objective:     /60 (BP Location: Right arm, Patient Position: Sitting)   Pulse 80   Temp 36.2 °C (97.2 °F) (Temporal)   Ht 1.803 m (5' 11\")   Wt 75.8 kg (167 lb)   SpO2 94%   BMI 23.29 kg/m²      Physical Exam  Vitals signs and nursing note reviewed.   Constitutional:       General: He is not in acute distress.     Appearance: He is not ill-appearing.   HENT:      Head: Normocephalic and atraumatic.      Right Ear: External ear " normal.      Left Ear: External ear normal.      Nose: Nose normal.   Eyes:      General:         Right eye: No discharge.         Left eye: No discharge.      Conjunctiva/sclera: Conjunctivae normal.   Cardiovascular:      Rate and Rhythm: Normal rate and regular rhythm.      Heart sounds: No murmur.   Pulmonary:      Effort: Pulmonary effort is normal. No respiratory distress.      Breath sounds: Normal breath sounds. No wheezing.   Musculoskeletal:         General: No tenderness or deformity.      Right lower leg: Edema present.      Left lower leg: Edema present.   Skin:     General: Skin is warm.   Neurological:      General: No focal deficit present.      Mental Status: He is alert and oriented to person, place, and time.   Psychiatric:         Mood and Affect: Mood normal.         Behavior: Behavior normal.       Past Medical History:   Diagnosis Date   • AK (actinic keratosis) 7/21/2016   • Anemia    • Arrhythmia     PVC's   • Arthritis     lower back   • Lombardi esophagus    • CATARACT     bilateral IOL   • Chronic kidney disease (CKD), stage III (moderate)    • Colon polyps    • Dental disorder     Dentures   • Depression 7/21/2016   • Diabetes 1984    oral medication   • GERD (gastroesophageal reflux disease)    • Hyperlipidemia    • Hypertension    • Infectious disease     history of c-diff 2016   • Neuropathy (HCC)    • Other specified disorder of intestines     alters between normal and diarrhea since radiation tx   • Personal history of venous thrombosis and embolism 2010/1995    leg   • Prostate cancer ([facility]) 2001    Status post prostatectomy   • PVD (peripheral vascular disease) ([facility])     s/p femoral endarterectomy   • Sleep apnea     Does not use CPAP   • Unspecified urinary incontinence     spill from artifical urinary sphincter   • Urinary bladder disorder     uses external catheter at night, and clamp during day     Social History     Socioeconomic History   • Marital status:      Spouse  name: Not on file   • Number of children: 5   • Years of education: Not on file   • Highest education level: Not on file   Occupational History   • Not on file   Social Needs   • Financial resource strain: Not on file   • Food insecurity     Worry: Not on file     Inability: Not on file   • Transportation needs     Medical: Not on file     Non-medical: Not on file   Tobacco Use   • Smoking status: Former Smoker     Packs/day: 2.00     Years: 30.00     Pack years: 60.00     Types: Cigarettes     Quit date: 3/5/1995     Years since quittin.9   • Smokeless tobacco: Never Used   • Tobacco comment: 1.5 ppd 25 yrs,quit    Substance and Sexual Activity   • Alcohol use: No     Alcohol/week: 0.0 oz   • Drug use: No   • Sexual activity: Not on file   Lifestyle   • Physical activity     Days per week: Not on file     Minutes per session: Not on file   • Stress: Not on file   Relationships   • Social connections     Talks on phone: Not on file     Gets together: Not on file     Attends Buddhism service: Not on file     Active member of club or organization: Not on file     Attends meetings of clubs or organizations: Not on file     Relationship status: Not on file   • Intimate partner violence     Fear of current or ex partner: Not on file     Emotionally abused: Not on file     Physically abused: Not on file     Forced sexual activity: Not on file   Other Topics Concern   • Not on file   Social History Narrative    Retired, previous  at [employer] [facility] [place].      Family History   Problem Relation Age of Onset   • Cancer Mother         ovarian   • Heart Disease Father    • Diabetes Father    • Diabetes Sister    • Hypertension Other    • Cancer Other         multiple cousins with prostate cancer.      Recent Labs     20  0917 20  1101 21  0709 21  1500   HDL 46  --   --   --    TRIGLYCERIDE 108  --   --   --    SODIUM 138 136 134* 138   POTASSIUM 4.8 5.0 4.9 4.5   CHLORIDE 102 104 106  108   CO2 21 22 17* 20   BUN 39* 23* 37* 33*   CREATININE 1.90* 1.32 2.73* 1.70*                 Assessment/Plan:        1. KAVITA (acute kidney injury) ([facility])  Creatinine improved  No uremic symptoms  Renal dose of medication  Avoid nephrotoxins  Recheck labs in 2 months      2. Essential hypertension  Controlled  Continue same medication regimen  Continue low-sodium diet      3. Edema, unspecified type  Patient was advised to continue low-sodium diet  Increase furosemide to twice daily, as needed,  Patient was advised about the possible side effects including hypotension  Recheck labs    4. Type 2 diabetes mellitus with other specified complication, without long-term current use of insulin ([facility])    5. Hydronephrosis, unspecified hydronephrosis type  Follow-up with Dr. Marie from urology

## 2021-02-18 ENCOUNTER — HOSPITAL ENCOUNTER (OUTPATIENT)
Dept: RADIOLOGY | Facility: MEDICAL CENTER | Age: 86
End: 2021-02-18
Attending: SURGERY
Payer: MEDICARE

## 2021-02-18 DIAGNOSIS — I70.213 ATHEROSCLEROSIS OF NATIVE ARTERY OF BOTH LOWER EXTREMITIES WITH INTERMITTENT CLAUDICATION (HCC): ICD-10-CM

## 2021-02-18 PROCEDURE — 93922 UPR/L XTREMITY ART 2 LEVELS: CPT

## 2021-02-18 PROCEDURE — 93926 LOWER EXTREMITY STUDY: CPT | Mod: RT

## 2021-03-16 ENCOUNTER — HOSPITAL ENCOUNTER (OUTPATIENT)
Dept: LAB | Facility: MEDICAL CENTER | Age: 86
End: 2021-03-16
Attending: SURGERY
Payer: MEDICARE

## 2021-03-16 ENCOUNTER — HOSPITAL ENCOUNTER (OUTPATIENT)
Dept: LAB | Facility: MEDICAL CENTER | Age: 86
End: 2021-03-16
Attending: UROLOGY
Payer: MEDICARE

## 2021-03-16 PROCEDURE — 84520 ASSAY OF UREA NITROGEN: CPT

## 2021-03-16 PROCEDURE — 84153 ASSAY OF PSA TOTAL: CPT

## 2021-03-16 PROCEDURE — 82565 ASSAY OF CREATININE: CPT

## 2021-03-16 PROCEDURE — 36415 COLL VENOUS BLD VENIPUNCTURE: CPT

## 2021-03-17 LAB
BUN SERPL-MCNC: 42 MG/DL (ref 8–22)
CREAT SERPL-MCNC: 1.96 MG/DL (ref 0.5–1.4)
PSA SERPL-MCNC: <0.02 NG/ML (ref 0–4)

## 2021-03-18 ENCOUNTER — HOSPITAL ENCOUNTER (OUTPATIENT)
Dept: RADIOLOGY | Facility: MEDICAL CENTER | Age: 86
End: 2021-03-18
Attending: SURGERY
Payer: MEDICARE

## 2021-03-18 DIAGNOSIS — I70.213 ATHEROSCLEROSIS OF NATIVE ARTERY OF BOTH LOWER EXTREMITIES WITH INTERMITTENT CLAUDICATION (HCC): ICD-10-CM

## 2021-03-18 PROCEDURE — 75635 CT ANGIO ABDOMINAL ARTERIES: CPT | Mod: MH

## 2021-03-18 PROCEDURE — 700117 HCHG RX CONTRAST REV CODE 255: Performed by: SURGERY

## 2021-03-18 RX ADMIN — IOHEXOL 100 ML: 350 INJECTION, SOLUTION INTRAVENOUS at 13:40

## 2021-04-02 ENCOUNTER — HOSPITAL ENCOUNTER (OUTPATIENT)
Dept: LAB | Facility: MEDICAL CENTER | Age: 86
End: 2021-04-02
Attending: INTERNAL MEDICINE
Payer: MEDICARE

## 2021-04-02 DIAGNOSIS — I10 ESSENTIAL HYPERTENSION: ICD-10-CM

## 2021-04-02 DIAGNOSIS — N17.9 AKI (ACUTE KIDNEY INJURY) (HCC): ICD-10-CM

## 2021-04-02 DIAGNOSIS — R60.9 EDEMA, UNSPECIFIED TYPE: ICD-10-CM

## 2021-04-02 LAB
CREAT UR-MCNC: 59.58 MG/DL
ERYTHROCYTE [DISTWIDTH] IN BLOOD BY AUTOMATED COUNT: 51.6 FL (ref 35.9–50)
HCT VFR BLD AUTO: 31.2 % (ref 42–52)
HGB BLD-MCNC: 9.8 G/DL (ref 14–18)
MCH RBC QN AUTO: 27.8 PG (ref 27–33)
MCHC RBC AUTO-ENTMCNC: 31.4 G/DL (ref 33.7–35.3)
MCV RBC AUTO: 88.4 FL (ref 81.4–97.8)
MICROALBUMIN UR-MCNC: <1.2 MG/DL
MICROALBUMIN/CREAT UR: NORMAL MG/G (ref 0–30)
PLATELET # BLD AUTO: 190 K/UL (ref 164–446)
PMV BLD AUTO: 10.1 FL (ref 9–12.9)
RBC # BLD AUTO: 3.53 M/UL (ref 4.7–6.1)
WBC # BLD AUTO: 7.4 K/UL (ref 4.8–10.8)

## 2021-04-02 PROCEDURE — 82043 UR ALBUMIN QUANTITATIVE: CPT

## 2021-04-02 PROCEDURE — 85027 COMPLETE CBC AUTOMATED: CPT

## 2021-04-02 PROCEDURE — 36415 COLL VENOUS BLD VENIPUNCTURE: CPT

## 2021-04-02 PROCEDURE — 80048 BASIC METABOLIC PNL TOTAL CA: CPT

## 2021-04-02 PROCEDURE — 82570 ASSAY OF URINE CREATININE: CPT

## 2021-04-03 LAB
ANION GAP SERPL CALC-SCNC: 13 MMOL/L (ref 7–16)
BUN SERPL-MCNC: 53 MG/DL (ref 8–22)
CALCIUM SERPL-MCNC: 9.4 MG/DL (ref 8.5–10.5)
CHLORIDE SERPL-SCNC: 101 MMOL/L (ref 96–112)
CO2 SERPL-SCNC: 21 MMOL/L (ref 20–33)
CREAT SERPL-MCNC: 2.14 MG/DL (ref 0.5–1.4)
GLUCOSE SERPL-MCNC: 172 MG/DL (ref 65–99)
POTASSIUM SERPL-SCNC: 3.9 MMOL/L (ref 3.6–5.5)
SODIUM SERPL-SCNC: 135 MMOL/L (ref 135–145)

## 2021-04-05 ENCOUNTER — HOSPITAL ENCOUNTER (OUTPATIENT)
Dept: RADIOLOGY | Facility: MEDICAL CENTER | Age: 86
End: 2021-04-05
Attending: PHYSICIAN ASSISTANT
Payer: MEDICARE

## 2021-04-05 DIAGNOSIS — N13.30 HYDRONEPHROSIS, UNSPECIFIED HYDRONEPHROSIS TYPE: ICD-10-CM

## 2021-04-05 PROCEDURE — 78708 K FLOW/FUNCT IMAGE W/DRUG: CPT | Mod: MH

## 2021-04-05 RX ORDER — FUROSEMIDE 10 MG/ML
INJECTION INTRAMUSCULAR; INTRAVENOUS
Status: DISCONTINUED
Start: 2021-04-05 | End: 2021-04-06 | Stop reason: HOSPADM

## 2021-04-06 ENCOUNTER — OFFICE VISIT (OUTPATIENT)
Dept: NEPHROLOGY | Facility: MEDICAL CENTER | Age: 86
End: 2021-04-06
Payer: MEDICARE

## 2021-04-06 VITALS
OXYGEN SATURATION: 98 % | BODY MASS INDEX: 21.81 KG/M2 | TEMPERATURE: 97.3 F | HEIGHT: 73 IN | HEART RATE: 91 BPM | SYSTOLIC BLOOD PRESSURE: 126 MMHG | DIASTOLIC BLOOD PRESSURE: 60 MMHG | WEIGHT: 164.6 LBS

## 2021-04-06 DIAGNOSIS — I10 ESSENTIAL HYPERTENSION: ICD-10-CM

## 2021-04-06 DIAGNOSIS — N18.32 STAGE 3B CHRONIC KIDNEY DISEASE: ICD-10-CM

## 2021-04-06 PROCEDURE — 99214 OFFICE O/P EST MOD 30 MIN: CPT | Performed by: INTERNAL MEDICINE

## 2021-04-06 ASSESSMENT — ENCOUNTER SYMPTOMS
SHORTNESS OF BREATH: 0
FEVER: 0
COUGH: 0
HYPERTENSION: 1
VOMITING: 0
NAUSEA: 0
INSOMNIA: 1
CHILLS: 0

## 2021-04-06 ASSESSMENT — FIBROSIS 4 INDEX: FIB4 SCORE: 2.17

## 2021-04-06 NOTE — PROGRESS NOTES
"Subjective:      Javier Braga is a 85 y.o. male who presents with Chronic Kidney Disease and Hypertension            Patient has a history of obstructive uropathy, he is followed by urology team.    Chronic Kidney Disease  This is a chronic problem. The current episode started more than 1 year ago. The problem occurs constantly. The problem has been waxing and waning. Pertinent negatives include no chest pain, chills, coughing, fever, nausea, urinary symptoms or vomiting.   Hypertension  This is a chronic problem. The current episode started more than 1 year ago. The problem is unchanged. The problem is controlled. Pertinent negatives include no chest pain, malaise/fatigue or shortness of breath. Risk factors for coronary artery disease include male gender. Past treatments include beta blockers and diuretics. The current treatment provides significant improvement. There are no compliance problems.  Hypertensive end-organ damage includes kidney disease. Identifiable causes of hypertension include chronic renal disease.       Review of Systems   Constitutional: Negative for chills, fever and malaise/fatigue.   Respiratory: Negative for cough and shortness of breath.    Cardiovascular: Negative for chest pain and leg swelling.   Gastrointestinal: Negative for nausea and vomiting.   Genitourinary: Negative for dysuria, frequency and urgency.   Psychiatric/Behavioral: The patient has insomnia.           Objective:     /60 (BP Location: Right arm, Patient Position: Sitting)   Pulse 91   Temp 36.3 °C (97.3 °F) (Temporal)   Ht 1.854 m (6' 1\")   Wt 74.7 kg (164 lb 9.6 oz)   SpO2 98%   BMI 21.72 kg/m²      Physical Exam  Vitals and nursing note reviewed.   Constitutional:       General: He is not in acute distress.     Appearance: He is not ill-appearing.   HENT:      Head: Normocephalic and atraumatic.      Right Ear: External ear normal.      Left Ear: External ear normal.      Nose: Nose normal.   Eyes:     "  General:         Right eye: No discharge.         Left eye: No discharge.      Conjunctiva/sclera: Conjunctivae normal.   Cardiovascular:      Rate and Rhythm: Normal rate and regular rhythm.      Heart sounds: No murmur.   Pulmonary:      Effort: Pulmonary effort is normal. No respiratory distress.      Breath sounds: Normal breath sounds. No wheezing.   Musculoskeletal:         General: No tenderness or deformity.      Right lower leg: No edema.      Left lower leg: No edema.   Skin:     General: Skin is warm.   Neurological:      General: No focal deficit present.      Mental Status: He is alert and oriented to person, place, and time.   Psychiatric:         Mood and Affect: Mood normal.         Behavior: Behavior normal.       Past Medical History:   Diagnosis Date   • AK (actinic keratosis) 7/21/2016   • Anemia    • Arrhythmia     PVC's   • Arthritis     lower back   • Lombardi esophagus    • CATARACT     bilateral IOL   • Chronic kidney disease (CKD), stage III (moderate)    • Colon polyps    • Dental disorder     Dentures   • Depression 7/21/2016   • Diabetes 1984    oral medication   • GERD (gastroesophageal reflux disease)    • Hyperlipidemia    • Hypertension    • Infectious disease     history of c-diff 2016   • Neuropathy ([facility])    • Other specified disorder of intestines     alters between normal and diarrhea since radiation tx   • Personal history of venous thrombosis and embolism 2010/1995    leg   • Prostate cancer ([facility]) 2001    Status post prostatectomy   • PVD (peripheral vascular disease) ([facility])     s/p femoral endarterectomy   • Sleep apnea     Does not use CPAP   • Unspecified urinary incontinence     spill from artifical urinary sphincter   • Urinary bladder disorder     uses external catheter at night, and clamp during day     Social History     Socioeconomic History   • Marital status:      Spouse name: Not on file   • Number of children: 5   • Years of education: Not on file   • Highest  education level: Not on file   Occupational History   • Not on file   Tobacco Use   • Smoking status: Former Smoker     Packs/day: 2.00     Years: 30.00     Pack years: 60.00     Types: Cigarettes     Quit date: 3/5/1995     Years since quittin.1   • Smokeless tobacco: Never Used   • Tobacco comment: 1.5 ppd 25 yrs,quit    Substance and Sexual Activity   • Alcohol use: No     Alcohol/week: 0.0 oz   • Drug use: No   • Sexual activity: Not on file   Other Topics Concern   • Not on file   Social History Narrative    Retired, previous  at [employer] [facility] [place].      Social Determinants of Health     Financial Resource Strain:    • Difficulty of Paying Living Expenses:    Food Insecurity:    • Worried About Running Out of Food in the Last Year:    • Ran Out of Food in the Last Year:    Transportation Needs:    • Lack of Transportation (Medical):    • Lack of Transportation (Non-Medical):    Physical Activity:    • Days of Exercise per Week:    • Minutes of Exercise per Session:    Stress:    • Feeling of Stress :    Social Connections:    • Frequency of Communication with Friends and Family:    • Frequency of Social Gatherings with Friends and Family:    • Attends Religion Services:    • Active Member of Clubs or Organizations:    • Attends Club or Organization Meetings:    • Marital Status:    Intimate Partner Violence:    • Fear of Current or Ex-Partner:    • Emotionally Abused:    • Physically Abused:    • Sexually Abused:      Family History   Problem Relation Age of Onset   • Cancer Mother         ovarian   • Heart Disease Father    • Diabetes Father    • Diabetes Sister    • Hypertension Other    • Cancer Other         multiple cousins with prostate cancer.      Recent Labs     20  0917 20  1101 21  0709 21  0709 21  1500 21  1204 21  1138   HDL 46  --   --   --   --   --   --    TRIGLYCERIDE 108  --   --   --   --   --   --    SODIUM 138   < > 134*  --   138  --  135   POTASSIUM 4.8   < > 4.9  --  4.5  --  3.9   CHLORIDE 102   < > 106  --  108  --  101   CO2 21   < > 17*  --  20  --  21   BUN 39*   < > 37*   < > 33* 42* 53*   CREATININE 1.90*   < > 2.73*   < > 1.70* 1.96* 2.14*    < > = values in this interval not displayed.                 Assessment/Plan:        1. Essential hypertension  Controlled  Continue same medication regimen  Continue low-sodium diet      2. Stage 3b chronic kidney disease  Creatinine is fluctuating up and down but overall stable  No uremic symptoms  Renal dose of medication  Avoid nephrotoxins  Continue same medication regimen  Recheck labs    3.  Obstructive uropathy  Follow-up with urology     Helical Rim Advancement Flap Text: The defect edges were debeveled with a #15 blade scalpel.  Given the location of the defect and the proximity to free margins (helical rim) a double helical rim advancement flap was deemed most appropriate.  Using a sterile surgical marker, the appropriate advancement flaps were drawn incorporating the defect and placing the expected incisions between the helical rim and antihelix where possible.  The area thus outlined was incised through and through with a #15 scalpel blade.  With a skin hook and iris scissors, the flaps were gently and sharply undermined and freed up.

## 2021-04-12 ENCOUNTER — PRE-ADMISSION TESTING (OUTPATIENT)
Dept: ADMISSIONS | Facility: MEDICAL CENTER | Age: 86
End: 2021-04-12
Attending: FAMILY MEDICINE
Payer: MEDICARE

## 2021-04-12 RX ORDER — CEFUROXIME AXETIL 250 MG/1
250 TABLET ORAL DAILY
COMMUNITY
End: 2021-06-03

## 2021-04-12 RX ORDER — ALOGLIPTIN 12.5 MG/1
12.5 TABLET, FILM COATED ORAL DAILY
COMMUNITY
End: 2022-01-01

## 2021-04-13 ENCOUNTER — OFFICE VISIT (OUTPATIENT)
Dept: CARDIOLOGY | Facility: MEDICAL CENTER | Age: 86
End: 2021-04-13
Payer: MEDICARE

## 2021-04-13 VITALS
BODY MASS INDEX: 22.96 KG/M2 | DIASTOLIC BLOOD PRESSURE: 62 MMHG | OXYGEN SATURATION: 95 % | SYSTOLIC BLOOD PRESSURE: 126 MMHG | WEIGHT: 164 LBS | HEIGHT: 71 IN | HEART RATE: 71 BPM

## 2021-04-13 DIAGNOSIS — E78.5 DYSLIPIDEMIA: ICD-10-CM

## 2021-04-13 DIAGNOSIS — I73.9 PAD (PERIPHERAL ARTERY DISEASE) (HCC): ICD-10-CM

## 2021-04-13 DIAGNOSIS — N18.32 STAGE 3B CHRONIC KIDNEY DISEASE: ICD-10-CM

## 2021-04-13 DIAGNOSIS — I10 ESSENTIAL HYPERTENSION: ICD-10-CM

## 2021-04-13 DIAGNOSIS — E11.42 TYPE 2 DIABETES MELLITUS WITH DIABETIC POLYNEUROPATHY, WITHOUT LONG-TERM CURRENT USE OF INSULIN (HCC): ICD-10-CM

## 2021-04-13 PROBLEM — N17.9 ACUTE RENAL FAILURE SUPERIMPOSED ON STAGE 3A CHRONIC KIDNEY DISEASE (HCC): Status: RESOLVED | Noted: 2021-01-15 | Resolved: 2021-04-13

## 2021-04-13 PROBLEM — N18.31 ACUTE RENAL FAILURE SUPERIMPOSED ON STAGE 3A CHRONIC KIDNEY DISEASE (HCC): Status: RESOLVED | Noted: 2021-01-15 | Resolved: 2021-04-13

## 2021-04-13 PROCEDURE — 99214 OFFICE O/P EST MOD 30 MIN: CPT | Performed by: INTERNAL MEDICINE

## 2021-04-13 RX ORDER — CARVEDILOL 6.25 MG/1
6.25 TABLET ORAL 2 TIMES DAILY
COMMUNITY
Start: 2021-03-02 | End: 2021-04-13

## 2021-04-13 RX ORDER — CARVEDILOL 6.25 MG/1
6.25 TABLET ORAL 2 TIMES DAILY WITH MEALS
Qty: 180 TABLET | Refills: 3 | Status: SHIPPED | OUTPATIENT
Start: 2021-04-13 | End: 2022-01-01

## 2021-04-13 RX ORDER — ATORVASTATIN CALCIUM 40 MG/1
40 TABLET, FILM COATED ORAL EVERY EVENING
Qty: 100 TABLET | Refills: 3 | Status: SHIPPED | OUTPATIENT
Start: 2021-04-13 | End: 2022-01-01

## 2021-04-13 ASSESSMENT — ENCOUNTER SYMPTOMS: BACK PAIN: 1

## 2021-04-13 ASSESSMENT — FIBROSIS 4 INDEX: FIB4 SCORE: 2.17

## 2021-04-13 NOTE — PROGRESS NOTES
Cardiology Follow-up Consultation Note    Date of note:   4/13/2021  Primary Care Provider: Laury Rees M.D.  Referring Provider: Zeus Joyce MD.     Patient Name: Selvin Braga   YOB: 1935  MRN:              [id number]    Chief Complaint: PVD.     History of Present Illness: Selvin Braga is a 85 y.o. male whose current medical problems include mild aortic insufficiency, peripheral artery disease status post femoral endarterectomy and PCI of his right SFA 3/2019 and then right common femoral artery bypass grafting 2/2020 by Dr. Delgado, elevated troponin in the setting of sepsis in April 2017 (myocardial perfusion imaging 5/22/2017 negative for ischemia), provoked DVTs, dyslipidemia, and diabetes who is here for follow-up.    At our visit,  7/19/2019:  In terms of PVD, no more claudication.      In terms of previous elevated troponin, no angina.     At our visit, 7/22/2020:  In terms of PVD, had a bypass surgery for his leg. Much improved.     + decreased balance.     Dyslipidemia well controlled.     + foot swelling.     Interim Events:  Had KAVITA on hctz and lisinopril.     Still having claudication after 50 yards, has an angiogram scheduled later this week. Followed by Dr. Delgado. Uses a motorized wheelchair.     In terms of hypertension, well controlled.    Swelling improved with compression stockings.     Review of Systems   Skin: Positive for itching.   Musculoskeletal: Positive for back pain.   Genitourinary: Positive for hematuria (from radiation damage).     + incontinence due to prostate cancer treatment consequences.     All other systems reviewed and discussed using a comprehensive questionnaire and are negative.       Past Medical History:   Diagnosis Date   • AK (actinic keratosis) 7/21/2016   • Anemia    • Arrhythmia     PVC's   • Arthritis     lower back   • Lombardi esophagus    • Cancer (HCC) 2001    prostate (radiation/sx)   • CATARACT     bilateral IOL   • Chronic  kidney disease (CKD), stage III (moderate)    • Colon polyps    • Dental disorder     Dentures   • Depression 7/21/2016   • Diabetes 1984    oral medication   • GERD (gastroesophageal reflux disease)    • Hemorrhagic disorder (HCC)     bleeds easily    • Hyperlipidemia    • Hypertension    • Infectious disease     history of c-diff 2016   • Neuropathy (HCC)    • Other specified disorder of intestines     alters between normal and diarrhea since radiation tx   • Personal history of venous thrombosis and embolism 2010/1995    leg   • Prostate cancer (HCC) 2001    Status post prostatectomy   • PVD (peripheral vascular disease) (HCC)     s/p femoral endarterectomy   • Recurrent UTI    • Sleep apnea     Does not use CPAP   • Unspecified urinary incontinence     spill from artifical urinary sphincter   • Urinary bladder disorder     uses external catheter at night, and clamp during day         Past Surgical History:   Procedure Laterality Date   • FEMORAL ENDARTERECTOMY Right 2/10/2020    Procedure: ENDARTERECTOMY, FEMORAL- COMMON FEMORAL ARTERY BYPASS WITH VEIN;  Surgeon: Alek Delgado M.D.;  Location: [facility];  Service: General   • FEMORAL ENDARTERECTOMY Right 3/13/2019    Procedure: FEMORAL ENDARTERECTOMY- COMMON;  Surgeon: Alek Delgado M.D.;  Location: [facility];  Service: General   • ILIAC ANGIOPLASTY WITH STENT Right 3/13/2019    Procedure: ILIAC ANGIOPLASTY WITH STENT- RETROGRADES;  Surgeon: Alek Delgado M.D.;  Location: [facility];  Service: General   • ANKLE ORIF Right 10/28/2015    Procedure: ANKLE ORIF;  Surgeon: Venu Elizabeth M.D.;  Location: [facility];  Service:    • SPHINCTER PROSTHESIS REMOVAL  8/10/2015    Procedure: SPHINCTER PROSTHESIS REMOVAL;  Surgeon: Tuan Marie M.D.;  Location: [facility];  Service:    • LAPAROSCOPY  5/21/2014    Performed by Graham Peña M.D. at [facility]   • BOWEL RESECTION   5/21/2014    Performed by Graham Peña M.D. at SURGERY [facility]   • COLONOSCOPY WITH APC  6/5/2013    Performed by Deepa Vela M.D. at [facility]   • CATARACT PHACO WITH IOL  2/27/2013    Performed by Clayton Noonan M.D. at SURGERY SAME DAY [facility]   • CATARACT PHACO WITH IOL  2/13/2013    Performed by Clayton Noonan M.D. at SURGERY SAME DAY [facility]   • LUMBAR FUSION POSTERIOR  12/13/2012    Procedure: L5-S1 removal of Jennings Fragment NON-INSTRUMENTAL;  Surgeon: Wil Martinez M.D.;  Location: SURGERY [facility];  Service:    • LUMBAR DECOMPRESSION  12/13/2012    Procedure: 4-5;  Surgeon: Wil Martinez M.D.;  Location: SURGERY [facility];  Service:    • SPHINCTER PROSTHESIS PLACEMENT  9/26/2012    Performed by GERSON Car M.D. at SURGERY [facility]   • GROIN EXPLORATION  3/13/2012    Performed by DEEPA PATRICIA at SURGERY [facility]   • SPHINCTER PROSTHESIS PLACEMENT  2/24/2011    Performed by GERSON CAR at SURGERY [facility]   • CYSTOSCOPY  2/24/2011    Performed by GERSON CAR at SURGERY [facility]   • CYSTOSCOPY  2/2/2011    Performed by GERSON CAR at SURGERY [facility]   • SPHINCTER PROSTHESIS REMOVAL  6/3/2010    Performed by JOSH APARICIO at SURGERY [facility]   • ANGIOGRAM  1/25/2010    Performed by PETER NORMAN at SURGERY [facility]   • AORTOGRAM  1/25/2010    Performed by PETER NORMAN at SURGERY [facility]   • PROSTATECTOMY ROBOTIC  2/2001   • PB INSERT,INFLATABLE SPHINCTER  2001   • OTHER ORTHOPEDIC SURGERY Left     hip & femur fx         Current Outpatient Medications   Medication Sig Dispense Refill   • carvedilol (COREG) 6.25 MG Tab Take 1 tablet by mouth 2 times a day with meals. 180 tablet 3   • metFORMIN (GLUCOPHAGE) 500 MG Tab Take 500 mg by mouth 2 times a day with meals.     • cefUROXime (CEFTIN) 250 MG Tab Take 250 mg by mouth every day.     •  Alogliptin Benzoate 12.5 MG Tab Take 12.5 mg by mouth every day.     • furosemide (LASIX) 40 MG Tab Take 1 Tab by mouth 2 Times a Day. 180 Tab 3   • loperamide (IMODIUM) 2 MG Cap Take 2 mg by mouth 4 times a day as needed for Diarrhea.     • pantoprazole (PROTONIX) 20 MG tablet Take 1 Tab by mouth every day.     • Cyanocobalamin (VITAMIN B-12) 1000 MCG Tab Take 1,000 mcg by mouth every day.     • atorvastatin (LIPITOR) 40 MG Tab Take 1 Tab by mouth every evening. 100 Tab 3   • aspirin 81 MG tablet Take 1 Tab by mouth every day. 90 Tab 3   • Multiple Vitamins-Minerals (PRESERVISION AREDS 2+MULTI VIT PO) Take 1 Cap by mouth 2 Times a Day.     • glipiZIDE (GLUCOTROL) 10 MG Tab Take 10 mg by mouth 2 times a day.     • Cholecalciferol (VITAMIN D) 2000 UNIT TABS Take 2,000 Units by mouth every day.     • fluoxetine (PROZAC) 20 MG CAPS Take 20 mg by mouth every day.       No current facility-administered medications for this visit.         Allergies   Allergen Reactions   • Augmentin Unspecified     Bleeding (rectal)  RXN=5 years ago   • Latex Unspecified     Blisters  RXN=ongoing   • Tape Rash     Rash-blisters-paper tape okay  RXN=ongoing         Family History   Problem Relation Age of Onset   • Cancer Mother         ovarian   • Heart Disease Father    • Diabetes Father    • Diabetes Sister    • Hypertension Other    • Cancer Other         multiple cousins with prostate cancer.          Social History     Socioeconomic History   • Marital status:      Spouse name: Not on file   • Number of children: 5   • Years of education: Not on file   • Highest education level: Not on file   Occupational History   • Not on file   Tobacco Use   • Smoking status: Former Smoker     Packs/day: 2.00     Years: 30.00     Pack years: 60.00     Types: Cigarettes     Quit date: 3/5/1995     Years since quittin.1   • Smokeless tobacco: Never Used   • Tobacco comment: 1.5 ppd 25 yrs,quit    Substance and Sexual Activity   •  "Alcohol use: No     Alcohol/week: 0.0 oz   • Drug use: No   • Sexual activity: Not on file   Other Topics Concern   • Not on file   Social History Narrative    Retired, previous  at [employer].      Social Determinants of Health     Financial Resource Strain:    • Difficulty of Paying Living Expenses:    Food Insecurity:    • Worried About Running Out of Food in the Last Year:    • Ran Out of Food in the Last Year:    Transportation Needs:    • Lack of Transportation (Medical):    • Lack of Transportation (Non-Medical):    Physical Activity:    • Days of Exercise per Week:    • Minutes of Exercise per Session:    Stress:    • Feeling of Stress :    Social Connections:    • Frequency of Communication with Friends and Family:    • Frequency of Social Gatherings with Friends and Family:    • Attends [employer] Services:    • Active Member of Clubs or Organizations:    • Attends Club or Organization Meetings:    • Marital Status:    Intimate Partner Violence:    • Fear of Current or Ex-Partner:    • Emotionally Abused:    • Physically Abused:    • Sexually Abused:          Physical Exam:  Ambulatory Vitals  /62 (BP Location: Left arm, Patient Position: Sitting, BP Cuff Size: Adult)   Pulse 71   Ht 1.803 m (5' 11\")   Wt 74.4 kg (164 lb)   SpO2 95%    Oxygen Therapy:  Pulse Oximetry: 95 %  BP Readings from Last 4 Encounters:   04/13/21 126/62   04/06/21 126/60   02/09/21 126/60   01/20/21 142/60       Weight/BMI: Body mass index is 22.87 kg/m².  Wt Readings from Last 4 Encounters:   04/13/21 74.4 kg (164 lb)   04/06/21 74.7 kg (164 lb 9.6 oz)   02/09/21 75.8 kg (167 lb)   01/20/21 78.9 kg (174 lb)       General: No apparent distress  Eyes: pale conjunctiva  ENT: OP covered by mask  Neck: JVP <8 cm H2O, no carotid bruits  Lungs: normal respiratory effort, CTAB  Heart: RRR, 2/6 systolic murmur at apex,  no rubs or gallops, 1-2+ edema bilateral lower extremities. No LV/RV heave on cardiac " palpatation. 2+ bilateral radial pulses.    Abdomen: soft, non tender, non distended, no masses, normal bowel sounds.  No HSM.  Extremities/MSK: no clubbing, no cyanosis  Neurological: No focal sensory deficits  Psychiatric: Appropriate affect, A/O x 3, intact judgement and insight  Skin: Warm extremities, pale    Exam repeated in full and unchanged except for as noted above.      Lab Data Review:  Lab Results   Component Value Date/Time    CHOLSTRLTOT 114 08/12/2020 09:17 AM    LDL 46 08/12/2020 09:17 AM    HDL 46 08/12/2020 09:17 AM    TRIGLYCERIDE 108 08/12/2020 09:17 AM       Lab Results   Component Value Date/Time    SODIUM 135 04/02/2021 11:38 AM    POTASSIUM 3.9 04/02/2021 11:38 AM    CHLORIDE 101 04/02/2021 11:38 AM    CO2 21 04/02/2021 11:38 AM    GLUCOSE 172 (H) 04/02/2021 11:38 AM    BUN 53 (H) 04/02/2021 11:38 AM    CREATININE 2.14 (H) 04/02/2021 11:38 AM    CREATININE 1.0 02/08/2005 02:20 PM     Lab Results   Component Value Date/Time    ALKPHOSPHAT 90 11/26/2019 08:19 AM    ASTSGOT 17 11/26/2019 08:19 AM    ALTSGPT 12 11/26/2019 08:19 AM    TBILIRUBIN 0.6 11/26/2019 08:19 AM      Lab Results   Component Value Date/Time    WBC 7.4 04/02/2021 11:38 AM     No components found for: HBGA1C  No components found for: TROPONIN  No results found for: BNP      Cardiac Imaging and Procedures Review:    EKG dated 3/8/2019 : My personal interpretation is NSR, normal EKG.     Echo dated 5/9/2019:   CONCLUSIONS  Normal left ventricular size, wall thickness, and systolic function.  Mitral annular calcification.  Aortic sclerosis without stenosis.  Compared to the images of the study done 4/21/17 - there has been no   significant change.     Nuclear Perfusion Imaging (5/2017):   Myocardial Perfusion   Report   NUCLEAR IMAGING INTERPRETATION   No evidence of significant jeopardized viable myocardium or prior myocardial    infarction.   Normal left ventricular size, ejection fraction, and wall motion.    Radiology test  Review:  CXR:   2017  FINDINGS:  Cardiac silhouette is mildly enlarged.  No pulmonary infiltrates or consolidations are noted.  No pleural effusions are appreciated.  Elevation of the right hemidiaphragm is again noted. Scattered linear peripheral opacifications are again noted in the left lung base.      CTA aorta 7/22/2020:  IMPRESSION:     1.  There is moderate to severe atherosclerosis.  2.  There is no evidence of thoracoabdominal aneurysm or dissection.  3.   There are variable amounts of stenosis involving the pelvic vasculature and lower extremity vasculature as discussed above in further detail.  4.  There is estimated 50% stenosis of the origin of the celiac axis and SMA with extensive calcific plaque.  5.  There is bilateral renal artery stenosis, right greater than left.  6.  There is underlying emphysema and chronic interstitial lung disease.  7.  There are postoperative changes consistent with prior prostatectomy.  8.  There is a probable right inguinal postoperative seroma or liquefied hematoma versus less likely thrombosed pseudoaneurysm.  9.  There are bilateral simple appearing renal cysts.    CTA aorta 3/18/2021:  IMPRESSION:     1.  Extensive calcific atherosclerosis is again identified as seen on the prior CT.     2.  Stenoses of the iliac arteries are again noted bilaterally which appears similar to the prior exam.     3.  Increased narrowing and stenosis focally in the right common femoral artery measuring up to 90% diameter reduction.     4.  Multifocal stenosis of right femoral artery and popliteal artery.     5.  Occlusion of left anterior tibial artery and peroneal arteries.     6.  Occlusion of right peroneal artery. Anterior tibial artery and posterior tibial arteries are patent. Left posterior tibial artery is patent.      Op Note Dr. Delgado 2/10/2020:  PROCEDURE PERFORMED -      1.  Right common femoral artery interposition bypass using reversed ipsilateral greater saphenous vein  2.   Resection of right femoral artery  3.  Harvesting of right greater saphenous vein    Medical Decision Makin. PAD (peripheral artery disease) (HCC)  Continued claudication, s/p R SFA PCI previously and bypass grafting. Followed by Dr. Delgado, pending another intervention later this week.    -aspirin 81mg PO Daily  -lipitor, lipids well controlled  -not on plavix I suspect due to history of GI and urinary bleeding.     2. History of DVT (deep vein thrombosis)  Twice, both provoked.   -CTM, no indication for prolonged anticoagulation at this time.     3. Essential hypertension  Well controlled  -continue current meds    4. Dyslipidemia  Well controlled  -continue lipitor    5. Chronic kidney disease (CKD), stage III (moderate) (HCC)  Stable,  -check BMP.     6. Anemia, unspecified type  Unclear cause, does have chronic hematuria/hematochezia. No reversible causes found on scopes. Previously unresponsive to iron. Likely a component of CKD.   -CTM    7. Leg swelling  Normal albumin and normal echocardiogram without e/o CHF. This is likely secondary to venous insufficiency in the setting of previous DVT is both legs.  -CTM, compression stockings, leg elevation.     8. Goals of care - DNR, POLST and AD scanned in.     9. Aortic insufficiency - mild in 2017. Does have murmur consistent with this. -recheck in .       Return in about 6 months (around 10/13/2021). with ALFREDO.       Hadley Martinez MD, [facility]  [facility], [address]  [address] [address]  Phone: [phone]  Fax: [phone]

## 2021-04-15 ENCOUNTER — APPOINTMENT (OUTPATIENT)
Dept: RADIOLOGY | Facility: MEDICAL CENTER | Age: 86
End: 2021-04-15
Attending: SURGERY
Payer: MEDICARE

## 2021-04-15 ENCOUNTER — HOSPITAL ENCOUNTER (OUTPATIENT)
Facility: MEDICAL CENTER | Age: 86
End: 2021-04-15
Attending: SURGERY | Admitting: SURGERY
Payer: MEDICARE

## 2021-04-15 VITALS
WEIGHT: 164.68 LBS | DIASTOLIC BLOOD PRESSURE: 44 MMHG | BODY MASS INDEX: 23.06 KG/M2 | SYSTOLIC BLOOD PRESSURE: 137 MMHG | TEMPERATURE: 98.6 F | OXYGEN SATURATION: 93 % | RESPIRATION RATE: 16 BRPM | HEART RATE: 61 BPM | HEIGHT: 71 IN

## 2021-04-15 DIAGNOSIS — E78.00 PURE HYPERCHOLESTEROLEMIA: ICD-10-CM

## 2021-04-15 DIAGNOSIS — N18.30 STAGE 3 CHRONIC KIDNEY DISEASE, UNSPECIFIED WHETHER STAGE 3A OR 3B CKD: ICD-10-CM

## 2021-04-15 DIAGNOSIS — I67.9 CEREBROVASCULAR DISEASE, UNSPECIFIED: ICD-10-CM

## 2021-04-15 DIAGNOSIS — E11.9 DIABETES MELLITUS WITHOUT COMPLICATION (HCC): ICD-10-CM

## 2021-04-15 DIAGNOSIS — E78.5 HYPERLIPIDEMIA, UNSPECIFIED HYPERLIPIDEMIA TYPE: ICD-10-CM

## 2021-04-15 DIAGNOSIS — K21.9 GASTROESOPHAGEAL REFLUX DISEASE WITHOUT ESOPHAGITIS: ICD-10-CM

## 2021-04-15 DIAGNOSIS — D64.9 ANEMIA, UNSPECIFIED TYPE: ICD-10-CM

## 2021-04-15 LAB
ANION GAP SERPL CALC-SCNC: 9 MMOL/L (ref 7–16)
BASOPHILS # BLD AUTO: 1 % (ref 0–1.8)
BASOPHILS # BLD: 0.06 K/UL (ref 0–0.12)
BUN SERPL-MCNC: 39 MG/DL (ref 8–22)
CALCIUM SERPL-MCNC: 9.4 MG/DL (ref 8.5–10.5)
CHLORIDE SERPL-SCNC: 104 MMOL/L (ref 96–112)
CO2 SERPL-SCNC: 25 MMOL/L (ref 20–33)
CREAT SERPL-MCNC: 1.37 MG/DL (ref 0.5–1.4)
EOSINOPHIL # BLD AUTO: 0.33 K/UL (ref 0–0.51)
EOSINOPHIL NFR BLD: 5.6 % (ref 0–6.9)
ERYTHROCYTE [DISTWIDTH] IN BLOOD BY AUTOMATED COUNT: 50.8 FL (ref 35.9–50)
GLUCOSE SERPL-MCNC: 200 MG/DL (ref 65–99)
HCT VFR BLD AUTO: 29.1 % (ref 42–52)
HGB BLD-MCNC: 9.2 G/DL (ref 14–18)
IMM GRANULOCYTES # BLD AUTO: 0.02 K/UL (ref 0–0.11)
IMM GRANULOCYTES NFR BLD AUTO: 0.3 % (ref 0–0.9)
LYMPHOCYTES # BLD AUTO: 1.43 K/UL (ref 1–4.8)
LYMPHOCYTES NFR BLD: 24.4 % (ref 22–41)
MCH RBC QN AUTO: 27.5 PG (ref 27–33)
MCHC RBC AUTO-ENTMCNC: 31.6 G/DL (ref 33.7–35.3)
MCV RBC AUTO: 87.1 FL (ref 81.4–97.8)
MONOCYTES # BLD AUTO: 0.72 K/UL (ref 0–0.85)
MONOCYTES NFR BLD AUTO: 12.3 % (ref 0–13.4)
NEUTROPHILS # BLD AUTO: 3.29 K/UL (ref 1.82–7.42)
NEUTROPHILS NFR BLD: 56.4 % (ref 44–72)
NRBC # BLD AUTO: 0 K/UL
NRBC BLD-RTO: 0 /100 WBC
PLATELET # BLD AUTO: 141 K/UL (ref 164–446)
PMV BLD AUTO: 9.4 FL (ref 9–12.9)
POTASSIUM SERPL-SCNC: 3.3 MMOL/L (ref 3.6–5.5)
RBC # BLD AUTO: 3.34 M/UL (ref 4.7–6.1)
SODIUM SERPL-SCNC: 138 MMOL/L (ref 135–145)
WBC # BLD AUTO: 5.9 K/UL (ref 4.8–10.8)

## 2021-04-15 PROCEDURE — 99153 MOD SED SAME PHYS/QHP EA: CPT

## 2021-04-15 PROCEDURE — 85025 COMPLETE CBC W/AUTO DIFF WBC: CPT

## 2021-04-15 PROCEDURE — 700117 HCHG RX CONTRAST REV CODE 255: Performed by: SURGERY

## 2021-04-15 PROCEDURE — 80048 BASIC METABOLIC PNL TOTAL CA: CPT

## 2021-04-15 PROCEDURE — 4410374 IR-ABDOMINAL AORTA-WITH RUNOFF

## 2021-04-15 PROCEDURE — 160002 HCHG RECOVERY MINUTES (STAT)

## 2021-04-15 PROCEDURE — 700111 HCHG RX REV CODE 636 W/ 250 OVERRIDE (IP)

## 2021-04-15 PROCEDURE — 700111 HCHG RX REV CODE 636 W/ 250 OVERRIDE (IP): Performed by: SURGERY

## 2021-04-15 RX ORDER — PROTAMINE SULFATE 10 MG/ML
INJECTION, SOLUTION INTRAVENOUS
Status: COMPLETED
Start: 2021-04-15 | End: 2021-04-15

## 2021-04-15 RX ORDER — SODIUM CHLORIDE 9 MG/ML
500 INJECTION, SOLUTION INTRAVENOUS
Status: ACTIVE | OUTPATIENT
Start: 2021-04-15 | End: 2021-04-15

## 2021-04-15 RX ORDER — ONDANSETRON 2 MG/ML
4 INJECTION INTRAMUSCULAR; INTRAVENOUS PRN
Status: ACTIVE | OUTPATIENT
Start: 2021-04-15 | End: 2021-04-15

## 2021-04-15 RX ORDER — HEPARIN SODIUM 1000 [USP'U]/ML
5000 INJECTION, SOLUTION INTRAVENOUS; SUBCUTANEOUS ONCE
Status: COMPLETED | OUTPATIENT
Start: 2021-04-15 | End: 2021-04-15

## 2021-04-15 RX ORDER — MIDAZOLAM HYDROCHLORIDE 1 MG/ML
INJECTION INTRAMUSCULAR; INTRAVENOUS
Status: COMPLETED
Start: 2021-04-15 | End: 2021-04-15

## 2021-04-15 RX ORDER — MIDAZOLAM HYDROCHLORIDE 1 MG/ML
.5-2 INJECTION INTRAMUSCULAR; INTRAVENOUS PRN
Status: ACTIVE | OUTPATIENT
Start: 2021-04-15 | End: 2021-04-15

## 2021-04-15 RX ORDER — PROTAMINE SULFATE 10 MG/ML
30 INJECTION, SOLUTION INTRAVENOUS ONCE
Status: COMPLETED | OUTPATIENT
Start: 2021-04-15 | End: 2021-04-15

## 2021-04-15 RX ORDER — HEPARIN SODIUM 1000 [USP'U]/ML
INJECTION, SOLUTION INTRAVENOUS; SUBCUTANEOUS
Status: COMPLETED
Start: 2021-04-15 | End: 2021-04-15

## 2021-04-15 RX ADMIN — IOHEXOL 62 ML: 300 INJECTION, SOLUTION INTRAVENOUS at 09:42

## 2021-04-15 RX ADMIN — PROTAMINE SULFATE 30 MG: 10 INJECTION, SOLUTION INTRAVENOUS at 11:12

## 2021-04-15 RX ADMIN — MIDAZOLAM HYDROCHLORIDE 1 MG: 1 INJECTION, SOLUTION INTRAMUSCULAR; INTRAVENOUS at 09:09

## 2021-04-15 RX ADMIN — MIDAZOLAM HYDROCHLORIDE 1 MG: 1 INJECTION, SOLUTION INTRAMUSCULAR; INTRAVENOUS at 09:17

## 2021-04-15 RX ADMIN — HEPARIN SODIUM 5000 UNITS: 1000 INJECTION, SOLUTION INTRAVENOUS; SUBCUTANEOUS at 09:17

## 2021-04-15 ASSESSMENT — FIBROSIS 4 INDEX: FIB4 SCORE: 2.17

## 2021-04-15 NOTE — PROGRESS NOTES
IR RN note:    Site Confirmed with MD, patient and RN pre procedure  Consents signed by patient, MD, and RN pre medication   MD verbal order to sedate patient to desired sedation level for procedure with medication ranges in MAR        Abdominal aortogram runnoff with possible interventiosn by MD Delgado assisted by RT Pugh,Left femoral artery access site;  Angiogram completed   Angioplasty Right femoral artery    End Tidal CO2 range 19-33 during procedure   left groin sealed with Angioseal   TERUMO Angioseal VIP 6 fr REF#  [id number]   LOT# [id number]   +1 Right and left  pedal pulses with doppler pulses pre procedure- MD Delgado aware   +1 Right and left  pedal pulses with doppler pulsespulses post procedure   Patient tolerated procedure, hemodynamically stable; pt awake, lethargic and talking post procedure; report given to [facility] ONEIDA Martinez ;   patient transported to [facility] via IR RN monitored then transferred care to report RN       RN informed to release signed and held orders on patients arrival to room

## 2021-04-15 NOTE — DISCHARGE INSTRUCTIONS
ACTIVITY: Rest and take it easy for the first 24 hours.  A responsible adult is recommended to remain with you during that time.  It is normal to feel sleepy.  We encourage you to not do anything that requires balance, judgment or coordination.    MILD FLU-LIKE SYMPTOMS ARE NORMAL. YOU MAY EXPERIENCE GENERALIZED MUSCLE ACHES, THROAT IRRITATION, HEADACHE AND/OR SOME NAUSEA.    FOR 24 HOURS DO NOT:  Drive, operate machinery or run household appliances.  Drink beer or alcoholic beverages.   Make important decisions or sign legal documents.    SPECIAL INSTRUCTIONS: POST ANGIOGRAM  General Care Instructions  • Maintain a bandage over the incision site for 24 hours.  It's normal to find a small bruise or dime-sized lump at the insertion site. This should disappear within a few weeks.  • Do not apply lotions or powders to the site.  Do not immerse the catheter insertion site in water (bathtub/swimming) for five days. It is ok to shower 24 hours after the procedure.  • You may resume your normal diet immediately; on the day of your procedure, drink 6-10 glasses of water to help flush the contrast liquid out of your system.  • If the doctor inserted the catheter in through your groin:  o Walking short distances on a flat surface is OK. Limit going up/down stairs for the first 2 days.  o DO NOT do yard work, drive, squat, lift heavy objects, or play sports for 2 days; or until your health care provider tells you it is OK.    Medications  • If your current medications need to be changed, you will be provided with an updated list of your medications prior to discharge.  • If you take warfarin (Coumadin), resume taking your usual dose the evening after the procedure.  • DO NOT STOP taking prescribed blood thinning (anti-platelet) medications unless instructed by your cardiologist.  These medications include:  o Aspirin, Clopidogrel (Plavix), Ticagrelor (Brilinta), or Prasugrel (Effient)   • If you take one of the following  anticoagulants, RESUME 24 HOURS after your procedure:  o Apixiban (Eliquis), Rivaroxaban (Xarelto), Dabigatran (Pradaxa), Edoxaban (Savaysa)  • If you take metformin (Glucophage), RESUME 48 HOURS after your procedure.    When to call your healthcare provider  Call your cardiologist right away at [phone] if you have any of the following:  ?  Problems/Concerns taking any of your prescribed heart medicines.  ?  The insertion site has increasing pain, swelling, redness, bleeding, or drainage.  ?  Your arm or leg below where the insertion site changes color, is cool, or is numb.  ?  You have chest pain or shortness of breath that does not go away with rest.  ?  Your pulse feels irregular -- very slow (less than 60 beats/minute) or very fast (over 100 beats/minute).  ?  You have dizziness, fainting, or you are very tired.  ?  You are coughing up blood or yellow or green mucus.  ?  You have chills or a fever over 101°F (38.3°C).   ?  If there is bleeding at the catheter insertion site, apply pressure for 10 minutes.  If bleeding persists, call 911, and continue to hold pressure until advanced medical support arrives.        Exercising Safely After Percutaneous Coronary Intervention (PCI)  After percutaneous coronary intervention (PCI), which involves angioplasty and often stenting, it's important to focus on your heart health. Exercise can help strengthen your heart. It can also help you feel good and improve your overall health. Talk with your health care provider or cardiac rehab team member about good options for you.  • Start slowly. Work up to more vigorous exercise as you get stronger. Aim for at least 150 minutes of exercise each week.  • Include aerobic activities. These make the heart beat faster. They work the heart and lungs, and improve the body's ability to use oxygen. Good choices include walking, swimming, and biking .  Always follow your doctor's recommendation for exercise.   You have been referred to  cardiac rehabilitation, which is important for your recovery.  You may contact [facility] at [phone] to learn more and schedule a visit.        Lifestyle Management After Percutaneous Coronary Intervention (PCI)  Percutaneous coronary intervention (PCI)  involves angioplasty and often stenting. This procedure can open arteries and relieve symptoms. But, it doesn't cure coronary artery disease. New blockages can still form. You need to take steps to prevent this by managing risk factors. Doing so will help make your heart and arteries healthier. Your doctor may prescribe cardiac rehabilitation to help with this lifelong process.  Understanding risk factors  Some risk factors for coronary artery disease can be controlled. These include smoking, high blood pressure, cholesterol, diabetes, and obesity. They can be managed with medication, diet, and exercise. Support and counseling can also play a role. The effort will pay off! Managing risk factors can help you be more active, feel better, and reduce the risk of heart attack.    If you smoke, quit!  If your doctor has been urging you to quit smoking, it's for good reasons. Smoking damages your heart, blood vessels, and lungs. The good news is that quitting can halt or even reverse the damage of smoking. To quit now:  • Get medical help. Ask your doctor for advice on stop-smoking programs. Also ask about medications or nicotine replacement therapy products that may help you quit smoking.  • Get support. Join a support group. Ask for help from your family and friends.  • Don't give up. It often takes several tries to succeed in quitting smoking.  • Avoid secondhand smoke. Ask family and friends not to smoke around you.        DIET: To avoid nausea, slowly advance diet as tolerated, avoiding spicy or greasy foods for the first day.  Add more substantial food to your diet according to your physician's instructions.  Babies can be fed formula  or breast milk as soon as they are hungry.  INCREASE FLUIDS AND FIBER TO AVOID CONSTIPATION.    SURGICAL DRESSING/BATHING: Keep dressing clean and dry for 24 hours.  May remove dressing on 4/15/21 after 1:00PM.  You do not need to replace the dressing.  Do not submerge area in water (no swimming, washing dishes, etc.) for 7 days.    FOLLOW-UP APPOINTMENT:  A follow-up appointment should be arranged with your doctor in 1 week; call to schedule.    You should CALL YOUR PHYSICIAN if you develop:  Fever greater than 101 degrees F.  Pain not relieved by medication, or persistent nausea or vomiting.  Excessive bleeding (blood soaking through dressing) or unexpected drainage from the wound.  Extreme redness or swelling around the incision site, drainage of pus or foul smelling drainage.  Inability to urinate or empty your bladder within 8 hours.  Problems with breathing or chest pain.    You should call 911 if you develop problems with breathing or chest pain.  If you are unable to contact your doctor or surgical center, you should go to the nearest emergency room or urgent care center.  Physician's telephone #: DR. PATRICIA [phone]    If any questions arise, call your doctor.  If your doctor is not available, please feel free to call the Surgical Center at [phone]. The Contact Center is open Monday through Friday 7AM to 5PM and may speak to a nurse at [phone], or toll free at [phone].     A registered nurse may call you a few days after your surgery to see how you are doing after your procedure.    MEDICATIONS: Resume taking daily medication.  Take prescribed pain medication with food.  If no medication is prescribed, you may take non-aspirin pain medication if needed.  PAIN MEDICATION CAN BE VERY CONSTIPATING.  Take a stool softener or laxative such as senokot, pericolace, or milk of magnesia if needed.    If your physician has prescribed pain medication that includes Acetaminophen (Tylenol), do not  take additional Acetaminophen (Tylenol) while taking the prescribed medication.    Depression / Suicide Risk    As you are discharged from this [facility] facility, it is important to learn how to keep safe from harming yourself.    Recognize the warning signs:  · Abrupt changes in personality, positive or negative- including increase in energy   · Giving away possessions  · Change in eating patterns- significant weight changes-  positive or negative  · Change in sleeping patterns- unable to sleep or sleeping all the time   · Unwillingness or inability to communicate  · Depression  · Unusual sadness, discouragement and loneliness  · Talk of wanting to die  · Neglect of personal appearance   · Rebelliousness- reckless behavior  · Withdrawal from people/activities they love  · Confusion- inability to concentrate     If you or a loved one observes any of these behaviors or has concerns about self-harm, here's what you can do:  · Talk about it- your feelings and reasons for harming yourself  · Remove any means that you might use to hurt yourself (examples: pills, rope, extension cords, firearm)  · Get professional help from the community (Mental Health, Substance Abuse, psychological counseling)  · Do not be alone:Call your Safe Contact- someone whom you trust who will be there for you.  · Call your local CRISIS HOTLINE 156-0728 or 315-218-7619  · Call your local Children's Mobile Crisis Response Team [place] (339) 254-2911 or www.[employer]  · Call the toll free National Suicide Prevention Hotlines   · National Suicide Prevention Lifeline 483-165-KDOQ (2697)  · National Hope Line Network 800-SUICIDE (938-0037)

## 2021-04-15 NOTE — OP REPORT
04/15/21    OPERATIVE NOTE    PRE-OPERATIVE DIAGNOSIS -     1.  Peripheral arterial disease  2.  Short distance right lower extremity claudication    POST-OPERATIVE DIAGNOSIS -     1.  Peripheral arterial disease  2.  Short distance right lower extremity claudication  3.  Recurrent stenosis right femoral artery    PROCEDURE PERFORMED -     1.  Ultrasound-guided access left common femoral artery  2.  Catheter placement third order artery  3.  Pelvic angiogram  4.  Selective angiogram right lower extremity  5.  Angioplasty right common femoral artery  6.  Angioplasty right common iliac artery    SURGEON - Alek Delgado M.D.    TYPE OF ANESTHESIA -sedation    INDICATIONS - 85 y.o. gentleman who underwent a previous right femoral endarterectomy and patch  The patient's postoperative course was complicated by early intimal hyperplasia and restenosis.  The patient then underwent a right common femoral artery interposition vein graft.  The patient surveillance study demonstrates stenosis within that vein graft.  The patient is having symptoms of recurrent claudication is being taken to the angiography suite for further investigation and possible intervention.      PROCEDURE IN DETAIL -     Patient was taken to the endovascular suite at [facility] and placed in the supine position.  After adequate sedation the patient's bilateral groins were shaved prepped draped in sterile fashion.  Using ultrasound guidance a thinwall access needle was inserted into the left common femoral artery.  Using a Seldinger technique a 5 Hungarian sheath was placed.  A flush angiogram catheter was brought up to the infrarenal aorta and a pelvic angiogram was performed.  A pelvic angiogram demonstrated a possible moderate stenosis within the right common iliac artery within the region of the stent.  There was also a high-grade stenosis noted at the proximal portion of the right interposition vein graft.  The remaining vein graft  looked widely patent as well as the proximal superficial and profunda femoris arteries.  Further remote runoff images were not performed.  Next 5000 units of heparin was administered and a wire was advanced over the bifurcation into the contralateral common femoral artery reaching third order status.  A 6 Scottish Ansell sheath was advanced over the bifurcation into the contralateral external iliac artery.  Selective angiogram was performed of the right femoral region.  A 6 mm x 40 mm angioplasty balloon was advanced across the area of stenosis in the common femoral artery and a prolonged inflation angioplasty was performed.  Completion angiogram demonstrated 100% resolution of the focal stenosis noted at the proximal portion of the vein graft.  Next the sheath was was withdrawn to the level of the common iliac artery and an angioplasty was performed of the right common iliac artery stent.  There was no significant deformation of the balloon.  All sheaths catheters wires were retrieved.  A 6 Scottish Angio-Seal was used in the left groin to seal the artery.  Patient tolerated procedure well.      Alek Delgado M.D.

## 2021-04-15 NOTE — OR NURSING
0945 Pt arrived to PPU with IR RN.  AAOx4.  VSS.  Denies pain and nausea.  Left groin dressing CDI and soft..  Bed rest ordered for 2 hours.  Belongings returned to pt bedside.  POC updated with pt and daughter in law over the phone, all questions answered.    0950 Left groin dressing has some drainange but is still soft.  Pressure held for 5 minutes.  Will continue to monitor. Pt tolerating PO fluid intake.  Resting with no complaints.    1000 Some oozing noted.  Dressing removed to visualize site.  Pressure held for 10 minutes.      1030 Some oozing still noted.  Sandbag applied.    1051 Dr. Delgado called and updated.  Protamine ordered and administered.  Pressure held for 5 minutes.   New dressing applied.       1134 Daughter in law called and updated.    1155 Some oozing noted.  Dressing changed.    1230 Some oozing noted.  Pressure held for 10 minutes.  Oozing seems to have stopped.    1315 Left groin dressing CDI and soft.  No bleeding noted.  Pt is resting.    1340 Pt ambulated around unit.  No bleeding noted.    1354 Pt given discharge instructions.  Discussed diet, activity, follow-up, symptoms, and management.  IV d/c'd.  All questions answered.    1405 Pt discharged to family via wheelchair with RN.  All belongings with pt.

## 2021-04-19 ENCOUNTER — TELEPHONE (OUTPATIENT)
Dept: MEDICAL GROUP | Facility: MEDICAL CENTER | Age: 86
End: 2021-04-19

## 2021-04-19 NOTE — TELEPHONE ENCOUNTER
ESTABLISHED PATIENT PRE-VISIT PLANNING     Patient was NOT contacted to complete PVP.     Note: Patient will not be contacted if there is no indication to call.     1.  Reviewed notes from the last few office visits within the medical group: Yes    2.  If any orders were placed at last visit or intended to be done for this visit (i.e. 6 mos follow-up), do we have Results/Consult Notes?         •  Labs - Labs were not ordered at last office visit.  Note: If patient appointment is for lab review and patient did not complete labs, check with provider if OK to reschedule patient until labs completed.       •  Imaging - Imaging was not ordered at last office visit.       •  Referrals - Referral ordered, patient was seen and consult notes are in chart. Care Teams updated  YES.    3. Is this appointment scheduled as a Hospital Follow-Up? No    4.  Immunizations were updated in Epic using Reconcile Outside Information activity? Yes    5.  Patient is due for the following Health Maintenance Topics:   Health Maintenance Due   Topic Date Due   • Annual Wellness Visit  Never done   • IMM ZOSTER VACCINES (1 of 2) Never done   • DIABETES MONOFILAMENT / LE EXAM  due       - Patient is up-to-date on all Health Maintenance topics. No records have been requested at this time.    6.  AHA (Pulse8) form printed for Provider? Yes

## 2021-04-27 ENCOUNTER — OFFICE VISIT (OUTPATIENT)
Dept: MEDICAL GROUP | Facility: MEDICAL CENTER | Age: 86
End: 2021-04-27
Payer: MEDICARE

## 2021-04-27 VITALS
HEART RATE: 77 BPM | OXYGEN SATURATION: 96 % | RESPIRATION RATE: 16 BRPM | BODY MASS INDEX: 23.38 KG/M2 | DIASTOLIC BLOOD PRESSURE: 68 MMHG | HEIGHT: 71 IN | TEMPERATURE: 97.6 F | SYSTOLIC BLOOD PRESSURE: 140 MMHG | WEIGHT: 167 LBS

## 2021-04-27 DIAGNOSIS — M79.89 LEG SWELLING: ICD-10-CM

## 2021-04-27 DIAGNOSIS — F33.42 RECURRENT MAJOR DEPRESSIVE DISORDER, IN FULL REMISSION (HCC): ICD-10-CM

## 2021-04-27 DIAGNOSIS — Z86.718 HISTORY OF DVT (DEEP VEIN THROMBOSIS): ICD-10-CM

## 2021-04-27 DIAGNOSIS — G47.00 INSOMNIA, UNSPECIFIED TYPE: ICD-10-CM

## 2021-04-27 DIAGNOSIS — I73.9 PAD (PERIPHERAL ARTERY DISEASE) (HCC): ICD-10-CM

## 2021-04-27 DIAGNOSIS — N18.32 STAGE 3B CHRONIC KIDNEY DISEASE: ICD-10-CM

## 2021-04-27 DIAGNOSIS — L29.9 PRURITUS: ICD-10-CM

## 2021-04-27 DIAGNOSIS — I10 ESSENTIAL HYPERTENSION: ICD-10-CM

## 2021-04-27 DIAGNOSIS — E78.5 DYSLIPIDEMIA: ICD-10-CM

## 2021-04-27 DIAGNOSIS — E11.42 TYPE 2 DIABETES MELLITUS WITH DIABETIC POLYNEUROPATHY, WITHOUT LONG-TERM CURRENT USE OF INSULIN (HCC): ICD-10-CM

## 2021-04-27 LAB
HBA1C MFR BLD: 7.1 % (ref 0–5.6)
INT CON NEG: ABNORMAL
INT CON POS: ABNORMAL

## 2021-04-27 PROCEDURE — 83036 HEMOGLOBIN GLYCOSYLATED A1C: CPT | Performed by: FAMILY MEDICINE

## 2021-04-27 PROCEDURE — 99214 OFFICE O/P EST MOD 30 MIN: CPT | Performed by: FAMILY MEDICINE

## 2021-04-27 RX ORDER — HYDROXYZINE HYDROCHLORIDE 25 MG/1
25 TABLET, FILM COATED ORAL
Qty: 90 TABLET | Refills: 1 | Status: SHIPPED | OUTPATIENT
Start: 2021-04-27 | End: 2021-01-01

## 2021-04-27 ASSESSMENT — FIBROSIS 4 INDEX: FIB4 SCORE: 2.92

## 2021-04-27 NOTE — PROGRESS NOTES
Subjective:     Selvin Braga is a 85 y.o. male presenting for a follow-up.  He continues to report bilateral feet swelling.  He has also noticed open skin lesions on both legs.  His numbness, discomfort has been stable.  Denies any fevers.  He recently had a procedure done with vascular surgery, has a follow-up appointment on Thursday.  He has found compression stockings that help somewhat.    Diabetes has been stable.  His A1c today 7.1%.  He has restarted his alogliptin and Metformin as his kidney function has improved.  He continues to see nephrology.    Has been having difficulty sleeping due to itching in the middle of the night.  His skin is mostly itchy on his back and upper extremities.  Denies any rashes.  Denies any new soaps, lotions, detergents.  He does not use lotion regularly.  He does not bathe with extremely hot water, and will bathe about twice a week.  He reports trying triamcinolone cream and clobetasol cream in the past, which were given to him by his previous dermatologist.  They were somewhat helpful.        Current Outpatient Medications:   •  hydrOXYzine HCl (ATARAX) 25 MG Tab, Take 1 tablet by mouth at bedtime as needed for Itching (and sleep)., Disp: 90 tablet, Rfl: 1  •  carvedilol (COREG) 6.25 MG Tab, Take 1 tablet by mouth 2 times a day with meals., Disp: 180 tablet, Rfl: 3  •  atorvastatin (LIPITOR) 40 MG Tab, Take 1 tablet by mouth every evening., Disp: 100 tablet, Rfl: 3  •  metFORMIN (GLUCOPHAGE) 500 MG Tab, Take 500 mg by mouth 2 times a day with meals., Disp: , Rfl:   •  cefUROXime (CEFTIN) 250 MG Tab, Take 250 mg by mouth every day., Disp: , Rfl:   •  Alogliptin Benzoate 12.5 MG Tab, Take 12.5 mg by mouth every day., Disp: , Rfl:   •  furosemide (LASIX) 40 MG Tab, Take 1 Tab by mouth 2 Times a Day., Disp: 180 Tab, Rfl: 3  •  loperamide (IMODIUM) 2 MG Cap, Take 2 mg by mouth 4 times a day as needed for Diarrhea., Disp: , Rfl:   •  pantoprazole (PROTONIX) 20 MG tablet, Take  "1 Tab by mouth every day., Disp: , Rfl:   •  Cyanocobalamin (VITAMIN B-12) 1000 MCG Tab, Take 1,000 mcg by mouth every day., Disp: , Rfl:   •  aspirin 81 MG tablet, Take 1 Tab by mouth every day., Disp: 90 Tab, Rfl: 3  •  Multiple Vitamins-Minerals (PRESERVISION AREDS 2+MULTI VIT PO), Take 1 Cap by mouth 2 Times a Day., Disp: , Rfl:   •  glipiZIDE (GLUCOTROL) 10 MG Tab, Take 10 mg by mouth 2 times a day., Disp: , Rfl:   •  Cholecalciferol (VITAMIN D) 2000 UNIT TABS, Take 2,000 Units by mouth every day., Disp: , Rfl:   •  fluoxetine (PROZAC) 20 MG CAPS, Take 20 mg by mouth every day., Disp: , Rfl:     Objective:     Vitals: /68 (BP Location: Left arm, Patient Position: Sitting)   Pulse 77   Temp 36.4 °C (97.6 °F) (Temporal)   Resp 16   Ht 1.803 m (5' 11\")   Wt 75.8 kg (167 lb)   SpO2 96%   BMI 23.29 kg/m²   General: Alert  HEENT: Normocephalic.   Extremities: Distal pulses 1+ symmetric.  2+ pitting edema bilaterally to the mid shins, superficial eschars on his legs bilaterally; toe nails trimmed.  Abnormal monofilament.  Achilles reflexes absent      Assessment/Plan:     Selvin was seen today for follow-up.    Diagnoses and all orders for this visit:    Leg swelling  Chronic, stable.  Discussed continue with compression stockings, elevation, low-salt diet.  For his skin lesions, recommended putting Vaseline or petroleum jelly for now.  No significant erythema, low suspicion for cellulitis at this point.  Also, he is still on his cefuroxime.    PAD (peripheral artery disease) ([facility])  Chronic, stable, managed by vascular    History of DVT (deep vein thrombosis)  Chronic, stable, continue to monitor    Type 2 diabetes mellitus with diabetic polyneuropathy, without long-term current use of insulin ([facility])  Chronic, stable, continue current medications  -     POCT Hemoglobin A1C  -     Diabetic Monofilament Lower Extremity Exam    Essential hypertension  Chronic, stable, continue current " medications    Dyslipidemia  Chronic, stable, continue atorvastatin    Recurrent major depressive disorder, in full remission (HCC)  Chronic, stable, continue fluoxetine    Stage 3b chronic kidney disease  Chronic, stable, managed by nephrology    Pruritus  Insomnia, unspecified type  Self-limited issue.  We discussed hydrating the skin with lotion daily.  He would like to try this first before adding a steroid cream.  We can also try some hydroxyzine as needed  -     hydrOXYzine HCl (ATARAX) 25 MG Tab; Take 1 tablet by mouth at bedtime as needed for Itching (and sleep).          Return in about 4 months (around 8/27/2021) for Med check.      Annual Health Assessment Questions:     1.  Are you currently engaging in any exercise or physical activity? No     2.  How would you describe your mood or emotional well-being today? good     3.  Have you had any falls in the last year? Yes     4.  Have you noticed any problems with your balance or had difficulty walking? Yes     5.  In the last six months have you experienced any leakage of urine? Yes     6. DPA/Advanced Directive: Patient does not have an Advanced Directive.  A packet and workshop information was given on Advanced Directives.

## 2021-05-19 ENCOUNTER — HOME HEALTH ADMISSION (OUTPATIENT)
Dept: HOME HEALTH SERVICES | Facility: HOME HEALTHCARE | Age: 86
End: 2021-05-19
Payer: MEDICARE

## 2021-05-22 ENCOUNTER — HOME CARE VISIT (OUTPATIENT)
Dept: HOME HEALTH SERVICES | Facility: HOME HEALTHCARE | Age: 86
End: 2021-05-22
Payer: MEDICARE

## 2021-05-22 VITALS
RESPIRATION RATE: 16 BRPM | SYSTOLIC BLOOD PRESSURE: 120 MMHG | BODY MASS INDEX: 23.29 KG/M2 | OXYGEN SATURATION: 98 % | HEART RATE: 81 BPM | TEMPERATURE: 98.6 F | DIASTOLIC BLOOD PRESSURE: 70 MMHG | WEIGHT: 167 LBS

## 2021-05-22 PROCEDURE — G0493 RN CARE EA 15 MIN HH/HOSPICE: HCPCS

## 2021-05-22 PROCEDURE — 665001 SOC-HOME HEALTH

## 2021-05-22 PROCEDURE — 6650537 HCR  CLEANSER ANTISEPTIC HAND FOAM 1.6OZ

## 2021-05-22 ASSESSMENT — ENCOUNTER SYMPTOMS
NAUSEA: DENIES
ADDITIONAL INFORMATION: 2/10
VOMITING: DENIES

## 2021-05-22 ASSESSMENT — PATIENT HEALTH QUESTIONNAIRE - PHQ9
CLINICAL INTERPRETATION OF PHQ2 SCORE: 0
2. FEELING DOWN, DEPRESSED, IRRITABLE, OR HOPELESS: 00
1. LITTLE INTEREST OR PLEASURE IN DOING THINGS: 00

## 2021-05-22 ASSESSMENT — FIBROSIS 4 INDEX: FIB4 SCORE: 2.92

## 2021-05-22 NOTE — Clinical Note
START OF CARE TO [facility] ON 5-    Primary Dx/Skiled Need: SN  SN frequency: 3w9,  3 PRN visits.  Zip Code: [id number]  Disciplines Ordered:   Insurance and Authorization: [facility]  Certification Period: 5-/7-  Special Considerations: Patient has PAD and is diabetic, he lives alone in the [facility].

## 2021-05-24 ENCOUNTER — DOCUMENTATION (OUTPATIENT)
Dept: MEDICAL GROUP | Facility: PHYSICIAN GROUP | Age: 86
End: 2021-05-24

## 2021-05-24 ENCOUNTER — HOME CARE VISIT (OUTPATIENT)
Dept: HOME HEALTH SERVICES | Facility: HOME HEALTHCARE | Age: 86
End: 2021-05-24
Payer: MEDICARE

## 2021-05-24 VITALS
DIASTOLIC BLOOD PRESSURE: 70 MMHG | HEART RATE: 83 BPM | SYSTOLIC BLOOD PRESSURE: 120 MMHG | RESPIRATION RATE: 16 BRPM | OXYGEN SATURATION: 98 % | TEMPERATURE: 98.3 F

## 2021-05-24 PROCEDURE — G0299 HHS/HOSPICE OF RN EA 15 MIN: HCPCS

## 2021-05-24 ASSESSMENT — ENCOUNTER SYMPTOMS
MUSCLE WEAKNESS: 1
VOMITING: DENIES
NAUSEA: DENIES

## 2021-05-24 NOTE — PROGRESS NOTES
Medication chart review for [facility] services    PCP:  Jackie Oquendo M.D.  [address] [address]  [place] [phone]  Fax: [phone]    Current medication list     Current Outpatient Medications:   •  doxycycline, 100 mg, Oral, BID  •  mupirocin, 1 Application, Topical, DAILY  •  hydrOXYzine HCl, 25 mg, Oral, QHS PRN  •  carvedilol, 6.25 mg, Oral, BID WITH MEALS  •  atorvastatin, 40 mg, Oral, Q EVENING  •  metFORMIN, 500 mg, Oral, BID WITH MEALS  •  cefUROXime, 250 mg, Oral, DAILY  •  Alogliptin Benzoate, 12.5 mg, Oral, DAILY  •  furosemide, 40 mg, Oral, BID  •  loperamide, 2 mg, Oral, 4X/DAY PRN  •  pantoprazole, 20 mg, Oral, DAILY  •  vitamin B-12, 1,000 mcg, Oral, DAILY  •  aspirin, 81 mg, Oral, DAILY  •  Multiple Vitamins-Minerals (PRESERVISION AREDS 2+MULTI VIT PO), 1 capsule, Oral, BID  •  glipiZIDE, 10 mg, Oral, BID  •  vitamin D, 2,000 Units, Oral, DAILY  •  FLUoxetine, 20 mg, Oral, DAILY    Allergies   Allergen Reactions   • Augmentin Unspecified     Bleeding (rectal)  RXN=5 years ago   • Latex Unspecified     Blisters  RXN=ongoing   • Tape Rash     Rash-blisters-paper tape okay  RXN=ongoing         Labs / Images Reviewed:     Lab Results   Component Value Date/Time    SODIUM 138 04/15/2021 08:00 AM    POTASSIUM 3.3 (L) 04/15/2021 08:00 AM    CHLORIDE 104 04/15/2021 08:00 AM    CO2 25 04/15/2021 08:00 AM    GLUCOSE 200 (H) 04/15/2021 08:00 AM    BUN 39 (H) 04/15/2021 08:00 AM    CREATININE 1.37 04/15/2021 08:00 AM    CREATININE 1.0 02/08/2005 02:20 PM      Lab Results   Component Value Date/Time    ALKPHOSPHAT 90 11/26/2019 08:19 AM    ASTSGOT 17 11/26/2019 08:19 AM    ALTSGPT 12 11/26/2019 08:19 AM    TBILIRUBIN 0.6 11/26/2019 08:19 AM    ALBUMIN 4.0 11/26/2019 08:19 AM    INR 1.09 04/21/2017 04:45 AM        Assessment and Plan:   • Received referral from [facility]. Medications reviewed. No clinically significant interactions noted.         James Longoria, PharmD, MS, BCACP, LCC  [facility]  Phone [phone] fax [phone]    This note was created using voice recognition software (Dragon). The accuracy of the dictation is limited by the abilities of the software. I have reviewed the note prior to signing, however some errors in grammar and context are still possible. If you have any questions related to this note please do not hesitate to contact our office.

## 2021-05-26 ENCOUNTER — HOME CARE VISIT (OUTPATIENT)
Dept: HOME HEALTH SERVICES | Facility: HOME HEALTHCARE | Age: 86
End: 2021-05-26
Payer: MEDICARE

## 2021-05-26 VITALS
OXYGEN SATURATION: 95 % | HEART RATE: 78 BPM | DIASTOLIC BLOOD PRESSURE: 78 MMHG | RESPIRATION RATE: 18 BRPM | SYSTOLIC BLOOD PRESSURE: 128 MMHG | TEMPERATURE: 98.2 F

## 2021-05-26 PROCEDURE — G0299 HHS/HOSPICE OF RN EA 15 MIN: HCPCS

## 2021-05-26 ASSESSMENT — ENCOUNTER SYMPTOMS
MUSCLE WEAKNESS: 1
ARTHRALGIAS: 1

## 2021-05-26 NOTE — Clinical Note
FOR INFORMATION ONLY:  History pertinent to today's visit: DM2, CKD III, atherosclerosis w/hx angio/balloon rle. Recent onset BLE. Recent cellulitis/wound RLE. Skilled need: Medication and disease process teaching, wound care, fall risk reduction. Pt/Cg response to the services provided: RLE wound now just eschar, swelling improved, no redness noted. Wound care provided per order. Medications reviewed. No s/s adverse reactions. Teaching per POC. Suggested patient begin daily weights to monitor fluid overload/edema management and he is agreeable. Blood sugars well within control. Plan for the next visit: Medication and disease process teaching, wound care, weights/cbg monitoring. Case communication: CM/SN.

## 2021-05-28 ENCOUNTER — HOME CARE VISIT (OUTPATIENT)
Dept: HOME HEALTH SERVICES | Facility: HOME HEALTHCARE | Age: 86
End: 2021-05-28
Payer: MEDICARE

## 2021-05-28 PROCEDURE — G0299 HHS/HOSPICE OF RN EA 15 MIN: HCPCS

## 2021-05-28 NOTE — Clinical Note
pt stays in senior place, ambulates with the use of walker, has motorized chair for long distance as reported .alert and oriented x4. vital signs taken wnl. denies pain. states leg is much better. states he has one more day of his antibiotic, no new medications . denies problem. wound care done. reported of 125mg/dl for fasying blood glucose. denies s/s of hypo/hyperglycemia. instructed on diabetic foot check. pt continous to have itching , states he tries not scratch his skin.states he takes hydroxine at bedtime . teaching done uses and s/e. instructed on safety/fall precautions. pt agreeable when informed to decrease snv to twice a week per instructions.. wound to right leg dry and healing, no open areas noted to both legs. signed HHCCN . Pt/Cg response to the services provided: denies falls or other concerns. .

## 2021-05-30 VITALS
HEART RATE: 76 BPM | RESPIRATION RATE: 16 BRPM | OXYGEN SATURATION: 97 % | TEMPERATURE: 97.8 F | SYSTOLIC BLOOD PRESSURE: 118 MMHG | DIASTOLIC BLOOD PRESSURE: 68 MMHG

## 2021-05-30 ASSESSMENT — ENCOUNTER SYMPTOMS
LIMITED RANGE OF MOTION: 1
NAUSEA: DENIES
MUSCLE WEAKNESS: 1
VOMITING: DENIES

## 2021-05-31 ENCOUNTER — HOME CARE VISIT (OUTPATIENT)
Dept: HOME HEALTH SERVICES | Facility: HOME HEALTHCARE | Age: 86
End: 2021-05-31
Payer: MEDICARE

## 2021-05-31 VITALS
TEMPERATURE: 98.2 F | RESPIRATION RATE: 18 BRPM | HEART RATE: 70 BPM | OXYGEN SATURATION: 98 % | SYSTOLIC BLOOD PRESSURE: 118 MMHG | DIASTOLIC BLOOD PRESSURE: 64 MMHG

## 2021-05-31 PROCEDURE — G0299 HHS/HOSPICE OF RN EA 15 MIN: HCPCS

## 2021-05-31 ASSESSMENT — ENCOUNTER SYMPTOMS
ARTHRALGIAS: 1
MUSCLE WEAKNESS: 1

## 2021-05-31 NOTE — Clinical Note
FOR INFORMATION ONLY;  History pertinent to today's visit: DM2, CKD III, atherosclerosis w/hx angio/balloon rle. Recent onset BLE. Recent cellulitis/wound RLE. Skilled need: Medication and disease process teaching, wound care, fall risk reduction. Pt/Cg response to the services provided: RLE wound remains as just a scab/eschar. Rebandaged per order. Completing oral antibiotic. No s/s cellulitis today. BLE swelling remains with modest improvement. No weight today but yesterday he was up one pound from day prior, 162lbs. AVSS. Blood sugars well controlled. Instructed per POC.  Plan for the next visit: Medication and disease process teaching, wound care, weights/cbg monitoring. Case communication: CM/SN.

## 2021-06-01 ENCOUNTER — HOME CARE VISIT (OUTPATIENT)
Dept: HOME HEALTH SERVICES | Facility: HOME HEALTHCARE | Age: 86
End: 2021-06-01
Payer: MEDICARE

## 2021-06-01 ASSESSMENT — ACTIVITIES OF DAILY LIVING (ADL): OASIS_M1830: 03

## 2021-06-01 NOTE — CASE COMMUNICATION
Quality Review for 5.22.21 OASIS performed on by GAGANDEEP Perez RN on 6.2, 2021:    Edits completed by GAGANDEEP Perez RN:  1. Changed  to 5.22.21 date of valid referral  2. Changed  to NA per EPIC  3. Changed  to 4 per narrative  4. Changed , , ,  to 2,  to 3,  to 1,  to 2,  to 3, [id number] C,E,F,G,H to 4, [id number] A-G, I,P to 4 per narrative pt needs supervision with ambulation and transfers and uses DME and per HB status reporting poor ambulation and weakness and fatigue  5. Changed  to 3 per ambulation score. Changed  to na per MAR   6. Changed  to 0   7. Added ambulation to functional limitations per HB status. Added ambulate only with assistance, fall risk, pressure ulcer prevention and high risk medication precautions to safety measures. Added heart healthy diet to nutritional requirements per dxs. Added rehab potential to DC plan  8. Updated F2F data  9. Added HTN to the care plan. Scheduled out education for DMs, pressure ulcer prevention, fall prevention education, pain management education, infection prevention  10. Added wound template for PRN visits  11. Added hand  to supplies  12. Clicked on high risk med education performed on the care plan per response of yes on

## 2021-06-03 ENCOUNTER — HOME CARE VISIT (OUTPATIENT)
Dept: HOME HEALTH SERVICES | Facility: HOME HEALTHCARE | Age: 86
End: 2021-06-03
Payer: MEDICARE

## 2021-06-03 VITALS
OXYGEN SATURATION: 92 % | SYSTOLIC BLOOD PRESSURE: 136 MMHG | RESPIRATION RATE: 18 BRPM | TEMPERATURE: 97.6 F | HEART RATE: 82 BPM | DIASTOLIC BLOOD PRESSURE: 56 MMHG

## 2021-06-03 PROCEDURE — G0493 RN CARE EA 15 MIN HH/HOSPICE: HCPCS

## 2021-06-03 ASSESSMENT — ENCOUNTER SYMPTOMS: DEBILITATING PAIN: 1

## 2021-06-04 NOTE — CASE COMMUNICATION
I agree with changes  ----- Message -----  From: Yudelka Perez R.N.  Sent: 6/1/2021  10:00 AM PDT  To: Kayla South R.N.      Quality Review for 5.22.21 OASIS performed on by GAGANDEEP Perez RN on 6.2, 2021:    Edits completed by GAGANDEEP Perez RN:  1. Changed  to 5.22.21 date of valid referral  2. Changed  to NA per EPIC  3. Changed  to 4 per narrative  4. Changed , , ,  to 2,  to 3,  to 1,  to 2,  to 3, [id number] C,E,F,G,H to 4, [id number] A-G, I,P to 4 per narrative pt needs supervision with ambulation and transfers and uses DME and per HB status reporting poor ambulation and weakness and fatigue  5. Changed  to 3 per ambulation score. Changed  to na per MAR   6. Changed  to 0   7. Added ambulation to functional limitations per HB status. Added ambulate only with assistance, fall risk, pressure ulcer prevention and high risk medication precautions to safety measures. Added heart healthy diet to nutritional requirements per dxs. Added rehab potential to DC plan  8. Updated F2F data  9. Added HTN to the care plan. Scheduled out education for DMs, pressure ulcer prevention, fall prevention education, pain management education, infection prevention  10. Added wound template for PRN visits  11. Added hand  to supplies  12. Clicked on high risk med education performed on the care plan per response of yes on

## 2021-06-05 ASSESSMENT — ENCOUNTER SYMPTOMS: MUSCLE WEAKNESS: 1

## 2021-06-07 ENCOUNTER — HOME CARE VISIT (OUTPATIENT)
Dept: HOME HEALTH SERVICES | Facility: HOME HEALTHCARE | Age: 86
End: 2021-06-07
Payer: MEDICARE

## 2021-06-07 VITALS
RESPIRATION RATE: 18 BRPM | TEMPERATURE: 98.2 F | HEART RATE: 72 BPM | DIASTOLIC BLOOD PRESSURE: 68 MMHG | OXYGEN SATURATION: 97 % | SYSTOLIC BLOOD PRESSURE: 114 MMHG

## 2021-06-07 PROCEDURE — G0493 RN CARE EA 15 MIN HH/HOSPICE: HCPCS

## 2021-06-07 PROCEDURE — G0180 MD CERTIFICATION HHA PATIENT: HCPCS | Performed by: FAMILY MEDICINE

## 2021-06-07 ASSESSMENT — ACTIVITIES OF DAILY LIVING (ADL)
HOME_HEALTH_OASIS: 00
OASIS_M1830: 01

## 2021-06-07 ASSESSMENT — PATIENT HEALTH QUESTIONNAIRE - PHQ9: CLINICAL INTERPRETATION OF PHQ2 SCORE: 0

## 2021-06-07 NOTE — Clinical Note
DISCHARGE NARRATIVE/INFORMATION ONLY;    DISCHARGE NARRATIVE  CURRENT LEVEL OF FUNCTION:   Ambulation and Mobility: Independent  Patient Equipment: walker for ambulation:   Transferring: Independent  Bathing: Independent  Dressing: Independent  Special equipment used: four wheel walker  MEDICATION MANAGEMENT:  Able to take independently  Medication issues:   DISCHARGE/TRANSITION PLAN  The discharge plan for home health is to discharge back to the community when individualized goals have been met.  Individualized goals during this episode of Home Health care were related to:increase strength, managing symptoms at home and be treated at home.   Patient has met goals related to: Disease management, health management and monitoring  Goals not met and why n/a  Patient is ready for discharge on 6/7/21 with the following services in place: no formal services in place. Needs met.

## 2021-06-07 NOTE — CASE COMMUNICATION
noted  ----- Message -----  From: Gia Hinojosa R.N.  Sent: 5/30/2021   9:27 AM PDT  To: Dorothy Mccabe R.N.        pt stays in senior place, ambulates with the use of walker, has motorized chair for long distance as reported .alert and oriented x4. vital signs taken wnl. denies pain. states leg is much better. states he has one more day of his antibiotic, no new medications . denies problem. wound care done. reported of 125mg/dl for fasying blood glucose. denies s/s of hypo/hyperglycemia. instructed on diabetic foot check. pt continous to have itching , states he tries not scratch his skin.states he takes hydroxine at bedtime . teaching done uses and s/e. instructed on safety/fall precautions. pt agreeable when informed to decrease snv to twice a week per instructions.. wound to right leg dry and healing, no open areas noted to both legs. signed HHCCN . Pt/Cg response to the services provided: denies falls or other concerns. .  Patent

## 2021-06-08 NOTE — CASE COMMUNICATION
noted  ----- Message -----  From: Benoit Schmidt R.N.  Sent: 5/26/2021   1:17 PM PDT  To: Dorothy Mccabe R.N.      FOR INFORMATION ONLY:  History pertinent to today's visit: DM2, CKD III, atherosclerosis w/hx angio/balloon rle. Recent onset BLE. Recent cellulitis/wound RLE. Skilled need: Medication and disease process teaching, wound care, fall risk reduction. Pt/Cg response to the services provided: RLE wound now just eschar, swelling improved, no redness noted. Wound care provided per order. Medications reviewed. No s/s adverse reactions. Teaching per POC. Suggested patient begin daily weights to monitor fluid overload/edema management and he is agreeable. Blood sugars well within control. Plan for the next visit: Medication and disease process teaching, wound care, weights/cbg monitoring. Case communication: CM/SN.

## 2021-06-09 ENCOUNTER — HOME CARE VISIT (OUTPATIENT)
Dept: HOME HEALTH SERVICES | Facility: HOME HEALTHCARE | Age: 86
End: 2021-06-09
Payer: MEDICARE

## 2021-06-10 NOTE — CASE COMMUNICATION
Quality Review for 6/7/21 [place] OASIS by ANTONIO Mccann, RN on  June 9, 2021      Edits completed by ANTONIO Mccann RN:  1.  C and E are yes per care plan

## 2021-06-10 NOTE — CASE COMMUNICATION
noted  ----- Message -----  From: Benoit Schmidt R.N.  Sent: 6/7/2021   5:33 PM PDT  To: Dorothy Mccabe R.N.      DISCHARGE NARRATIVE/INFORMATION ONLY;    DISCHARGE NARRATIVE  CURRENT LEVEL OF FUNCTION:   Ambulation and Mobility: Independent  Patient Equipment: walker for ambulation:   Transferring: Independent  Bathing: Independent  Dressing: Independent  Special equipment used: four wheel walker  MEDICATION MANAGEMENT:  Able to take independently  Medication issues:   DISCHARGE/TRANSITION PLAN  The discharge plan for home health is to discharge back to the community when individualized goals have been met.  Individualized goals during this episode of Home Health care were related to:increase strength, managing symptoms at home and be treated at home.   Patient has met goals related to: Disease management, health management and monitoring  Goals not met and why n/a  Patient is ready for discharge on 6/7/21 with the following services in place: no formal services in place. Needs met.

## 2021-06-10 NOTE — CASE COMMUNICATION
noted  ----- Message -----  From: Benoit Schmidt R.N.  Sent: 5/31/2021   4:09 PM PDT  To: Dorothy Mccabe R.N.      FOR INFORMATION ONLY;  History pertinent to today's visit: DM2, CKD III, atherosclerosis w/hx angio/balloon rle. Recent onset BLE. Recent cellulitis/wound RLE. Skilled need: Medication and disease process teaching, wound care, fall risk reduction. Pt/Cg response to the services provided: RLE wound remains as just a scab/eschar. Rebandaged per order. Completing oral antibiotic. No s/s cellulitis today. BLE swelling remains with modest improvement. No weight today but yesterday he was up one pound from day prior, 162lbs. AVSS. Blood sugars well controlled. Instructed per POC.  Plan for the next visit: Medication and disease process teaching, wound care, weights/cbg monitoring. Case communication: CM/SN.

## 2021-06-11 NOTE — CASE COMMUNICATION
noted  ----- Message -----  From: Kayla South R.N.  Sent: 5/22/2021   4:21 PM PDT  To: Dorothy Mccabe R.N.      START OF CARE TO [facility] ON 5-    Primary Dx/Skiled Need: SN  SN frequency: 3w9,  3 PRN visits.  Zip Code: [address]  Disciplines Ordered: SN  Insurance and Authorization: [facility]  Certification Period: 5-/7-  Special Considerations: Patient has PAD and is diabetic, he lives alone in the [facility].

## 2021-06-11 NOTE — CASE COMMUNICATION
I agree with these changes.   ----- Message -----  From: Sigrid Mccann R.N.  Sent: 6/9/2021   6:06 PM PDT  To: Benoit Schmidt R.N.      Quality Review for 6/7/21 [place] OASIS by ANTONIO Mccann, [place] on  June 9, 2021      Edits completed by ANTONIO Mccann RN:  1.  C and E are yes per care plan

## 2021-06-30 ENCOUNTER — HOSPITAL ENCOUNTER (OUTPATIENT)
Dept: LAB | Facility: MEDICAL CENTER | Age: 86
End: 2021-06-30
Attending: INTERNAL MEDICINE
Payer: MEDICARE

## 2021-06-30 DIAGNOSIS — I10 ESSENTIAL HYPERTENSION: ICD-10-CM

## 2021-06-30 DIAGNOSIS — N18.32 STAGE 3B CHRONIC KIDNEY DISEASE: ICD-10-CM

## 2021-06-30 LAB
ANION GAP SERPL CALC-SCNC: 10 MMOL/L (ref 7–16)
BUN SERPL-MCNC: 30 MG/DL (ref 8–22)
CALCIUM SERPL-MCNC: 9.4 MG/DL (ref 8.5–10.5)
CHLORIDE SERPL-SCNC: 105 MMOL/L (ref 96–112)
CO2 SERPL-SCNC: 25 MMOL/L (ref 20–33)
CREAT SERPL-MCNC: 1.5 MG/DL (ref 0.5–1.4)
CREAT UR-MCNC: 213.43 MG/DL
ERYTHROCYTE [DISTWIDTH] IN BLOOD BY AUTOMATED COUNT: 50.1 FL (ref 35.9–50)
GLUCOSE SERPL-MCNC: 223 MG/DL (ref 65–99)
HCT VFR BLD AUTO: 29.4 % (ref 42–52)
HGB BLD-MCNC: 9.3 G/DL (ref 14–18)
MCH RBC QN AUTO: 27.2 PG (ref 27–33)
MCHC RBC AUTO-ENTMCNC: 31.6 G/DL (ref 33.7–35.3)
MCV RBC AUTO: 86 FL (ref 81.4–97.8)
MICROALBUMIN UR-MCNC: 43.8 MG/DL
MICROALBUMIN/CREAT UR: 205 MG/G (ref 0–30)
PLATELET # BLD AUTO: 187 K/UL (ref 164–446)
PMV BLD AUTO: 9.4 FL (ref 9–12.9)
POTASSIUM SERPL-SCNC: 3.5 MMOL/L (ref 3.6–5.5)
RBC # BLD AUTO: 3.42 M/UL (ref 4.7–6.1)
SODIUM SERPL-SCNC: 140 MMOL/L (ref 135–145)
WBC # BLD AUTO: 6.5 K/UL (ref 4.8–10.8)

## 2021-06-30 PROCEDURE — 85027 COMPLETE CBC AUTOMATED: CPT

## 2021-06-30 PROCEDURE — 36415 COLL VENOUS BLD VENIPUNCTURE: CPT

## 2021-06-30 PROCEDURE — 80048 BASIC METABOLIC PNL TOTAL CA: CPT

## 2021-06-30 PROCEDURE — 82043 UR ALBUMIN QUANTITATIVE: CPT

## 2021-06-30 PROCEDURE — 82570 ASSAY OF URINE CREATININE: CPT

## 2021-07-19 ENCOUNTER — HOSPITAL ENCOUNTER (OUTPATIENT)
Facility: MEDICAL CENTER | Age: 86
End: 2021-07-19
Attending: PHYSICIAN ASSISTANT
Payer: MEDICARE

## 2021-07-19 ENCOUNTER — PATIENT MESSAGE (OUTPATIENT)
Dept: HEALTH INFORMATION MANAGEMENT | Facility: OTHER | Age: 86
End: 2021-07-19

## 2021-07-19 PROCEDURE — 87086 URINE CULTURE/COLONY COUNT: CPT

## 2021-07-23 LAB
BACTERIA UR CULT: NORMAL
SIGNIFICANT IND 70042: NORMAL
SITE SITE: NORMAL
SOURCE SOURCE: NORMAL

## 2021-08-04 NOTE — TELEPHONE ENCOUNTER
Could you please re-order labs for patient he is scheduled to see Dr Najjar next week. Had labs done on 06/30    Thank you !

## 2021-08-11 NOTE — PROGRESS NOTES
"Subjective:      Selvin Braga is a 85 y.o. male who presents with Hypertension and Chronic Kidney Disease            Hypertension  This is a chronic problem. The current episode started more than 1 year ago. The problem is unchanged. The problem is controlled. Pertinent negatives include no chest pain, malaise/fatigue, peripheral edema or shortness of breath. Risk factors for coronary artery disease include diabetes mellitus and male gender. Past treatments include beta blockers and diuretics. The current treatment provides significant improvement. There are no compliance problems.  Hypertensive end-organ damage includes kidney disease. Identifiable causes of hypertension include chronic renal disease.   Chronic Kidney Disease  This is a chronic problem. The current episode started more than 1 year ago. The problem occurs constantly. The problem has been unchanged. Associated symptoms include urinary symptoms. Pertinent negatives include no chest pain, chills, coughing, fever, nausea or vomiting.       Review of Systems   Constitutional: Negative for chills, fever and malaise/fatigue.   Respiratory: Negative for cough and shortness of breath.    Cardiovascular: Negative for chest pain and leg swelling.   Gastrointestinal: Negative for nausea and vomiting.   Genitourinary: Positive for hematuria. Negative for dysuria, frequency and urgency.          Objective:     /64 (BP Location: Right arm, Patient Position: Sitting, BP Cuff Size: Adult)   Pulse 82   Temp 36.7 °C (98.1 °F) (Temporal)   Ht 1.803 m (5' 11\")   Wt 75 kg (165 lb 6 oz)   SpO2 95%   BMI 23.07 kg/m²      Physical Exam  Vitals and nursing note reviewed.   Constitutional:       General: He is not in acute distress.     Appearance: He is not ill-appearing.   HENT:      Head: Normocephalic and atraumatic.      Right Ear: External ear normal.      Left Ear: External ear normal.      Nose: Nose normal.   Eyes:      General:         Right eye: No " discharge.         Left eye: No discharge.      Conjunctiva/sclera: Conjunctivae normal.   Cardiovascular:      Rate and Rhythm: Normal rate and regular rhythm.      Heart sounds: No murmur heard.     Pulmonary:      Effort: Pulmonary effort is normal. No respiratory distress.      Breath sounds: Normal breath sounds. No wheezing.   Musculoskeletal:         General: No tenderness or deformity.      Right lower leg: No edema.      Left lower leg: No edema.   Skin:     General: Skin is warm.   Neurological:      General: No focal deficit present.      Mental Status: He is alert and oriented to person, place, and time.   Psychiatric:         Mood and Affect: Mood normal.         Behavior: Behavior normal.       Past Medical History:   Diagnosis Date   • AK (actinic keratosis) 7/21/2016   • Anemia    • Arrhythmia     PVC's   • Arthritis     lower back   • Lombardi esophagus    • Cancer ([facility]) 2001    prostate (radiation/sx)   • CATARACT     bilateral IOL   • Chronic kidney disease (CKD), stage III (moderate) ([facility])    • Colon polyps    • Dental disorder     Dentures   • Depression 7/21/2016   • Diabetes 1984    oral medication   • GERD (gastroesophageal reflux disease)    • Hemorrhagic disorder ([facility])     bleeds easily    • Hyperlipidemia    • Hypertension    • Infectious disease     history of c-diff 2016   • Neuropathy ([facility])    • Other specified disorder of intestines     alters between normal and diarrhea since radiation tx   • Personal history of venous thrombosis and embolism 2010/1995    leg   • Prostate cancer ([facility]) 2001    Status post prostatectomy   • PVD (peripheral vascular disease) ([facility])     s/p femoral endarterectomy   • Recurrent UTI    • Sleep apnea     Does not use CPAP   • Unspecified urinary incontinence     spill from artifical urinary sphincter   • Urinary bladder disorder     uses external catheter at night, and clamp during day     Social History     Socioeconomic History   • Marital status:       Spouse name: Not on file   • Number of children: 5   • Years of education: Not on file   • Highest education level: Not on file   Occupational History   • Not on file   Tobacco Use   • Smoking status: Former Smoker     Packs/day: 2.00     Years: 30.00     Pack years: 60.00     Types: Cigarettes     Quit date: 3/5/1995     Years since quittin.4   • Smokeless tobacco: Never Used   • Tobacco comment: 1.5 ppd 25 yrs,quit    Vaping Use   • Vaping Use: Never used   Substance and Sexual Activity   • Alcohol use: No     Alcohol/week: 0.0 oz   • Drug use: No   • Sexual activity: Not on file   Other Topics Concern   • Not on file   Social History Narrative    Retired, previous  at [employer] [place].      Social Determinants of Health     Financial Resource Strain:    • Difficulty of Paying Living Expenses:    Food Insecurity:    • Worried About Running Out of Food in the Last Year:    • Ran Out of Food in the Last Year:    Transportation Needs:    • Lack of Transportation (Medical):    • Lack of Transportation (Non-Medical):    Physical Activity:    • Days of Exercise per Week:    • Minutes of Exercise per Session:    Stress:    • Feeling of Stress :    Social Connections:    • Frequency of Communication with Friends and Family:    • Frequency of Social Gatherings with Friends and Family:    • Attends Orthodoxy Services:    • Active Member of Clubs or Organizations:    • Attends Club or Organization Meetings:    • Marital Status:    Intimate Partner Violence:    • Fear of Current or Ex-Partner:    • Emotionally Abused:    • Physically Abused:    • Sexually Abused:      Family History   Problem Relation Age of Onset   • Cancer Mother         ovarian   • Heart Disease Father    • Diabetes Father    • Diabetes Sister    • Hypertension Other    • Cancer Other         multiple cousins with prostate cancer.      Recent Labs     04/15/21  0800 21  0934 21  0911   SODIUM 138 140 142   POTASSIUM 3.3*  3.5* 4.1   CHLORIDE 104 105 108   CO2 25 25 23   BUN 39* 30* 38*   CREATININE 1.37 1.50* 1.49*                        Assessment/Plan:        1. Essential hypertension  Controlled  Continue same medication regimen  Continue low-sodium diet      2. Stage 3b chronic kidney disease (HCC)  Cr at baseline  No uremic symptoms  Renal dose of medication  Avoid nephrotoxins  Continue same medication regimen      3. Anemia, unspecified type  Most likely secondary to hematuria  Patient to follow-up with urology team  Start iron supplement  Recheck labs    4. Hematuria, unspecified type  Follow-up with urology

## 2021-08-25 NOTE — PROGRESS NOTES
Subjective:     Selvin Braga is a 85 y.o. male presenting to discuss skin lesions that are not healing.  Have been present for several weeks.  He has several on his upper back, 1 large one on his left shoulder, several on his right leg.  He tried mupirocin cream with no improvement.  Is wondering if he can get home wound care as he is not able to leave his home without assistance and does not drive.  Diabetes has been stable.  His A1c today is 7.5%.  He continues on his medications regularly.  He also reports dysuria for the past couple days.  Denies any blood in the urine.  He does have history of recurrent UTIs, is currently on cephalexin daily.  He sees urology regularly.        Current Outpatient Medications:   •  cephALEXin (KEFLEX) 250 MG Cap, Take 250 mg by mouth every day., Disp: , Rfl:   •  carvedilol (COREG) 6.25 MG Tab, Take 1 tablet by mouth 2 times a day with meals., Disp: 180 tablet, Rfl: 3  •  atorvastatin (LIPITOR) 40 MG Tab, Take 1 tablet by mouth every evening., Disp: 100 tablet, Rfl: 3  •  metFORMIN (GLUCOPHAGE) 500 MG Tab, Take 500 mg by mouth 2 times a day with meals., Disp: , Rfl:   •  Alogliptin Benzoate 12.5 MG Tab, Take 12.5 mg by mouth every day., Disp: , Rfl:   •  furosemide (LASIX) 40 MG Tab, Take 1 Tab by mouth 2 Times a Day., Disp: 180 Tab, Rfl: 3  •  loperamide (IMODIUM) 2 MG Cap, Take 2 mg by mouth 4 times a day as needed for Diarrhea., Disp: , Rfl:   •  pantoprazole (PROTONIX) 20 MG tablet, Take 1 Tab by mouth every day., Disp: , Rfl:   •  Cyanocobalamin (VITAMIN B-12) 1000 MCG Tab, Take 1,000 mcg by mouth every day., Disp: , Rfl:   •  aspirin 81 MG tablet, Take 1 Tab by mouth every day., Disp: 90 Tab, Rfl: 3  •  Multiple Vitamins-Minerals (PRESERVISION AREDS 2+MULTI VIT PO), Take 1 Cap by mouth 2 Times a Day., Disp: , Rfl:   •  glipiZIDE (GLUCOTROL) 10 MG Tab, Take 10 mg by mouth 2 times a day., Disp: , Rfl:   •  Cholecalciferol (VITAMIN D) 2000 UNIT TABS, Take 2,000 Units by  "mouth every day., Disp: , Rfl:   •  fluoxetine (PROZAC) 20 MG CAPS, Take 20 mg by mouth every day., Disp: , Rfl:     Objective:     Vitals: /70   Pulse 64   Temp 36.4 °C (97.6 °F)   Resp 16   Ht 1.803 m (5' 11\")   Wt 73.5 kg (162 lb)   SpO2 92%   BMI 22.59 kg/m²   General: Alert  HEENT: Normocephalic.  Extremities: No leg edema on the left, 1+ pitting on the right.  Several eschars with surrounding erythema on the right leg.  1 large superficial ulcer on the left shoulder.  Approximately 5 eschars on his upper back.    Assessment/Plan:     Selvin was seen today for follow-up.    Diagnoses and all orders for this visit:    Skin ulcer, limited to breakdown of skin (HCC)  Acute, uncomplicated issue.  We will try and get home wound care at home given his limitations.  -     REFERRAL TO HOME HEALTH    Type 2 diabetes mellitus with diabetic polyneuropathy, without long-term current use of insulin (HCC)  Chronic, stable, continue current medications  -     POCT Hemoglobin A1C  -     REFERRAL TO HOME HEALTH    Dysuria  Recurrent UTI  Acute, uncomplicated issue.  Will await urine cultures.  He is currently on cephalexin  -     POCT Urinalysis  -     URINE CULTURE(NEW); Future  -     REFERRAL TO HOME HEALTH    Essential hypertension  Dyslipidemia  Chronic, stable, continue current medications  -     REFERRAL TO HOME HEALTH    Stage 3b chronic kidney disease (HCC)  Chronic, stable, continue to monitor  -     REFERRAL TO HOME HEALTH    PAD (peripheral artery disease) ([facility])  Chronic, stable, sees vascular surgery  -     REFERRAL TO HOME HEALTH          Return in about 4 months (around 12/25/2021) for routine follow up.    Face to Face Supporting Documentation - Home Health    The encounter with this patient was in whole or in part the primary reason for home health admission.    Date of encounter:   Patient:                    MRN:                       YOB: 2020  Selvin Gonsalez" Omi  [id number]  1935     Home health to see patient for:  Wound Care    Skilled need for:  Exacerbation of Chronic Disease State of peripheral artery disease causing ulcers on leg    Skilled nursing interventions to include:  Wound Care    Homebound status evidenced by:  Need the aid of supportive devices such as crutches, canes, wheelchairs or walkers or Needs the assistance of another person in order to leave the home. Leaving home requires a considerable and taxing effort. There is a normal inability to leave the home.    Community Physician to provide follow up care: Jackie Oquendo M.D.     Optional Interventions? No      I certify the face to face encounter for this home health care referral meets the CMS requirements and the encounter/clinical assessment with the patient was, in whole, or in part, for the medical condition(s) listed above, which is the primary reason for home health care. Based on my clinical findings: the service(s) are medically necessary, support the need for home health care, and the homebound criteria are met.  I certify that this patient has had a face to face encounter by myself.  Jackie Oquendo M.D. - NPI: [id number]

## 2021-08-27 NOTE — PROGRESS NOTES
Medication chart review for [facility] services    PCP:  Jackie Oquendo M.D.  [address] [address]  [place] [id number]  Fax: [phone]    Current medication list     Current Outpatient Medications:   •  ASPIRIN 81 PO, 1 Tablet, Oral, DAILY  •  Mupirocin, 1 Application, Topical, DAILY  •  cephALEXin, 250 mg, Oral, QDAY  •  carvedilol, 6.25 mg, Oral, BID WITH MEALS  •  atorvastatin, 40 mg, Oral, Q EVENING  •  metFORMIN, 500 mg, Oral, BID WITH MEALS  •  Alogliptin Benzoate, 12.5 mg, Oral, DAILY  •  furosemide, 40 mg, Oral, BID  •  loperamide, 2 mg, Oral, 4X/DAY PRN  •  pantoprazole, 20 mg, Oral, DAILY  •  vitamin B-12, 1,000 mcg, Oral, DAILY  •  Multiple Vitamins-Minerals (PRESERVISION AREDS 2+MULTI VIT PO), 1 Capsule, Oral, BID  •  glipiZIDE, 10 mg, Oral, BID  •  vitamin D, 2,000 Units, Oral, DAILY  •  FLUoxetine, 20 mg, Oral, DAILY    Allergies   Allergen Reactions   • Augmentin Unspecified     Bleeding (rectal)  RXN=5 years ago   • Latex Unspecified     Blisters  RXN=ongoing   • Tape Rash     Rash-blisters-paper tape okay  RXN=ongoing       Labs     Lab Results   Component Value Date/Time    HBA1C 7.5 (A) 08/25/2021 01:22 PM          Lab Results   Component Value Date/Time    CHOLSTRLTOT 114 08/12/2020 09:17 AM    LDL 46 08/12/2020 09:17 AM    HDL 46 08/12/2020 09:17 AM    TRIGLYCERIDE 108 08/12/2020 09:17 AM       Lab Results   Component Value Date/Time    SODIUM 142 08/06/2021 09:11 AM    POTASSIUM 4.1 08/06/2021 09:11 AM    CHLORIDE 108 08/06/2021 09:11 AM    CO2 23 08/06/2021 09:11 AM    GLUCOSE 196 (H) 08/06/2021 09:11 AM    BUN 38 (H) 08/06/2021 09:11 AM    CREATININE 1.49 (H) 08/06/2021 09:11 AM    CREATININE 1.0 02/08/2005 02:20 PM     Lab Results   Component Value Date/Time    ALKPHOSPHAT 90 11/26/2019 08:19 AM    ASTSGOT 17 11/26/2019 08:19 AM    ALTSGPT 12 11/26/2019 08:19 AM    TBILIRUBIN 0.6 11/26/2019 08:19 AM    INR 1.09 04/21/2017 04:45 AM    ALBUMIN 4.0 11/26/2019 08:19 AM      Lab  Results   Component Value Date/Time    WBC 5.9 08/06/2021 09:11 AM    RBC 3.28 (L) 08/06/2021 09:11 AM    HEMOGLOBIN 8.6 (L) 08/06/2021 09:11 AM    HEMATOCRIT 28.1 (L) 08/06/2021 09:11 AM    PLATELETCT 172 08/06/2021 09:11 AM      Lab Results   Component Value Date/Time    MALBCRT 80 (H) 08/06/2021 09:11 AM    MICROALBUR 3.6 08/06/2021 09:11 AM     Immunization History   Administered Date(s) Administered   • INFLUENZA TIV (IM) 10/01/2012, 10/21/2013, 09/25/2016   • Influenza (IM) Preservative Free - HISTORICAL DATA 10/06/2012   • Influenza Seasonal Injectable - Historical Data 09/17/2011, 10/21/2013, 09/21/2015, 09/25/2016   • Influenza Vaccine Adult HD 09/28/2013, 09/26/2020, 09/26/2020   • Influenza Vaccine Pediatric Split - Historical Data 09/17/2011, 10/21/2013, 09/21/2015, 09/25/2016   • Influenza Vaccine Quad Inj (Preserved) 09/21/2015   • Pfizer SARS-CoV-2 Vaccine 01/05/2021, 01/26/2021   • Pneumococcal Conjugate Vaccine (Prevnar/PCV-13) 12/13/2012   • Pneumococcal Vaccine (UF) - HISTORICAL DATA 01/06/2012   • Pneumococcal polysaccharide vaccine (PPSV-23) 12/13/2012   • TD Vaccine 08/07/2010       Assessment and Plan:   • Received referral from [facility]. Medications reviewed.         James Longoria, PharmD, MS, BCACP, LCC  [facility]  Phone [phone] fax [phone]    This note was created using voice recognition software (Dragon). The accuracy of the dictation is limited by the abilities of the software. I have reviewed the note prior to signing, however some errors in grammar and context are still possible. If you have any questions related to this note please do not hesitate to contact our office.   \

## 2021-08-27 NOTE — Clinical Note
Care Team Case Communication:  Primary dx/Skilled need: Wound care, recurrent UTIs, NIDDM  SN frequency: 1W1, 3W8, 1W1 and 3PRNs  Zip code: [address]  Disciplines ordered:   Insurance & authorization: [facility]  Certification period: 8/27/21-10/5/21  Special considerations: Patient is well-known to the team. He is back on service for ongoing chronic multiple lesions of his RLL, back and L shoulder.

## 2021-08-30 NOTE — TELEPHONE ENCOUNTER
Phone Number Called: [phone]    Call outcome: Spoke to patient regarding message below.    Message: Called to inform patient of message below.                 ----- Message from Jackie Oquendo M.D. sent at 8/30/2021  2:13 PM PDT -----  Please let pt know that his urine culture did grow bacteria.  I have sent in a prescription for a 1 dose course of fosfomycin given the sensitivities.

## 2021-08-30 NOTE — Clinical Note
FYI  Pt states he was notified by Dr Marshall's office that he has a UTI and antibiotic has been ordered. Given the information about medical [employer] if he needs transportation to pick it up. Will [facility]. Pt states he has had radiation cystitis and enteritis since FU radiation therapy for prostate cancer in 2015. Pt has recurrent small dry scabs within area of redness over left shoulder and upper left back. Almost the whole upper back has scars from similar lesion that have healed. The left upper back lesions are almost healed. Lesions are dry. Pt states he has habit of picking at things and knows he scratches and picks at scabs. Shoulder lesion covered with 4x4 bordered gauze to help stop him. RLE with multiple dry scabs as well over medial, anterior, lateral leg. Erythema over the area but no increased warmth. He does have some edema BLE. He is using the mupirocin ointment over these lesions. At his request, covered with dry gauze and a few turns of kerlix to remind him not to scratch and pick. He states he will be able to do this daily after applying the mupirocin. He has seen Dr Santo, dermatologist on [facility], about 5 years ago. Does not remember what the ointment was that he prescribed. Triamcinolone sounded familiar to him. I called the [facility] [place] and the wait time for sonia't is about November right not. [facility]. He has an sonia't Friday 9/3 with his primary there for 6 month check. Will have her look at lesions as well and see if he can get earlier dermatology referral there. Dr Marshall has requested, and obtained approved for, urgent dermatology consultation through [facility] but pt has not been contacted yet. Pt had arterial US 2/18/21 and states Dr Alek Delgado, vascular, placed stent for PAD. At the present time, I recommend waiting to see when he can get dermatology sonia't to determine etiology of lesions. If too  far in the future, we can try some Triad wound paste to RLE lesions or Triamcinolone ointment and Tubigrip for some light compression.    Thank you, Vickie Fall, RN, MSN, CWCN

## 2021-08-31 NOTE — CASE COMMUNICATION
noted  ----- Message -----  From: Sammy Rose R.N.  Sent: 8/27/2021  11:58 AM PDT  To: Dorothy Mccabe R.N.        Care Team Case Communication:  Primary dx/Skilled need: Wound care, recurrent UTIs, NIDDM  SN frequency: 1W1, 3W8, 1W1 and 3PRNs  Zip code: [address]  Disciplines ordered: SN  Insurance & authorization: [facility]  Certification period: 8/27/21-10/5/21  Special considerations: Patient is well-known to the team. He is back on service for ongoing chronic multiple lesions of his RLL, back and L shoulder.

## 2021-09-01 NOTE — CASE COMMUNICATION
Thank you  ----- Message -----  From: Jackie Oquendo M.D.  Sent: 9/1/2021   6:36 AM PDT  To: Dorothy Mccabe R.N.      He can continue the keflex (the fosfomycin is only for 1 day)  ----- Message -----  From: Dorothy Mccabe R.N.  Sent: 8/31/2021   7:58 PM PDT  To: Jackie Oquendo M.D.    Would you like him to stop the cephazolin or continue it given the latest urine culture?

## 2021-09-01 NOTE — CASE COMMUNICATION
noted  ----- Message -----  From: Vickie Fall R.N.  Sent: 8/31/2021   2:40 PM PDT  To: DEBBIE Gallo  Pt states he was notified by Dr Marshall's office that he has a UTI and antibiotic has been ordered. Given the information about medical [employer] if he needs transportation to pick it up. [facility]. Pt states he has had radiation cystitis and enteritis since FU radiation therapy for prostate cancer in 2015. Pt has recurrent small dry scabs within area of redness over left shoulder and upper left back. Almost the whole upper back has scars from similar lesion that have healed. The left upper back lesions are almost healed. Lesions are dry. Pt states he has habit of picking at things and knows he scratches and picks at scabs. Shoulder lesion covered with 4x4 bordered gauze to help stop him. RLE with multiple dry scabs as well over medial, anterior, lateral leg. Erythema over the area but no increased warmth. He does have some edema BLE. He is using the mupirocin ointment over these lesions. At his request, covered with dry gauze and a few turns of kerlix to remind him not to scratch and pick. He states he will be able to do this daily after applying the mupirocin. He has seen Dr Santo, dermatologist on [facility], about 5 years ago. Does not remember what the ointment was that he prescribed. Triamcinolone sounded familiar to him. I called the [facility] [place] and the wait time for sonia't is about November right not. [facility]. He has an sonia't Friday 9/3 with his primary there for 6 month check. Will have her look at lesions as well and see if he can get earlier dermatology referral there. Dr Marshall has requested, and obtained approved for, urgent dermatology consultation through [facility] but pt has not been contacted yet. Pt had arterial US 2/18/21 and states Dr Alek Delgado, vascular, placed stent for PAD. At the  present time, I recommend waiting to see when he can get dermatology sonia't to determine etiology of lesions. If too far in the future, we can try some Triad wound paste to RLE lesions or Triamcinolone ointment and Tubigrip for some light compression.    Thank you, Vickie Fall, RN, MSN, CWCN

## 2021-09-02 NOTE — Clinical Note
That's great!  ----- Message -----  From: Dorothy Mccabe R.N.  Sent: 9/2/2021   7:09 AM PDT  To: Vickie Fall R.N.       wounds drying well. Kerlex placed over leg wounds per request, did not want dressing to shoulder/back wounds. Took singe does antibiotic from PCP, feels it is working as blood sugar back down to his normal 131 from 200. He knew he had an infection as his blood sugars were high & he had not changed his diet. He has an appt with [facility] on 9/16 which is much faster than the Dec visit with Janeen.

## 2021-09-02 NOTE — CASE COMMUNICATION
wounds drying well. Kerlex placed over leg wounds per request, did not want dressing to shoulder/back wounds. Tricia mejia does antibiotic from PCP, feels it is working as blood sugar back down to his normal 131 from 200. He knew he had an infection as his blood sugars were high & he had not changed his diet. He has an appt with [facility] on 9/16 which is much faster than the Dec visit with Janeen.

## 2021-09-02 NOTE — CASE COMMUNICATION
Quality Review for 8.27.21 SOC OASIS performed on by GAGANDEEP Perez RN on 9.2.21, 2021:    Edits completed by GAGANDEEP Perez RN:  1. Changed  to 5 per narrative  2. Changed ,  to 2,  to 2, [id number] C,E,F,G to 3, [id number] A-F, I-K, P to 3 per narrative pt needs min assist with ambulation, transfers and dressing and uses DME  3. Changed  to 0  5. Added ambulate only with assistance, correct use of support devices, fall risk and high risk medication precautions to safety measures. Added low heart healthy to nutritional requirements per dxs  6.  Updated F2F data  7. Click on instruct on pain management technique on care plan per narrative reporting pain management instruction provided.   8. Used wound intervention and made wound intervention for each site. Sent to ones as orders

## 2021-09-03 NOTE — Clinical Note
alert and oriented x4, vital signs wnl. sttes he is doing fine. reported that ge finished antbiotic one time by PCP. denies s/s of UTI . wound care done , didn't want dressing applied to right lower leg. states he will see MD later and will remove dressing during visit. he will them apply dressing afterwards . note dry scabbings to back , shoulder and right lower leg. states his bowel movement is good has intermittent diarrhea due to hx of xrt .  reported fasting blood glucose was 170mg/dl. states he knows it's high if he has infection. instructed on infection prevention, hand washing , avoid scratching wounds

## 2021-09-05 NOTE — CASE COMMUNICATION
I agree with changes.  ----- Message -----  From: Yudelka Perez R.N.  Sent: 9/2/2021   1:17 PM PDT  To: Sammy Rose R.N.      Quality Review for 8.27.21 SOC OASIS performed on by GAGANDEEP Perez RN on 9.2.21, 2021:    Edits completed by GAGANDEEP Perez RN:  1. Changed  to 5 per narrative  2. Changed ,  to 2,  to 2, [id number] C,E,F,G to 3, [id number] A-F, I-K, P to 3 per narrative pt needs min assist with ambulation, transfers and dressing and uses DME  3. Changed  to 0  5. Added ambulate only with assistance, correct use of support devices, fall risk and high risk medication precautions to safety measures. Added low heart healthy to nutritional requirements per dxs  6.  Updated F2F data  7. Click on instruct on pain management technique on care plan per narrative reporting pain management instruction provided.   8. Used wound intervention and made wound intervention for each site. Sent to ones as orders

## 2021-09-06 NOTE — Clinical Note
"TAMARAI  [facility]. She did not have any idea what scabbing might be from but prescribed an oral medication - pt does not know what it is. It will arrive by mail, pt believes likely tomorrow. Dr Oquendo, [facility], has referred pt to [facility] and pt has sonia't 9/16. Pt has been applying the mupirocin to all scabs now. The two on the back of his left shoulder are dry. The scabs on left shoulder are now coming off with mupirocin; has blood spots on his undershirt. Does not want dry bandage on them. Some scabs open on RLE. Protecting them with dry gauze and few turns of kerlix. Pt usually wears compression hose BLE - states \"bottom number is 15\" so may be 10/15 for light compression. Cannot get it over dressing on RLE. Given light stretch Tubigrip to give him some support. Photos taken. Pt presently being treated for UTI. States he is followed by Dr Marie, urology. Has been on prophylactic Keflex, that he continues to take. Was given new prescription for Cefuroxime but hasn't taken it because he thought it was the same thing. Will send case communication to Urology and Dr Oquendo for clarification.   "

## 2021-09-06 NOTE — CASE COMMUNICATION
noted  ----- Message -----  From: Gia Hinojosa R.N.  Sent: 9/5/2021   9:13 AM PDT  To: Dorothy Mccabe R.N.      alert and oriented x4, vital signs wnl. sttes he is doing fine. reported that ge finished antbiotic one time by PCP. denies s/s of UTI . wound care done , didn't want dressing applied to right lower leg. states he will see MD later and will remove dressing during visit. he will them apply dressing afterwards . note dry scabbings to back , shoulder and right lower leg. states his bowel movement is good has intermittent diarrhea due to hx of xrt .  reported fasting blood glucose was 170mg/dl. states he knows it's high if he has infection. instructed on infection prevention, hand washing , avoid scratching wounds

## 2021-09-06 NOTE — Clinical Note
Dr Marie / Dr Oquendo: Please advise. Pt will be seen by home health nurse tomorrow 9/7.    Pt states he is followed by Dr Marie, Urology. States he takes cephalexin (Keflex) 250 mg cap one a day for UTI prophylaxis. His bottle has Dr Marie's name and is dated 8/17/21. Recent UA 8/25/21 indicated UTI. Pt states he was given prescription for cefuroxime (Zinacef) 250 mg tab that he took one of 9/5/21. Bottle has the name Meg on it and is dated 8/17/21. Pt thought the medications were the same, so only took the one. Is pt supposed to be taking both antibiotics?   Thank you

## 2021-09-06 NOTE — Clinical Note
I did tell him to call Urology office. Will see what Dorothy Mccabe finds out when she goes tomorrow. Yesterday was holiday and I could not call. Thought it might be better in writing. Dorothy is his . Dates on the bottles from Pharmacy don't make sense. Thanks  ----- Message -----  From: Jackie Oquendo M.D.  Sent: 9/7/2021   1:03 PM PDT  To: Vickie Fall R.N.      I do not know if the cefuroxime is for an active infection.  I do not have any records from urology.  I would recommend that the patient call the urology office for clarification.  The cephalexin is likely just once a day for UTI prophylaxis.  However, if he was prescribed cefuroxime for an active infection, I would recommend stopping the cephalexin while on the cefuroxime  ----- Message -----  From: Vickie Fall R.N.  Sent: 9/7/2021  12:28 PM PDT  To: NICHO Pearce Dr / Dr Oquendo: Please advise. Pt will be seen by home health nurse tomorrow 9/7.    Pt states he is followed by Dr Marie, Urology. States he takes cephalexin (Keflex) 250 mg cap one a day for UTI prophylaxis. His bottle has Dr Marie's name and is dated 8/17/21. Recent UA 8/25/21 indicated UTI. Pt states he was given prescription for cefuroxime (Zinacef) 250 mg tab that he took one of 9/5/21. Bottle has the name Meg on it and is dated 8/17/21. Pt thought the medications were the same, so only took the one. Is pt supposed to be taking both antibiotics?   Thank you

## 2021-09-06 NOTE — Clinical Note
----- Message -----  From: Jackie Oquendo M.D.  Sent: 9/7/2021   1:03 PM PDT  To: Vickie Fall R.N.      I do not know if the cefuroxime is for an active infection.  I do not have any records from urology.  I would recommend that the patient call the urology office for clarification.  The cephalexin is likely just once a day for UTI prophylaxis.  However, if he was prescribed cefuroxime for an active infection, I would recommend stopping the cephalexin while on the cefuroxime  ----- Message -----  From: Vickie Fall R.N.  Sent: 9/7/2021  12:28 PM PDT  To: NICHO Pearce Dr / Dr Oquendo: Please advise. Pt will be seen by home health nurse tomorrow 9/7.    Pt states he is followed by Dr Marie, Urology. States he takes cephalexin (Keflex) 250 mg cap one a day for UTI prophylaxis. His bottle has Dr Marie's name and is dated 8/17/21. Recent UA 8/25/21 indicated UTI. Pt states he was given prescription for cefuroxime (Zinacef) 250 mg tab that he took one of 9/5/21. Bottle has the name Meg on it and is dated 8/17/21. Pt thought the medications were the same, so only took the one. Is pt supposed to be taking both antibiotics?   Thank you

## 2021-09-08 NOTE — CASE COMMUNICATION
"noted  ----- Message -----  From: Vickie Fall R.N.  Sent: 9/7/2021  12:28 PM PDT  To: Dorothy Mccabe R.N.      NEVIN  [facility]. She did not have any idea what scabbing might be from but prescribed an oral medication - pt does not know what it is. It will arrive by mail, pt believes likely tomorrow. Dr Oquendo, [facility], has referred pt to [facility] and pt has sonia't 9/16. Pt has been applying the mupirocin to all scabs now. The two on the back of his left shoulder are dry. The scabs on left shoulder are now coming off with mupirocin; has blood spots on his undershirt. Does not want dry bandage on them. Some scabs open on RLE. Protecting them with dry gauze and few turns of kerlix. Pt usually wears compression hose BLE - states \"bottom number is 15\" so may be 10/15 for light compression. Cannot get it over dressing on RLE. Given light stretch Tubigrip to give him some support. Photos taken. Pt presently being treated for UTI. States he is followed by Dr Marie, urology. Has been on prophylactic Keflex, that he continues to take. Was given new prescription for Cefuroxime but hasn't taken it because he thought it was the same thing. Will send case communication to Urology and Dr Oquendo for clarification.   "

## 2021-09-08 NOTE — CASE COMMUNICATION
noted  ----- Message -----  From: Vickie Fall R.N.  Sent: 9/7/2021   1:43 PM PDT  To: Dorothy Mccabe R.N.  Subject: FW:                                                  ----- Message -----  From: Jackie Oquendo M.D.  Sent: 9/7/2021   1:03 PM PDT  To: Vickie Fall R.N.      I do not know if the cefuroxime is for an active infection.  I do not have any records from urology.  I would recommend that the patient call the urology office for clarification.  The cephalexin is likely just once a day for UTI prophylaxis.  However, if he was prescribed cefuroxime for an active infection, I would recommend stopping the cephalexin while on the cefuroxime  ----- Message -----  From: Vickie Fall R.N.  Sent: 9/7/2021  12:28 PM PDT  To: NICHO Pearce Dr / Dr Oquendo: Please advise. Pt will be seen by home health nurse tomorrow 9/7.    Pt states he is followed by Dr Marie, Urology. States he takes cephalexin (Keflex) 250 mg cap one a day for UTI prophylaxis. His bottle has Dr Marie's name and is dated 8/17/21. Recent UA 8/25/21 indicated UTI. Pt states he was given prescription for cefuroxime (Zinacef) 250 mg tab that he took one of 9/5/21. Bottle has the name Meg on it and is dated 8/17/21. Pt thought the medications were the same, so only took the one. Is pt supposed to be taking both antibiotics?   Thank you

## 2021-09-08 NOTE — CASE COMMUNICATION
noted  ----- Message -----  From: Vickie Fall R.N.  Sent: 9/7/2021   1:43 PM PDT  To: Dorothy Mccabe R.N., Jackie Oquendo M.D.  Subject: RE:                                                I did tell him to call Urology office. Will see what Dorothy Eliot finds out when she goes tomorrow. Yesterday was holiday and I could not call. Thought it might be better in writing. Dorothy is his . Dates on the bottles from Pharmacy don't make sense. Thanks  ----- Message -----  From: Jackie Oquendo M.D.  Sent: 9/7/2021   1:03 PM PDT  To: Vickie Fall R.N.      I do not know if the cefuroxime is for an active infection.  I do not have any records from urology.  I would recommend that the patient call the urology office for clarification.  The cephalexin is likely just once a day for UTI prophylaxis.  However, if he was prescribed cefuroxime for an active infection, I would recommend stopping the cephalexin while on the cefuroxime  ----- Message -----  From: Vickie Fall R.N.  Sent: 9/7/2021  12:28 PM PDT  To: NICHO Pearce Dr / Dr Oquendo: Please advise. Pt will be seen by home health nurse tomorrow 9/7.    Pt states he is followed by Dr Marie, Urology. States he takes cephalexin (Keflex) 250 mg cap one a day for UTI prophylaxis. His bottle has Dr Marie's name and is dated 8/17/21. Recent UA 8/25/21 indicated UTI. Pt states he was given prescription for cefuroxime (Zinacef) 250 mg tab that he took one of 9/5/21. Bottle has the name Meg on it and is dated 8/17/21. Pt thought the medications were the same, so only took the one. Is pt supposed to be taking both antibiotics?   Thank you

## 2021-09-08 NOTE — CASE COMMUNICATION
noted  ----- Message -----  From: Vickie Fall R.N.  Sent: 9/7/2021  12:28 PM PDT  To: DEBBIE Gallo Dr / Dr Oquendo: Please advise. Pt will be seen by home health nurse tomorrow 9/7.    Pt states he is followed by Dr Marie, Urology. States he takes cephalexin (Keflex) 250 mg cap one a day for UTI prophylaxis. His bottle has Dr Marie's name and is dated 8/17/21. Recent UA 8/25/21 indicated UTI. Pt states he was given prescription for cefuroxime (Zinacef) 250 mg tab that he took one of 9/5/21. Bottle has the name Meg on it and is dated 8/17/21. Pt thought the medications were the same, so only took the one. Is pt supposed to be taking both antibiotics?   Thank you

## 2021-09-10 NOTE — CASE COMMUNICATION
Report from today's visit:  Wound care completed as ordered. Patient reports he continues to pick at lesions due to pruritus and is working hard and leaving lesions alone. Patient declined bordered gauze as adhesive causes increased itching. No s/sx infection noted to wounds. Patient reports 6 UTIs in past year, followed by urology. Patient states he typically experiences cloudy urine and burning as symptoms. Denies symptoms at today's visit. Patient tolerating increased Keflex dose without side effects. Patient completes wound care on days SN not present. Reports washes hands with soap and water prior to performing to prevent infectoin. Patient education provided per plan of care, patient verbalized understanding. Patient reports fasting glucose 115 today. Plan for the next visit: Continue per POC.

## 2021-09-11 NOTE — CASE COMMUNICATION
noted  ----- Message -----  From: Shannan Ashley R.N.  Sent: 9/10/2021   1:04 PM PDT  To: Dorothy Mccabe R.N.      Report from today's visit:  Wound care completed as ordered. Patient reports he continues to pick at lesions due to pruritus and is working hard and leaving lesions alone. Patient declined bordered gauze as adhesive causes increased itching. No s/sx infection noted to wounds. Patient reports 6 UTIs in past year, followed by urology. Patient states he typically experiences cloudy urine and burning as symptoms. Denies symptoms at today's visit. Patient tolerating increased Keflex dose without side effects. Patient completes wound care on days SN not present. Reports washes hands with soap and water prior to performing to prevent infectoin. Patient education provided per plan of care, patient verbalized understanding. Patient reports fasting glucose 115 today. Plan for the next visit: Continue per POC.

## 2021-09-15 NOTE — Clinical Note
[facility], and no change or new medications. ambulates using walker.. reported blood glucose this morning was 170mg/dl. denies s/s of hyperglycemia. right leg with no dressing, pt reported that he removed dressing earlier and some skin came off to right outer leg. wound care done. pt refused drresiing to back and left shoulder areas, states it makes it more itchy.  teaching on s/s of wound infection provided. pt has appointment with dermatology tomorrow. pt instructed to avoid scratching wounds  Pt/Cg response to the services provided: denies falls , voiced understanding.

## 2021-09-16 NOTE — PROGRESS NOTES
"CC: non healing lesions     Subjective: new  patient here for leg swelling, ulcer-superficial.  Went to pick skin/scratch and large patch came free.     HPI/location: Ulcers on leg x \"weeks\"  Time present: \"weeks\"  Painful lesion: No  Itching lesion: Yes  Enlarging lesion: No  Anything make it better or worse? Had been mupirocin oint. Not helping   Takes keflex every day (he thinks. Name not all that familiar). For UTI ppx.    back and shoulder lesions, desires eval. Picks and scratches at sites.     History of skin cancer: No  History of precancers/actinic keratoses: No  History of biopsies:No  History of blistering/severe sunburns:Yes, Details: child  Family history of skin cancer:No  Family history of atypical moles:No    ROS: denies fevers/chills. Occ itch.  No cough  DermPMH: no skin cancer/melanoma  No problem-specific Assessment & Plan notes found for this encounter.    Relevant PMH: many med comorbidities  Social:former smoker    PE: Gen:WDWN male in NAD.  Skin: superficial erosions on right lower shin, largest several cm with clean based/edges. Pitting edema to mid shin with small intact bullae and few flaccid bullae present.  Background erythema, blanches with pressure.  Upper back - many whitened scars; few linear excoriations  Plaque appearing traumatized on left shoulder with overlying hemorrhagic crusting.  No rolled border or telangiectasias visible.      A/P: macular amyloidosis, itching, back/left shoulder, likely: 's nodules/scars:  -lidex cream BID, se reviewed  -minimize picking/trauma  -f/u 2 months    Bullae edema and ulceration: right lower leg. Hx of vascular dz: inflammation, cannot exclude infection  -mupirocin topical  rf  -doxycycline 100mg PO BID X 10 days, then stop; se reviewed  -declined f/u 1-2 weeks  -refer to Podiatry for Unna boot dressings    I have reviewed medications relevant to my specialty.              "

## 2021-09-16 NOTE — Clinical Note
I will write for viscopaste wrap & 2 layer compression toe to knee. I am fairly sure that is what I did last time he was on service but I am unable to check. Our wound nurse feels the Coflex wraps are safer & more consistent pressure than the coban as there are markings on the wrap. He did not have any bullae when I saw him last week.  ----- Message -----  From: Vee Sosa M.D.  Sent: 9/17/2021   7:30 AM PDT  To: Dorothy Mccabe R.N.      Absolutely.  If that is service routine provided, please do!  I think the edema will improve substantially and he should get fewer bullae. Podiatry referral might otherwise take a while.     Oral doxycycline will help with any infection and inflammation present.   Many thanks!  Derek  ----- Message -----  From: Dorothy Mccabe R.N.  Sent: 9/16/2021   5:09 PM PDT  To: Vee Sosa M.D.    Action Needed  I am scheduled to see Selvin tomorrow. Would you like home health to apply unna boot 2x weekly per your note?

## 2021-09-16 NOTE — CASE COMMUNICATION
noted  ----- Message -----  From: Gia Hinojosa R.N.  Sent: 9/15/2021   6:24 PM PDT  To: Dorothy Mccabe R.N.      [facility], and no change or new medications. ambulates using walker.. reported blood glucose this morning was 170mg/dl. denies s/s of hyperglycemia. right leg with no dressing, pt reported that he removed dressing earlier and some skin came off to right outer leg. wound care done. pt refused drresiing to back and left shoulder areas, states it makes it more itchy.  teaching on s/s of wound infection provided. pt has appointment with dermatology tomorrow. pt instructed to avoid scratching wounds  Pt/Cg response to the services provided: denies falls , voiced understanding.

## 2021-09-16 NOTE — CASE COMMUNICATION
SNV 9/13  Pt axox4, all VSS, hypertension at baseline, asymptomatic. Wound to left shoulder, back and RLE performed per care plan. Pt tolerated well. Photos taken and sent to pt's chart. Pt denies any fall or change to meds.

## 2021-09-16 NOTE — CASE COMMUNICATION
noted  ----- Message -----  From: Hu Roa R.N.  Sent: 9/16/2021   8:14 AM PDT  To: Dorothy Mccabe R.N.      SN 9/13  Pt axox4, all VSS, hypertension at baseline, asymptomatic. Wound to left shoulder, back and RLE performed per care plan. Pt tolerated well. Photos taken and sent to pt's chart. Pt denies any fall or change to meds.

## 2021-09-17 NOTE — CASE COMMUNICATION
Action Needed  I am scheduled to see Selvin tomorrow. Would you like home health to apply unna boot 2x weekly per your note?

## 2021-09-24 NOTE — Clinical Note
need: assessment and vital signs taken, wnl. denies pain. ambulates using walker. pt reported he was seen by urologist and prescribed methaenamine maico , teaching done it is an atibiotic  given to treat or stops growth of bacteria in urine, s/e can include n/v diarrhea. pt reported that he had taken it before, states he will start taking it today. wound care done to right lower leh, much improved  red wound bed ,early granulation noted.. lesions to back and left upper shoulder dry and healed. pt reported of fasting blood glucose 136mg/dl., denies s/s of hyperglycemia. states stool little runny yesterday states due to hx of xrt cystitis and enteritis  reenfroced diabetic foot check, fall precautioms.  Pt/Cg response to the services provided: denies falls or other concerns.

## 2021-09-25 NOTE — CASE COMMUNICATION
noted  ----- Message -----  From: Gia Hinojosa R.N.  Sent: 9/24/2021   6:51 PM PDT  To: Dorothy Mccabe R.N.      need: assessment and vital signs taken, wnl. denies pain. ambulates using walker. pt reported he was seen by urologist and prescribed methaenamine maico , teaching done it is an atibiotic  given to treat or stops growth of bacteria in urine, s/e can include n/v diarrhea. pt reported that he had taken it before, states he will start taking it today. wound care done to right lower leh, much improved  red wound bed ,early granulation noted.. lesions to back and left upper shoulder dry and healed. pt reported of fasting blood glucose 136mg/dl., denies s/s of hyperglycemia. states stool little runny yesterday states due to hx of xrt cystitis and enteritis  reenfroced diabetic foot check, fall precautioms.  Pt/Cg response to the services provided: denies falls or other concerns.

## 2021-09-28 NOTE — Clinical Note
FOR INFO ONLY    History pertinent to today's visit: wound care, hx of Type 2 diabetes, diabetic polyneuropathy, dysuria with new UTI, skin lesions, HTN, dyslipidemia, CKD-stage 3, PAD, GERD, hx of prostate CA 2015 with radiation, radiation cystitis and enteritis, osteoporosis   Skilled need: health assessment, wound assessment and care, depression managment, diabetes management  Pt/Cg response to the services provided: Pt alert and interactive with plan of care and education. Pt continues to take antibiotic and states no side effects. Wound care performed per POC. Only a small area of lower leg not intact. Photos and measurements taken and uploaded to pt chart electronically. Pt lesions on shoulder and back are dried with scab. Photos taken and uploaded to chart. Pt reported morning BG was 104. Pt states he uses his walker around his apartment and electric scooter when moving around faciltiy. Pt reports no falls or changes in medication. Pt knows how to contact HH at anytime for questions or concerns.  Plan for the next visit: health assessment, wound assessment and care, depression managment, diabetes management  Case communication: ENDY

## 2021-09-29 NOTE — CASE COMMUNICATION
noted  ----- Message -----  From: Oralia Aguayo R.N.  Sent: 9/28/2021   7:00 PM PDT  To: Dorothy Mccabe R.N.      FOR INFO ONLY    History pertinent to today's visit: wound care, hx of Type 2 diabetes, diabetic polyneuropathy, dysuria with new UTI, skin lesions, HTN, dyslipidemia, CKD-stage 3, PAD, GERD, hx of prostate CA 2015 with radiation, radiation cystitis and enteritis, osteoporosis   Skilled need: health assessment, wound assessment and care, depression managment, diabetes management  Pt/Cg response to the services provided: Pt alert and interactive with plan of care and education. Pt continues to take antibiotic and states no side effects. Wound care performed per POC. Only a small area of lower leg not intact. Photos and measurements taken and uploaded to pt chart electronically. Pt lesions on shoulder and back are dried with scab. Photos taken and uploaded to chart. Pt reported morning BG was 104. Pt states he uses his walker around his apartment and electric scooter when moving around faciltiy. Pt reports no falls or changes in medication. Pt knows how to contact HH at anytime for questions or concerns.  Plan for the next visit: health assessment, wound assessment and care, depression managment, diabetes management  Case communication: ENDY

## 2021-10-01 NOTE — Clinical Note
assessment and vital signs taken., wnl. states he has no pain. no change in medication. reported  blood glucose,  135mg/dl, instructed on s/s of hyperglcycenmia like increased thirst ,hunger , blurred vison frequent urination, weakness. wound care done to right lower leg. wound much improved pinkish reddish. noted dry scab to right upper back. states itching to skin is much better. reenforced safety/fall precautions.  Pt/Cg response to the services provided: denies s/s of UTI, denies falls .

## 2021-10-05 NOTE — CASE COMMUNICATION
Discharge planning:  SN removed dressing to RLE, wounds are healed. Wound care discontinued, tubigrip applied for compression management. Patient states has compression stockings, but does not know where they are located currently. Care plan reviewed, patient very knowledgeable about disease processes. Discharge planning reviewed, patient does not need any ongoing resources, is established with PCP and specialty care, N[facility] signed for planned dc date of 10/8/2021. Patient will notifiy HH if new wounds appear prior to discharge.

## 2021-10-05 NOTE — CASE COMMUNICATION
noted  ----- Message -----  From: Gia Hinojosa R.N.  Sent: 10/3/2021   6:27 PM PDT  To: Dorothy Mccabe R.N.      assessment and vital signs taken., wnl. states he has no pain. no change in medication. reported  blood glucose,  135mg/dl, instructed on s/s of hyperglcycenmia like increased thirst ,hunger , blurred vison frequent urination, weakness. wound care done to right lower leg. wound much improved pinkish reddish. noted dry scab to right upper back. states itching to skin is much better. reenforced safety/fall precautions.  Pt/Cg response to the services provided: denies s/s of UTI, denies falls .

## 2021-10-06 NOTE — CASE COMMUNICATION
noted  ----- Message -----  From: Shannan Ashley R.N.  Sent: 10/5/2021  11:28 AM PDT  To: Dorothy Mccabe R.N.      Discharge planning:  SN removed dressing to RLE, wounds are healed. Wound care discontinued, tubigrip applied for compression management. Patient states has compression stockings, but does not know where they are located currently. Care plan reviewed, patient very knowledgeable about disease processes. Discharge planning reviewed, patient does not need any ongoing resources, is established with PCP and specialty care, N[facility] signed for planned dc date of 10/8/2021. Patient will notifiy  if new wounds appear prior to discharge.

## 2021-10-07 NOTE — TELEPHONE ENCOUNTER
LVM for pt to confirm if last cardiology visit was on 04/13/21 when they last saw . Calling to confirm if pt has had any recent hospitalizations or cardiac work up done outside of [facility]. Office number given for call back.

## 2021-10-07 NOTE — TELEPHONE ENCOUNTER
Pt called to respond to questions.  Last saw Dr. Martinez on 4-, no hospitalization or cardio.    Thank you,  Cinthia MARTINES

## 2021-10-08 NOTE — Clinical Note
pt ambulates with the use of walker, told the nurse right away he has new open wound to left lower leg. states he removed his tubigrip the other day to left  leg and noted open area.  noted left lower leg covered with 4x4 bordered gauze. assessment and vital signs taken, wnl.. denies pain. reported that he is done with all oral antibiotics, has open area to left lower leg 3.2 cm x 0.9 cm , reddened .no drianage noted. photo measurement taken. wound care done , covered with silicone adhesive foam . stil has some edema to both lower extremities.tubigrip applied. right lower leg  wound closed, healed,pinkish. teachings on wound infection s/s provided . moniotr for increased redness, swelling, pain, foul odor, pain to notify MD or HH. will hold discharge plans today due to new wound care needs follow up.. Pt/Cg response to the services provided: tera wound care, denies s/s of UTI  Plan for the next visit: hold discharge for now, wound care .

## 2021-10-12 NOTE — Clinical Note
assessment and vital signs taken, wnl.. pt said he is doing fiine, denies pain. he sleeps better because itching is improved . reported of 180mg/dl for blood sugar. denies s/s of hyperglycemia. wound care done to left lower leg., with small amts of serosanguinous drainage photo , measurment taken. .. Pt/Cg response to the services provided: tera wound care .

## 2021-10-15 NOTE — Clinical Note
assessment and vital signs taken wnl.pt reported that he had third dose of covid 19 vaccination, denies problems.  reported fasting blood glucose of  158mg/dl. denies s/s of hyperglycemia.. denies pain. wound care done to left lower leg.. right lower leg skin intact. lower extremities with 1+ edema . tubigrip applied to both lower extremities.. asking if he will need double wrappings again. denies s/s of UTI . Pt/Cg response to the services provided: tera wound care, denies falls .

## 2021-10-18 NOTE — Clinical Note
Pt axox4, /68, slightly higher today, asymptomatic. Reports he took scheduled medication prior to this visit. Educated pt to continue monitor BP and report to  immediately if BP > 160/90 or < 90/50 or develops any s/sx such as headache or dizziness. Pt voiced understanding. Pt ambulates with FWW, denies any new slips, trips or falls. Reports no changes to his meds. Fasting .

## 2021-10-21 NOTE — Clinical Note
ACTION REQUIRED  Updated wound care order submitted to Dr Oquendo for review and signature.    . [facility]BG [facility]. Pt sometimes checks BG again later in the day, and usually higher. Would be better if BG was consistently under 140. Pt with history of radiation cystitis. Presently having burning on urination and has urology sonia't tomorrow. States he has recurrent UTI's. Scabby lesions he had on his shoulders and back almost completely gone with medication given by dermatologist. Legs healed except for skin tear LLE pretibial. That wound is almost healed: 1.8 x 0.3 cm. Photo taken. Recommend applying Triad hydrophilic wound paste over wound to help heal and then silicone foam bandage. He has his own compression hose but not wearing them. They are zippered ones. Given information to go on line to get latonya gloves (inexpensive) or donning device and wear his hose. Also given information on how to measure and order compression hose. RLE has healed but has hemosiderin staining. Presently using Tubigrip E but it  rolls up across his ankle and down from the top with edema above and below. Has tourniquet effect. Needs to get back to wearing compression hose - putting them on when he gets up in the morning and removing at HS. Wound may be healed by next SN visit 10/25. NOMNC explained and given to pt. To be discharged if healed; recertification if not.   Thank you, Vickie Fall, RN, MSN, CWCN

## 2021-10-25 NOTE — CASE COMMUNICATION
noted  ----- Message -----  From: Gia Hinojosa R.N.  Sent: 10/12/2021   7:06 PM PDT  To: Dorothy Mccabe R.N.       assessment and vital signs taken, wnl.. pt said he is doing fiine, denies pain. he sleeps better because itching is improved . reported of 180mg/dl for blood sugar. denies s/s of hyperglycemia. wound care done to left lower leg., with small amts of serosanguinous drainage photo , measurment taken. .. Pt/Cg response to the services provided: tera wound care .

## 2021-10-25 NOTE — CASE COMMUNICATION
noted  ----- Message -----  From: Vickie Fall R.N.  Sent: 10/21/2021   8:51 PM PDT  To: Dorothy Mccabe R.N.      ACTION REQUIRED  Updated wound care order submitted to Dr Oquendo for review and signature.    . [facility] and FBG numbers. Pt sometimes checks BG again later in the day, and usually higher. Would be better if BG was consistently under 140. Pt with history of radiation cystitis. Presently having burning on urination and has urology sonia't tomorrow. States he has recurrent UTI's. Scabby lesions he had on his shoulders and back almost completely gone with medication given by dermatologist. Legs healed except for skin tear LLE pretibial. That wound is almost healed: 1.8 x 0.3 cm. Photo taken. Recommend applying Triad hydrophilic wound paste over wound to help heal and then silicone foam bandage. He has his own compression hose but not wearing them. They are zippered ones. Given information to go on line to get latonya gloves (inexpensive) or donning device and wear his hose. Also given information on how to measure and order compression hose. RLE has healed but has hemosiderin staining. Presently using Tubigrip E but it  rolls up across his ankle and down from the top with edema above and below. Has tourniquet effect. Needs to get back to wearing compression hose - putting them on when he gets up in the morning and removing at HS. Wound may be healed by next SN visit 10/25. NOMNC explained and given to pt. To be discharged if healed; recertification if not.   Thank you, Vickie Fall, RN, MSN, CWCN

## 2021-10-26 NOTE — CASE COMMUNICATION
FYI,  temp 97.2, outside parameters, Asymptomatic.   Skin tear completely closed, pt discharged from agency

## 2021-10-27 NOTE — CASE COMMUNICATION
noted  ----- Message -----  From: Gia Hinojosa R.N.  Sent: 10/17/2021   5:16 PM PDT  To: Dorothy Mccabe R.N.      assessment and vital signs taken wnl.pt reported that he had third dose of covid 19 vaccination, denies problems.  reported fasting blood glucose of  158mg/dl. denies s/s of hyperglycemia.. denies pain. wound care done to left lower leg.. right lower leg skin intact. lower extremities with 1+ edema . tubigrip applied to both lower extremities.. asking if he will need double wrappings again. denies s/s of UTI . Pt/Cg response to the services provided: tera wound care, denies falls .

## 2021-10-27 NOTE — CASE COMMUNICATION
noted  ----- Message -----  From: Hu Roa R.N.  Sent: 10/19/2021   7:48 AM PDT  To: Dorothy Mccabe R.N.      Pt axox4, /68, slightly higher today, asymptomatic. Reports he took scheduled medication prior to this visit. Educated pt to continue monitor BP and report to  immediately if BP > 160/90 or < 90/50 or develops any s/sx such as headache or dizziness. Pt voiced understanding. Pt ambulates with FWW, denies any new slips, trips or falls. Reports no changes to his meds. Fasting .

## 2021-10-28 NOTE — CASE COMMUNICATION
noted  ----- Message -----  From: Gia Hinojosa R.N.  Sent: 10/10/2021   2:14 PM PDT  To: Dorothy Mccabe R.N.      pt ambulates with the use of walker, told the nurse right away he has new open wound to left lower leg. states he removed his tubigrip the other day to left  leg and noted open area.  noted left lower leg covered with 4x4 bordered gauze. assessment and vital signs taken, wnl.. denies pain. reported that he is done with all oral antibiotics, has open area to left lower leg 3.2 cm x 0.9 cm , reddened .no drianage noted. photo measurement taken. wound care done , covered with silicone adhesive foam . stil has some edema to both lower extremities.tubigrip applied. right lower leg  wound closed, healed,pinkish. teachings on wound infection s/s provided . moniotr for increased redness, swelling, pain, foul odor, pain to notify MD or HH. will hold discharge plans today due to new wound care needs follow up.. Pt/Cg response to the services provided: tera wound care, denies s/s of UTI  Plan for the next visit: hold discharge for now, wound care .

## 2021-10-28 NOTE — CASE COMMUNICATION
Quality Review for 10/25/21 [place] OASIS by ANTONIO Mccann, ONEIDA on  October 26, 2021      Edits completed by ANTONIO Mccann RN:  1.  E is NA per the care plan

## 2021-10-29 NOTE — CASE COMMUNICATION
I agree [facility] changes  ----- Message -----  From: Sigrid Mccann R.N.  Sent: 10/28/2021   7:55 AM PDT  To: Dorothy Mccabe R.N.      Quality Review for 10/25/21 [place] OASIS by ANTONIO Mccann, RN on  October 26, 2021      Edits completed by ANTONIO Mccann, RN:  1.  E is NA per the care plan

## 2021-11-08 NOTE — TELEPHONE ENCOUNTER
Spoke with pt who confirmed last time seeing cardiology was in 04/13/21 with . Per pt has not seen any other cardiology outside of [facility]. Per pt has not had any recent hospitalizations outside of [facility]. Confirmed with pt that recent lab work and cardiac testing is in pts chart.   Pt is katherine to see Nohemi THORNTON on 11/10/21 at 1300.   Appointment time, location and date confirmed with pt.

## 2021-11-10 NOTE — PROGRESS NOTES
Cardiology Clinic Follow-up Note    Date of note:    11/10/2021  Primary Care Provider: Jackie Oquendo M.D.    Name:             Selvin Braga  YOB: 1935  MRN:               [id number]    CC: PAD and HTN    Primary Cardiologist: Dr. Martinez    Patient HPI:   Selvin Braga is a 85 y.o. male with current medical problems including DM2, HTN, CKD stage 3b, mild aortic insufficiency, peripheral artery disease status, with multiple interventions by Dr. Delgado, elevated troponin in the setting of sepsis in April 2017, provoked DVT's, and dyslipidemia who is here for follow-up.      Interim History:  Mr. Braga was last seen in this cardiology office by Dr. Martinez on 4/13/21.  At that time he was scheduled for a procedure with Dr. Rice for an Abdominal aortogram with runoff, had angioplasty of right common femoral artery and right common iliac artery, has been doing well since    Lives in independent living home. Uses scooter and front wheel walker.    Has an appt. With Nephrologist this week, Dr. Najjar, for his CKD.    Patient endorses medication compliance. He denies palpitations, chest pain, shortness of breath, dyspnea on exertion, dizziness or syncopal episodes, orthopnea, PND, and recent weight gain. Positive complaints include chronic pain/numbness and tingling to bilateral feet, along with chronic feet and lower leg swelling, improved with use of compression stockings.    Cardiovascular Risk Factors:  1. Smoking status: Number, quit in 1995, 60-year pack history  2. Type II Diabetes Mellitus: Yes  Lab Results   Component Value Date/Time    HBA1C 7.5 (A) 08/25/2021 01:22 PM    HBA1C 7.1 (A) 04/27/2021 11:00 AM     3. Hypertension:   4. Dyslipidemia: yes  Cholesterol,Tot   Date Value Ref Range Status   08/12/2020 114 100 - 199 mg/dL Final     LDL   Date Value Ref Range Status   08/12/2020 46 <100 mg/dL Final     HDL   Date Value Ref Range Status   08/12/2020 46 >=40 mg/dL Final      Triglycerides   Date Value Ref Range Status   08/12/2020 108 0 - 149 mg/dL Final     5. Family history of early Coronary Artery Disease in a first degree relative (Male less than 55 years of age; Female less than 65 years of age): n/a  6.  Obesity and/or Metabolic Syndrome: BMI 22.7  7. Sedentary lifestyle: yes    Review of systems:  All others systems reviewed and negative except for what is outlined in the above HPI    Past Medical History:   Diagnosis Date   • AK (actinic keratosis) 7/21/2016   • Anemia    • Arrhythmia     PVC's   • Arthritis     lower back   • Lombardi esophagus    • Cancer (HCC) 2001    prostate (radiation/sx)   • CATARACT     bilateral IOL   • Chronic kidney disease (CKD), stage III (moderate) ([facility])    • Colon polyps    • Dental disorder     Dentures   • Depression 7/21/2016   • Diabetes 1984    oral medication   • GERD (gastroesophageal reflux disease)    • Hemorrhagic disorder (HCC)     bleeds easily    • Hyperlipidemia    • Hypertension    • Infectious disease     history of c-diff 2016   • Neuropathy ([facility])    • Other specified disorder of intestines     alters between normal and diarrhea since radiation tx   • Personal history of venous thrombosis and embolism 2010/1995    leg   • Prostate cancer (HCC) 2001    Status post prostatectomy   • PVD (peripheral vascular disease) ([facility])     s/p femoral endarterectomy   • Recurrent UTI    • Sleep apnea     Does not use CPAP   • Unspecified urinary incontinence     spill from artifical urinary sphincter   • Urinary bladder disorder     uses external catheter at night, and clamp during day     Past Surgical History:   Procedure Laterality Date   • FEMORAL ENDARTERECTOMY Right 2/10/2020    Procedure: ENDARTERECTOMY, FEMORAL- COMMON FEMORAL ARTERY BYPASS WITH VEIN;  Surgeon: Alek Delgado M.D.;  Location: SURGERY [facility];  Service: General   • FEMORAL ENDARTERECTOMY Right 3/13/2019    Procedure: FEMORAL ENDARTERECTOMY- COMMON;  Surgeon:  Deepa Delgado M.D.;  Location: [facility];  Service: General   • ILIAC ANGIOPLASTY WITH STENT Right 3/13/2019    Procedure: ILIAC ANGIOPLASTY WITH STENT- RETROGRADES;  Surgeon: Deepa Delgado M.D.;  Location: [facility];  Service: General   • ANKLE ORIF Right 10/28/2015    Procedure: ANKLE ORIF;  Surgeon: Venu Elizabeth M.D.;  Location: [facility];  Service:    • SPHINCTER PROSTHESIS REMOVAL  8/10/2015    Procedure: SPHINCTER PROSTHESIS REMOVAL;  Surgeon: Tuan Marie M.D.;  Location: [facility];  Service:    • LAPAROSCOPY  5/21/2014    Performed by Graham Peña M.D. at [facility]   • BOWEL RESECTION  5/21/2014    Performed by Graham Peña M.D. at [facility]   • COLONOSCOPY WITH APC  6/5/2013    Performed by Deepa Veal M.D. at [facility]   • CATARACT PHACO WITH IOL  2/27/2013    Performed by Clayton Noonan M.D. at SURGERY [facility]   • CATARACT PHACO WITH IOL  2/13/2013    Performed by Clayton Noonan M.D. at SURGERY [facility] DAY [facility]   • LUMBAR FUSION POSTERIOR  12/13/2012    Procedure: L5-S1 removal of Jennings Fragment NON-INSTRUMENTAL;  Surgeon: Wil Martinez M.D.;  Location: [facility];  Service:    • LUMBAR DECOMPRESSION  12/13/2012    Procedure: 4-5;  Surgeon: Wil Martinez M.D.;  Location: [facility];  Service:    • SPHINCTER PROSTHESIS PLACEMENT  9/26/2012    Performed by GERSON Car M.D. at [facility]   • GROIN EXPLORATION  3/13/2012    Performed by DEEPA DELGADO at [facility]   • SPHINCTER PROSTHESIS PLACEMENT  2/24/2011    Performed by GERSON CAR at [facility]   • CYSTOSCOPY  2/24/2011    Performed by GERSON CAR at [facility]   • CYSTOSCOPY  2/2/2011    Performed by GERSON CAR at [facility]ERY [facility]   • SPHINCTER PROSTHESIS REMOVAL  6/3/2010    Performed by  JOSH APARICIO at SURGERY [facility]   • ANGIOGRAM  2010    Performed by PETER NORMAN at SURGERY [facility]   • AORTOGRAM  2010    Performed by PETER NORMAN at SURGERY [facility]   • PROSTATECTOMY ROBOTIC  2001   • PB INSERT,INFLATABLE SPHINCTER     • OTHER ORTHOPEDIC SURGERY Left     hip & femur fx     Family History   Problem Relation Age of Onset   • Cancer Mother         ovarian   • Heart Disease Father    • Diabetes Father    • Diabetes Sister    • Hypertension Other    • Cancer Other         multiple cousins with prostate cancer.      Social History     Socioeconomic History   • Marital status:      Spouse name: Not on file   • Number of children: 5   • Years of education: Not on file   • Highest education level: Not on file   Occupational History   • Not on file   Tobacco Use   • Smoking status: Former Smoker     Packs/day: 2.00     Years: 30.00     Pack years: 60.00     Types: Cigarettes     Quit date: 3/5/1995     Years since quittin.7   • Smokeless tobacco: Never Used   • Tobacco comment: 1.5 ppd 25 yrs,quit    Vaping Use   • Vaping Use: Never used   Substance and Sexual Activity   • Alcohol use: No     Alcohol/week: 0.0 oz   • Drug use: No   • Sexual activity: Not on file   Other Topics Concern   • Not on file   Social History Narrative    Retired, previous  at [employer] [place].      Social Determinants of Health     Financial Resource Strain:    • Difficulty of Paying Living Expenses: Not on file   Food Insecurity:    • Worried About Running Out of Food in the Last Year: Not on file   • Ran Out of Food in the Last Year: Not on file   Transportation Needs:    • Lack of Transportation (Medical): Not on file   • Lack of Transportation (Non-Medical): Not on file   Physical Activity:    • Days of Exercise per Week: Not on file   • Minutes of Exercise per Session: Not on file   Stress:    • Feeling of Stress : Not on file    Social Connections:    • Frequency of Communication with Friends and Family: Not on file   • Frequency of Social Gatherings with Friends and Family: Not on file   • Attends Temple Services: Not on file   • Active Member of Clubs or Organizations: Not on file   • Attends Club or Organization Meetings: Not on file   • Marital Status: Not on file   Intimate Partner Violence:    • Fear of Current or Ex-Partner: Not on file   • Emotionally Abused: Not on file   • Physically Abused: Not on file   • Sexually Abused: Not on file   Housing Stability:    • Unable to Pay for Housing in the Last Year: Not on file   • Number of Places Lived in the Last Year: Not on file   • Unstable Housing in the Last Year: Not on file     Allergies   Allergen Reactions   • Augmentin Unspecified     Bleeding (rectal)  RXN=5 years ago   • Latex Unspecified     Blisters  RXN=ongoing   • Tape Rash     Rash-blisters-paper tape okay  RXN=ongoing     Current Outpatient Medications   Medication Sig Dispense Refill   • fluocinonide (LIDEX) 0.05 % Cream APPLY TO AFFECTED AREA TO LEFT SHOULDER AND BACK TWICE A DAY . DO NOT USE ON FACE/ARMPITS/ GROIN. 60 g 0   • ASPIRIN 81 PO Take 1 Tablet by mouth every day. Indications: Preventative for heart clots.     • carvedilol (COREG) 6.25 MG Tab Take 1 tablet by mouth 2 times a day with meals. 180 tablet 3   • atorvastatin (LIPITOR) 40 MG Tab Take 1 tablet by mouth every evening. 100 tablet 3   • metFORMIN (GLUCOPHAGE) 500 MG Tab Take 500 mg by mouth 2 times a day with meals.     • Alogliptin Benzoate 12.5 MG Tab Take 12.5 mg by mouth every day.     • furosemide (LASIX) 40 MG Tab Take 1 Tab by mouth 2 Times a Day. 180 Tab 3   • loperamide (IMODIUM) 2 MG Cap Take 2 mg by mouth 4 times a day as needed for Diarrhea.     • pantoprazole (PROTONIX) 20 MG tablet Take 1 Tab by mouth every day.     • Cyanocobalamin (VITAMIN B-12) 1000 MCG Tab Take 1,000 mcg by mouth every day.     • Multiple Vitamins-Minerals  "(PRESERVISION AREDS 2+MULTI VIT PO) Take 1 Cap by mouth 2 Times a Day.     • glipiZIDE (GLUCOTROL) 10 MG Tab Take 10 mg by mouth 2 times a day.     • Cholecalciferol (VITAMIN D) 2000 UNIT TABS Take 2,000 Units by mouth every day.     • fluoxetine (PROZAC) 20 MG CAPS Take 20 mg by mouth every day.       No current facility-administered medications for this visit.       Physical Exam:  Ambulatory Vitals  /66 (BP Location: Left arm, Patient Position: Sitting, BP Cuff Size: Adult)   Pulse 79   Resp 16   Ht 1.803 m (5' 11\")   Wt 73.9 kg (163 lb)   SpO2 94%    BP Readings from Last 4 Encounters:   11/10/21 116/66   10/25/21 140/60   10/21/21 132/58   10/18/21 146/68       Weight/BMI: Body mass index is 22.73 kg/m².  Wt Readings from Last 4 Encounters:   11/10/21 73.9 kg (163 lb)   08/27/21 73.5 kg (162 lb)   08/25/21 73.5 kg (162 lb)   08/11/21 75 kg (165 lb 6 oz)     General: No apparent distress. Well nourished.   Neck: No JVD. No caroid bruits, trachea midline  Lungs: CTAB. Normal effort, without crackles/rhonchi, no wheezing  Heart: RRR. Normal S1/S2, 2/6 murmur, no rub. trace lower extremity edema, bilateral compression stockings in place. 2+ radial pulses, 2+ DT pulses  Ext: No clubbing or cyanosis.  Abdomen: soft, non tender, non distended, no jeanine hepatomegaly.  Neurological: No focal deficits, no facial asymmetry.  Normal speech.  Psychiatric: Appropriate affect, alert and oriented x 4.   Skin: Warm and dry, no rash.    Lab Data Review:  Lab Results   Component Value Date/Time    CHOLSTRLTOT 114 08/12/2020 09:17 AM    LDL 46 08/12/2020 09:17 AM    HDL 46 08/12/2020 09:17 AM    TRIGLYCERIDE 108 08/12/2020 09:17 AM       Lab Results   Component Value Date/Time    SODIUM 143 11/03/2021 11:01 AM    POTASSIUM 3.7 11/03/2021 11:01 AM    CHLORIDE 103 11/03/2021 11:01 AM    CO2 27 11/03/2021 11:01 AM    GLUCOSE 144 (H) 11/03/2021 11:01 AM    BUN 36 (H) 11/03/2021 11:01 AM    CREATININE 1.74 (H) 11/03/2021 " 11:01 AM    CREATININE 1.0 2005 02:20 PM     Lab Results   Component Value Date/Time    ALKPHOSPHAT 90 2019 08:19 AM    ASTSGOT 17 2019 08:19 AM    ALTSGPT 12 2019 08:19 AM    TBILIRUBIN 0.6 2019 08:19 AM      Lab Results   Component Value Date/Time    WBC 6.0 2021 11:01 AM     Cardiac Imaging and Procedures Review:      Echo 2019:  CONCLUSIONS  Normal left ventricular size, wall thickness, and systolic function.  Mitral annular calcification.  Aortic sclerosis without stenosis.  Compared to the images of the study done 17 - there has been no   significant change.     NM Stress Test 2017:  Myocardial Perfusion   Report   NUCLEAR IMAGING INTERPRETATION   No evidence of significant jeopardized viable myocardium or prior myocardial    infarction.   Normal left ventricular size, ejection fraction, and wall motion.      Assessment and Clinical Decision Makin. Dyslipidemia  Lipid Profile   2. Nonrheumatic aortic valve insufficiency  EC-ECHOCARDIOGRAM COMPLETE W/O CONT   3. Essential hypertension     4. Stage 3b chronic kidney disease (HCC)     5. PAD (peripheral artery disease) ([facility])     6. Type 2 diabetes mellitus with diabetic polyneuropathy, without long-term current use of insulin ([facility])       The following treatment plan was discussed    Dyslipidemia  -Yearly lipid panels in good control  -Yearly lipid panel due, ordered  -Continue atorvastatin 40 mg    Aortic valve insufficiency, mild  -Most recent echo from 2019 however did not reveal mild aortic valve insufficiency  -Audible 2/6 murmur on exam  -Repeat echocardiogram next year, 2022    Essential hypertension  -Controlled today in office  -Home SBP readings ranging from 115-135  -Continue current antihypertensive regimen to include Coreg 6.25 mg BID    PAD  -Improved symptoms after angioplasty with Dr. Rice in 2021  -Continue to monitor for increasing claudication  -Aspirin 81 mg daily  -Atorvastatin 40 mg  daily  -Plavix given history of GIB and hematuria    CKD stage IIIb  -Follows with nephrology, appointment this week with Dr. Najjar     Plan reviewed in detail with the patient, verbalizes understanding and is in agreement.  Pt is to follow up with Dr. Martinez or Jelena HILLIARD in 6 months  Encouraged Pt to follow up with us over the phone or electronically using my MyChart as cardiac issues/concerns arise.      PLEASE NOTE: This dictation was created using voice recognition software. I have made every reasonable attempt to correct obvious errors, but I expect that there are errors of grammar and possibly content that I did not discover before finalizing the note.       HILLARY Summers.   [facility]  [phone]    Collaborating Physician: Dr. Beauchamp

## 2021-11-18 NOTE — PROGRESS NOTES
"Subjective     Selvin Braga is a 85 y.o. male who presents with Hypertension and Chronic Kidney Disease            Hypertension  This is a chronic problem. The current episode started more than 1 year ago. The problem is unchanged. The problem is controlled. Pertinent negatives include no chest pain, malaise/fatigue, peripheral edema or shortness of breath. Risk factors for coronary artery disease include diabetes mellitus and male gender. Past treatments include beta blockers and diuretics. The current treatment provides significant improvement. There are no compliance problems.  Hypertensive end-organ damage includes kidney disease. Identifiable causes of hypertension include chronic renal disease.   Chronic Kidney Disease  This is a chronic problem. The current episode started more than 1 year ago. The problem occurs constantly. The problem has been waxing and waning. Associated symptoms include urinary symptoms. Pertinent negatives include no chest pain, chills, coughing, fever, nausea or vomiting.       Review of Systems   Constitutional: Negative for chills, fever and malaise/fatigue.   Respiratory: Negative for cough and shortness of breath.    Cardiovascular: Negative for chest pain and leg swelling.   Gastrointestinal: Negative for nausea and vomiting.   Genitourinary: Negative for dysuria, frequency and urgency.        Urinary incontinence              Objective     /70 (BP Location: Right arm, Patient Position: Sitting, BP Cuff Size: Adult)   Pulse 78   Temp 36.4 °C (97.5 °F) (Temporal)   Ht 1.803 m (5' 11\")   Wt 75.3 kg (166 lb)   SpO2 93%   BMI 23.15 kg/m²      Physical Exam  Vitals and nursing note reviewed.   Constitutional:       General: He is not in acute distress.     Appearance: He is not ill-appearing.   HENT:      Head: Normocephalic and atraumatic.      Right Ear: External ear normal.      Left Ear: External ear normal.      Nose: Nose normal.   Eyes:      General:         " Right eye: No discharge.         Left eye: No discharge.      Conjunctiva/sclera: Conjunctivae normal.   Cardiovascular:      Rate and Rhythm: Normal rate and regular rhythm.      Heart sounds: No murmur heard.      Pulmonary:      Effort: Pulmonary effort is normal. No respiratory distress.      Breath sounds: Normal breath sounds. No wheezing.   Musculoskeletal:         General: No tenderness or deformity.      Right lower leg: No edema.      Left lower leg: No edema.   Skin:     General: Skin is warm.   Neurological:      General: No focal deficit present.      Mental Status: He is alert and oriented to person, place, and time.   Psychiatric:         Mood and Affect: Mood normal.         Behavior: Behavior normal.       Past Medical History:   Diagnosis Date   • AK (actinic keratosis) 7/21/2016   • Anemia    • Arrhythmia     PVC's   • Arthritis     lower back   • Lombardi esophagus    • Cancer ([facility]) 2001    prostate (radiation/sx)   • CATARACT     bilateral IOL   • Chronic kidney disease (CKD), stage III (moderate) ([facility])    • Colon polyps    • Dental disorder     Dentures   • Depression 7/21/2016   • Diabetes 1984    oral medication   • GERD (gastroesophageal reflux disease)    • Hemorrhagic disorder ([facility])     bleeds easily    • Hyperlipidemia    • Hypertension    • Infectious disease     history of c-diff 2016   • Neuropathy ([facility])    • Other specified disorder of intestines     alters between normal and diarrhea since radiation tx   • Personal history of venous thrombosis and embolism 2010/1995    leg   • Prostate cancer ([facility]) 2001    Status post prostatectomy   • PVD (peripheral vascular disease) ([facility])     s/p femoral endarterectomy   • Recurrent UTI    • Sleep apnea     Does not use CPAP   • Unspecified urinary incontinence     spill from artifical urinary sphincter   • Urinary bladder disorder     uses external catheter at night, and clamp during day     Social History     Socioeconomic History   • Marital  status:      Spouse name: Not on file   • Number of children: 5   • Years of education: Not on file   • Highest education level: Not on file   Occupational History   • Not on file   Tobacco Use   • Smoking status: Former Smoker     Packs/day: 2.00     Years: 30.00     Pack years: 60.00     Types: Cigarettes     Quit date: 3/5/1995     Years since quittin.7   • Smokeless tobacco: Never Used   • Tobacco comment: 1.5 ppd 25 yrs,quit    Vaping Use   • Vaping Use: Never used   Substance and Sexual Activity   • Alcohol use: No     Alcohol/week: 0.0 oz   • Drug use: No   • Sexual activity: Not on file   Other Topics Concern   • Not on file   Social History Narrative    Retired, previous  at [employer] [place].      Social Determinants of Health     Financial Resource Strain:    • Difficulty of Paying Living Expenses: Not on file   Food Insecurity:    • Worried About Running Out of Food in the Last Year: Not on file   • Ran Out of Food in the Last Year: Not on file   Transportation Needs:    • Lack of Transportation (Medical): Not on file   • Lack of Transportation (Non-Medical): Not on file   Physical Activity:    • Days of Exercise per Week: Not on file   • Minutes of Exercise per Session: Not on file   Stress:    • Feeling of Stress : Not on file   Social Connections:    • Frequency of Communication with Friends and Family: Not on file   • Frequency of Social Gatherings with Friends and Family: Not on file   • Attends Restoration Services: Not on file   • Active Member of Clubs or Organizations: Not on file   • Attends Club or Organization Meetings: Not on file   • Marital Status: Not on file   Intimate Partner Violence:    • Fear of Current or Ex-Partner: Not on file   • Emotionally Abused: Not on file   • Physically Abused: Not on file   • Sexually Abused: Not on file   Housing Stability:    • Unable to Pay for Housing in the Last Year: Not on file   • Number of Places Lived in the Last Year:  Not on file   • Unstable Housing in the Last Year: Not on file     Family History   Problem Relation Age of Onset   • Cancer Mother         ovarian   • Heart Disease Father    • Diabetes Father    • Diabetes Sister    • Hypertension Other    • Cancer Other         multiple cousins with prostate cancer.      Recent Labs     06/30/21  0934 08/06/21  0911 11/03/21  1101   SODIUM 140 142 143   POTASSIUM 3.5* 4.1 3.7   CHLORIDE 105 108 103   CO2 25 23 27   BUN 30* 38* 36*   CREATININE 1.50* 1.49* 1.74*                             Assessment & Plan        1. Essential hypertension  Controlled  Continue same medication regimen  Continue low-sodium diet      2. Stage 3b chronic kidney disease (HCC)  Stable overall  No uremic symptoms  Renal dose of medication  Avoid nephrotoxins  Continue same medication regimen  Repeat labs in 6-month

## 2021-11-19 NOTE — PROGRESS NOTES
"CC: non healing lesions       Subjective: Established patient returns for leg/shoulder itching/rash    Patient returns for lesion to Left posterior shoulder.  He states today that it has improved with use of Lidex cream.  He has had visit with homehealth to address leg edema with some improvement    From prior notes:  \"HPI/location: Ulcers on leg x \"weeks\"  Time present: \"weeks\"  Painful lesion: No  Itching lesion: Yes  Enlarging lesion: No  Anything make it better or worse? Had been mupirocin oint. Not helping   Takes keflex every day (he thinks. Name not all that familiar). For UTI ppx.    back and shoulder lesions, desires eval. Picks and scratches at sites.\"     History of skin cancer: No  History of precancers/actinic keratoses: No  History of biopsies:No  History of blistering/severe sunburns:Yes, Details: child  Family history of skin cancer:No  Family history of atypical moles:No    ROS: denies fevers/chills. Occ itch.  No cough  DermPMH: no skin cancer/melanoma  No problem-specific Assessment & Plan notes found for this encounter.    Relevant PMH: many med comorbidities  Social:former smoker    PE: Gen:WDWN male in NAD.  Skin: lower right leg - erythema and edema without ulceration or bullae noted. Upper back - many whitened scars; few linear excoriations appearing traumatized on shoulders with overlying hemorrhagic crusting.  No rolled border or telangiectasias visible.      A/P: macular amyloidosis, itching, back/left shoulder, likely: 's nodules/scars:  -cont lidex cream BID PRN itching, se reviewed  -minimize picking/trauma  -f/u prn    Venous stasis:   -reviewed moisturizer use  -lidex cream BID PRN sparingly for itch  -cont zinc dressings, PRN  -home health as needed    I have reviewed medications relevant to my specialty.            "

## 2021-11-23 NOTE — TELEPHONE ENCOUNTER
ESTABLISHED PATIENT PRE-VISIT PLANNING   Annual Wellness Visit Never Done    Patient was NOT contacted to complete PVP.     Note: Patient will not be contacted if there is no indication to call.     1.  Reviewed notes from the last few office visits within the medical group: Yes    2.  If any orders were placed at last visit or intended to be done for this visit (i.e. 6 mos follow-up), do we have Results/Consult Notes?           Labs - Labs ordered, completed on 08/26/2021 and results are in chart.  Note: If patient appointment is for lab review and patient did not complete labs, check with provider if OK to reschedule patient until labs completed.         Imaging - Imaging was not ordered at last office visit. Last office visit with PCP Dr. Oquendo was on 08/25/2021.           Referrals -     -Podiatry:   -Dermatology:   -Home Health:    3. Is this appointment scheduled as a Hospital Follow-Up? No    4.  Immunizations were updated in Epic using Reconcile Outside Information activity? Yes    5.  Patient is due for the following Health Maintenance Topics:   Health Maintenance Due   Topic Date Due    Annual Wellness Visit  Never done    IMM ZOSTER VACCINES (1 of 2) Never done    FASTING LIPID PROFILE  08/12/2021       6.  AHA (Pulse8) form printed for Provider? Yes

## 2021-11-24 PROBLEM — Z66 DO NOT RESUSCITATE STATUS: Status: RESOLVED | Noted: 2018-10-16 | Resolved: 2021-01-01

## 2021-11-24 NOTE — PROGRESS NOTES
Subjective:     Selvin Braga is a 85 y.o. male presenting for a follow-up.  He has no concerns today.  He states that his blood sugars have been somewhat elevated lately.  Is ranging 1 50-1 80s fasting.  He continues on his medications regularly.  Denies any changes to medications or diet.  [facility] plans to discuss with them as they are managing his medications.  Blood pressures also remain well controlled.  He continues on his other medications regularly.  Leg swelling has improved.  Skin lesions have resolved.        Current Outpatient Medications:   •  methenamine hip (HIPPREX) 1 GM Tab, TAKE 1 TABLET BY MOUTH TWICE A DAY, Disp: , Rfl:   •  mupirocin (BACTROBAN) 2 % Ointment, PLEASE SEE ATTACHED FOR DETAILED DIRECTIONS, Disp: , Rfl:   •  fluocinonide (LIDEX) 0.05 % Cream, APPLY TO AFFECTED AREA TO LEFT SHOULDER AND BACK TWICE A DAY . DO NOT USE ON FACE/ARMPITS/ GROIN., Disp: 60 g, Rfl: 0  •  ASPIRIN 81 PO, Take 1 Tablet by mouth every day. Indications: Preventative for heart clots., Disp: , Rfl:   •  carvedilol (COREG) 6.25 MG Tab, Take 1 tablet by mouth 2 times a day with meals., Disp: 180 tablet, Rfl: 3  •  atorvastatin (LIPITOR) 40 MG Tab, Take 1 tablet by mouth every evening., Disp: 100 tablet, Rfl: 3  •  metFORMIN (GLUCOPHAGE) 500 MG Tab, Take 500 mg by mouth 2 times a day with meals., Disp: , Rfl:   •  Alogliptin Benzoate 12.5 MG Tab, Take 12.5 mg by mouth every day., Disp: , Rfl:   •  furosemide (LASIX) 40 MG Tab, Take 1 Tab by mouth 2 Times a Day., Disp: 180 Tab, Rfl: 3  •  loperamide (IMODIUM) 2 MG Cap, Take 2 mg by mouth 4 times a day as needed for Diarrhea., Disp: , Rfl:   •  pantoprazole (PROTONIX) 20 MG tablet, Take 1 Tab by mouth every day., Disp: , Rfl:   •  Cyanocobalamin (VITAMIN B-12) 1000 MCG Tab, Take 1,000 mcg by mouth every day., Disp: , Rfl:   •  Multiple Vitamins-Minerals (PRESERVISION AREDS 2+MULTI VIT PO), Take 1 Cap by mouth 2 Times a  "Day., Disp: , Rfl:   •  glipiZIDE (GLUCOTROL) 10 MG Tab, Take 10 mg by mouth 2 times a day., Disp: , Rfl:   •  Cholecalciferol (VITAMIN D) 2000 UNIT TABS, Take 2,000 Units by mouth every day., Disp: , Rfl:   •  fluoxetine (PROZAC) 20 MG CAPS, Take 20 mg by mouth every day., Disp: , Rfl:     Objective:     Vitals: /72   Pulse 74   Temp 36.4 °C (97.6 °F)   Resp 16   Ht 1.803 m (5' 11\")   Wt 73.5 kg (162 lb)   SpO2 95%   BMI 22.59 kg/m²   General: Alert  HEENT: Normocephalic.   Heart: Regular rate and rhythm.  S1 and S2 normal.  No murmurs appreciated.  Respiratory: Normal respiratory effort.  Clear to auscultation bilaterally.  Abdomen: Non-distended, soft      Assessment/Plan:     Selvin was seen today for follow-up.    Diagnoses and all orders for this visit:    Type 2 diabetes mellitus with diabetic polyneuropathy, without long-term current use of insulin (HCC)  Chronic, stable, continue current medications.    Essential hypertension  Chronic, stable, continue current medications.    Dyslipidemia  Chronic, stable, continue current medications.        Return in about 6 months (around 5/24/2022) for routine follow up.    "

## 2022-01-01 ENCOUNTER — HOME CARE VISIT (OUTPATIENT)
Dept: HOSPICE | Facility: HOSPICE | Age: 87
End: 2022-01-01
Payer: MEDICARE

## 2022-01-01 ENCOUNTER — HOSPITAL ENCOUNTER (OUTPATIENT)
Facility: MEDICAL CENTER | Age: 87
End: 2022-06-15
Attending: FAMILY MEDICINE
Payer: MEDICARE

## 2022-01-01 ENCOUNTER — HOME CARE VISIT (OUTPATIENT)
Dept: HOME HEALTH SERVICES | Facility: HOME HEALTHCARE | Age: 87
End: 2022-01-01
Payer: MEDICARE

## 2022-01-01 ENCOUNTER — TELEPHONE (OUTPATIENT)
Dept: CARDIOLOGY | Facility: MEDICAL CENTER | Age: 87
End: 2022-01-01

## 2022-01-01 ENCOUNTER — PATIENT OUTREACH (OUTPATIENT)
Dept: MEDICAL GROUP | Facility: MEDICAL CENTER | Age: 87
End: 2022-01-01
Payer: MEDICARE

## 2022-01-01 ENCOUNTER — DOCUMENTATION (OUTPATIENT)
Dept: MEDICAL GROUP | Facility: PHYSICIAN GROUP | Age: 87
End: 2022-01-01
Payer: MEDICARE

## 2022-01-01 ENCOUNTER — APPOINTMENT (OUTPATIENT)
Dept: RADIOLOGY | Facility: MEDICAL CENTER | Age: 87
End: 2022-01-01
Attending: EMERGENCY MEDICINE
Payer: MEDICARE

## 2022-01-01 ENCOUNTER — HOME CARE VISIT (OUTPATIENT)
Dept: HOSPICE | Facility: HOSPICE | Age: 87
End: 2022-01-01

## 2022-01-01 ENCOUNTER — PHARMACY VISIT (OUTPATIENT)
Dept: PHARMACY | Facility: MEDICAL CENTER | Age: 87
End: 2022-01-01
Payer: COMMERCIAL

## 2022-01-01 ENCOUNTER — TELEPHONE (OUTPATIENT)
Dept: MEDICAL GROUP | Facility: MEDICAL CENTER | Age: 87
End: 2022-01-01
Payer: MEDICARE

## 2022-01-01 ENCOUNTER — HOSPITAL ENCOUNTER (OUTPATIENT)
Facility: MEDICAL CENTER | Age: 87
End: 2022-06-20
Attending: FAMILY MEDICINE
Payer: MEDICARE

## 2022-01-01 ENCOUNTER — HOSPITAL ENCOUNTER (INPATIENT)
Facility: MEDICAL CENTER | Age: 87
LOS: 2 days | DRG: 682 | End: 2022-07-28
Attending: STUDENT IN AN ORGANIZED HEALTH CARE EDUCATION/TRAINING PROGRAM | Admitting: STUDENT IN AN ORGANIZED HEALTH CARE EDUCATION/TRAINING PROGRAM
Payer: COMMERCIAL

## 2022-01-01 ENCOUNTER — OFFICE VISIT (OUTPATIENT)
Dept: MEDICAL GROUP | Facility: MEDICAL CENTER | Age: 87
End: 2022-01-01
Payer: MEDICARE

## 2022-01-01 ENCOUNTER — HOSPITAL ENCOUNTER (OUTPATIENT)
Facility: MEDICAL CENTER | Age: 87
End: 2022-06-07
Attending: FAMILY MEDICINE
Payer: MEDICARE

## 2022-01-01 ENCOUNTER — PATIENT MESSAGE (OUTPATIENT)
Dept: HEALTH INFORMATION MANAGEMENT | Facility: OTHER | Age: 87
End: 2022-01-01
Payer: MEDICARE

## 2022-01-01 ENCOUNTER — OFFICE VISIT (OUTPATIENT)
Dept: CARDIOLOGY | Facility: MEDICAL CENTER | Age: 87
End: 2022-01-01
Payer: MEDICARE

## 2022-01-01 ENCOUNTER — HOSPITAL ENCOUNTER (OUTPATIENT)
Facility: MEDICAL CENTER | Age: 87
End: 2022-04-27
Attending: PHYSICIAN ASSISTANT
Payer: MEDICARE

## 2022-01-01 ENCOUNTER — APPOINTMENT (OUTPATIENT)
Dept: NEPHROLOGY | Facility: MEDICAL CENTER | Age: 87
End: 2022-01-01
Payer: MEDICARE

## 2022-01-01 ENCOUNTER — DOCUMENTATION (OUTPATIENT)
Dept: MEDICAL GROUP | Facility: MEDICAL CENTER | Age: 87
End: 2022-01-01

## 2022-01-01 ENCOUNTER — DOCUMENTATION (OUTPATIENT)
Dept: VASCULAR LAB | Facility: MEDICAL CENTER | Age: 87
End: 2022-01-01
Payer: MEDICARE

## 2022-01-01 ENCOUNTER — HOME HEALTH ADMISSION (OUTPATIENT)
Dept: HOME HEALTH SERVICES | Facility: HOME HEALTHCARE | Age: 87
End: 2022-01-01
Payer: MEDICARE

## 2022-01-01 ENCOUNTER — HOSPICE ADMISSION (OUTPATIENT)
Dept: HOSPICE | Facility: HOSPICE | Age: 87
End: 2022-01-01
Payer: MEDICARE

## 2022-01-01 ENCOUNTER — HOSPITAL ENCOUNTER (OUTPATIENT)
Dept: LAB | Facility: MEDICAL CENTER | Age: 87
End: 2022-05-18
Attending: INTERNAL MEDICINE
Payer: MEDICARE

## 2022-01-01 ENCOUNTER — HOSPITAL ENCOUNTER (OUTPATIENT)
Facility: MEDICAL CENTER | Age: 87
End: 2022-06-17
Attending: FAMILY MEDICINE
Payer: MEDICARE

## 2022-01-01 ENCOUNTER — HOSPITAL ENCOUNTER (EMERGENCY)
Facility: MEDICAL CENTER | Age: 87
End: 2022-07-26
Attending: EMERGENCY MEDICINE
Payer: MEDICARE

## 2022-01-01 ENCOUNTER — HOSPITAL ENCOUNTER (INPATIENT)
Facility: MEDICAL CENTER | Age: 87
LOS: 2 days | DRG: 683 | End: 2022-06-10
Attending: STUDENT IN AN ORGANIZED HEALTH CARE EDUCATION/TRAINING PROGRAM | Admitting: INTERNAL MEDICINE
Payer: MEDICARE

## 2022-01-01 ENCOUNTER — HOSPITAL ENCOUNTER (OUTPATIENT)
Dept: CARDIOLOGY | Facility: MEDICAL CENTER | Age: 87
End: 2022-01-12
Attending: NURSE PRACTITIONER
Payer: MEDICARE

## 2022-01-01 ENCOUNTER — OFFICE VISIT (OUTPATIENT)
Dept: NEPHROLOGY | Facility: MEDICAL CENTER | Age: 87
End: 2022-01-01
Payer: MEDICARE

## 2022-01-01 VITALS
TEMPERATURE: 97.5 F | OXYGEN SATURATION: 97 % | HEART RATE: 67 BPM | RESPIRATION RATE: 20 BRPM | BODY MASS INDEX: 20.8 KG/M2 | SYSTOLIC BLOOD PRESSURE: 128 MMHG | DIASTOLIC BLOOD PRESSURE: 60 MMHG | WEIGHT: 153.4 LBS

## 2022-01-01 VITALS
SYSTOLIC BLOOD PRESSURE: 128 MMHG | BODY MASS INDEX: 21.02 KG/M2 | RESPIRATION RATE: 20 BRPM | TEMPERATURE: 97.4 F | HEART RATE: 76 BPM | DIASTOLIC BLOOD PRESSURE: 68 MMHG | WEIGHT: 155 LBS | OXYGEN SATURATION: 97 %

## 2022-01-01 VITALS
TEMPERATURE: 97.8 F | OXYGEN SATURATION: 91 % | DIASTOLIC BLOOD PRESSURE: 43 MMHG | HEIGHT: 72 IN | RESPIRATION RATE: 17 BRPM | WEIGHT: 160.05 LBS | SYSTOLIC BLOOD PRESSURE: 129 MMHG | BODY MASS INDEX: 21.68 KG/M2 | HEART RATE: 80 BPM

## 2022-01-01 VITALS
SYSTOLIC BLOOD PRESSURE: 118 MMHG | WEIGHT: 173 LBS | DIASTOLIC BLOOD PRESSURE: 66 MMHG | BODY MASS INDEX: 24.22 KG/M2 | HEART RATE: 88 BPM | OXYGEN SATURATION: 93 % | HEIGHT: 71 IN

## 2022-01-01 VITALS
RESPIRATION RATE: 16 BRPM | HEART RATE: 64 BPM | WEIGHT: 155.5 LBS | SYSTOLIC BLOOD PRESSURE: 126 MMHG | DIASTOLIC BLOOD PRESSURE: 40 MMHG | BODY MASS INDEX: 21.69 KG/M2

## 2022-01-01 VITALS
RESPIRATION RATE: 18 BRPM | OXYGEN SATURATION: 97 % | BODY MASS INDEX: 21 KG/M2 | TEMPERATURE: 97.8 F | SYSTOLIC BLOOD PRESSURE: 159 MMHG | HEART RATE: 79 BPM | WEIGHT: 150 LBS | DIASTOLIC BLOOD PRESSURE: 65 MMHG | HEIGHT: 71 IN

## 2022-01-01 VITALS
OXYGEN SATURATION: 97 % | WEIGHT: 156.4 LBS | BODY MASS INDEX: 21.18 KG/M2 | SYSTOLIC BLOOD PRESSURE: 132 MMHG | DIASTOLIC BLOOD PRESSURE: 70 MMHG | HEIGHT: 72 IN | RESPIRATION RATE: 16 BRPM | HEART RATE: 80 BPM | TEMPERATURE: 98 F

## 2022-01-01 VITALS
OXYGEN SATURATION: 100 % | HEART RATE: 84 BPM | SYSTOLIC BLOOD PRESSURE: 118 MMHG | DIASTOLIC BLOOD PRESSURE: 66 MMHG | TEMPERATURE: 97.4 F | RESPIRATION RATE: 18 BRPM

## 2022-01-01 VITALS
HEIGHT: 71 IN | SYSTOLIC BLOOD PRESSURE: 128 MMHG | TEMPERATURE: 97.7 F | BODY MASS INDEX: 20.3 KG/M2 | WEIGHT: 145 LBS | DIASTOLIC BLOOD PRESSURE: 48 MMHG

## 2022-01-01 VITALS
HEART RATE: 87 BPM | WEIGHT: 175 LBS | SYSTOLIC BLOOD PRESSURE: 126 MMHG | HEIGHT: 71 IN | DIASTOLIC BLOOD PRESSURE: 74 MMHG | TEMPERATURE: 97.4 F | BODY MASS INDEX: 24.5 KG/M2 | OXYGEN SATURATION: 90 %

## 2022-01-01 VITALS
WEIGHT: 154.25 LBS | RESPIRATION RATE: 20 BRPM | DIASTOLIC BLOOD PRESSURE: 60 MMHG | BODY MASS INDEX: 21.51 KG/M2 | HEART RATE: 68 BPM | SYSTOLIC BLOOD PRESSURE: 112 MMHG

## 2022-01-01 VITALS
HEART RATE: 84 BPM | OXYGEN SATURATION: 94 % | TEMPERATURE: 97.7 F | RESPIRATION RATE: 18 BRPM | BODY MASS INDEX: 22.06 KG/M2 | WEIGHT: 158.2 LBS | SYSTOLIC BLOOD PRESSURE: 122 MMHG | DIASTOLIC BLOOD PRESSURE: 58 MMHG

## 2022-01-01 VITALS — DIASTOLIC BLOOD PRESSURE: 50 MMHG | HEART RATE: 76 BPM | RESPIRATION RATE: 20 BRPM | SYSTOLIC BLOOD PRESSURE: 110 MMHG

## 2022-01-01 VITALS
BODY MASS INDEX: 24.64 KG/M2 | SYSTOLIC BLOOD PRESSURE: 128 MMHG | RESPIRATION RATE: 20 BRPM | TEMPERATURE: 98 F | DIASTOLIC BLOOD PRESSURE: 70 MMHG | HEART RATE: 70 BPM | WEIGHT: 176 LBS | OXYGEN SATURATION: 93 % | HEIGHT: 71 IN

## 2022-01-01 VITALS — DIASTOLIC BLOOD PRESSURE: 62 MMHG | RESPIRATION RATE: 18 BRPM | SYSTOLIC BLOOD PRESSURE: 110 MMHG

## 2022-01-01 VITALS
TEMPERATURE: 97.8 F | WEIGHT: 156.2 LBS | DIASTOLIC BLOOD PRESSURE: 58 MMHG | BODY MASS INDEX: 21.79 KG/M2 | OXYGEN SATURATION: 95 % | RESPIRATION RATE: 20 BRPM | HEART RATE: 78 BPM | SYSTOLIC BLOOD PRESSURE: 124 MMHG

## 2022-01-01 VITALS
WEIGHT: 156.4 LBS | BODY MASS INDEX: 21.21 KG/M2 | OXYGEN SATURATION: 92 % | SYSTOLIC BLOOD PRESSURE: 118 MMHG | DIASTOLIC BLOOD PRESSURE: 82 MMHG | HEART RATE: 72 BPM | RESPIRATION RATE: 20 BRPM | TEMPERATURE: 97.5 F

## 2022-01-01 VITALS
DIASTOLIC BLOOD PRESSURE: 84 MMHG | OXYGEN SATURATION: 96 % | RESPIRATION RATE: 20 BRPM | TEMPERATURE: 97.5 F | SYSTOLIC BLOOD PRESSURE: 118 MMHG | HEART RATE: 72 BPM

## 2022-01-01 VITALS
DIASTOLIC BLOOD PRESSURE: 50 MMHG | RESPIRATION RATE: 18 BRPM | HEART RATE: 80 BPM | OXYGEN SATURATION: 91 % | BODY MASS INDEX: 23.04 KG/M2 | WEIGHT: 165.2 LBS | SYSTOLIC BLOOD PRESSURE: 120 MMHG | TEMPERATURE: 97.7 F

## 2022-01-01 VITALS
SYSTOLIC BLOOD PRESSURE: 140 MMHG | RESPIRATION RATE: 16 BRPM | DIASTOLIC BLOOD PRESSURE: 52 MMHG | OXYGEN SATURATION: 100 % | SYSTOLIC BLOOD PRESSURE: 118 MMHG | RESPIRATION RATE: 18 BRPM | HEART RATE: 84 BPM | HEART RATE: 76 BPM | TEMPERATURE: 97.4 F | DIASTOLIC BLOOD PRESSURE: 66 MMHG

## 2022-01-01 VITALS — DIASTOLIC BLOOD PRESSURE: 60 MMHG | SYSTOLIC BLOOD PRESSURE: 120 MMHG | HEART RATE: 76 BPM | RESPIRATION RATE: 20 BRPM

## 2022-01-01 VITALS — SYSTOLIC BLOOD PRESSURE: 120 MMHG | RESPIRATION RATE: 20 BRPM | DIASTOLIC BLOOD PRESSURE: 64 MMHG | HEART RATE: 80 BPM

## 2022-01-01 VITALS
TEMPERATURE: 97.5 F | OXYGEN SATURATION: 95 % | DIASTOLIC BLOOD PRESSURE: 72 MMHG | WEIGHT: 151 LBS | HEART RATE: 80 BPM | RESPIRATION RATE: 16 BRPM | HEIGHT: 71 IN | SYSTOLIC BLOOD PRESSURE: 124 MMHG | BODY MASS INDEX: 21.14 KG/M2

## 2022-01-01 VITALS — SYSTOLIC BLOOD PRESSURE: 128 MMHG | HEART RATE: 10 BPM | RESPIRATION RATE: 16 BRPM | DIASTOLIC BLOOD PRESSURE: 52 MMHG

## 2022-01-01 VITALS
TEMPERATURE: 97.6 F | HEIGHT: 71 IN | HEART RATE: 84 BPM | SYSTOLIC BLOOD PRESSURE: 122 MMHG | RESPIRATION RATE: 16 BRPM | WEIGHT: 170 LBS | BODY MASS INDEX: 23.8 KG/M2 | DIASTOLIC BLOOD PRESSURE: 72 MMHG | OXYGEN SATURATION: 93 %

## 2022-01-01 VITALS — DIASTOLIC BLOOD PRESSURE: 60 MMHG | RESPIRATION RATE: 20 BRPM | SYSTOLIC BLOOD PRESSURE: 128 MMHG | HEART RATE: 76 BPM

## 2022-01-01 DIAGNOSIS — E11.42 TYPE 2 DIABETES MELLITUS WITH DIABETIC POLYNEUROPATHY, WITHOUT LONG-TERM CURRENT USE OF INSULIN (HCC): ICD-10-CM

## 2022-01-01 DIAGNOSIS — N18.32 STAGE 3B CHRONIC KIDNEY DISEASE: Primary | ICD-10-CM

## 2022-01-01 DIAGNOSIS — N18.32 STAGE 3B CHRONIC KIDNEY DISEASE: ICD-10-CM

## 2022-01-01 DIAGNOSIS — Z85.46 HISTORY OF PROSTATE CANCER: ICD-10-CM

## 2022-01-01 DIAGNOSIS — I10 ESSENTIAL HYPERTENSION: ICD-10-CM

## 2022-01-01 DIAGNOSIS — N17.9 AKI (ACUTE KIDNEY INJURY) (HCC): ICD-10-CM

## 2022-01-01 DIAGNOSIS — E87.6 HYPOKALEMIA: ICD-10-CM

## 2022-01-01 DIAGNOSIS — E78.5 DYSLIPIDEMIA: ICD-10-CM

## 2022-01-01 DIAGNOSIS — I50.32 CHRONIC DIASTOLIC CONGESTIVE HEART FAILURE (HCC): ICD-10-CM

## 2022-01-01 DIAGNOSIS — Z86.718 HISTORY OF DVT (DEEP VEIN THROMBOSIS): ICD-10-CM

## 2022-01-01 DIAGNOSIS — M79.89 LEG SWELLING: ICD-10-CM

## 2022-01-01 DIAGNOSIS — N30.01 ACUTE CYSTITIS WITH HEMATURIA: ICD-10-CM

## 2022-01-01 DIAGNOSIS — R60.9 PERIPHERAL EDEMA: ICD-10-CM

## 2022-01-01 DIAGNOSIS — D64.9 ANEMIA, UNSPECIFIED TYPE: ICD-10-CM

## 2022-01-01 DIAGNOSIS — R53.1 WEAKNESS: ICD-10-CM

## 2022-01-01 DIAGNOSIS — N39.0 RECURRENT UTI: ICD-10-CM

## 2022-01-01 DIAGNOSIS — R30.0 DYSURIA: ICD-10-CM

## 2022-01-01 DIAGNOSIS — R62.7 FAILURE TO THRIVE IN ADULT: ICD-10-CM

## 2022-01-01 DIAGNOSIS — I73.9 PAD (PERIPHERAL ARTERY DISEASE) (HCC): ICD-10-CM
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
## 2022-01-01 DIAGNOSIS — N18.31 ACUTE RENAL FAILURE WITH ACUTE TUBULAR NECROSIS SUPERIMPOSED ON STAGE 3A CHRONIC KIDNEY DISEASE (HCC): ICD-10-CM

## 2022-01-01 DIAGNOSIS — Z51.5 HOSPICE CARE PATIENT: ICD-10-CM

## 2022-01-01 DIAGNOSIS — I87.8 VENOUS STASIS: ICD-10-CM

## 2022-01-01 DIAGNOSIS — S32.592A CLOSED FRACTURE OF MULTIPLE RAMI OF LEFT PUBIS, INITIAL ENCOUNTER (HCC): ICD-10-CM

## 2022-01-01 DIAGNOSIS — N17.0 ACUTE RENAL FAILURE WITH ACUTE TUBULAR NECROSIS SUPERIMPOSED ON STAGE 3A CHRONIC KIDNEY DISEASE (HCC): ICD-10-CM

## 2022-01-01 DIAGNOSIS — R60.9 EDEMA, UNSPECIFIED TYPE: ICD-10-CM

## 2022-01-01 DIAGNOSIS — R63.4 WEIGHT LOSS: ICD-10-CM

## 2022-01-01 DIAGNOSIS — I35.1 NONRHEUMATIC AORTIC VALVE INSUFFICIENCY: ICD-10-CM

## 2022-01-01 DIAGNOSIS — F33.42 RECURRENT MAJOR DEPRESSIVE DISORDER, IN FULL REMISSION (HCC): ICD-10-CM

## 2022-01-01 LAB
ALBUMIN SERPL BCP-MCNC: 3.4 G/DL (ref 3.2–4.9)
ALBUMIN SERPL BCP-MCNC: 4 G/DL (ref 3.2–4.9)
ALBUMIN/GLOB SERPL: 0.9 G/DL
ALBUMIN/GLOB SERPL: 1.1 G/DL
ALP SERPL-CCNC: 122 U/L (ref 30–99)
ALP SERPL-CCNC: 128 U/L (ref 30–99)
ALT SERPL-CCNC: 11 U/L (ref 2–50)
ALT SERPL-CCNC: 30 U/L (ref 2–50)
ANION GAP SERPL CALC-SCNC: 11 MMOL/L (ref 7–16)
ANION GAP SERPL CALC-SCNC: 11 MMOL/L (ref 7–16)
ANION GAP SERPL CALC-SCNC: 12 MMOL/L (ref 7–16)
ANION GAP SERPL CALC-SCNC: 15 MMOL/L (ref 7–16)
ANION GAP SERPL CALC-SCNC: 16 MMOL/L (ref 7–16)
ANION GAP SERPL CALC-SCNC: 17 MMOL/L (ref 7–16)
ANION GAP SERPL CALC-SCNC: 18 MMOL/L (ref 7–16)
ANION GAP SERPL CALC-SCNC: 19 MMOL/L (ref 7–16)
APPEARANCE UR: CLEAR
APPEARANCE UR: NORMAL
AST SERPL-CCNC: 18 U/L (ref 12–45)
AST SERPL-CCNC: 39 U/L (ref 12–45)
BACTERIA #/AREA URNS HPF: ABNORMAL /HPF
BACTERIA UR CULT: ABNORMAL
BASOPHILS # BLD AUTO: 0.8 % (ref 0–1.8)
BASOPHILS # BLD: 0.07 K/UL (ref 0–0.12)
BILIRUB SERPL-MCNC: 0.5 MG/DL (ref 0.1–1.5)
BILIRUB SERPL-MCNC: 0.5 MG/DL (ref 0.1–1.5)
BILIRUB UR QL STRIP.AUTO: NEGATIVE
BILIRUB UR STRIP-MCNC: NORMAL MG/DL
BUN SERPL-MCNC: 109 MG/DL (ref 8–22)
BUN SERPL-MCNC: 26 MG/DL (ref 8–22)
BUN SERPL-MCNC: 62 MG/DL (ref 8–22)
BUN SERPL-MCNC: 79 MG/DL (ref 8–22)
BUN SERPL-MCNC: 81 MG/DL (ref 8–22)
BUN SERPL-MCNC: 90 MG/DL (ref 8–22)
BUN SERPL-MCNC: 92 MG/DL (ref 8–22)
BUN SERPL-MCNC: 97 MG/DL (ref 8–22)
CALCIUM SERPL-MCNC: 10 MG/DL (ref 8.4–10.2)
CALCIUM SERPL-MCNC: 9.1 MG/DL (ref 8.5–10.5)
CALCIUM SERPL-MCNC: 9.2 MG/DL (ref 8.4–10.2)
CALCIUM SERPL-MCNC: 9.4 MG/DL (ref 8.5–10.5)
CALCIUM SERPL-MCNC: 9.5 MG/DL (ref 8.5–10.5)
CALCIUM SERPL-MCNC: 9.6 MG/DL (ref 8.4–10.2)
CALCIUM SERPL-MCNC: 9.6 MG/DL (ref 8.5–10.5)
CALCIUM SERPL-MCNC: 9.7 MG/DL (ref 8.4–10.2)
CHLORIDE SERPL-SCNC: 102 MMOL/L (ref 96–112)
CHLORIDE SERPL-SCNC: 102 MMOL/L (ref 96–112)
CHLORIDE SERPL-SCNC: 89 MMOL/L (ref 96–112)
CHLORIDE SERPL-SCNC: 89 MMOL/L (ref 96–112)
CHLORIDE SERPL-SCNC: 92 MMOL/L (ref 96–112)
CHLORIDE SERPL-SCNC: 92 MMOL/L (ref 96–112)
CHLORIDE SERPL-SCNC: 97 MMOL/L (ref 96–112)
CHLORIDE SERPL-SCNC: 98 MMOL/L (ref 96–112)
CO2 SERPL-SCNC: 25 MMOL/L (ref 20–33)
CO2 SERPL-SCNC: 26 MMOL/L (ref 20–33)
CO2 SERPL-SCNC: 27 MMOL/L (ref 20–33)
CO2 SERPL-SCNC: 28 MMOL/L (ref 20–33)
CO2 SERPL-SCNC: 28 MMOL/L (ref 20–33)
CO2 SERPL-SCNC: 30 MMOL/L (ref 20–33)
CO2 SERPL-SCNC: 30 MMOL/L (ref 20–33)
CO2 SERPL-SCNC: 31 MMOL/L (ref 20–33)
COLOR UR AUTO: NORMAL
COLOR UR: YELLOW
CREAT SERPL-MCNC: 1.52 MG/DL (ref 0.5–1.4)
CREAT SERPL-MCNC: 1.54 MG/DL (ref 0.5–1.4)
CREAT SERPL-MCNC: 1.79 MG/DL (ref 0.5–1.4)
CREAT SERPL-MCNC: 1.86 MG/DL (ref 0.5–1.4)
CREAT SERPL-MCNC: 2.34 MG/DL (ref 0.5–1.4)
CREAT SERPL-MCNC: 2.47 MG/DL (ref 0.5–1.4)
CREAT SERPL-MCNC: 2.83 MG/DL (ref 0.5–1.4)
CREAT SERPL-MCNC: 3.24 MG/DL (ref 0.5–1.4)
CREAT UR-MCNC: 31.85 MG/DL
EKG IMPRESSION: NORMAL
EOSINOPHIL # BLD AUTO: 0.22 K/UL (ref 0–0.51)
EOSINOPHIL NFR BLD: 2.5 % (ref 0–6.9)
EPI CELLS #/AREA URNS HPF: ABNORMAL /HPF
ERYTHROCYTE [DISTWIDTH] IN BLOOD BY AUTOMATED COUNT: 49.9 FL (ref 35.9–50)
ERYTHROCYTE [DISTWIDTH] IN BLOOD BY AUTOMATED COUNT: 50.1 FL (ref 35.9–50)
ERYTHROCYTE [DISTWIDTH] IN BLOOD BY AUTOMATED COUNT: 53.2 FL (ref 35.9–50)
FERRITIN SERPL-MCNC: 22.1 NG/ML (ref 22–322)
FLUAV RNA SPEC QL NAA+PROBE: NEGATIVE
FLUBV RNA SPEC QL NAA+PROBE: NEGATIVE
FOLATE SERPL-MCNC: 13.2 NG/ML
GAMMA INTERFERON BACKGROUND BLD IA-ACNC: 0.05 IU/ML
GFR SERPLBLD CREATININE-BSD FMLA CKD-EPI: 18 ML/MIN/1.73 M 2
GFR SERPLBLD CREATININE-BSD FMLA CKD-EPI: 21 ML/MIN/1.73 M 2
GFR SERPLBLD CREATININE-BSD FMLA CKD-EPI: 25 ML/MIN/1.73 M 2
GFR SERPLBLD CREATININE-BSD FMLA CKD-EPI: 27 ML/MIN/1.73 M 2
GFR SERPLBLD CREATININE-BSD FMLA CKD-EPI: 35 ML/MIN/1.73 M 2
GFR SERPLBLD CREATININE-BSD FMLA CKD-EPI: 37 ML/MIN/1.73 M 2
GFR SERPLBLD CREATININE-BSD FMLA CKD-EPI: 44 ML/MIN/1.73 M 2
GFR SERPLBLD CREATININE-BSD FMLA CKD-EPI: 44 ML/MIN/1.73 M 2
GLOBULIN SER CALC-MCNC: 3.6 G/DL (ref 1.9–3.5)
GLOBULIN SER CALC-MCNC: 3.8 G/DL (ref 1.9–3.5)
GLUCOSE BLD STRIP.AUTO-MCNC: 123 MG/DL (ref 65–99)
GLUCOSE BLD STRIP.AUTO-MCNC: 128 MG/DL (ref 65–99)
GLUCOSE BLD STRIP.AUTO-MCNC: 160 MG/DL (ref 65–99)
GLUCOSE BLD STRIP.AUTO-MCNC: 172 MG/DL (ref 65–99)
GLUCOSE BLD STRIP.AUTO-MCNC: 177 MG/DL (ref 65–99)
GLUCOSE BLD STRIP.AUTO-MCNC: 187 MG/DL (ref 65–99)
GLUCOSE BLD STRIP.AUTO-MCNC: 187 MG/DL (ref 65–99)
GLUCOSE BLD STRIP.AUTO-MCNC: 194 MG/DL (ref 65–99)
GLUCOSE BLD STRIP.AUTO-MCNC: 212 MG/DL (ref 65–99)
GLUCOSE BLD STRIP.AUTO-MCNC: 213 MG/DL (ref 65–99)
GLUCOSE BLD STRIP.AUTO-MCNC: 336 MG/DL (ref 65–99)
GLUCOSE SERPL-MCNC: 113 MG/DL (ref 65–99)
GLUCOSE SERPL-MCNC: 120 MG/DL (ref 65–99)
GLUCOSE SERPL-MCNC: 140 MG/DL (ref 65–99)
GLUCOSE SERPL-MCNC: 172 MG/DL (ref 65–99)
GLUCOSE SERPL-MCNC: 239 MG/DL (ref 65–99)
GLUCOSE SERPL-MCNC: 247 MG/DL (ref 65–99)
GLUCOSE SERPL-MCNC: 298 MG/DL (ref 65–99)
GLUCOSE SERPL-MCNC: 377 MG/DL (ref 65–99)
GLUCOSE UR STRIP.AUTO-MCNC: NEGATIVE MG/DL
GLUCOSE UR STRIP.AUTO-MCNC: NORMAL MG/DL
HBA1C MFR BLD: 7 % (ref 0–5.6)
HCT VFR BLD AUTO: 26.7 % (ref 42–52)
HCT VFR BLD AUTO: 28.2 % (ref 42–52)
HCT VFR BLD AUTO: 29.7 % (ref 42–52)
HGB BLD-MCNC: 8.2 G/DL (ref 14–18)
HGB BLD-MCNC: 8.7 G/DL (ref 14–18)
HGB BLD-MCNC: 8.8 G/DL (ref 14–18)
IMM GRANULOCYTES # BLD AUTO: 0.04 K/UL (ref 0–0.11)
IMM GRANULOCYTES NFR BLD AUTO: 0.5 % (ref 0–0.9)
INT CON NEG: NEGATIVE
INT CON POS: POSITIVE
IRON SATN MFR SERPL: 9 % (ref 15–55)
IRON SERPL-MCNC: 33 UG/DL (ref 50–180)
KETONES UR STRIP.AUTO-MCNC: NEGATIVE MG/DL
KETONES UR STRIP.AUTO-MCNC: NORMAL MG/DL
LEUKOCYTE ESTERASE UR QL STRIP.AUTO: ABNORMAL
LEUKOCYTE ESTERASE UR QL STRIP.AUTO: NORMAL
LV EJECT FRACT  99904: 60
LV EJECT FRACT MOD 2C 99903: 61.39
LV EJECT FRACT MOD 4C 99902: 46.36
LV EJECT FRACT MOD BP 99901: 54.92
LYMPHOCYTES # BLD AUTO: 1.38 K/UL (ref 1–4.8)
LYMPHOCYTES NFR BLD: 15.9 % (ref 22–41)
M TB IFN-G BLD-IMP: NEGATIVE
M TB IFN-G CD4+ BCKGRND COR BLD-ACNC: -0.02 IU/ML
MAGNESIUM SERPL-MCNC: 2.3 MG/DL (ref 1.5–2.5)
MAGNESIUM SERPL-MCNC: 2.3 MG/DL (ref 1.5–2.5)
MCH RBC QN AUTO: 23.8 PG (ref 27–33)
MCH RBC QN AUTO: 23.9 PG (ref 27–33)
MCH RBC QN AUTO: 24.1 PG (ref 27–33)
MCHC RBC AUTO-ENTMCNC: 29.3 G/DL (ref 33.7–35.3)
MCHC RBC AUTO-ENTMCNC: 30.7 G/DL (ref 33.7–35.3)
MCHC RBC AUTO-ENTMCNC: 31.2 G/DL (ref 33.7–35.3)
MCV RBC AUTO: 76.4 FL (ref 81.4–97.8)
MCV RBC AUTO: 78.5 FL (ref 81.4–97.8)
MCV RBC AUTO: 81.6 FL (ref 81.4–97.8)
MICRO URNS: ABNORMAL
MITOGEN IGNF BCKGRD COR BLD-ACNC: >10 IU/ML
MONOCYTES # BLD AUTO: 1.03 K/UL (ref 0–0.85)
MONOCYTES NFR BLD AUTO: 11.9 % (ref 0–13.4)
NEUTROPHILS # BLD AUTO: 5.92 K/UL (ref 1.82–7.42)
NEUTROPHILS NFR BLD: 68.4 % (ref 44–72)
NITRITE UR QL STRIP.AUTO: NEGATIVE
NITRITE UR QL STRIP.AUTO: NORMAL
NRBC # BLD AUTO: 0 K/UL
NRBC BLD-RTO: 0 /100 WBC
PH UR STRIP.AUTO: 5 [PH] (ref 5–8)
PH UR STRIP.AUTO: 5.5 [PH] (ref 5–8)
PLATELET # BLD AUTO: 226 K/UL (ref 164–446)
PLATELET # BLD AUTO: 256 K/UL (ref 164–446)
PLATELET # BLD AUTO: 291 K/UL (ref 164–446)
PMV BLD AUTO: 8.8 FL (ref 9–12.9)
PMV BLD AUTO: 9.5 FL (ref 9–12.9)
PMV BLD AUTO: 9.7 FL (ref 9–12.9)
POTASSIUM SERPL-SCNC: 2.5 MMOL/L (ref 3.6–5.5)
POTASSIUM SERPL-SCNC: 2.7 MMOL/L (ref 3.6–5.5)
POTASSIUM SERPL-SCNC: 2.8 MMOL/L (ref 3.6–5.5)
POTASSIUM SERPL-SCNC: 3 MMOL/L (ref 3.6–5.5)
POTASSIUM SERPL-SCNC: 3.2 MMOL/L (ref 3.6–5.5)
POTASSIUM SERPL-SCNC: 3.2 MMOL/L (ref 3.6–5.5)
POTASSIUM SERPL-SCNC: 3.3 MMOL/L (ref 3.6–5.5)
POTASSIUM SERPL-SCNC: 3.4 MMOL/L (ref 3.6–5.5)
PROT SERPL-MCNC: 7.2 G/DL (ref 6–8.2)
PROT SERPL-MCNC: 7.6 G/DL (ref 6–8.2)
PROT UR QL STRIP: NEGATIVE MG/DL
PROT UR QL STRIP: NORMAL MG/DL
QFT TB2 - NIL TBQ2: -0.01 IU/ML
RBC # BLD AUTO: 3.4 M/UL (ref 4.7–6.1)
RBC # BLD AUTO: 3.64 M/UL (ref 4.7–6.1)
RBC # BLD AUTO: 3.69 M/UL (ref 4.7–6.1)
RBC # URNS HPF: ABNORMAL /HPF
RBC UR QL AUTO: ABNORMAL
RBC UR QL AUTO: NORMAL
SARS-COV-2 RNA RESP QL NAA+PROBE: NOTDETECTED
SIGNIFICANT IND 70042: ABNORMAL
SIGNIFICANT IND 70042: ABNORMAL
SITE SITE: ABNORMAL
SITE SITE: ABNORMAL
SODIUM SERPL-SCNC: 134 MMOL/L (ref 135–145)
SODIUM SERPL-SCNC: 135 MMOL/L (ref 135–145)
SODIUM SERPL-SCNC: 135 MMOL/L (ref 135–145)
SODIUM SERPL-SCNC: 136 MMOL/L (ref 135–145)
SODIUM SERPL-SCNC: 140 MMOL/L (ref 135–145)
SODIUM SERPL-SCNC: 141 MMOL/L (ref 135–145)
SODIUM SERPL-SCNC: 141 MMOL/L (ref 135–145)
SODIUM SERPL-SCNC: 143 MMOL/L (ref 135–145)
SODIUM UR-SCNC: 81 MMOL/L
SOURCE SOURCE: ABNORMAL
SOURCE SOURCE: ABNORMAL
SP GR UR STRIP.AUTO: 1.01
SP GR UR STRIP.AUTO: 1.01
SPECIMEN SOURCE: NORMAL
TIBC SERPL-MCNC: 377 UG/DL (ref 250–450)
TSH SERPL DL<=0.005 MIU/L-ACNC: 3.47 UIU/ML (ref 0.38–5.33)
UIBC SERPL-MCNC: 344 UG/DL (ref 110–370)
UROBILINOGEN UR STRIP-MCNC: 0.2 MG/DL
VIT B12 SERPL-MCNC: 1789 PG/ML (ref 211–911)
WBC # BLD AUTO: 7.4 K/UL (ref 4.8–10.8)
WBC # BLD AUTO: 8.6 K/UL (ref 4.8–10.8)
WBC # BLD AUTO: 8.7 K/UL (ref 4.8–10.8)
WBC #/AREA URNS HPF: ABNORMAL /HPF

## 2022-01-01 PROCEDURE — 83550 IRON BINDING TEST: CPT

## 2022-01-01 PROCEDURE — 700102 HCHG RX REV CODE 250 W/ 637 OVERRIDE(OP): Performed by: INTERNAL MEDICINE

## 2022-01-01 PROCEDURE — 87077 CULTURE AEROBIC IDENTIFY: CPT

## 2022-01-01 PROCEDURE — 82962 GLUCOSE BLOOD TEST: CPT | Mod: 91

## 2022-01-01 PROCEDURE — G0299 HHS/HOSPICE OF RN EA 15 MIN: HCPCS

## 2022-01-01 PROCEDURE — S9126 HOSPICE CARE, IN THE HOME, P: HCPCS

## 2022-01-01 PROCEDURE — A9270 NON-COVERED ITEM OR SERVICE: HCPCS | Performed by: EMERGENCY MEDICINE

## 2022-01-01 PROCEDURE — G0156 HHCP-SVS OF AIDE,EA 15 MIN: HCPCS

## 2022-01-01 PROCEDURE — 93005 ELECTROCARDIOGRAM TRACING: CPT | Performed by: STUDENT IN AN ORGANIZED HEALTH CARE EDUCATION/TRAINING PROGRAM

## 2022-01-01 PROCEDURE — A9270 NON-COVERED ITEM OR SERVICE: HCPCS | Performed by: INTERNAL MEDICINE

## 2022-01-01 PROCEDURE — T2045 HOSPICE GENERAL CARE: HCPCS

## 2022-01-01 PROCEDURE — G0152 HHCP-SERV OF OT,EA 15 MIN: HCPCS

## 2022-01-01 PROCEDURE — RXMED WILLOW AMBULATORY MEDICATION CHARGE: Performed by: REHABILITATION PRACTITIONER

## 2022-01-01 PROCEDURE — 99285 EMERGENCY DEPT VISIT HI MDM: CPT

## 2022-01-01 PROCEDURE — 700102 HCHG RX REV CODE 250 W/ 637 OVERRIDE(OP): Performed by: EMERGENCY MEDICINE

## 2022-01-01 PROCEDURE — A9270 NON-COVERED ITEM OR SERVICE: HCPCS | Performed by: STUDENT IN AN ORGANIZED HEALTH CARE EDUCATION/TRAINING PROGRAM

## 2022-01-01 PROCEDURE — 770001 HCHG ROOM/CARE - MED/SURG/GYN PRIV*

## 2022-01-01 PROCEDURE — 73552 X-RAY EXAM OF FEMUR 2/>: CPT | Mod: LT

## 2022-01-01 PROCEDURE — 81002 URINALYSIS NONAUTO W/O SCOPE: CPT | Performed by: FAMILY MEDICINE

## 2022-01-01 PROCEDURE — G0155 HHCP-SVS OF CSW,EA 15 MIN: HCPCS

## 2022-01-01 PROCEDURE — 83735 ASSAY OF MAGNESIUM: CPT

## 2022-01-01 PROCEDURE — 70450 CT HEAD/BRAIN W/O DYE: CPT | Mod: MG

## 2022-01-01 PROCEDURE — 36415 COLL VENOUS BLD VENIPUNCTURE: CPT

## 2022-01-01 PROCEDURE — 93306 TTE W/DOPPLER COMPLETE: CPT | Mod: 26 | Performed by: INTERNAL MEDICINE

## 2022-01-01 PROCEDURE — G0180 MD CERTIFICATION HHA PATIENT: HCPCS | Performed by: FAMILY MEDICINE

## 2022-01-01 PROCEDURE — 700111 HCHG RX REV CODE 636 W/ 250 OVERRIDE (IP): Performed by: INTERNAL MEDICINE

## 2022-01-01 PROCEDURE — 99233 SBSQ HOSP IP/OBS HIGH 50: CPT | Performed by: INTERNAL MEDICINE

## 2022-01-01 PROCEDURE — 72170 X-RAY EXAM OF PELVIS: CPT

## 2022-01-01 PROCEDURE — 86480 TB TEST CELL IMMUN MEASURE: CPT

## 2022-01-01 PROCEDURE — 96375 TX/PRO/DX INJ NEW DRUG ADDON: CPT

## 2022-01-01 PROCEDURE — 87186 SC STD MICRODIL/AGAR DIL: CPT

## 2022-01-01 PROCEDURE — 99223 1ST HOSP IP/OBS HIGH 75: CPT | Mod: AI | Performed by: INTERNAL MEDICINE

## 2022-01-01 PROCEDURE — G0151 HHCP-SERV OF PT,EA 15 MIN: HCPCS

## 2022-01-01 PROCEDURE — 51798 US URINE CAPACITY MEASURE: CPT

## 2022-01-01 PROCEDURE — 81001 URINALYSIS AUTO W/SCOPE: CPT

## 2022-01-01 PROCEDURE — 665001 SOC-HOME HEALTH

## 2022-01-01 PROCEDURE — 80048 BASIC METABOLIC PNL TOTAL CA: CPT

## 2022-01-01 PROCEDURE — 96372 THER/PROPH/DIAG INJ SC/IM: CPT

## 2022-01-01 PROCEDURE — 99214 OFFICE O/P EST MOD 30 MIN: CPT | Performed by: INTERNAL MEDICINE

## 2022-01-01 PROCEDURE — 71045 X-RAY EXAM CHEST 1 VIEW: CPT

## 2022-01-01 PROCEDURE — 700111 HCHG RX REV CODE 636 W/ 250 OVERRIDE (IP): Performed by: EMERGENCY MEDICINE

## 2022-01-01 PROCEDURE — 80053 COMPREHEN METABOLIC PANEL: CPT

## 2022-01-01 PROCEDURE — 84443 ASSAY THYROID STIM HORMONE: CPT

## 2022-01-01 PROCEDURE — 96366 THER/PROPH/DIAG IV INF ADDON: CPT

## 2022-01-01 PROCEDURE — G0495 RN CARE TRAIN/EDU IN HH: HCPCS

## 2022-01-01 PROCEDURE — 93306 TTE W/DOPPLER COMPLETE: CPT

## 2022-01-01 PROCEDURE — 700102 HCHG RX REV CODE 250 W/ 637 OVERRIDE(OP): Performed by: STUDENT IN AN ORGANIZED HEALTH CARE EDUCATION/TRAINING PROGRAM

## 2022-01-01 PROCEDURE — C9803 HOPD COVID-19 SPEC COLLECT: HCPCS | Performed by: STUDENT IN AN ORGANIZED HEALTH CARE EDUCATION/TRAINING PROGRAM

## 2022-01-01 PROCEDURE — 700111 HCHG RX REV CODE 636 W/ 250 OVERRIDE (IP): Performed by: NURSE PRACTITIONER

## 2022-01-01 PROCEDURE — 96374 THER/PROPH/DIAG INJ IV PUSH: CPT

## 2022-01-01 PROCEDURE — 99214 OFFICE O/P EST MOD 30 MIN: CPT | Performed by: FAMILY MEDICINE

## 2022-01-01 PROCEDURE — 700105 HCHG RX REV CODE 258: Performed by: INTERNAL MEDICINE

## 2022-01-01 PROCEDURE — G0493 RN CARE EA 15 MIN HH/HOSPICE: HCPCS

## 2022-01-01 PROCEDURE — 96365 THER/PROPH/DIAG IV INF INIT: CPT

## 2022-01-01 PROCEDURE — 82570 ASSAY OF URINE CREATININE: CPT

## 2022-01-01 PROCEDURE — 84300 ASSAY OF URINE SODIUM: CPT

## 2022-01-01 PROCEDURE — 0240U HCHG SARS-COV-2 COVID-19 NFCT DS RESP RNA 3 TRGT MIC: CPT

## 2022-01-01 PROCEDURE — 85027 COMPLETE CBC AUTOMATED: CPT

## 2022-01-01 PROCEDURE — 700111 HCHG RX REV CODE 636 W/ 250 OVERRIDE (IP): Performed by: STUDENT IN AN ORGANIZED HEALTH CARE EDUCATION/TRAINING PROGRAM

## 2022-01-01 PROCEDURE — 82746 ASSAY OF FOLIC ACID SERUM: CPT

## 2022-01-01 PROCEDURE — 82607 VITAMIN B-12: CPT

## 2022-01-01 PROCEDURE — 770020 HCHG ROOM/CARE - TELE (206)

## 2022-01-01 PROCEDURE — 87086 URINE CULTURE/COLONY COUNT: CPT

## 2022-01-01 PROCEDURE — 85025 COMPLETE CBC W/AUTO DIFF WBC: CPT

## 2022-01-01 PROCEDURE — 83036 HEMOGLOBIN GLYCOSYLATED A1C: CPT | Performed by: FAMILY MEDICINE

## 2022-01-01 PROCEDURE — 99239 HOSP IP/OBS DSCHRG MGMT >30: CPT | Performed by: INTERNAL MEDICINE

## 2022-01-01 PROCEDURE — 82728 ASSAY OF FERRITIN: CPT

## 2022-01-01 PROCEDURE — 83540 ASSAY OF IRON: CPT

## 2022-01-01 PROCEDURE — 665036 HSPC NOTICE OF ELECTION NOE

## 2022-01-01 RX ORDER — ONDANSETRON 4 MG/1
4 TABLET, ORALLY DISINTEGRATING ORAL EVERY 6 HOURS PRN
Status: DISCONTINUED | OUTPATIENT
Start: 2022-01-01 | End: 2022-01-01 | Stop reason: HOSPADM

## 2022-01-01 RX ORDER — POTASSIUM CHLORIDE 20 MEQ/1
40 TABLET, EXTENDED RELEASE ORAL ONCE
Status: COMPLETED | OUTPATIENT
Start: 2022-01-01 | End: 2022-01-01

## 2022-01-01 RX ORDER — SULFAMETHOXAZOLE AND TRIMETHOPRIM 400; 80 MG/1; MG/1
1 TABLET ORAL 2 TIMES DAILY
Qty: 14 TABLET | Refills: 0 | Status: SHIPPED | OUTPATIENT
Start: 2022-01-01 | End: 2022-01-01

## 2022-01-01 RX ORDER — POTASSIUM CHLORIDE 750 MG/1
TABLET, EXTENDED RELEASE ORAL
Qty: 60 TABLET | Refills: 3 | Status: SHIPPED | OUTPATIENT
Start: 2022-01-01 | End: 2022-01-01 | Stop reason: SDUPTHER

## 2022-01-01 RX ORDER — LORAZEPAM 2 MG/ML
2 INJECTION INTRAMUSCULAR
Status: DISCONTINUED | OUTPATIENT
Start: 2022-01-01 | End: 2022-01-01 | Stop reason: HOSPADM

## 2022-01-01 RX ORDER — AMOXICILLIN 250 MG
1 CAPSULE ORAL
Status: DISCONTINUED | OUTPATIENT
Start: 2022-01-01 | End: 2022-01-01 | Stop reason: HOSPADM

## 2022-01-01 RX ORDER — POLYETHYLENE GLYCOL 3350 17 G/17G
1 POWDER, FOR SOLUTION ORAL
Status: DISCONTINUED | OUTPATIENT
Start: 2022-01-01 | End: 2022-01-01 | Stop reason: HOSPADM

## 2022-01-01 RX ORDER — POTASSIUM CHLORIDE 750 MG/1
10 TABLET, EXTENDED RELEASE ORAL 2 TIMES DAILY
Qty: 60 TABLET | Refills: 3 | Status: SHIPPED | OUTPATIENT
Start: 2022-01-01

## 2022-01-01 RX ORDER — HALOPERIDOL 2 MG/ML
1 SOLUTION ORAL EVERY 4 HOURS PRN
Status: DISCONTINUED | OUTPATIENT
Start: 2022-01-01 | End: 2022-01-01

## 2022-01-01 RX ORDER — SODIUM CHLORIDE 9 MG/ML
500 INJECTION, SOLUTION INTRAVENOUS ONCE
Status: COMPLETED | OUTPATIENT
Start: 2022-01-01 | End: 2022-01-01

## 2022-01-01 RX ORDER — AMOXICILLIN 250 MG
1 CAPSULE ORAL NIGHTLY
Status: DISCONTINUED | OUTPATIENT
Start: 2022-01-01 | End: 2022-01-01 | Stop reason: HOSPADM

## 2022-01-01 RX ORDER — ATROPINE SULFATE 10 MG/ML
2 SOLUTION/ DROPS OPHTHALMIC
Status: DISCONTINUED | OUTPATIENT
Start: 2022-01-01 | End: 2022-01-01 | Stop reason: HOSPADM

## 2022-01-01 RX ORDER — CIPROFLOXACIN 500 MG/1
TABLET, FILM COATED ORAL
COMMUNITY
Start: 2022-01-01 | End: 2022-01-01

## 2022-01-01 RX ORDER — TRAZODONE HYDROCHLORIDE 50 MG/1
25 TABLET ORAL NIGHTLY
COMMUNITY
End: 2022-01-01

## 2022-01-01 RX ORDER — FERROUS GLUCONATE 324(38)MG
324 TABLET ORAL
Status: DISCONTINUED | OUTPATIENT
Start: 2022-01-01 | End: 2022-01-01

## 2022-01-01 RX ORDER — ATORVASTATIN CALCIUM 40 MG/1
40 TABLET, FILM COATED ORAL EVERY EVENING
Qty: 100 TABLET | Refills: 3 | Status: SHIPPED | OUTPATIENT
Start: 2022-01-01

## 2022-01-01 RX ORDER — CHOLESTYRAMINE 4 G/9G
8 POWDER, FOR SUSPENSION ORAL 2 TIMES DAILY
Qty: 378 G | Refills: 6 | Status: SHIPPED | OUTPATIENT
Start: 2022-01-01 | End: 2022-01-01

## 2022-01-01 RX ORDER — AMOXICILLIN 250 MG
2 CAPSULE ORAL
Qty: 15 TABLET | Refills: 6 | OUTPATIENT
Start: 2022-01-01

## 2022-01-01 RX ORDER — LORAZEPAM 2 MG/ML
2 CONCENTRATE ORAL
Status: DISCONTINUED | OUTPATIENT
Start: 2022-01-01 | End: 2022-01-01 | Stop reason: HOSPADM

## 2022-01-01 RX ORDER — ASPIRIN 81 MG/1
81 TABLET, CHEWABLE ORAL DAILY
Status: DISCONTINUED | OUTPATIENT
Start: 2022-01-01 | End: 2022-01-01 | Stop reason: HOSPADM

## 2022-01-01 RX ORDER — ONDANSETRON 4 MG/1
4 TABLET, ORALLY DISINTEGRATING ORAL EVERY 4 HOURS PRN
Status: DISCONTINUED | OUTPATIENT
Start: 2022-01-01 | End: 2022-01-01

## 2022-01-01 RX ORDER — BISACODYL 10 MG
10 SUPPOSITORY, RECTAL RECTAL
Status: DISCONTINUED | OUTPATIENT
Start: 2022-01-01 | End: 2022-01-01 | Stop reason: HOSPADM

## 2022-01-01 RX ORDER — POTASSIUM CHLORIDE 750 MG/1
10 TABLET, FILM COATED, EXTENDED RELEASE ORAL 2 TIMES DAILY
Qty: 60 TABLET | Refills: 3 | Status: ON HOLD | OUTPATIENT
Start: 2022-01-01 | End: 2022-01-01 | Stop reason: SDUPTHER

## 2022-01-01 RX ORDER — INSULIN GLARGINE 100 [IU]/ML
15 INJECTION, SOLUTION SUBCUTANEOUS EVERY EVENING
Qty: 10 ML | COMMUNITY
Start: 2022-01-01 | End: 2022-01-01

## 2022-01-01 RX ORDER — LACTULOSE 20 G/30ML
30 SOLUTION ORAL
Status: DISCONTINUED | OUTPATIENT
Start: 2022-01-01 | End: 2022-01-01 | Stop reason: HOSPADM

## 2022-01-01 RX ORDER — PANTOPRAZOLE SODIUM 20 MG/1
20 TABLET, DELAYED RELEASE ORAL DAILY
Status: DISCONTINUED | OUTPATIENT
Start: 2022-01-01 | End: 2022-01-01

## 2022-01-01 RX ORDER — HALOPERIDOL 5 MG/ML
2 INJECTION INTRAMUSCULAR EVERY 4 HOURS PRN
Status: DISCONTINUED | OUTPATIENT
Start: 2022-01-01 | End: 2022-01-01 | Stop reason: HOSPADM

## 2022-01-01 RX ORDER — FUROSEMIDE 80 MG
80 TABLET ORAL 2 TIMES DAILY
Qty: 180 TABLET | Refills: 3 | Status: SHIPPED | OUTPATIENT
Start: 2022-01-01 | End: 2022-01-01

## 2022-01-01 RX ORDER — CARVEDILOL 6.25 MG/1
TABLET ORAL
Qty: 200 TABLET | Refills: 2 | Status: SHIPPED | OUTPATIENT
Start: 2022-01-01

## 2022-01-01 RX ORDER — ONDANSETRON 2 MG/ML
4 INJECTION INTRAMUSCULAR; INTRAVENOUS EVERY 6 HOURS PRN
Status: DISCONTINUED | OUTPATIENT
Start: 2022-01-01 | End: 2022-01-01 | Stop reason: HOSPADM

## 2022-01-01 RX ORDER — POLYVINYL ALCOHOL 14 MG/ML
2 SOLUTION/ DROPS OPHTHALMIC PRN
Status: DISCONTINUED | OUTPATIENT
Start: 2022-01-01 | End: 2022-01-01 | Stop reason: HOSPADM

## 2022-01-01 RX ORDER — INSULIN GLARGINE 100 [IU]/ML
10 INJECTION, SOLUTION SUBCUTANEOUS EVERY EVENING
COMMUNITY
End: 2022-01-01

## 2022-01-01 RX ORDER — ONDANSETRON 2 MG/ML
4 INJECTION INTRAMUSCULAR; INTRAVENOUS EVERY 4 HOURS PRN
Status: DISCONTINUED | OUTPATIENT
Start: 2022-01-01 | End: 2022-01-01

## 2022-01-01 RX ORDER — METOLAZONE 5 MG/1
5 TABLET ORAL DAILY
Qty: 90 TABLET | Refills: 3 | Status: SHIPPED | OUTPATIENT
Start: 2022-01-01

## 2022-01-01 RX ORDER — HALOPERIDOL 2 MG/ML
2 SOLUTION ORAL EVERY 4 HOURS PRN
Status: DISCONTINUED | OUTPATIENT
Start: 2022-01-01 | End: 2022-01-01 | Stop reason: HOSPADM

## 2022-01-01 RX ORDER — HYDRALAZINE HYDROCHLORIDE 20 MG/ML
10 INJECTION INTRAMUSCULAR; INTRAVENOUS EVERY 4 HOURS PRN
Status: DISCONTINUED | OUTPATIENT
Start: 2022-01-01 | End: 2022-01-01 | Stop reason: HOSPADM

## 2022-01-01 RX ORDER — POTASSIUM CHLORIDE 7.45 MG/ML
10 INJECTION INTRAVENOUS
Status: DISPENSED | OUTPATIENT
Start: 2022-01-01 | End: 2022-01-01

## 2022-01-01 RX ORDER — CARVEDILOL 6.25 MG/1
6.25 TABLET ORAL 2 TIMES DAILY WITH MEALS
Status: DISCONTINUED | OUTPATIENT
Start: 2022-01-01 | End: 2022-01-01 | Stop reason: HOSPADM

## 2022-01-01 RX ORDER — MORPHINE SULFATE 100 MG/5ML
5 SOLUTION ORAL EVERY 4 HOURS
Status: DISCONTINUED | OUTPATIENT
Start: 2022-01-01 | End: 2022-01-01

## 2022-01-01 RX ORDER — HALOPERIDOL 5 MG/ML
1 INJECTION INTRAMUSCULAR EVERY 4 HOURS PRN
Status: DISCONTINUED | OUTPATIENT
Start: 2022-01-01 | End: 2022-01-01

## 2022-01-01 RX ORDER — FUROSEMIDE 40 MG/1
TABLET ORAL
Qty: 180 TABLET | Refills: 3 | Status: SHIPPED | OUTPATIENT
Start: 2022-01-01 | End: 2022-01-01

## 2022-01-01 RX ORDER — METHENAMINE HIPPURATE 1000 MG/1
1 TABLET ORAL 2 TIMES DAILY
COMMUNITY
End: 2022-01-01

## 2022-01-01 RX ORDER — ACETAMINOPHEN 500 MG
1000 TABLET ORAL ONCE
Status: COMPLETED | OUTPATIENT
Start: 2022-01-01 | End: 2022-01-01

## 2022-01-01 RX ORDER — ACETAMINOPHEN 500 MG
500 TABLET ORAL EVERY 6 HOURS PRN
Qty: 28 TABLET | Refills: 6 | OUTPATIENT
Start: 2022-01-01

## 2022-01-01 RX ORDER — MORPHINE SULFATE 4 MG/ML
2 INJECTION INTRAVENOUS ONCE
Status: COMPLETED | OUTPATIENT
Start: 2022-01-01 | End: 2022-01-01

## 2022-01-01 RX ORDER — AMOXICILLIN 250 MG
2 CAPSULE ORAL 2 TIMES DAILY
Status: DISCONTINUED | OUTPATIENT
Start: 2022-01-01 | End: 2022-01-01 | Stop reason: HOSPADM

## 2022-01-01 RX ORDER — ATORVASTATIN CALCIUM 40 MG/1
40 TABLET, FILM COATED ORAL EVERY EVENING
Status: DISCONTINUED | OUTPATIENT
Start: 2022-01-01 | End: 2022-01-01 | Stop reason: HOSPADM

## 2022-01-01 RX ORDER — FERROUS GLUCONATE 324(38)MG
324 TABLET ORAL
Qty: 90 TABLET | Refills: 1 | Status: SHIPPED | OUTPATIENT
Start: 2022-01-01

## 2022-01-01 RX ORDER — LORAZEPAM 2 MG/ML
1 CONCENTRATE ORAL EVERY 4 HOURS PRN
Status: DISCONTINUED | OUTPATIENT
Start: 2022-01-01 | End: 2022-01-01

## 2022-01-01 RX ORDER — FLUOXETINE HYDROCHLORIDE 20 MG/1
20 CAPSULE ORAL DAILY
Status: DISCONTINUED | OUTPATIENT
Start: 2022-01-01 | End: 2022-01-01 | Stop reason: HOSPADM

## 2022-01-01 RX ORDER — ENEMA 19; 7 G/133ML; G/133ML
1 ENEMA RECTAL
Status: DISCONTINUED | OUTPATIENT
Start: 2022-01-01 | End: 2022-01-01 | Stop reason: HOSPADM

## 2022-01-01 RX ORDER — HEPARIN SODIUM 5000 [USP'U]/ML
5000 INJECTION, SOLUTION INTRAVENOUS; SUBCUTANEOUS EVERY 8 HOURS
Status: DISCONTINUED | OUTPATIENT
Start: 2022-01-01 | End: 2022-01-01 | Stop reason: HOSPADM

## 2022-01-01 RX ORDER — POTASSIUM CHLORIDE 20 MEQ/1
40 TABLET, EXTENDED RELEASE ORAL ONCE
Status: DISCONTINUED | OUTPATIENT
Start: 2022-01-01 | End: 2022-01-01

## 2022-01-01 RX ORDER — MORPHINE SULFATE 100 MG/5ML
5 SOLUTION ORAL
Qty: 120 ML | Refills: 0 | Status: SHIPPED | OUTPATIENT
Start: 2022-01-01 | End: 2023-06-21

## 2022-01-01 RX ORDER — CHOLESTYRAMINE 4 G/9G
8 POWDER, FOR SUSPENSION ORAL 2 TIMES DAILY
Qty: 378 G | Refills: 6 | Status: SHIPPED | OUTPATIENT
Start: 2022-01-01 | End: 2022-01-01 | Stop reason: SDUPTHER

## 2022-01-01 RX ORDER — ACETAMINOPHEN 325 MG/1
650 TABLET ORAL EVERY 6 HOURS PRN
Status: DISCONTINUED | OUTPATIENT
Start: 2022-01-01 | End: 2022-01-01 | Stop reason: HOSPADM

## 2022-01-01 RX ORDER — MAGNESIUM SULFATE HEPTAHYDRATE 40 MG/ML
2 INJECTION, SOLUTION INTRAVENOUS ONCE
Status: DISCONTINUED | OUTPATIENT
Start: 2022-01-01 | End: 2022-01-01

## 2022-01-01 RX ORDER — TRAZODONE HYDROCHLORIDE 50 MG/1
25 TABLET ORAL NIGHTLY
Status: DISCONTINUED | OUTPATIENT
Start: 2022-01-01 | End: 2022-01-01

## 2022-01-01 RX ORDER — LOPERAMIDE HYDROCHLORIDE 2 MG/1
4 CAPSULE ORAL
Qty: 56 CAPSULE | Refills: 3 | OUTPATIENT
Start: 2022-01-01

## 2022-01-01 RX ORDER — ALOGLIPTIN 12.5 MG/1
12.5 TABLET, FILM COATED ORAL DAILY
COMMUNITY

## 2022-01-01 RX ORDER — FUROSEMIDE 80 MG
40 TABLET ORAL 2 TIMES DAILY
COMMUNITY
Start: 2022-01-01 | End: 2022-01-01

## 2022-01-01 RX ORDER — INSULIN GLARGINE 100 [IU]/ML
15 INJECTION, SOLUTION SUBCUTANEOUS EVERY EVENING
COMMUNITY

## 2022-01-01 RX ORDER — LORAZEPAM 2 MG/ML
0.5 CONCENTRATE ORAL EVERY 4 HOURS PRN
Qty: 360 ML | Refills: 0 | Status: SHIPPED | OUTPATIENT
Start: 2022-01-01 | End: 2023-06-21

## 2022-01-01 RX ORDER — ONDANSETRON 2 MG/ML
4 INJECTION INTRAMUSCULAR; INTRAVENOUS ONCE
Status: COMPLETED | OUTPATIENT
Start: 2022-01-01 | End: 2022-01-01

## 2022-01-01 RX ORDER — OMEPRAZOLE 20 MG/1
20 CAPSULE, DELAYED RELEASE ORAL DAILY
Status: DISCONTINUED | OUTPATIENT
Start: 2022-01-01 | End: 2022-01-01 | Stop reason: HOSPADM

## 2022-01-01 RX ORDER — LORAZEPAM 2 MG/ML
1 INJECTION INTRAMUSCULAR EVERY 4 HOURS PRN
Status: DISCONTINUED | OUTPATIENT
Start: 2022-01-01 | End: 2022-01-01

## 2022-01-01 RX ORDER — POTASSIUM CHLORIDE 750 MG/1
20 TABLET, FILM COATED, EXTENDED RELEASE ORAL DAILY
Qty: 60 TABLET | Refills: 0 | Status: SHIPPED | OUTPATIENT
Start: 2022-01-01 | End: 2022-01-01

## 2022-01-01 RX ADMIN — INSULIN HUMAN 3 UNITS: 100 INJECTION, SOLUTION PARENTERAL at 10:49

## 2022-01-01 RX ADMIN — POTASSIUM CHLORIDE 10 MEQ: 7.46 INJECTION, SOLUTION INTRAVENOUS at 02:15

## 2022-01-01 RX ADMIN — ACETAMINOPHEN 1000 MG: 500 TABLET ORAL at 08:53

## 2022-01-01 RX ADMIN — FLUOXETINE 20 MG: 20 CAPSULE ORAL at 06:13

## 2022-01-01 RX ADMIN — SODIUM CHLORIDE 500 ML: 9 INJECTION, SOLUTION INTRAVENOUS at 14:14

## 2022-01-01 RX ADMIN — OMEPRAZOLE 20 MG: 20 CAPSULE, DELAYED RELEASE ORAL at 06:13

## 2022-01-01 RX ADMIN — ATORVASTATIN CALCIUM 40 MG: 40 TABLET, FILM COATED ORAL at 18:11

## 2022-01-01 RX ADMIN — SENNOSIDES AND DOCUSATE SODIUM 2 TABLET: 50; 8.6 TABLET ORAL at 06:12

## 2022-01-01 RX ADMIN — INSULIN GLARGINE-YFGN 10 UNITS: 100 INJECTION, SOLUTION SUBCUTANEOUS at 02:47

## 2022-01-01 RX ADMIN — MORPHINE SULFATE 2 MG: 4 INJECTION INTRAVENOUS at 12:15

## 2022-01-01 RX ADMIN — INSULIN HUMAN 3 UNITS: 100 INJECTION, SOLUTION PARENTERAL at 18:11

## 2022-01-01 RX ADMIN — FLUOXETINE 20 MG: 20 CAPSULE ORAL at 05:59

## 2022-01-01 RX ADMIN — HEPARIN SODIUM 5000 UNITS: 5000 INJECTION INTRAVENOUS; SUBCUTANEOUS at 22:33

## 2022-01-01 RX ADMIN — Medication 50 MG: at 18:52

## 2022-01-01 RX ADMIN — OMEPRAZOLE 20 MG: 20 CAPSULE, DELAYED RELEASE ORAL at 05:48

## 2022-01-01 RX ADMIN — SENNOSIDES AND DOCUSATE SODIUM 2 TABLET: 50; 8.6 TABLET ORAL at 05:47

## 2022-01-01 RX ADMIN — INSULIN GLARGINE-YFGN 10 UNITS: 100 INJECTION, SOLUTION SUBCUTANEOUS at 18:01

## 2022-01-01 RX ADMIN — ASPIRIN 81 MG CHEWABLE TABLET 81 MG: 81 TABLET CHEWABLE at 06:12

## 2022-01-01 RX ADMIN — HEPARIN SODIUM 5000 UNITS: 5000 INJECTION INTRAVENOUS; SUBCUTANEOUS at 06:14

## 2022-01-01 RX ADMIN — HEPARIN SODIUM 5000 UNITS: 5000 INJECTION INTRAVENOUS; SUBCUTANEOUS at 21:05

## 2022-01-01 RX ADMIN — MORPHINE SULFATE 5 MG: 10 INJECTION INTRAVENOUS at 08:25

## 2022-01-01 RX ADMIN — ASPIRIN 81 MG CHEWABLE TABLET 81 MG: 81 TABLET CHEWABLE at 05:48

## 2022-01-01 RX ADMIN — MORPHINE SULFATE 5 MG: 100 SOLUTION ORAL at 15:38

## 2022-01-01 RX ADMIN — INSULIN HUMAN 10 UNITS: 100 INJECTION, SOLUTION PARENTERAL at 21:19

## 2022-01-01 RX ADMIN — INSULIN GLARGINE-YFGN 10 UNITS: 100 INJECTION, SOLUTION SUBCUTANEOUS at 18:12

## 2022-01-01 RX ADMIN — INSULIN HUMAN 3 UNITS: 100 INJECTION, SOLUTION PARENTERAL at 06:22

## 2022-01-01 RX ADMIN — HEPARIN SODIUM 5000 UNITS: 5000 INJECTION INTRAVENOUS; SUBCUTANEOUS at 05:59

## 2022-01-01 RX ADMIN — MORPHINE SULFATE 5 MG: 100 SOLUTION ORAL at 11:22

## 2022-01-01 RX ADMIN — POTASSIUM CHLORIDE 40 MEQ: 1500 TABLET, EXTENDED RELEASE ORAL at 15:05

## 2022-01-01 RX ADMIN — HEPARIN SODIUM 5000 UNITS: 5000 INJECTION INTRAVENOUS; SUBCUTANEOUS at 16:24

## 2022-01-01 RX ADMIN — POTASSIUM CHLORIDE 10 MEQ: 7.46 INJECTION, SOLUTION INTRAVENOUS at 03:00

## 2022-01-01 RX ADMIN — INSULIN HUMAN 3 UNITS: 100 INJECTION, SOLUTION PARENTERAL at 06:00

## 2022-01-01 RX ADMIN — POTASSIUM CHLORIDE 40 MEQ: 1500 TABLET, EXTENDED RELEASE ORAL at 08:47

## 2022-01-01 RX ADMIN — POTASSIUM CHLORIDE 40 MEQ: 1500 TABLET, EXTENDED RELEASE ORAL at 10:23

## 2022-01-01 RX ADMIN — SENNOSIDES AND DOCUSATE SODIUM 2 TABLET: 50; 8.6 TABLET ORAL at 18:03

## 2022-01-01 RX ADMIN — SENNOSIDES AND DOCUSATE SODIUM 2 TABLET: 50; 8.6 TABLET ORAL at 18:10

## 2022-01-01 RX ADMIN — ATORVASTATIN CALCIUM 40 MG: 40 TABLET, FILM COATED ORAL at 18:03

## 2022-01-01 RX ADMIN — INSULIN HUMAN 3 UNITS: 100 INJECTION, SOLUTION PARENTERAL at 21:06

## 2022-01-01 RX ADMIN — HEPARIN SODIUM 5000 UNITS: 5000 INJECTION INTRAVENOUS; SUBCUTANEOUS at 05:48

## 2022-01-01 RX ADMIN — HEPARIN SODIUM 5000 UNITS: 5000 INJECTION INTRAVENOUS; SUBCUTANEOUS at 14:14

## 2022-01-01 RX ADMIN — ONDANSETRON 4 MG: 2 INJECTION INTRAMUSCULAR; INTRAVENOUS at 12:15

## 2022-01-01 RX ADMIN — OMEPRAZOLE 20 MG: 20 CAPSULE, DELAYED RELEASE ORAL at 05:59

## 2022-01-01 RX ADMIN — ASPIRIN 81 MG CHEWABLE TABLET 81 MG: 81 TABLET CHEWABLE at 05:59

## 2022-01-01 RX ADMIN — CARVEDILOL 6.25 MG: 6.25 TABLET, FILM COATED ORAL at 07:54

## 2022-01-01 RX ADMIN — FLUOXETINE 20 MG: 20 CAPSULE ORAL at 05:48

## 2022-01-01 RX ADMIN — CARVEDILOL 6.25 MG: 6.25 TABLET, FILM COATED ORAL at 09:50

## 2022-01-01 RX ADMIN — SENNOSIDES AND DOCUSATE SODIUM 2 TABLET: 50; 8.6 TABLET ORAL at 05:59

## 2022-01-01 RX ADMIN — POTASSIUM CHLORIDE 10 MEQ: 7.46 INJECTION, SOLUTION INTRAVENOUS at 04:00

## 2022-01-01 RX ADMIN — INSULIN HUMAN 4 UNITS: 100 INJECTION, SOLUTION PARENTERAL at 18:01

## 2022-01-01 RX ADMIN — INSULIN HUMAN 3 UNITS: 100 INJECTION, SOLUTION PARENTERAL at 12:55

## 2022-01-01 SDOH — ECONOMIC STABILITY: GENERAL

## 2022-01-01 SDOH — ECONOMIC STABILITY: HOUSING INSECURITY: EVIDENCE OF SMOKING MATERIAL: 0

## 2022-01-01 SDOH — ECONOMIC STABILITY: HOUSING INSECURITY
HOME SAFETY: ON EACH LEVEL OF THE HOME. PATIENT DOES HAVE A FIRE ESCAPE PLAN DEVELOPED. PATIENT DOES NOT HAVE FLAMMABLE MATERIALS PRESENT IN THE HOME PRESENTING A FIRE HAZARD. NO EVIDENCE FOUND OF SMOKING MATERIALS PRESENT IN THE HOME.

## 2022-01-01 SDOH — ECONOMIC STABILITY: HOUSING INSECURITY
HOME SAFETY: OXYGEN SAFETY RISK ASSESSMENT PERFORMED. PATIENT PT WAS GIVEN A NO SMOKING SIGN AND PROVIDED EDUCATION ABOUT WHY IT IS IMPORTANT TO PLACE ONE. PATIENT DOES HAVE A WORKING FIRE EXTINGUISHER PRESENT IN THE HOME. SMOKE ALARMS ARE PRESENT AND FUNCTIONAL

## 2022-01-01 ASSESSMENT — LIFESTYLE VARIABLES
HAVE PEOPLE ANNOYED YOU BY CRITICIZING YOUR DRINKING: NO
TOTAL SCORE: 0
EVER HAD A DRINK FIRST THING IN THE MORNING TO STEADY YOUR NERVES TO GET RID OF A HANGOVER: NO
EVER FELT BAD OR GUILTY ABOUT YOUR DRINKING: NO
AVERAGE NUMBER OF DAYS PER WEEK YOU HAVE A DRINK CONTAINING ALCOHOL: 0
TOTAL SCORE: 0
HAVE YOU EVER FELT YOU SHOULD CUT DOWN ON YOUR DRINKING: NO
HOW MANY TIMES IN THE PAST YEAR HAVE YOU HAD 5 OR MORE DRINKS IN A DAY: 0
ALCOHOL_USE: NO
HOW MANY TIMES IN THE PAST YEAR HAVE YOU HAD 5 OR MORE DRINKS IN A DAY: 0
TOTAL SCORE: 0
ON A TYPICAL DAY WHEN YOU DRINK ALCOHOL HOW MANY DRINKS DO YOU HAVE: 0
EVER FELT BAD OR GUILTY ABOUT YOUR DRINKING: NO
CONSUMPTION TOTAL: NEGATIVE
HAVE PEOPLE ANNOYED YOU BY CRITICIZING YOUR DRINKING: NO
ALCOHOL_USE: NO
CONSUMPTION TOTAL: NEGATIVE
AVERAGE NUMBER OF DAYS PER WEEK YOU HAVE A DRINK CONTAINING ALCOHOL: 0
TOTAL SCORE: 0
HAVE YOU EVER FELT YOU SHOULD CUT DOWN ON YOUR DRINKING: NO
ON A TYPICAL DAY WHEN YOU DRINK ALCOHOL HOW MANY DRINKS DO YOU HAVE: 0
EVER HAD A DRINK FIRST THING IN THE MORNING TO STEADY YOUR NERVES TO GET RID OF A HANGOVER: NO
DOES PATIENT WANT TO STOP DRINKING: NO
TOTAL SCORE: 0
TOTAL SCORE: 0

## 2022-01-01 ASSESSMENT — ENCOUNTER SYMPTOMS
LOWER EXTREMITY EDEMA: 1
LAST BOWEL MOVEMENT: 66303
SLEEP QUALITY: FAIR
NAUSEA: 0
PAIN: 1
LOWER EXTREMITY EDEMA: 1
DENIES PAIN: 1
FATIGUE: 1
SLEEP QUALITY: FAIR
FATIGUE: 1
HIGHEST PAIN SEVERITY IN PAST 24 HOURS: 0/10
DENIES PAIN: 1
PALPITATIONS: 0
VOMITING: DENIES
FALLS: 0
PERSON REPORTING PAIN: PATIENT
SHORTNESS OF BREATH: 1
STRIDOR: 0
PAIN LOCATION - PAIN QUALITY: ACHY, GNAWING
PAIN: 1
MUSCLE WEAKNESS: 1
DIZZINESS: 0
PAIN LOCATION - PAIN SEVERITY: 7/10
COUGH: 0
PAIN LOCATION - PAIN DURATION: SHORT
DENIES PAIN: 1
MUSCLE WEAKNESS: 1
DENIES PAIN: 1
PAIN LOCATION - PAIN SEVERITY: 9/10
DENIES PAIN: 1
BRUISES/BLEEDS EASILY: 1
FATIGUES EASILY: 1
SUBJECTIVE PAIN PROGRESSION: UNCHANGED
DEPRESSED MOOD: 1
BACK PAIN: 1
SHORTNESS OF BREATH: 1
MUSCLE WEAKNESS: 1
FATIGUES EASILY: 1
PERSON REPORTING PAIN: PATIENT
NAUSEA: DENIES
MUSCLE WEAKNESS: 1
WEAKNESS: 1
CONSTIPATION: 1
FATIGUES EASILY: 1
NAUSEA: DENIES
TINGLING: 0
PAIN LOCATION - PAIN FREQUENCY: WITH ACTIVITY
PAIN: 1
DENIES PAIN: 1
DENIES PAIN: 1
HIGHEST PAIN SEVERITY IN PAST 24 HOURS: 0/10
PAIN: 1
LAST BOWEL MOVEMENT: 66281
PERSON REPORTING PAIN: PATIENT
LAST BOWEL MOVEMENT: 66308
NAUSEA: DENIES
LAST BOWEL MOVEMENT: 66300
STOOL FREQUENCY: LESS THAN DAILY
PAIN LOCATION: HIP
ABDOMINAL PAIN: 0
LAST BOWEL MOVEMENT: 66311
PAIN LOCATION: LEFT HIP/LOWER BACK
PAIN LOCATION: HIP
DENIES PAIN: 1
DYSPNEA ACTIVITY LEVEL: AT REST
PAIN LOCATION: GENERALIZED
SLEEP QUALITY: FAIR
PAIN LOCATION - PAIN QUALITY: SHARP
COUGH: 0
MYALGIAS: 0
PERSON REPORTING PAIN: PATIENT
PAIN: 1
SLEEP QUALITY: FAIR
FATIGUE: 1
LAST BOWEL MOVEMENT: 66294
PAIN: 1
LOSS OF CONSCIOUSNESS: 0
SUBJECTIVE PAIN PROGRESSION: WAXING AND WANING
FATIGUES EASILY: 1
LAST BOWEL MOVEMENT: 66315
CONSTIPATION: 1
DENIES PAIN: 1
PERSON REPORTING PAIN: PATIENT
PERSON REPORTING PAIN: PATIENT
HYPERTENSION: 1
PAIN: PATIENT DENIES PAIN
SLEEP QUALITY: FAIR
MUSCLE WEAKNESS: 1
STOOL FREQUENCY: LESS THAN DAILY
LOWEST PAIN SEVERITY IN PAST 24 HOURS: 0/10
PAIN LOCATION - EXACERBATING FACTORS: MOVEMENT
FATIGUES EASILY: 1
VOMITING: DENIES
SLEEP QUALITY: FAIR
CONSTIPATION: 1
NAUSEA: DENIES
HIGHEST PAIN SEVERITY IN PAST 24 HOURS: 9/10
PAIN LOCATION - PAIN SEVERITY: 3/10
MUSCLE WEAKNESS: 1
SLEEP QUALITY: FAIR
LIMITED RANGE OF MOTION: 1
MUSCLE WEAKNESS: 1
PERSON REPORTING PAIN: PATIENT
PERSON REPORTING PAIN: PATIENT
MUSCLE WEAKNESS: 1
NAUSEA: DENIES
FATIGUES EASILY: 1
PAIN LOCATION - PAIN DURATION: WHILE MOVING
DYSPNEA ACTIVITY LEVEL: AFTER AMBULATING 10 - 20 FT
VOMITING: DENIES
SLEEP QUALITY: FAIR
STOOL FREQUENCY: LESS THAN DAILY
LOWER EXTREMITY EDEMA: 1
PAIN LOCATION - PAIN FREQUENCY: INTERMITTENT
VOMITING: DENIES
HIGHEST PAIN SEVERITY IN PAST 24 HOURS: 0/10
MUSCLE WEAKNESS: 1
PAIN LOCATION - PAIN FREQUENCY: INTERMITTENT
DENIES PAIN: 1
LOWER EXTREMITY EDEMA: 1
SHORTNESS OF BREATH: 0
PAIN LOCATION: LOWER BACK
PAIN LOCATION - PAIN FREQUENCY: CONSTANT
CONSTIPATION: 0
PAIN LOCATION - PAIN QUALITY: ACHE
HIGHEST PAIN SEVERITY IN PAST 24 HOURS: 3/10
LIMITED RANGE OF MOTION: 1
PAIN LOCATION - PAIN QUALITY: ACHY, SORE
MUSCLE WEAKNESS: 1
LOWEST PAIN SEVERITY IN PAST 24 HOURS: 0/10
PERSON REPORTING PAIN: PATIENT
LOWER EXTREMITY EDEMA: 1
PAIN SEVERITY GOAL: 0/10
DEPRESSION: 0
PAIN: 1
FATIGUE: 1
NAUSEA: 0
FATIGUES EASILY: 1
PAIN LOCATION - EXACERBATING FACTORS: MORNINGS
HIGHEST PAIN SEVERITY IN PAST 24 HOURS: 0/10
PERSON REPORTING PAIN: PATIENT
SUBJECTIVE PAIN PROGRESSION: UNCHANGED
DENIES PAIN: 1
SLEEP QUALITY: FAIR
FATIGUES EASILY: 1
DYSPNEA ACTIVITY LEVEL: AT REST
CONSTIPATION: 1
PERSON REPORTING PAIN: PATIENT
VOMITING: 0
CONSTIPATION: 1
PAIN LOCATION - RELIEVING FACTORS: MEDICATION, REST
MUSCLE WEAKNESS: 1
PAIN LOCATION - RELIEVING FACTORS: STILLNESS
DIARRHEA: 1
LOWEST PAIN SEVERITY IN PAST 24 HOURS: 0/10
PERSON REPORTING PAIN: PATIENT
LAST BOWEL MOVEMENT: 66281
PAIN LOCATION - PAIN FREQUENCY: INTERMITTENT
FATIGUE: 1
SLEEP QUALITY: FAIR
CONSTIPATION: 1
LOWER EXTREMITY EDEMA: 1
SLEEP QUALITY: FAIR
MYALGIAS: 1
PAIN SEVERITY GOAL: 0/10
SLEEP QUALITY: FAIR
PAIN LOCATION - PAIN DURATION: CHRONIC
PERSON REPORTING PAIN: PATIENT
STOOL FREQUENCY: LESS THAN DAILY
STOOL FREQUENCY: LESS THAN DAILY
CONSTIPATION: 1
HIGHEST PAIN SEVERITY IN PAST 24 HOURS: 5/10
PAIN SEVERITY GOAL: 3/10
PAIN LOCATION - PAIN SEVERITY: 3/10
LAST BOWEL MOVEMENT: 66316
SHORTNESS OF BREATH: 1
PERSON REPORTING PAIN: PATIENT
LOWER EXTREMITY EDEMA: 1
BOWEL INCONTINENCE: 1
SLEEP QUALITY: FAIR
LAST BOWEL MOVEMENT: 66296
PERSON REPORTING PAIN: PATIENT
DENIES PAIN: 1
LIMITED RANGE OF MOTION: 1
PAIN LOCATION - PAIN DURATION: SHORT
ARTHRALGIAS: 1
FATIGUE: 1
CHILLS: 0
DIARRHEA: 1
BOWEL INCONTINENCE: 1
SHORTNESS OF BREATH: 1
SUBJECTIVE PAIN PROGRESSION: WAXING AND WANING
SPUTUM PRODUCTION: 0
FEVER: 0
DENIES PAIN: 1
CONSTIPATION: 1
FATIGUES EASILY: 1
MUSCLE WEAKNESS: 1
PAIN LOCATION - EXACERBATING FACTORS: S/P FALL
LOWER EXTREMITY EDEMA: 1
PERSON REPORTING PAIN: PATIENT
LOWER EXTREMITY EDEMA: 1
DEPRESSED MOOD: 1
BOWEL PATTERN NORMAL: 1
SHORTNESS OF BREATH: 1
FALLS: 1
CONSTIPATION: 1
DENIES PAIN: 1
FATIGUE: 1
CONSTIPATION: 1
MUSCLE WEAKNESS: 1
SHORTNESS OF BREATH: 0
FATIGUES EASILY: 1
NAUSEA: DENIES
VOMITING: DENIES
HEADACHES: 0
STOOL FREQUENCY: LESS THAN DAILY
VOMITING: DENIES
PERSON REPORTING PAIN: PATIENT
STOOL FREQUENCY: LESS THAN DAILY
PERSON REPORTING PAIN: PATIENT
CONSTIPATION: 1
VOMITING: 0
PAIN LOCATION - PAIN DURATION: INTERMITTANT
DIARRHEA: 1
DYSPNEA ACTIVITY LEVEL: AT REST
PAIN LOCATION - PAIN SEVERITY: 5/10
BOWEL INCONTINENCE: 1
PERSON REPORTING PAIN: PATIENT
PAIN: 1
PERSON REPORTING PAIN: PATIENT
LOWER EXTREMITY EDEMA: 1
DENIES PAIN: 1
SHORTNESS OF BREATH: 1
DEBILITATING PAIN: 1
LOWEST PAIN SEVERITY IN PAST 24 HOURS: 3/10
LAST BOWEL MOVEMENT: 66280
DENIES PAIN: 1
STOOL FREQUENCY: LESS THAN DAILY
CHILLS: 0
PAIN SEVERITY GOAL: 3/10
PERSON REPORTING PAIN: PATIENT
DIARRHEA: 0
DENIES PAIN: 1
MUSCLE WEAKNESS: 1
PAIN LOCATION - RELIEVING FACTORS: REST
STOOL FREQUENCY: LESS THAN DAILY
DIARRHEA: 1
FEVER: 0
MUSCLE WEAKNESS: 1
FATIGUE: 1
FATIGUE: 1

## 2022-01-01 ASSESSMENT — COGNITIVE AND FUNCTIONAL STATUS - GENERAL
MOBILITY SCORE: 6
SUGGESTED CMS G CODE MODIFIER MOBILITY: CN
MOVING TO AND FROM BED TO CHAIR: UNABLE
TURNING FROM BACK TO SIDE WHILE IN FLAT BAD: UNABLE
STANDING UP FROM CHAIR USING ARMS: A LITTLE
WALKING IN HOSPITAL ROOM: A LITTLE
MOBILITY SCORE: 17
TOILETING: TOTAL
EATING MEALS: A LOT
HELP NEEDED FOR BATHING: TOTAL
MOVING TO AND FROM BED TO CHAIR: A LITTLE
SUGGESTED CMS G CODE MODIFIER DAILY ACTIVITY: CM
TURNING FROM BACK TO SIDE WHILE IN FLAT BAD: A LITTLE
DAILY ACTIVITIY SCORE: 8
SUGGESTED CMS G CODE MODIFIER MOBILITY: CK
DRESSING REGULAR LOWER BODY CLOTHING: A LOT
SUGGESTED CMS G CODE MODIFIER DAILY ACTIVITY: CH
WALKING IN HOSPITAL ROOM: TOTAL
CLIMB 3 TO 5 STEPS WITH RAILING: TOTAL
MOVING FROM LYING ON BACK TO SITTING ON SIDE OF FLAT BED: UNABLE
STANDING UP FROM CHAIR USING ARMS: TOTAL
CLIMB 3 TO 5 STEPS WITH RAILING: A LOT
DAILY ACTIVITIY SCORE: 24
PERSONAL GROOMING: TOTAL
DRESSING REGULAR UPPER BODY CLOTHING: TOTAL
MOVING FROM LYING ON BACK TO SITTING ON SIDE OF FLAT BED: A LITTLE

## 2022-01-01 ASSESSMENT — PATIENT HEALTH QUESTIONNAIRE - PHQ9
CLINICAL INTERPRETATION OF PHQ2 SCORE: 2
2. FEELING DOWN, DEPRESSED, IRRITABLE, OR HOPELESS: 00
SUM OF ALL RESPONSES TO PHQ9 QUESTIONS 1 AND 2: 0
1. LITTLE INTEREST OR PLEASURE IN DOING THINGS: NOT AT ALL
CLINICAL INTERPRETATION OF PHQ2 SCORE: 0
8. MOVING OR SPEAKING SO SLOWLY THAT OTHER PEOPLE COULD HAVE NOTICED. OR THE OPPOSITE, BEING SO FIGETY OR RESTLESS THAT YOU HAVE BEEN MOVING AROUND A LOT MORE THAN USUAL: NOT AT ALL
5. POOR APPETITE OR OVEREATING: SEVERAL DAYS
6. FEELING BAD ABOUT YOURSELF - OR THAT YOU ARE A FAILURE OR HAVE LET YOURSELF OR YOUR FAMILY DOWN: NOT AL ALL
1. LITTLE INTEREST OR PLEASURE IN DOING THINGS: NOT AT ALL
9. THOUGHTS THAT YOU WOULD BE BETTER OFF DEAD, OR OF HURTING YOURSELF: NOT AT ALL
1. LITTLE INTEREST OR PLEASURE IN DOING THINGS: 00
7. TROUBLE CONCENTRATING ON THINGS, SUCH AS READING THE NEWSPAPER OR WATCHING TELEVISION: NOT AT ALL
1. LITTLE INTEREST OR PLEASURE IN DOING THINGS: 00
4. FEELING TIRED OR HAVING LITTLE ENERGY: NOT AT ALL
SUM OF ALL RESPONSES TO PHQ QUESTIONS 1-9: 6
CLINICAL INTERPRETATION OF PHQ2 SCORE: 0
SUM OF ALL RESPONSES TO PHQ9 QUESTIONS 1 AND 2: 0
2. FEELING DOWN, DEPRESSED, IRRITABLE, OR HOPELESS: 00
2. FEELING DOWN, DEPRESSED, IRRITABLE, OR HOPELESS: NOT AT ALL
2. FEELING DOWN, DEPRESSED, IRRITABLE, OR HOPELESS: NOT AT ALL
3. TROUBLE FALLING OR STAYING ASLEEP OR SLEEPING TOO MUCH: SEVERAL DAYS
5. POOR APPETITE OR OVEREATING: 1 - SEVERAL DAYS
SUM OF ALL RESPONSES TO PHQ9 QUESTIONS 1 AND 2: 0
CLINICAL INTERPRETATION OF PHQ2 SCORE: 0
2. FEELING DOWN, DEPRESSED, IRRITABLE, OR HOPELESS: NOT AT ALL
SUM OF ALL RESPONSES TO PHQ QUESTIONS 1-9: 2
CLINICAL INTERPRETATION OF PHQ2 SCORE: 0
1. LITTLE INTEREST OR PLEASURE IN DOING THINGS: NOT AT ALL

## 2022-01-01 ASSESSMENT — FIBROSIS 4 INDEX
FIB4 SCORE: 2.36
FIB4 SCORE: 2.36
FIB4 SCORE: 2.04
FIB4 SCORE: 2.36
FIB4 SCORE: 1.59
FIB4 SCORE: 2.36
FIB4 SCORE: 1.8
FIB4 SCORE: 1.8
FIB4 SCORE: 2.36

## 2022-01-01 ASSESSMENT — PAIN SCALES - PAIN ASSESSMENT IN ADVANCED DEMENTIA (PAINAD)
CONSOLABILITY: 0 - NO NEED TO CONSOLE.
FACIALEXPRESSION: 0 - SMILING OR INEXPRESSIVE.
NEGVOCALIZATION: 0 - NONE.
TOTALSCORE: 5
BODYLANGUAGE: 1 - TENSE. DISTRESSED PACING. FIDGETING.
NEGVOCALIZATION: 1 - OCCASIONAL MOAN OR GROAN. LOW-LEVEL SPEECH WITH A NEGATIVE OR DISAPPROVING QUALITY.
TOTALSCORE: 0
CONSOLABILITY: 2 - UNABLE TO CONSOLE, DISTRACT OR REASSURE.
FACIALEXPRESSION: 0 - SMILING OR INEXPRESSIVE.
CONSOLABILITY: 0 - NO NEED TO CONSOLE.
BODYLANGUAGE: 1 - TENSE. DISTRESSED PACING. FIDGETING.
TOTALSCORE: 0
FACIALEXPRESSION: 0 - SMILING OR INEXPRESSIVE.
BODYLANGUAGE: 0 - RELAXED.
TOTALSCORE: 4
FACIALEXPRESSION: 2 - FACIAL GRIMACING.
CONSOLABILITY: 1 - DISTRACTED OR REASSURED BY VOICE OR TOUCH.
NEGVOCALIZATION: 0 - NONE.
NEGVOCALIZATION: 0 - NONE.
BODYLANGUAGE: 0 - RELAXED.

## 2022-01-01 ASSESSMENT — PAIN DESCRIPTION - PAIN TYPE
TYPE: ACUTE PAIN

## 2022-01-01 ASSESSMENT — SOCIAL DETERMINANTS OF HEALTH (SDOH)
ACTIVE STRESSOR - HEALTH CHANGES: 1

## 2022-01-01 ASSESSMENT — ACTIVITIES OF DAILY LIVING (ADL)
AMBULATION ASSISTANCE: SUPERVISION
GROOMING ASSESSED: 1
AMBULATION ASSISTANCE: 1
DRESSING_REQUIRES_ASSISTANCE: 1
GROOMING_WITHIN_DEFINED_LIMITS: 1
PHYSICAL TRANSFERS ASSESSED: 1
MONEY MANAGEMENT (EXPENSES/BILLS): NEEDS ASSISTANCE
FEEDING ASSESSED: 1
MONEY MANAGEMENT (EXPENSES/BILLS): NEEDS ASSISTANCE
LAUNDRY: NEEDS ASSISTANCE
OASIS_M1830: 05
OASIS_M1830: 01
CURRENT_FUNCTION: INDEPENDENT
GROOMING_CURRENT_FUNCTION: INDEPENDENT
BATHING_REQUIRES_ASSISTANCE: 1
BATHING ASSESSED: 1
MONEY MANAGEMENT (EXPENSES/BILLS): NEEDS ASSISTANCE
BATHING_CURRENT_FUNCTION: SUPERVISION
CONTINENCE_REQUIRES_ASSISTANCE: 1
MONEY MANAGEMENT (EXPENSES/BILLS): NEEDS ASSISTANCE
TOILETING: INDEPENDENT
HOME_HEALTH_OASIS: 00
LAUNDRY ASSESSED: 1
TOILETING: 1
FEEDING: INDEPENDENT
DRESSING_UB_CURRENT_FUNCTION: INDEPENDENT
FEEDING_WITHIN_DEFINED_LIMITS: 1
OASIS_M1830: 05
DRESSING_LB_CURRENT_FUNCTION: INDEPENDENT
AMBULATION ASSISTANCE ON FLAT SURFACES: 1
MONEY MANAGEMENT (EXPENSES/BILLS): NEEDS ASSISTANCE
MONEY MANAGEMENT (EXPENSES/BILLS): NEEDS ASSISTANCE

## 2022-01-01 ASSESSMENT — NEW YORK HEART ASSOCIATION (NYHA) CLASSIFICATION: PATIENT MEETS NYHA AND SIGNIFICANT SYMPTOMS CRITERIA: 1

## 2022-05-10 PROBLEM — I50.32 CHRONIC DIASTOLIC CONGESTIVE HEART FAILURE (HCC): Status: ACTIVE | Noted: 2022-01-01

## 2022-05-10 NOTE — PROGRESS NOTES
Cardiology Follow-up Consultation Note    Date of note:   5/10/2022  Primary Care Provider: Laury Rees M.D.  Referring Provider: Zeus Joyce MD.     Patient Name: Selvin Braga   YOB: 1935  MRN:              [id number]    Chief Complaint: PVD.     History of Present Illness: Selvin Braga is a 86 y.o. male whose current medical problems include mild aortic insufficiency, peripheral artery disease status post femoral endarterectomy and PCI of his right SFA 3/2019 and then right common femoral artery bypass grafting 2/2020 followed by angioplasty of right common femoral artery and right common iliac artery in 4/2021 by Dr. Delgado, elevated troponin in the setting of sepsis in April 2017 (myocardial perfusion imaging 5/22/2017 negative for ischemia), provoked DVTs, dyslipidemia, and diabetes who is here for follow-up.    At our visit,  7/19/2019:  In terms of PVD, no more claudication.      In terms of previous elevated troponin, no angina.     At our visit, 7/22/2020:  In terms of PVD, had a bypass surgery for his leg. Much improved.     + decreased balance.     Dyslipidemia well controlled.     + foot swelling.     At our visit, 4/13/2021:  Had KAVITA on hctz and lisinopril.     Still having claudication after 50 yards, has an angiogram scheduled later this week. Followed by Dr. Delgado. Uses a motorized wheelchair.     Swelling improved with compression stockings.     Interim Events:  Continued leg swelling. On lasix 40mg PO bid. Weight up 10 pounds in the last month.     [facility]     In terms of hypertension, well controlled.    Lower back pain, no further claudication currently.     Review of Systems   HENT: Positive for tinnitus.    Cardiovascular: Positive for leg swelling.   Hematologic/Lymphatic: Bruises/bleeds easily.   Skin: Positive for itching.   Musculoskeletal: Positive for back pain and falls.   Gastrointestinal: Positive for constipation and  diarrhea.   Genitourinary: Positive for hematuria (from radiation damage).     + incontinence due to prostate cancer treatment consequences.     All other systems reviewed and discussed using a comprehensive questionnaire and are negative.       Past Medical History:   Diagnosis Date   • AK (actinic keratosis) 7/21/2016   • Anemia    • Arrhythmia     PVC's   • Arthritis     lower back   • Lombardi esophagus    • Cancer (HCC) 2001    prostate (radiation/sx)   • CATARACT     bilateral IOL   • Chronic kidney disease (CKD), stage III (moderate) ([facility])    • Colon polyps    • Dental disorder     Dentures   • Depression 7/21/2016   • Diabetes 1984    oral medication   • GERD (gastroesophageal reflux disease)    • Hemorrhagic disorder ([facility])     bleeds easily    • Hyperlipidemia    • Hypertension    • Infectious disease     history of c-diff 2016   • Neuropathy ([facility])    • Other specified disorder of intestines     alters between normal and diarrhea since radiation tx   • Personal history of venous thrombosis and embolism 2010/1995    leg   • Prostate cancer ([facility]) 2001    Status post prostatectomy   • PVD (peripheral vascular disease) ([facility])     s/p femoral endarterectomy   • Recurrent UTI    • Sleep apnea     Does not use CPAP   • Unspecified urinary incontinence     spill from artifical urinary sphincter   • Urinary bladder disorder     uses external catheter at night, and clamp during day         Past Surgical History:   Procedure Laterality Date   • FEMORAL ENDARTERECTOMY Right 2/10/2020    Procedure: ENDARTERECTOMY, FEMORAL- COMMON FEMORAL ARTERY BYPASS WITH VEIN;  Surgeon: Alek Delgado M.D.;  Location: SURGERY [facility];  Service: General   • FEMORAL ENDARTERECTOMY Right 3/13/2019    Procedure: FEMORAL ENDARTERECTOMY- COMMON;  Surgeon: Alek Delgado M.D.;  Location: SURGERY [facility];  Service: General   • ILIAC ANGIOPLASTY WITH STENT Right 3/13/2019    Procedure: ILIAC ANGIOPLASTY WITH STENT- RETROGRADES;   Surgeon: Deepa Delgado M.D.;  Location: [facility];  Service: General   • ORIF, ANKLE Right 10/28/2015    Procedure: ANKLE ORIF;  Surgeon: Venu Elizabeth M.D.;  Location: [facility];  Service:    • SPHINCTER PROSTHESIS REMOVAL  8/10/2015    Procedure: SPHINCTER PROSTHESIS REMOVAL;  Surgeon: Tuan Marie M.D.;  Location: [facility];  Service:    • LAPAROSCOPY  5/21/2014    Performed by Graham Peña M.D. at [facility]   • BOWEL RESECTION  5/21/2014    Performed by Graham Peña M.D. at [facility]   • COLONOSCOPY WITH APC  6/5/2013    Performed by Deepa Vela M.D. at [facility]   • CATARACT PHACO WITH IOL  2/27/2013    Performed by Clayton Noonan M.D. at SURGERY [facility]   • CATARACT PHACO WITH IOL  2/13/2013    Performed by Clayton Noonan M.D. at SURGERY SAME DAY [facility]   • FUSION, SPINE, LUMBAR, PLIF  12/13/2012    Procedure: L5-S1 removal of Jennings Fragment NON-INSTRUMENTAL;  Surgeon: Wil Martinez M.D.;  Location: [facility];  Service:    • LUMBAR DECOMPRESSION  12/13/2012    Procedure: 4-5;  Surgeon: Wil Martinez M.D.;  Location: [facility];  Service:    • SPHINCTER PROSTHESIS PLACEMENT  9/26/2012    Performed by GERSON Car M.D. at [facility]   • GROIN EXPLORATION  3/13/2012    Performed by DEEPA DELGADO at [facility]   • SPHINCTER PROSTHESIS PLACEMENT  2/24/2011    Performed by GERSON CAR at [facility]   • CYSTOSCOPY  2/24/2011    Performed by GERSON CAR at [facility]   • CYSTOSCOPY  2/2/2011    Performed by GERSON CAR at [facility]   • SPHINCTER PROSTHESIS REMOVAL  6/3/2010    Performed by JOSH APARICIO at [facility]   • ANGIOGRAM  1/25/2010    Performed by PETER NORMAN at SURGERY [facility]   • AORTOGRAM  1/25/2010    Performed  by PETER NORMAN at SURGERY [facility] ORS   • PROSTATECTOMY ROBOTIC  2/2001   • [place] INSERT,INFLATABLE SPHINCTER  2001   • OTHER ORTHOPEDIC SURGERY Left     hip & femur fx         Current Outpatient Medications   Medication Sig Dispense Refill   • INSULIN GLULISINE INJ 10 Units.     • traZODone (DESYREL) 50 MG Tab Take 50 mg by mouth every evening.     • carvedilol (COREG) 6.25 MG Tab TAKE 1 TABLET BY MOUTH TWICE A DAY WITH MEALS 200 Tablet 2   • furosemide (LASIX) 40 MG Tab TAKE 1 TABLET BY MOUTH TWICE A  Tablet 3   • fluocinonide (LIDEX) 0.05 % Cream APPLY TO AFFECTED AREA TO LEFT SHOULDER AND BACK TWICE A DAY . DO NOT USE ON FACE/ARMPITS/ GROIN. 60 g 0   • mupirocin (BACTROBAN) 2 % Ointment PLEASE SEE ATTACHED FOR DETAILED DIRECTIONS     • ASPIRIN 81 PO Take 1 Tablet by mouth every day. Indications: Preventative for heart clots.     • atorvastatin (LIPITOR) 40 MG Tab Take 1 tablet by mouth every evening. 100 tablet 3   • metFORMIN (GLUCOPHAGE) 500 MG Tab Take 500 mg by mouth 2 times a day with meals.     • Alogliptin Benzoate 12.5 MG Tab Take 12.5 mg by mouth every day.     • loperamide (IMODIUM) 2 MG Cap Take 2 mg by mouth 4 times a day as needed for Diarrhea.     • pantoprazole (PROTONIX) 20 MG tablet Take 1 Tab by mouth every day.     • Cyanocobalamin (VITAMIN B-12) 1000 MCG Tab Take 1,000 mcg by mouth every day.     • Multiple Vitamins-Minerals (PRESERVISION AREDS 2+MULTI VIT PO) Take 1 Cap by mouth 2 Times a Day.     • glipiZIDE (GLUCOTROL) 10 MG Tab Take 10 mg by mouth 2 times a day.     • Cholecalciferol (VITAMIN D) 2000 UNIT TABS Take 2,000 Units by mouth every day.     • fluoxetine (PROZAC) 20 MG CAPS Take 20 mg by mouth every day.     • ciprofloxacin (CIPRO) 500 MG Tab TAKE 1 TABLET BY MOUTH EVERY 12 HOURS FOR 5 DAYS       No current facility-administered medications for this visit.         Allergies   Allergen Reactions   • Augmentin Unspecified     Bleeding (rectal)  RXN=5 years ago   •  "Latex Unspecified     Blisters  RXN=ongoing   • Tape Rash     Rash-blisters-paper tape okay  RXN=ongoing         Family History   Problem Relation Age of Onset   • Cancer Mother         ovarian   • Heart Disease Father    • Diabetes Father    • Diabetes Sister    • Hypertension Other    • Cancer Other         multiple cousins with prostate cancer.          Social History     Socioeconomic History   • Marital status:      Spouse name: Not on file   • Number of children: 5   • Years of education: Not on file   • Highest education level: Not on file   Occupational History   • Not on file   Tobacco Use   • Smoking status: Former Smoker     Packs/day: 2.00     Years: 30.00     Pack years: 60.00     Types: Cigarettes     Quit date: 3/5/1995     Years since quittin.2   • Smokeless tobacco: Never Used   • Tobacco comment: 1.5 ppd 25 yrs,quit    Vaping Use   • Vaping Use: Never used   Substance and Sexual Activity   • Alcohol use: No     Alcohol/week: 0.0 oz   • Drug use: No   • Sexual activity: Not on file   Other Topics Concern   • Not on file   Social History Narrative    Retired, previous  at [employer].      Social Determinants of Health     Financial Resource Strain: Not on file   Food Insecurity: Not on file   Transportation Needs: Not on file   Physical Activity: Not on file   Stress: Not on file   Social Connections: Not on file   Intimate Partner Violence: Not on file   Housing Stability: Not on file         Physical Exam:  Ambulatory Vitals  /66 (BP Location: Left arm, Patient Position: Sitting, BP Cuff Size: Adult)   Pulse 88   Ht 1.803 m (5' 11\")   Wt 78.5 kg (173 lb)   SpO2 93%    Oxygen Therapy:  Pulse Oximetry: 93 %  BP Readings from Last 4 Encounters:   05/10/22 118/66   21 122/72   21 122/70   11/10/21 116/66       Weight/BMI: Body mass index is 24.13 kg/m².  Wt Readings from Last 4 Encounters:   05/10/22 78.5 kg (173 lb)   21 73.5 kg (162 lb) "   11/17/21 75.3 kg (166 lb)   11/10/21 73.9 kg (163 lb)       General: No apparent distress  Eyes: pale conjunctiva  ENT: OP covered by mask  Neck: JVP <8 cm H2O  Lungs: normal respiratory effort, CTAB  Heart: RRR, 2/6 systolic murmur at apex,  no rubs or gallops, 1-2+ edema right leg, 3+ edema left leg. No LV/RV heave on cardiac palpatation. 2+ bilateral radial pulses.    Abdomen: soft, non tender, non distended, no masses, normal bowel sounds.  No HSM.  Extremities/MSK: no clubbing, no cyanosis  Neurological: No focal sensory deficits  Psychiatric: Appropriate affect, A/O x 3, intact judgement and insight  Skin: Warm extremities, pale    Exam repeated in full and unchanged except for as noted above.      Lab Data Review:  Lab Results   Component Value Date/Time    CHOLSTRLTOT 116 12/01/2021 09:16 AM    LDL 47 12/01/2021 09:16 AM    HDL 51 12/01/2021 09:16 AM    TRIGLYCERIDE 92 12/01/2021 09:16 AM       Lab Results   Component Value Date/Time    SODIUM 143 11/03/2021 11:01 AM    POTASSIUM 3.7 11/03/2021 11:01 AM    CHLORIDE 103 11/03/2021 11:01 AM    CO2 27 11/03/2021 11:01 AM    GLUCOSE 144 (H) 11/03/2021 11:01 AM    BUN 36 (H) 11/03/2021 11:01 AM    CREATININE 1.74 (H) 11/03/2021 11:01 AM    CREATININE 1.0 02/08/2005 02:20 PM     Lab Results   Component Value Date/Time    ALKPHOSPHAT 90 11/26/2019 08:19 AM    ASTSGOT 17 11/26/2019 08:19 AM    ALTSGPT 12 11/26/2019 08:19 AM    TBILIRUBIN 0.6 11/26/2019 08:19 AM      Lab Results   Component Value Date/Time    WBC 6.0 11/03/2021 11:01 AM     No components found for: HBGA1C  No components found for: TROPONIN  No results found for: BNP      Cardiac Imaging and Procedures Review:    EKG dated 3/8/2019 : My personal interpretation is NSR, normal EKG.     Echo dated 1/2022:  CONCLUSIONS  Normal left ventricular systolic function.  The left ventricular ejection fraction is visually estimated to be 60%.  Grade I diastolic dysfunction.  The right ventricle is normal in size  and systolic function.  Mild mitral regurgitation.    Nuclear Perfusion Imaging (5/2017):   Myocardial Perfusion   Report   NUCLEAR IMAGING INTERPRETATION   No evidence of significant jeopardized viable myocardium or prior myocardial    infarction.   Normal left ventricular size, ejection fraction, and wall motion.    Radiology test Review:  CXR:   2017  FINDINGS:  Cardiac silhouette is mildly enlarged.  No pulmonary infiltrates or consolidations are noted.  No pleural effusions are appreciated.  Elevation of the right hemidiaphragm is again noted. Scattered linear peripheral opacifications are again noted in the left lung base.      CTA aorta 7/22/2020:  IMPRESSION:     1.  There is moderate to severe atherosclerosis.  2.  There is no evidence of thoracoabdominal aneurysm or dissection.  3.   There are variable amounts of stenosis involving the pelvic vasculature and lower extremity vasculature as discussed above in further detail.  4.  There is estimated 50% stenosis of the origin of the celiac axis and SMA with extensive calcific plaque.  5.  There is bilateral renal artery stenosis, right greater than left.  6.  There is underlying emphysema and chronic interstitial lung disease.  7.  There are postoperative changes consistent with prior prostatectomy.  8.  There is a probable right inguinal postoperative seroma or liquefied hematoma versus less likely thrombosed pseudoaneurysm.  9.  There are bilateral simple appearing renal cysts.    CTA aorta 3/18/2021:  IMPRESSION:     1.  Extensive calcific atherosclerosis is again identified as seen on the prior CT.     2.  Stenoses of the iliac arteries are again noted bilaterally which appears similar to the prior exam.     3.  Increased narrowing and stenosis focally in the right common femoral artery measuring up to 90% diameter reduction.     4.  Multifocal stenosis of right femoral artery and popliteal artery.     5.  Occlusion of left anterior tibial artery and  peroneal arteries.     6.  Occlusion of right peroneal artery. Anterior tibial artery and posterior tibial arteries are patent. Left posterior tibial artery is patent.      Op Note Dr. Delgado 2/10/2020:  PROCEDURE PERFORMED -      1.  Right common femoral artery interposition bypass using reversed ipsilateral greater saphenous vein  2.  Resection of right femoral artery  3.  Harvesting of right greater saphenous vein    Medical Decision Makin. PAD (peripheral artery disease) ([facility])  Continued claudication, s/p R SFA PCI previously and bypass grafting and repeat intervention 2021.   -aspirin 81mg PO Daily  -lipitor  -not on plavix I suspect due to history of GI and urinary bleeding.     2. History of DVT (deep vein thrombosis)  Twice, both provoked.   -CTM, no indication for prolonged anticoagulation at this time.     3. Essential hypertension  Well controlled  -continue current meds    4. Dyslipidemia  Well controlled  -continue lipitor    5. Chronic kidney disease (CKD), stage III (moderate) ([facility])  Stable,  -follow-up blood tests next week. [facility]     6. Anemia, unspecified type  Unclear cause, does have chronic hematuria/hematochezia. No reversible causes found on scopes. Previously unresponsive to iron. Likely a component of CKD.   -CTM    7. Chronic diastolic heart failure  Normal albumin and normal echocardiogram without e/o CHF while on lasix. Does have significant swelling likely from venous insufficiency in the setting of previous DVT is both legs.  -CTM, compression stockings, leg elevation.   -continue lasix 40mg PO bid  -f/u BMP Q3-6 months.     8. Goals of care - DNR, POLST and AD scanned in.     9. Aortic insufficiency - mild in  based on my personal review.   -recheck  if within goals of care to potentially intervene.       Return in about 6 months (around 11/10/2022). with MR. Hadley Martinez MD, [facility]  [place] [facility], [facility]  [address] [address]  Phone: [phone]  Fax: [phone]

## 2022-05-10 NOTE — TELEPHONE ENCOUNTER
[facility] F#:[phone] requesting most recent lab results to be faxed to [phone], confirmation fax sent to [employer].

## 2022-05-25 NOTE — PROGRESS NOTES
"Subjective     Selvin Braga is a 86 y.o. male who presents with Chronic Kidney Disease and Hypertension            Chronic Kidney Disease  This is a chronic problem. The current episode started more than 1 year ago. The problem occurs constantly. The problem has been waxing and waning. Pertinent negatives include no chest pain, chills, coughing, fever, nausea, urinary symptoms or vomiting.   Hypertension  This is a chronic problem. The current episode started more than 1 year ago. The problem is unchanged. The problem is controlled. Associated symptoms include peripheral edema. Pertinent negatives include no chest pain, malaise/fatigue or shortness of breath. Risk factors for coronary artery disease include male gender. Past treatments include diuretics. The current treatment provides significant improvement. There are no compliance problems.  Hypertensive end-organ damage includes kidney disease. Identifiable causes of hypertension include chronic renal disease.       Review of Systems   Constitutional: Negative for chills, fever and malaise/fatigue.   Respiratory: Negative for cough and shortness of breath.    Cardiovascular: Negative for chest pain and leg swelling.   Gastrointestinal: Negative for nausea and vomiting.   Genitourinary: Negative for dysuria, frequency and urgency.              Objective     /74 (BP Location: Right arm, Patient Position: Sitting, BP Cuff Size: Adult)   Pulse 87   Temp 36.3 °C (97.4 °F) (Temporal)   Ht 1.803 m (5' 11\")   Wt 79.4 kg (175 lb)   SpO2 90%   BMI 24.41 kg/m²      Physical Exam  Vitals and nursing note reviewed.   Constitutional:       General: He is not in acute distress.     Appearance: He is not ill-appearing.   HENT:      Head: Normocephalic and atraumatic.      Right Ear: External ear normal.      Left Ear: External ear normal.      Nose: Nose normal.   Eyes:      General:         Right eye: No discharge.         Left eye: No discharge.      " Conjunctiva/sclera: Conjunctivae normal.   Cardiovascular:      Rate and Rhythm: Normal rate and regular rhythm.      Heart sounds: No murmur heard.  Pulmonary:      Effort: Pulmonary effort is normal. No respiratory distress.      Breath sounds: Normal breath sounds. No wheezing.   Musculoskeletal:         General: No tenderness or deformity.      Right lower leg: Edema present.      Left lower leg: Edema present.   Skin:     General: Skin is warm.      Findings: Lesion present.   Neurological:      General: No focal deficit present.      Mental Status: He is alert and oriented to person, place, and time.   Psychiatric:         Mood and Affect: Mood normal.         Behavior: Behavior normal.       Past Medical History:   Diagnosis Date   • AK (actinic keratosis) 7/21/2016   • Anemia    • Arrhythmia     PVC's   • Arthritis     lower back   • Lombardi esophagus    • Cancer (HCC) 2001    prostate (radiation/sx)   • CATARACT     bilateral IOL   • Chronic kidney disease (CKD), stage III (moderate) ([facility])    • Colon polyps    • Dental disorder     Dentures   • Depression 7/21/2016   • Diabetes 1984    oral medication   • GERD (gastroesophageal reflux disease)    • Hemorrhagic disorder ([facility])     bleeds easily    • Hyperlipidemia    • Hypertension    • Infectious disease     history of c-diff 2016   • Neuropathy ([facility])    • Other specified disorder of intestines     alters between normal and diarrhea since radiation tx   • Personal history of venous thrombosis and embolism 2010/1995    leg   • Prostate cancer (HCC) 2001    Status post prostatectomy   • PVD (peripheral vascular disease) ([facility])     s/p femoral endarterectomy   • Recurrent UTI    • Sleep apnea     Does not use CPAP   • Unspecified urinary incontinence     spill from artifical urinary sphincter   • Urinary bladder disorder     uses external catheter at night, and clamp during day     Social History     Socioeconomic History   • Marital status:      Spouse  name: Not on file   • Number of children: 5   • Years of education: Not on file   • Highest education level: Not on file   Occupational History   • Not on file   Tobacco Use   • Smoking status: Former Smoker     Packs/day: 2.00     Years: 30.00     Pack years: 60.00     Types: Cigarettes     Quit date: 3/5/1995     Years since quittin.2   • Smokeless tobacco: Never Used   • Tobacco comment: 1.5 ppd 25 yrs,quit    Vaping Use   • Vaping Use: Never used   Substance and Sexual Activity   • Alcohol use: No     Alcohol/week: 0.0 oz   • Drug use: No   • Sexual activity: Not on file   Other Topics Concern   • Not on file   Social History Narrative    Retired, previous  at [employer].      Social Determinants of Health     Financial Resource Strain: Not on file   Food Insecurity: Not on file   Transportation Needs: Not on file   Physical Activity: Not on file   Stress: Not on file   Social Connections: Not on file   Intimate Partner Violence: Not on file   Housing Stability: Not on file     Family History   Problem Relation Age of Onset   • Cancer Mother         ovarian   • Heart Disease Father    • Diabetes Father    • Diabetes Sister    • Hypertension Other    • Cancer Other         multiple cousins with prostate cancer.      Recent Labs     21  0911 21  1101 21  0916 22  1031   HDL  --   --  51  --    TRIGLYCERIDE  --   --  92  --    SODIUM 142 143  --  141   POTASSIUM 4.1 3.7  --  3.4*   CHLORIDE 108 103  --  102   CO2 23 27  --  28   BUN 38* 36*  --  26*   CREATININE 1.49* 1.74*  --  1.54*                       Assessment & Plan        1. Essential hypertension  Controlled  Continue same medication regimen  Continue low-sodium diet      2. Stage 3b chronic kidney disease (HCC)  No uremic symptoms  Renal dose of medication  Avoid nephrotoxins  Continue same medication regimen      3. Edema, unspecified type  Low-sodium diet  Increase furosemide 80 mg twice daily  Start  metolazone  Potassium supplement   recheck labs patient  - Referral to Home Health     4.  Hypokalemia  Potassium supplement  Recheck labs

## 2022-05-26 NOTE — TELEPHONE ENCOUNTER
ESTABLISHED PATIENT PRE-VISIT PLANNING     Annual Wellness Visit Postponed per Health Maintenance      Patient was NOT contacted to complete PVP.     Note: Patient will not be contacted if there is no indication to call.     1.  Reviewed notes from the last few office visits within the medical group: Yes    2.  If any orders were placed at last visit or intended to be done for this visit (i.e. 6 mos follow-up), do we have Results/Consult Notes?         •  Labs - Labs were not ordered at last office visit. Last office visit with  was on 11/24/2021.  Note: If patient appointment is for lab review and patient did not complete labs, check with provider if OK to reschedule patient until labs completed.       •  Imaging - Imaging was not ordered at last office visit.       •  Referrals - No referrals were ordered at last office visit.    3. Is this appointment scheduled as a Hospital Follow-Up? No    4.  Immunizations were updated in Epic using Reconcile Outside Information activity? Yes    5.  Patient is due for the following Health Maintenance Topics:   Health Maintenance Due   Topic Date Due   • A1C SCREENING  02/25/2022   • RETINAL SCREENING  03/17/2022   • DIABETES MONOFILAMENT / LE EXAM  04/27/2022       6.  AHA (Pulse8) form printed for Provider? Yes

## 2022-05-27 NOTE — PROGRESS NOTES
Subjective:     Selvin Braga is a 86 y.o. male presenting for a follow up          Current Outpatient Medications:   •  methenamine hip (HIPPREX) 1 GM Tab, Take 1 g by mouth 2 times a day., Disp: , Rfl:   •  furosemide (LASIX) 80 MG Tab, Take 1 Tablet by mouth 2 times a day., Disp: 180 Tablet, Rfl: 3  •  metOLazone (ZAROXOLYN) 5 MG Tab, Take 1 Tablet by mouth every day., Disp: 90 Tablet, Rfl: 3  •  potassium chloride ER (KLOR-CON) 10 MEQ tablet, Take 1 Tablet by mouth 2 times a day., Disp: 60 Tablet, Rfl: 3  •  INSULIN GLULISINE INJ, 10 Units., Disp: , Rfl:   •  traZODone (DESYREL) 50 MG Tab, Take 50 mg by mouth every evening., Disp: , Rfl:   •  atorvastatin (LIPITOR) 40 MG Tab, Take 1 Tablet by mouth every evening., Disp: 100 Tablet, Rfl: 3  •  carvedilol (COREG) 6.25 MG Tab, TAKE 1 TABLET BY MOUTH TWICE A DAY WITH MEALS, Disp: 200 Tablet, Rfl: 2  •  fluocinonide (LIDEX) 0.05 % Cream, APPLY TO AFFECTED AREA TO LEFT SHOULDER AND BACK TWICE A DAY . DO NOT USE ON FACE/ARMPITS/ GROIN., Disp: 60 g, Rfl: 0  •  mupirocin (BACTROBAN) 2 % Ointment, PLEASE SEE ATTACHED FOR DETAILED DIRECTIONS, Disp: , Rfl:   •  ASPIRIN 81 PO, Take 1 Tablet by mouth every day. Indications: Preventative for heart clots., Disp: , Rfl:   •  metFORMIN (GLUCOPHAGE) 500 MG Tab, Take 500 mg by mouth 2 times a day with meals., Disp: , Rfl:   •  Alogliptin Benzoate 12.5 MG Tab, Take 12.5 mg by mouth every day., Disp: , Rfl:   •  loperamide (IMODIUM) 2 MG Cap, Take 2 mg by mouth 4 times a day as needed for Diarrhea., Disp: , Rfl:   •  pantoprazole (PROTONIX) 20 MG tablet, Take 1 Tab by mouth every day., Disp: , Rfl:   •  Cyanocobalamin (VITAMIN B-12) 1000 MCG Tab, Take 1,000 mcg by mouth every day., Disp: , Rfl:   •  Multiple Vitamins-Minerals (PRESERVISION AREDS 2+MULTI VIT PO), Take 1 Cap by mouth 2 Times a Day., Disp: , Rfl:   •  glipiZIDE (GLUCOTROL) 10 MG Tab, Take 10 mg by mouth 2 times a day., Disp: , Rfl:   •  Cholecalciferol (VITAMIN D)  "2000 UNIT TABS, Take 2,000 Units by mouth every day., Disp: , Rfl:   •  fluoxetine (PROZAC) 20 MG CAPS, Take 20 mg by mouth every day., Disp: , Rfl:     Objective:     Vitals: /72   Pulse 84   Temp 36.4 °C (97.6 °F)   Resp 16   Ht 1.803 m (5' 11\")   Wt 77.1 kg (170 lb)   SpO2 93%   BMI 23.71 kg/m²   General: Alert  HEENT: Normocephalic.   Heart: Regular rate and rhythm.  S1 and S2 normal.  No murmurs appreciated.  Respiratory: Normal respiratory effort.  Clear to auscultation bilaterally.  Abdomen: Non-distended, soft    Assessment/Plan:     Selvin was seen today for follow-up.    Diagnoses and all orders for this visit:    Leg swelling  Chronic, worsening.  He recently saw nephrology and adjust his medications.  He plans to get home health soon as well for wrapping.  He has tried compression socks, elevation.    Type 2 diabetes mellitus with diabetic polyneuropathy, without long-term current use of insulin (HCC)  Chronic, stable.  Currently well controlled.  Continue current medications.  [facility] records requested.  He declines a foot exam today as his legs are tightly wrapped  -     POCT Hemoglobin A1C    Dyslipidemia  Chronic, stable, continue atorvastatin    Essential hypertension  Chronic, stable, continue current medications    Recurrent major depressive disorder, in full remission (HCC)  Chronic, stable, continue Prozac    Stage 3b chronic kidney disease (HCC)  Chronic, stable, managed by nephrology    PAD (peripheral artery disease) (HCC)  Chronic, stable, continue to monitor        Return in about 4 months (around 9/27/2022) for routine follow up.    "

## 2022-05-27 NOTE — LETTER
Yoovi  Jackie Oquendo M.D.  [address]  [place] [address]  Fax: [phone]   Authorization for Release/Disclosure of   Protected Health Information   Name: KARINA SKAGGS : 1935 SSN: [id number]   Address: [address] [place] [address] [address]  [place] [address] Phone:    [phone] (home) [phone] (work)   I authorize the entity listed below to release/disclose the PHI below to:   Yoovi/Jackie Oquendo M.D. and Jackie Oquendo M.D.   Provider or Entity Name:   dr curtis /[facility]   Address   City, [facility]   Phone:      Fax:     Reason for request: continuity of care   Information to be released:    [  ] LAST COLONOSCOPY,  including any PATH REPORT and follow-up  [  ] LAST FIT/COLOGUARD RESULT [  ] LAST DEXA  [  ] LAST MAMMOGRAM  [  ] LAST PAP  [  ] LAST LABS [x] RETINA EXAM REPORT  [  ] IMMUNIZATION RECORDS  [  ] Release all info      [  ] Check here and initial the line next to each item to release ALL health information INCLUDING  _____ Care and treatment for drug and / or alcohol abuse  _____ HIV testing, infection status, or AIDS  _____ Genetic Testing    DATES OF SERVICE OR TIME PERIOD TO BE DISCLOSED: _____________  I understand and acknowledge that:  * This Authorization may be revoked at any time by you in writing, except if your health information has already been used or disclosed.  * Your health information that will be used or disclosed as a result of you signing this authorization could be re-disclosed by the recipient. If this occurs, your re-disclosed health information may no longer be protected by State or Federal laws.  * You may refuse to sign this Authorization. Your refusal will not affect your ability to obtain treatment.  * This Authorization becomes effective upon signing and will  on (date) __________.      If no date is indicated, this Authorization will  one (1) year from the signature date.    Name: Karina Gonsalez  Omi    Signature:   Date:     5/27/2022       PLEASE FAX REQUESTED RECORDS BACK TO: [phone]

## 2022-05-29 NOTE — Clinical Note
Primary dx/Skilled need: wound care, venous stasis   SN frequency: 2w4, 3 PRN   Zip code: [address]  Disciplines ordered: SN only, refused PT   Insurance & authorization: Medicare, [facility]   Certification period:  5/29/22---7/27/22   Special considerations: none

## 2022-05-29 NOTE — Clinical Note
Opened patient to [facility] rec completed, there is one major drug to drug interaction:     Drug-Drug: FLUoxetine and traZODone   Additive serotonergic effects may occur during coadministration of selective serotonin reuptake inhibitors (SSRIs) and traZODone, and the risk of developing serotonin syndrome may be increased.   Details Major   fluoxetine (PROZAC) 20 MG CAPS     traZODone (DESYREL) 50 MG Tab

## 2022-05-30 NOTE — CASE COMMUNICATION
noted  ----- Message -----  From: Miguel Cross R.N.  Sent: 5/29/2022  10:28 AM PDT  To: Dorothy Mccabe R.N., Danielle Bass R.N., *      Primary dx/Skilled need: wound care, venous stasis   SN frequency: 2w4, 3 PRN   Zip code: [id number]  Disciplines ordered: SN only, refused PT   Insurance & authorization: Medicare, [facility]   Certification period:  5/29/22---7/27/22   Special considerations: none

## 2022-05-31 NOTE — CASE COMMUNICATION
Action needed  1)  He feels he is getting to the point he needs oxygen. Took several minutes of deep breathing to get O2 sat over 90%. Would you please order an overnight home oxygen test?  2) Appears to have an element of venous stasis in addition to CHF. Would you please order [employer] to eval for compression pump?

## 2022-06-01 NOTE — CASE COMMUNICATION
Thank you  ----- Message -----  From: Jackie Oquendo M.D.  Sent: 5/31/2022  12:00 PM PDT  To: Dorothy Mccabe R.N.      done  ----- Message -----  From: Dorothy Mccabe R.N.  Sent: 5/31/2022  11:01 AM PDT  To: Vickie Fall R.N., Jackie Oquendo M.D.    Action needed  1)  He feels he is getting to the point he needs oxygen. Took several minutes of deep breathing to get O2 sat over 90%. Would you please order an overnight home oxygen test ?  2) Appears to have an element of venous stasis in addition to CHF. Would you please order [employer] to eval for compression pump?

## 2022-06-01 NOTE — PROGRESS NOTES
[facility]    Received referral from [facility] to review patient's medication list.    Med list reviewed and reconciled.  Clinically significant DDI identified: fluoxetine & trazodone may increase risk of serotonin syndrome. Monitor for s/s of this syndrome.  Allergies reviewed.      Ally Brito, PharmD

## 2022-06-03 NOTE — TELEPHONE ENCOUNTER
Caller Name: Meredith (nurse)  Call Back Number: [phone]    How would the patient prefer to be contacted with a response: Phone call OK to leave a detailed message    Selvin's nurse called to let you know that he is not feeling well and she thinks it is because he has stopped taking his lasix, potassium and his zaroxolyn.  She reports he has lost weight and is currently 158lbs.  She said you can call her at the above number or call Selvin at [phone] to discuss.

## 2022-06-03 NOTE — Clinical Note
"History pertinent to today's visit:  CHF, leg edema Skilled need:Assess,treat wouinds,educate. Pt had not taken showerr upon RN's arrival ;\"I just dont't feel great today\" could not give specfic symptoms; compression dressings changed; edema decreased per pt. Pt also repored his Lasix was increased to 80 mg BID and he \"couldn't take it\" (frequency to BR) reports he has \"not taken potassium or Zaroxolyn either). This SN placed call to Dr. Oquendo's office; LM with MA. Educated pt on risks for eliminating these medications and in the future he is to call his MD to advise of side effects. Pt/Cg response to the services provided: Verbalized understanding. Plan for the next visit: Per POC. Case communication: RN CM  "

## 2022-06-06 NOTE — TELEPHONE ENCOUNTER
Called patient, he reports low energy, weight loss.  He feels quite weak after standing for a couple minutes.  He tried reducing his Lasix to 40 mg twice a day and stopping his trazodone, but this does not seem to help.  He denies any urinary symptoms, dysuria, blood in the urine.  His blood sugars have been in the 180s.  [facility], but this makes him constipated, which then exacerbates his hemorrhoids.  He is wondering if physical therapy may help.    Plan: We will check blood work, UA/UCx, and will try ferrous gluconate. Will hold off on physical therapy for now until results return.

## 2022-06-06 NOTE — CASE COMMUNICATION
"noted  ----- Message -----  From: Quynh Garcia R.N.  Sent: 6/4/2022   7:26 AM PDT  To: Dorothy Mccabe R.N.      History pertinent to today's visit:  CHF, leg edema Skilled need:Assess,treat wouinds,educate. Pt had not taken showerr upon RN's arrival ;\"I just dont't feel great today\" could not give specfic symptoms; compression dressings changed; edema decreased per pt. Pt also repored his Lasix was increased to 80 mg BID and he \"coul dn't take it\" (frequency to BR) reports he has \"not taken potassium or Zaroxolyn either). This SN placed call to Dr. Oquendo's office; LM with MA. Educated pt on risks for eliminating these medications and in the future he is to call his MD to advise of side effects. Pt/Cg response to the services provided: Verbalized understanding. Plan for the next visit: Per POC. Case communication: RN CM  "

## 2022-06-06 NOTE — CASE COMMUNICATION
Quality Review for 5.29.22 SOC OASIS performed on by GAGANDEEP Perez RN on 6.6.22022:    Edits completed by GAGANDEEP Perez RN:  1. Changed  to 5.29.22 per LSOC order  2. Changed flu question to na, it is not between Oct and March  3. Changed  to 5 per narrative  4. Changed  to 5, pt would need showering DME to safely perform. Changed , ,  and  to 2 per [id number] C-H response of 4  5. Changed  and  to 3 per  ambulation score   6. Added fall risk, high risk medications to safety measures. Added heart healthy diet to nutritional requirements per dxs  7.  Updated F2F data  8. Resolved pressure ulcer prevention from care plan. Ilia was 20.

## 2022-06-07 NOTE — Clinical Note
alert and oriented x4 denies pain. staes he had been losing weight 156.2 lbs today as reported.. reported of no energy , denies chest pains or breathing problems. states he spoke with Dr Oquendo and aware that he has been losing weight , no energy . he was told to continou same medications for now, but he stopped taking trazadone..states it doesn't really help him.. pt has new precription ferrous sulfate but hasn't arrived yet by mail.  no dressing noted lower extremities , sates he removed it had taken shower thus morning. wound care done to anna lower extremitieswith compression wrappings. states swelling has decreased to both legs   reportd that fasting blood sugar was 264mg/dl. denies s/s of hyperglyccemia.  pt also told the nurse that Dr Oquendo ordered some blood work. per [facility] pt has lab order including urine.he  venipuncture done x1 to right anticubital area with butterfly ga23. pressure dressing applied. no active bleeding noted. unable to give urine sample for now. states to get urine sample next sn. lab speciemns transported to [facility] lab.. instructed on UTI prevention and s/s to report, safety/fall precautions . Pt/Cg response to the services provided: voiced understanding

## 2022-06-08 PROBLEM — R60.0 PERIPHERAL EDEMA: Status: ACTIVE | Noted: 2022-01-01

## 2022-06-08 PROBLEM — D50.9 MICROCYTIC ANEMIA: Status: ACTIVE | Noted: 2017-04-20

## 2022-06-08 PROBLEM — R60.9 PERIPHERAL EDEMA: Status: ACTIVE | Noted: 2022-01-01

## 2022-06-08 PROBLEM — E87.6 HYPOKALEMIA: Status: ACTIVE | Noted: 2022-01-01

## 2022-06-08 NOTE — ASSESSMENT & PLAN NOTE
- With hyperglycemia  -Continue home dose of long-acting  -Start insulin sliding scale  -Adjust as needed

## 2022-06-08 NOTE — ED NOTES
After Visit Summary   10/2/2018    Duane R Demarre    MRN: [id number]           Patient Information     Date Of Birth          1944        Visit Information        Provider Department      10/2/2018 1:15 PM Lin/Ritika Delgado [facility]        Today's Diagnoses     Need for prophylactic vaccination and inoculation against influenza    -  1       Follow-ups after your visit        Who to contact     If you have questions or need follow up information about today's clinic visit or your schedule please contact [facility] directly at [phone].  Normal or non-critical lab and imaging results will be communicated to you by CleanTiehart, letter or phone within 4 business days after the clinic has received the results. If you do not hear from us within 7 days, please contact the clinic through Acisiont or phone. If you have a critical or abnormal lab result, we will notify you by phone as soon as possible.  Submit refill requests through Qyuki or call your pharmacy and they will forward the refill request to us. Please allow 3 business days for your refill to be completed.          Additional Information About Your Visit        MyChart Information     Qyuki gives you secure access to your electronic health record. If you see a primary care provider, you can also send messages to your care team and make appointments. If you have questions, please call your primary care clinic.  If you do not have a primary care provider, please call [phone] and they will assist you.        Care EveryWhere ID     This is your Care EveryWhere ID. This could be used by other organizations to access your [facility] medical records  [id number]         Blood Pressure from Last 3 Encounters:   07/24/18 126/68   01/09/18 138/80   01/30/17 126/64    Weight from Last 3 Encounters:   07/24/18 210 lb (95.3 kg)   01/09/18 200 lb 4.8 oz (90.9 kg)   01/30/17 194 lb 9.6 oz (88.3 kg)             PT moved into a hospital bed for pt comfort, pt in stable condition second bag of KCL finishing infusing third bag to infuse once its complete. Pt in stable condition no s/s of acute distress call light w/in reach. Bed in lowest position. No further needs at this time.      We Performed the Following     ADMIN INFLUENZA (For MEDICARE Patients ONLY) []     FLU VACCINE, INCREASED ANTIGEN, PRESV FREE, AGE 65+ [72936]        Primary Care Provider Office Phone # Fax #    R Braden Gil [phone] [id number] [facility] [address]        Equal Access to Services     SAM GONZALES : Hadii aad ku hadasho Soomaali, waaxda luqadaha, qaybta kaalmada adeegyada, waxay idiin hayaan adeeg khmekhish laconchisn . So [facility] [phone].    ATENCIÓN: Si habla español, tiene a martinez disposición servicios gratuitos de asistencia lingüística. Llame al [phone].    We comply with applicable federal civil rights laws and [place] laws. We do not discriminate on the basis of race, color, national origin, age, disability, sex, sexual orientation, or gender identity.            Thank you!     Thank you for choosing [facility]  for your care. Our goal is always to provide you with excellent care. Hearing back from our patients is one way we can continue to improve our services. Please take a few minutes to complete the written survey that you may receive in the mail after your visit with us. Thank you!             Your Updated Medication List - Protect others around you: Learn how to safely use, store and throw away your medicines at www.disposemymeds.org.          This list is accurate as of 10/2/18  1:23 PM.  Always use your most recent med list.                   Brand Name Dispense Instructions for use Diagnosis    ascorbic acid 500 MG tablet    VITAMIN C    100 tablet    Take 2 tablets by mouth daily.        aspirin 81 MG tablet      Take  by mouth daily.        doxazosin 4 MG tablet    CARDURA    90 tablet    Take 1 tablet (4 mg) by mouth At Bedtime    Hypertension goal BP (blood pressure) < 140/90       hydrochlorothiazide 12.5 MG capsule    MICROZIDE    90 capsule    Take 1 capsule (12.5 mg) by mouth every morning    Hypertension goal BP (blood  pressure) < 140/90       metoprolol succinate 100 MG 24 hr tablet    TOPROL-XL    90 tablet    Take 1 tablet (100 mg) by mouth daily    Hypertension goal BP (blood pressure) < 140/90       MULTIVITAMIN PO           simvastatin 40 MG tablet    ZOCOR    90 tablet    Take 1 tablet (40 mg) by mouth At Bedtime    Hyperlipidemia LDL goal <130

## 2022-06-08 NOTE — ED NOTES
Med rec updated and complete, per pt   Allergies reviewed, per pt   Pt reports that he has not picked up his FERROUS GLUCONATE 324 (38 Fe) from the pharmacy

## 2022-06-08 NOTE — DISCHARGE PLANNING
*ATTN Discharge Planning team: This patient is currently on service with [facility]. Please submit a referral order and face-to-face prior to discharging the patient home. If you have any questions, contact our Transitional Care Specialist team at x [phone].

## 2022-06-08 NOTE — ED NOTES
Helga from Lab called with critical result of potassium 2.7  at 2328. Critical lab result read back to Helga.   Dr. Kenny notified of critical lab result at 2328.  Critical lab result read back by Dr. Kenny.

## 2022-06-08 NOTE — PROGRESS NOTES
"Hospital Medicine Daily Progress Note    Date of Service  6/8/2022    Chief Complaint  Selvin Braga is a 86 y.o. male admitted 6/7/2022 with abnormal labs     Hospital Course  Per notes, \"86 y.o. male who presented 6/7/2022 with abnormal labs.  Patient was called earlier and told he needed to come to the emergency department due to his low potassium as well as his worsening kidney function.  Patient did have significant lower extremity edema, this is chronic but has been worsening weeks ago and so he was transition from 40 mg of oral Lasix twice a day to 80 mg twice a day.  Patient has chronic kidney disease.  Upon repeat lab work he was noted to have significant worsening of his kidney function as well as significant hypokalemia.  Patient does complain of fatigue/weakness.  I did discuss the case including labs and imaging with the ER physician.\"      Interval Problem Update  No complaints at time of examination, patient still having hypokalemia.  Will need to continue to monitor overnight with close monitoring of telemetry.    I have personally seen and examined the patient at bedside. I discussed the plan of care with patient.    Consultants/Specialty  None    Code Status  DNAR/DNI    Disposition  Patient is not medically cleared for discharge.   Anticipate discharge to to home with close outpatient follow-up.  I have placed the appropriate orders for post-discharge needs.    Review of Systems  Review of Systems   Respiratory: Positive for shortness of breath.    Cardiovascular: Positive for leg swelling.   All other systems reviewed and are negative.       Physical Exam  Temp:  [36.4 °C (97.5 °F)-36.6 °C (97.9 °F)] 36.4 °C (97.5 °F)  Pulse:  [69-83] 69  Resp:  [17-21] 18  BP: (108-154)/(37-71) 108/45  SpO2:  [91 %-100 %] 100 %    Physical Exam  Vitals and nursing note reviewed.   Constitutional:       Appearance: Normal appearance.   Cardiovascular:      Rate and Rhythm: Normal rate and regular rhythm.      " Pulses: Normal pulses.      Heart sounds: Normal heart sounds.   Pulmonary:      Effort: Pulmonary effort is normal.      Breath sounds: Normal breath sounds.   Abdominal:      General: Abdomen is flat. Bowel sounds are normal.      Palpations: Abdomen is soft.   Musculoskeletal:      Right lower leg: No edema.      Left lower leg: No edema.   Skin:     General: Skin is warm and dry.   Neurological:      General: No focal deficit present.      Mental Status: He is alert and oriented to person, place, and time. Mental status is at baseline.         Fluids  No intake or output data in the 24 hours ending 06/08/22 1550    Laboratory  Recent Labs     06/07/22  0925 06/08/22  1308   WBC 8.7 8.6   RBC 3.69* 3.40*   HEMOGLOBIN 8.8* 8.2*   HEMATOCRIT 28.2* 26.7*   MCV 76.4* 78.5*   MCH 23.8* 24.1*   MCHC 31.2* 30.7*   RDW 49.9 50.1*   PLATELETCT 291 226   MPV 9.7 8.8*     Recent Labs     06/07/22  0925 06/07/22  2250 06/08/22  1308   SODIUM 135 135 140   POTASSIUM 2.5* 2.7* 2.8*   CHLORIDE 89* 89* 97   CO2 27 30 28   GLUCOSE 239* 298* 172*   BUN 90* 92* 79*   CREATININE 2.47* 2.34* 1.86*   CALCIUM 9.6 10.0 9.2                   Imaging  No orders to display        Assessment/Plan  * Hypokalemia- (present on admission)  Assessment & Plan  - Significant, due to increased Lasix dose  -I am unsure how much potassium he will need since his kidney function is worsening  -I am going to give 40 mill equivalents of IV Lasix  -Repeat BMP later this morning to further evaluate how much potassium he will need and how much Lasix he can be placed on  -I do feel the patient needs to be admitted at this time, if he was to be sent home, his kidney function will continue to worsen and is hypokalemia may as well or he may develop hyperkalemia causing cardiac disturbance eventually  -I do anticipate patient requiring more than 2 midnights in the hospital    Peripheral edema- (present on admission)  Assessment & Plan  - Significant peripheral  edema patient says it is markedly improved with at least a 10 pound water weight loss  -I do think it is okay to avoid this morning's dose of Lasix  -He did have an echocardiogram done in January with a preserved ejection fraction and grade 1 diastolic dysfunction, I do not think this needs to be repeated at this time    Acute renal failure superimposed on stage 3a chronic kidney disease (HCC)- (present on admission)  Assessment & Plan  - Due to increase in Lasix  -For now, I am going to hold his Lasix and give one-time dose of IV potassium and reevaluate his labs later this morning  -He will likely need his home Lasix restarted and may need additional potassium but I would like to see improvement before that is done  -Patient continues to have good urine output  -Avoid nephrotoxins      Type 2 diabetes mellitus with diabetic polyneuropathy, without long-term current use of insulin (formerly [facility])- (present on admission)  Assessment & Plan  - With hyperglycemia  -Continue home dose of long-acting  -Start insulin sliding scale  -Adjust as needed    Dyslipidemia- (present on admission)  Assessment & Plan  - Continue home statin    Microcytic anemia- (present on admission)  Assessment & Plan  - No sign of gross bleeding  -Anemia is chronic  -Repeat CBC in the morning    Depression- (present on admission)  Assessment & Plan  - Continue home Prozac    Essential hypertension- (present on admission)  Assessment & Plan  - Continue home Coreg  -Start as needed hydralazine  -Adjust as needed       VTE prophylaxis: SCDs/TEDs    I have performed a physical exam and reviewed and updated ROS and Plan today (6/8/2022). In review of yesterday's note (6/7/2022), there are no changes except as documented above.

## 2022-06-08 NOTE — H&P
Hospital Medicine History & Physical Note    Date of Service  6/8/2022    Primary Care Physician  Jackie Oquendo M.D.    Consultants  None    Specialist Names: None    Code Status  DNAR/DNI    Chief Complaint  Chief Complaint   Patient presents with   • Weakness   • Abnormal Labs     Low K 2.4       History of Presenting Illness  Selvin Braag is a 86 y.o. male who presented 6/7/2022 with abnormal labs.  Patient was called earlier and told he needed to come to the emergency department due to his low potassium as well as his worsening kidney function.  Patient did have significant lower extremity edema, this is chronic but has been worsening weeks ago and so he was transition from 40 mg of oral Lasix twice a day to 80 mg twice a day.  Patient has chronic kidney disease.  Upon repeat lab work he was noted to have significant worsening of his kidney function as well as significant hypokalemia.  Patient does complain of fatigue/weakness.  I did discuss the case including labs and imaging with the ER physician.    I discussed the plan of care with patient and family.    Review of Systems  Review of Systems   Constitutional: Positive for malaise/fatigue. Negative for chills and fever.   HENT: Negative for congestion.    Respiratory: Negative for cough, sputum production, shortness of breath and stridor.    Cardiovascular: Positive for leg swelling. Negative for chest pain and palpitations.   Gastrointestinal: Negative for abdominal pain, constipation, diarrhea, nausea and vomiting.   Genitourinary: Negative for dysuria and urgency.   Musculoskeletal: Negative for falls and myalgias.   Neurological: Positive for weakness. Negative for dizziness, tingling, loss of consciousness and headaches.   Psychiatric/Behavioral: Negative for depression and suicidal ideas.   All other systems reviewed and are negative.      Past Medical History   has a past medical history of AK (actinic keratosis) (7/21/2016), Anemia,  Arrhythmia, Arthritis, Lombardi esophagus, Cancer (HCC) (2001), CATARACT, Chronic kidney disease (CKD), stage III (moderate) ([facility]), Colon polyps, Dental disorder, Depression (7/21/2016), Diabetes (1984), GERD (gastroesophageal reflux disease), Hemorrhagic disorder (HCC), Hyperlipidemia, Hypertension, Infectious disease, Neuropathy (HCC), Other specified disorder of intestines, Personal history of venous thrombosis and embolism (2010/1995), Prostate cancer (HCC) (2001), PVD (peripheral vascular disease) ([facility]), Recurrent UTI, Sleep apnea, Unspecified urinary incontinence, and Urinary bladder disorder.    Surgical History   has a past surgical history that includes angiogram (1/25/2010); aortogram (1/25/2010); prostatectomy robotic (2/2001); pr insert,inflatable sphincter (2001); sphincter prosthesis removal (6/3/2010); cystoscopy (2/2/2011); sphincter prosthesis placement (2/24/2011); cystoscopy (2/24/2011); groin exploration (3/13/2012); sphincter prosthesis placement (9/26/2012); cataract phaco with iol (2/13/2013); cataract phaco with iol (2/27/2013); colonoscopy with apc (6/5/2013); laparoscopy (5/21/2014); fusion, spine, lumbar, plif (12/13/2012); sphincter prosthesis removal (8/10/2015); femoral endarterectomy (Right, 3/13/2019); iliac angioplasty with stent (Right, 3/13/2019); bowel resection (5/21/2014); lumbar decompression (12/13/2012); other orthopedic surgery (Left); orif, ankle (Right, 10/28/2015); and femoral endarterectomy (Right, 2/10/2020).     Family History  family history includes Cancer in his mother and another family member; Diabetes in his father and sister; Heart Disease in his father; Hypertension in an other family member.   Family history reviewed with patient. There is family history that is pertinent to the chief complaint.     Social History   reports that he quit smoking about 27 years ago. His smoking use included cigarettes. He has a 60.00 pack-year smoking history. He has never used  smokeless tobacco. He reports that he does not drink alcohol and does not use drugs.    Allergies  Allergies   Allergen Reactions   • Augmentin Unspecified     Bleeding (rectal)  RXN=5 years ago   • Latex Unspecified     Blisters  RXN=ongoing   • Tape Rash     Rash-blisters-paper tape okay  RXN=ongoing       Medications  Prior to Admission Medications   Prescriptions Last Dose Informant Patient Reported? Taking?   ASPIRIN 81 PO   Yes No   Sig: Take 1 Tablet by mouth every day. Indications: Preventative for heart clots.   Alogliptin Benzoate 12.5 MG Tab   Yes No   Sig: Take 12.5 mg by mouth every day.   Cholecalciferol (VITAMIN D) 2000 UNIT TABS  Patient Yes No   Sig: Take 2,000 Units by mouth every day.   Cyanocobalamin (VITAMIN B-12) 1000 MCG Tab   Yes No   Sig: Take 1,000 mcg by mouth every day.   HYDROCORTISONE, TOPICAL, 1 % Solution   Yes No   Sig: Apply 1 Application topically 1 time a day as needed. PRN for scalp ithc, scalpicin.    INSULIN GLULISINE INJ   Yes No   Sig: Inject 10 Units as directed at bedtime.   Multiple Vitamins-Minerals (PRESERVISION AREDS 2+MULTI VIT PO)  Patient Yes No   Sig: Take 1 Cap by mouth 2 Times a Day.   atorvastatin (LIPITOR) 40 MG Tab   No No   Sig: Take 1 Tablet by mouth every evening.   carvedilol (COREG) 6.25 MG Tab   No No   Sig: TAKE 1 TABLET BY MOUTH TWICE A DAY WITH MEALS   ferrous gluconate (FERGON) 324 (38 Fe) MG Tab   No No   Sig: Take 1 Tablet by mouth every morning with breakfast.   fluocinonide (LIDEX) 0.05 % Cream   No No   Sig: APPLY TO AFFECTED AREA TO LEFT SHOULDER AND BACK TWICE A DAY . DO NOT USE ON FACE/ARMPITS/ GROIN.   fluoxetine (PROZAC) 20 MG CAPS  Patient Yes No   Sig: Take 20 mg by mouth every day.   furosemide (LASIX) 80 MG Tab   No No   Sig: Take 1 Tablet by mouth 2 times a day.   glipiZIDE (GLUCOTROL) 10 MG Tab  Patient Yes No   Sig: Take 10 mg by mouth 2 times a day.   loperamide (IMODIUM) 2 MG Cap   Yes No   Sig: Take 2 mg by mouth 4 times a day as  needed for Diarrhea.   metFORMIN (GLUCOPHAGE) 500 MG Tab   Yes No   Sig: Take 500 mg by mouth 2 times a day with meals.   metOLazone (ZAROXOLYN) 5 MG Tab   No No   Sig: Take 1 Tablet by mouth every day.   methenamine hip (HIPPREX) 1 GM Tab   Yes No   Sig: Take 1 g by mouth 2 times a day.   pantoprazole (PROTONIX) 20 MG tablet   Yes No   Sig: Take 1 Tab by mouth every day.   potassium chloride ER (KLOR-CON) 10 MEQ tablet   No No   Sig: Take 1 Tablet by mouth 2 times a day.   tolnaftate (TOLNAFTATE ANTIFUNGAL) 1 % Cream   Yes No   Sig: Apply 1 Application topically 2 times a day as needed. Indications: Athlete's Foot   traZODone (DESYREL) 50 MG Tab   Yes No   Sig: Take 25 mg by mouth every evening.      Facility-Administered Medications: None       Physical Exam  Temp:  [36.6 °C (97.8 °F)-36.6 °C (97.9 °F)] 36.6 °C (97.9 °F)  Pulse:  [78-83] 79  Resp:  [18-21] 19  BP: (124-147)/(43-58) 147/43  SpO2:  [91 %-95 %] 91 %  Blood Pressure : (!) 147/43   Temperature: 36.6 °C (97.9 °F)   Pulse: 79   Respiration: 19   Pulse Oximetry: 91 %       Physical Exam  Vitals and nursing note reviewed.   Constitutional:       General: He is not in acute distress.     Appearance: He is well-developed. He is not toxic-appearing or diaphoretic.   HENT:      Head: Normocephalic and atraumatic.      Right Ear: External ear normal.      Left Ear: External ear normal. There is no impacted cerumen.      Nose: Nose normal. No congestion or rhinorrhea.      Mouth/Throat:      Mouth: Mucous membranes are moist.      Pharynx: No oropharyngeal exudate.   Eyes:      General:         Right eye: No discharge.         Left eye: No discharge.   Neck:      Trachea: No tracheal deviation.   Cardiovascular:      Rate and Rhythm: Normal rate and regular rhythm.      Heart sounds: No murmur heard.    No friction rub. No gallop.   Pulmonary:      Effort: Pulmonary effort is normal. No respiratory distress.      Breath sounds: Normal breath sounds. No stridor.  No wheezing or rales.   Chest:      Chest wall: No tenderness.   Abdominal:      General: Bowel sounds are normal. There is no distension.      Palpations: Abdomen is soft.      Tenderness: There is no abdominal tenderness.   Musculoskeletal:         General: No tenderness. Normal range of motion.      Cervical back: Normal range of motion and neck supple. No edema or erythema.      Right lower leg: Edema present.      Left lower leg: Edema present.   Lymphadenopathy:      Cervical: No cervical adenopathy.   Skin:     General: Skin is warm and dry.      Findings: No erythema or rash.   Neurological:      Mental Status: He is alert and oriented to person, place, and time.      Cranial Nerves: No cranial nerve deficit.   Psychiatric:         Mood and Affect: Mood normal.         Behavior: Behavior normal.         Thought Content: Thought content normal.         Judgment: Judgment normal.         Laboratory:  Recent Labs     06/07/22  0925   WBC 8.7   RBC 3.69*   HEMOGLOBIN 8.8*   HEMATOCRIT 28.2*   MCV 76.4*   MCH 23.8*   MCHC 31.2*   RDW 49.9   PLATELETCT 291   MPV 9.7     Recent Labs     06/07/22  0925 06/07/22  2250   SODIUM 135 135   POTASSIUM 2.5* 2.7*   CHLORIDE 89* 89*   CO2 27 30   GLUCOSE 239* 298*   BUN 90* 92*   CREATININE 2.47* 2.34*   CALCIUM 9.6 10.0     Recent Labs     06/07/22  0925 06/07/22  2250   ALTSGPT 11  --    ASTSGOT 18  --    ALKPHOSPHAT 128*  --    TBILIRUBIN 0.5  --    GLUCOSE 239* 298*         No results for input(s): NTPROBNP in the last 72 hours.      No results for input(s): TROPONINT in the last 72 hours.    Imaging:  No orders to display       EKG:  I have personally reviewed the images and compared with prior images.    Assessment/Plan:  Justification for Admission Status  I anticipate this patient will require at least two midnights for appropriate medical management, necessitating inpatient admission because Worsening kidney disease as well as significant hypokalemia    *  Hypokalemia- (present on admission)  Assessment & Plan  - Significant, due to increased Lasix dose  -I am unsure how much potassium he will need since his kidney function is worsening  -I am going to give 40 mill equivalents of IV Lasix  -Repeat BMP later this morning to further evaluate how much potassium he will need and how much Lasix he can be placed on  -I do feel the patient needs to be admitted at this time, if he was to be sent home, his kidney function will continue to worsen and is hypokalemia may as well or he may develop hyperkalemia causing cardiac disturbance eventually  -I do anticipate patient requiring more than 2 midnights in the hospital    Peripheral edema- (present on admission)  Assessment & Plan  - Significant peripheral edema patient says it is markedly improved with at least a 10 pound water weight loss  -I do think it is okay to avoid this morning's dose of Lasix  -He did have an echocardiogram done in January with a preserved ejection fraction and grade 1 diastolic dysfunction, I do not think this needs to be repeated at this time    Acute renal failure superimposed on stage 3a chronic kidney disease (HCC)- (present on admission)  Assessment & Plan  - Due to increase in Lasix  -For now, I am going to hold his Lasix and give one-time dose of IV potassium and reevaluate his labs later this morning  -He will likely need his home Lasix restarted and may need additional potassium but I would like to see improvement before that is done  -Patient continues to have good urine output  -Avoid nephrotoxins  -I am not going to start IV fluids at this time, I do not want to rapidly worsen his lower extremity edema that he has improved    Type 2 diabetes mellitus with diabetic polyneuropathy, without long-term current use of insulin (HCC)- (present on admission)  Assessment & Plan  - With hyperglycemia  -Continue home dose of long-acting  -Start insulin sliding scale  -Adjust as needed    Dyslipidemia-  (present on admission)  Assessment & Plan  - Continue home statin    Microcytic anemia- (present on admission)  Assessment & Plan  - No sign of gross bleeding  -Anemia is chronic  -Repeat CBC in the morning    Depression- (present on admission)  Assessment & Plan  - Continue home Prozac    Essential hypertension- (present on admission)  Assessment & Plan  - Continue home Coreg  -Start as needed hydralazine  -Adjust as needed      VTE prophylaxis: SCDs/TEDs and heparin ppx

## 2022-06-08 NOTE — ED NOTES
Pt to stay in ED until there is a room for him to be admitted will obtain a hospital bed for pt comfort

## 2022-06-08 NOTE — ED NOTES
Pt is requesting to have his am labs drawn after 6am when I give pt his medications pt stated he would like to sleep for a little longer, PT has 6am medications due will come back at patients request

## 2022-06-08 NOTE — ED PROVIDER NOTES
CHIEF COMPLAINT  Chief Complaint   Patient presents with   • Weakness   • Abnormal Labs     Low K 2.4       HPI  Selvin Braga is a 86 y.o. male who presents evaluation of hypokalemia.  Patient has a history of hypertension hyperlipidemia CKD CHF.  Recently increased his Lasix dose from 40 mg to 80 mg 2 weeks ago, due to increased lower extremity edema.  States he has had a an approximate 10 pound weight loss since that time with improvement of his edema though over the past few days has had increased generalized weakness.  Labs were sent which were remarkable for a hypokalemia of 2.4, as well as an KAVITA.  He was instructed to come to the emergency department for evaluation.  He has 2+ edema in the lower extremities.    REVIEW OF SYSTEMS  See [facility] for further details. All other systems are negative.     PAST MEDICAL HISTORY   has a past medical history of AK (actinic keratosis) (2016), Anemia, Arrhythmia, Arthritis, Lombardi esophagus, Cancer ([facility]) (), CATARACT, Chronic kidney disease (CKD), stage III (moderate) ([facility]), Colon polyps, Dental disorder, Depression (2016), Diabetes (), GERD (gastroesophageal reflux disease), Hemorrhagic disorder ([facility]), Hyperlipidemia, Hypertension, Infectious disease, Neuropathy ([facility]), Other specified disorder of intestines, Personal history of venous thrombosis and embolism (), Prostate cancer ([facility]) (), PVD (peripheral vascular disease) ([facility]), Recurrent UTI, Sleep apnea, Unspecified urinary incontinence, and Urinary bladder disorder.    SOCIAL HISTORY  Social History     Tobacco Use   • Smoking status: Former Smoker     Packs/day: 2.00     Years: 30.00     Pack years: 60.00     Types: Cigarettes     Quit date: 3/5/1995     Years since quittin.2   • Smokeless tobacco: Never Used   • Tobacco comment: 1.5 ppd 25 yrs,quit    Vaping Use   • Vaping Use: Never used   Substance and Sexual Activity   • Alcohol use: No     Alcohol/week: 0.0 oz   • Drug  use: No   • Sexual activity: Not on file       SURGICAL HISTORY   has a past surgical history that includes angiogram (1/25/2010); aortogram (1/25/2010); prostatectomy robotic (2/2001); insert,inflatable sphincter (2001); sphincter prosthesis removal (6/3/2010); cystoscopy (2/2/2011); sphincter prosthesis placement (2/24/2011); cystoscopy (2/24/2011); groin exploration (3/13/2012); sphincter prosthesis placement (9/26/2012); cataract phaco with iol (2/13/2013); cataract phaco with iol (2/27/2013); colonoscopy with apc (6/5/2013); laparoscopy (5/21/2014); fusion, spine, lumbar, plif (12/13/2012); sphincter prosthesis removal (8/10/2015); femoral endarterectomy (Right, 3/13/2019); iliac angioplasty with stent (Right, 3/13/2019); bowel resection (5/21/2014); lumbar decompression (12/13/2012); other orthopedic surgery (Left); orif, ankle (Right, 10/28/2015); and femoral endarterectomy (Right, 2/10/2020).    CURRENT MEDICATIONS  Home Medications    **Home medications have not yet been reviewed for this encounter**         ALLERGIES  Allergies   Allergen Reactions   • Augmentin Unspecified     Bleeding (rectal)  RXN=5 years ago   • Latex Unspecified     Blisters  RXN=ongoing   • Tape Rash     Rash-blisters-paper tape okay  RXN=ongoing       FAMILY HISTORY  No pertinent family history    PHYSICAL EXAM   BP (!) 147/43   Pulse 82   Temp 36.6 °C (97.9 °F) (Temporal)   Resp 18   Ht 1.829 m (6')   Wt 70.3 kg (155 lb)   SpO2 93%   BMI 21.02 kg/m²  @BEAR[[id number]::@   Pulse ox interpretation: I interpret this pulse ox as normal.  VITALS - vital signs documented prior to this note have been reviewed and noted,  GENERAL - awake, alert, oriented, GCS 15, no apparent distress, non-toxic  appearing  HEENT - normocephalic, atraumatic, pupils equal, sclera anicteric, mucus  membranes moist  NECK - supple, no meningismus, full active range of motion, trachea midline  CARDIOVASCULAR - regular rate/rhythm, no  murmurs/gallops/rubs  PULMONARY - no respiratory distress, speaking in full sentences, clear to  auscultation bilaterally, no wheezing/ronchi/rales, no accessory muscle use  GASTROINTESTINAL - soft, non-tender, non-distended, no rebound, guarding,  or peritonitis  GENITOURINARY - Deferred  NEUROLOGIC - Awake alert, normal mental status, speech fluid, cognition  normal, moves all extremities  MUSCULOSKELETAL - no obvious asymmetry or deformities present  EXTREMITIES - warm, well-perfused, no cyanosis or 2 plus edema in the lower extremities  DERMATOLOGIC - warm, dry, no rashes, no jaundice  PSYCHIATRIC - normal affect, normal insight, normal concentration          EKG: EKG obtained at 0 44 shows a sinus rhythm with a right bundle branch block nonspecific T wave changes there is a prolonged QTc interval otherwise normal axis, normal FL.  Interpretation is sinus with right bundle, nonspecific T wave changes and a prolonged QTC there is no STEMI      LABS      Labs Reviewed   MAGNESIUM   BASIC METABOLIC PANEL         Pertinent Labs & Imaging studies reviewed. (See chart for details)    RADIOLOGY  No orders to display             ED COURSE/PROCEDURES          Medications - No data to display            MEDICAL DECISION MAKING    Patient presented for evaluation of hypokalemia.  Patient recently increased his Lasix dose secondary to increasing peripheral edema, and over the past few days has had generalized weakness had outpatient labs sent which were remarkable for a fairly significant hypokalemia as well as a worsening renal insufficiency so was instructed to come to the emergency department.  Repeat labs were significant for a hypokalemia, with an underlying increased creatinine, likely secondary to the patient's increased Lasix use.  Given the renal insufficiency in conjunction with the hypokalemia and EKG changes will admit to medicine for further care and evaluation of above.          FINAL IMPRESSION  1.   Hypokalemia   2.  Renal insufficiency  3.  Hyperglycemia  4.  Prolonged QTC         Electronically signed by: Girma Kenny D.O., 6/7/2022 11:25 PM      Dictation Disclaimer  Please note this report has been produced using speech recognition software and  may contain errors related to that system, including errors seen in grammar,  punctuation and spelling, as well as words and phrases that may be inappropriate.  If there are any questions or concerns, please feel free to contact the dictating  physician for clarification.

## 2022-06-08 NOTE — PROGRESS NOTES
.After hours phone call  Call on 6/7/2022 at 9:29 PM from Selvin Braga [phone] (home) [phone] (mobile) who reports PCP: Jackie Oquendo M.D..    Called by [facility] for critical lab value of potassium 2.5.   Reviewed chart notes - pt recently had furosemide increased from 40mg to 80mg bid, on potassium 10meq bid. Labs drawn for fatigue, weakness. Also noted KAVITA on today's lab - GFR typically in 30s-40s and today is 25.     Plan:   Patient could not be reached at listed phone number and needed to call son (emergency contact) for updated phone number.   Pt was reached at [phone]. Identity confirmed with 2 identifiers. He was informed of critical labs and that this puts him at risk of cardiac events. He was advised to present to the emergency room for urgent correction of his electrolyte abnormalities and evaluation of his KAVITA. He states he will call an ambulance to get to the ER.       Cira Schwarz M.D.

## 2022-06-08 NOTE — ASSESSMENT & PLAN NOTE
- Significant peripheral edema patient says it is markedly improved with at least a 10 pound water weight loss  -I do think it is okay to avoid this morning's dose of Lasix  -He did have an echocardiogram done in January with a preserved ejection fraction and grade 1 diastolic dysfunction, I do not think this needs to be repeated at this time

## 2022-06-08 NOTE — PROGRESS NOTES
4 Eyes Skin Assessment Completed by ONEIDA Tierney and ONEIDA Barnett.    Head WDL  Ears WDL  Nose WDL  Mouth WDL  Neck WDL  Breast/Chest WDL  Shoulder Blades WDL  Spine WDL  (R) Arm/Elbow/Hand WDL  (L) Arm/Elbow/Hand WDL  Abdomen WDL  Groin WDL  Scrotum/Coccyx/Buttocks Redness, Blanching and Excoriation  (R) Leg WDL  (L) Leg WDL  (R) Heel/Foot/Toe Redness and Blanching  (L) Heel/Foot/Toe Redness and Blanching          Devices In Places Tele Box, Condom Cath and Nasal Cannula      Interventions In Place N/A    Possible Skin Injury No    Pictures Uploaded Into Epic N/A  Wound Consult Placed N/A  RN Wound Prevention Protocol Ordered Yes

## 2022-06-08 NOTE — ED NOTES
Third bag of KCL started pt tolerating well no s/s of acute distress pt reports he is feeling more comfortable after moving beds. Lights dimmed per pt request.

## 2022-06-08 NOTE — HOSPITAL COURSE
"Per notes, \"86 y.o. male who presented 6/7/2022 with abnormal labs.  Patient was called earlier and told he needed to come to the emergency department due to his low potassium as well as his worsening kidney function.  Patient did have significant lower extremity edema, this is chronic but has been worsening weeks ago and so he was transition from 40 mg of oral Lasix twice a day to 80 mg twice a day.  Patient has chronic kidney disease.  Upon repeat lab work he was noted to have significant worsening of his kidney function as well as significant hypokalemia.  Patient does complain of fatigue/weakness.  I did discuss the case including labs and imaging with the ER physician.\"  "

## 2022-06-08 NOTE — DISCHARGE PLANNING
PC to HOMER Desir who said that pt most likely to use his power scooter most of the time. He is able to shower self and dress. But unable to perform IADLs. Pt lives at [facility].     CM talked to Harvey who is the excecutive director and he said that [facility] is basically an apartment service with a restaurant,bar,housekeeping and transportation. They do not provide any services. They clean pt apartment once a week. If pt wound need caregivers then he would need to set this up himself.     If pt is weak then he may need to go to SNF first before returning to the [facility].. Or  services whichever is appropriate.       Care Transition Team Assessment    Information Source  Orientation Level: Unable to assess  Information Given By: Other (Comments) (Daughter in law)  Informant's Name: Karlee  Who is responsible for making decisions for patient? : Patient    Readmission Evaluation  Is this a readmission?: No    Elopement Risk  Legal Hold: No  Ambulatory or Self Mobile in Wheelchair: No-Not an Elopement Risk    Interdisciplinary Discharge Planning  Does Admitting Nurse Feel This Could be a Complex Discharge?: No  Primary Care Physician: Dr Oquendo  Lives with - Patient's Self Care Capacity: Other (Comments) (Lives in Assisted Living)  Support Systems: Children  Housing / Facility: Assisted Living Residence  Name of Care Facility: [facility] ([id number])  Do You Take your Prescribed Medications Regularly: Yes  Able to Return to Previous ADL's: Yes  Mobility Issues: Yes  Prior Services: Intermittent Physical Support for ADL Per Service  Patient Prefers to be Discharged to:: [facility]  Assistance Needed: Yes  Durable Medical Equipment: Walker, Other - Specify (Motorized Scooter)    Discharge Preparedness  What is your plan after discharge?: Uncertain - pending medical team collaboration  What are your discharge supports?: Child  Prior Functional Level: Needs Assist with  Activities of Daily Living, Needs Assist with Medication Management, Uses Walker  Difficulity with ADLs: Bathing, Dressing, Toileting  Difficulity with IADLs: Cooking, Driving, Laundry, Shopping    Functional Assesment  Prior Functional Level: Needs Assist with Activities of Daily Living, Needs Assist with Medication Management, Uses Walker    Finances  Financial Barriers to Discharge: No  Prescription Coverage: Yes    Vision / Hearing Impairment  Vision Impairment : Yes  Hearing Impairment : No    Values / Beliefs / Concerns  Values / Beliefs Concerns : No    Advance Directive  Advance Directive?: POLST, Living Will    Domestic Abuse  Have you ever been the victim of abuse or violence?: No    Psychological Assessment  History of Substance Abuse: None    Discharge Risks or Barriers  Discharge risks or barriers?: Post-acute placement / services    Anticipated Discharge Information  Discharge Disposition: D/T to home under A care in anticipation of covered skilled care (06)

## 2022-06-08 NOTE — ED NOTES
Pt requested to have his leg bag switched to a standard drainage bag, urine draining to gravity pt helped to reposition. Call light w/in reach no s/s of acute distress pending room for admission

## 2022-06-08 NOTE — ASSESSMENT & PLAN NOTE
- Significant, due to increased Lasix dose  -I am unsure how much potassium he will need since his kidney function is worsening  -I am going to give 40 mill equivalents of IV Lasix  -Repeat BMP later this morning to further evaluate how much potassium he will need and how much Lasix he can be placed on  -I do feel the patient needs to be admitted at this time, if he was to be sent home, his kidney function will continue to worsen and is hypokalemia may as well or he may develop hyperkalemia causing cardiac disturbance eventually  -I do anticipate patient requiring more than 2 midnights in the hospital

## 2022-06-08 NOTE — ED NOTES
PT provided with sugar free jello and sugar free vanilla pudding bed in lowest position no s/s of acute distress

## 2022-06-08 NOTE — CASE COMMUNICATION
noted  ----- Message -----  From: Gia Hinojosa R.N.  Sent: 6/7/2022   6:03 PM PDT  To: Dorothy Mccabe R.N., Danielle Bass R.N., *       alert and oriented x4 denies pain. staes he had been losing weight 156.2 lbs today as reported.. reported of no energy , denies chest pains or breathing problems. states he spoke with Dr Oquendo and aware that he has been losing weight , no energy . he was told to continou same medications for now , but he stopped taking trazadone..states it doesn't really help him.. pt has new precription ferrous sulfate but hasn't arrived yet by mail.  no dressing noted lower extremities , sates he removed it had taken shower thus morning. wound care done to anna lower extremitieswith compression wrappings. states swelling has decreased to both legs   reportd that fasting blood sugar was 264mg/dl. denies s/s of hyperglyccemia.  pt also told the  nurse that Dr Oquendo ordered some blood work. per [facility] pt has lab order including urine.he  venipuncture done x1 to right anticubital area with butterfly ga23. pressure dressing applied. no active bleeding noted. unable to give urine sample for now. states to get urine sample next [facility]. lab speciemns transported to [facility] lab.. instructed on UTI prevention and s/s to report, safety/fall precautions . Pt/Cg response to the services  provided: voiced understanding

## 2022-06-08 NOTE — ASSESSMENT & PLAN NOTE
- Due to increase in Lasix  -For now, I am going to hold his Lasix and give one-time dose of IV potassium and reevaluate his labs later this morning  -He will likely need his home Lasix restarted and may need additional potassium but I would like to see improvement before that is done  -Patient continues to have good urine output  -Avoid nephrotoxins

## 2022-06-08 NOTE — ED NOTES
Morning medications given pt in stable condition, no s/s of acute distress bed in lowest position call light w/in reach pt has no further needs at this time  Lights turned on for pt comfort

## 2022-06-09 PROBLEM — E87.6 HYPOKALEMIA: Status: RESOLVED | Noted: 2022-01-01 | Resolved: 2022-01-01

## 2022-06-09 NOTE — Clinical Note
I agree with change  ----- Message -----  From: Yudelka Perez R.N.  Sent: 6/9/2022   8:08 AM PDT  To: Dorothy Mccabe R.N.      Quality Review for 6.7.22 Transfer OASIS performed on by GAGANDEEP Perez RN on 6.92022:    Edits completed by GAGANDEEP Perez RN:  1. Changed  a to na per POC

## 2022-06-09 NOTE — CASE COMMUNICATION
Quality Review for 6.7.22 Transfer OASIS performed on by GAGANDEEP Perez RN on 6.92022:    Edits completed by GAGANDEEP Perez RN:  1. Changed  a to na per POC

## 2022-06-09 NOTE — PROGRESS NOTES
Telemetry Shift Summary     Rhythm: SR  HR Range: 70-90  Ectopy: fPVC, fTRIG, rCOUP, fBIGEM  Measurements: 0.20/0.12/0.40           Normal Values  Rhythm SR  HR Range    Measurements 0.12-0.20 / 0.06-0.10  / 0.30-0.52

## 2022-06-09 NOTE — DISCHARGE INSTRUCTIONS
Discharge Instructions    Discharged to home by car with relative. Discharged via wheelchair, hospital escort: Yes.  Special equipment needed: Not Applicable    Be sure to schedule a follow-up appointment with your primary care doctor or any specialists as instructed.     Discharge Plan:   Diet Plan: Discussed  Activity Level: Discussed  Confirmed Follow up Appointment: Appointment Scheduled  Confirmed Symptoms Management: Discussed  Medication Reconciliation Updated: Yes    I understand that a diet low in cholesterol, fat, and sodium is recommended for good health. Unless I have been given specific instructions below for another diet, I accept this instruction as my diet prescription.   Other diet: consistent carbohydrate diet    Special Instructions: None    Is patient discharged on Warfarin / Coumadin?   No     Depression / Suicide Risk    As you are discharged from this [facility] facility, it is important to learn how to keep safe from harming yourself.    Recognize the warning signs:  Abrupt changes in personality, positive or negative- including increase in energy   Giving away possessions  Change in eating patterns- significant weight changes-  positive or negative  Change in sleeping patterns- unable to sleep or sleeping all the time   Unwillingness or inability to communicate  Depression  Unusual sadness, discouragement and loneliness  Talk of wanting to die  Neglect of personal appearance   Rebelliousness- reckless behavior  Withdrawal from people/activities they love  Confusion- inability to concentrate     If you or a loved one observes any of these behaviors or has concerns about self-harm, here's what you can do:  Talk about it- your feelings and reasons for harming yourself  Remove any means that you might use to hurt yourself (examples: pills, rope, extension cords, firearm)  Get professional help from the community (Mental Health, Substance Abuse, psychological counseling)  Do not be alone:Call  your Safe Contact- someone whom you trust who will be there for you.  Call your local CRISIS HOTLINE 087-0617 or 959-039-0549  Call your local Children's Mobile Crisis Response Team [place] (174) 711-0906 or www.Dandelion  Call the toll free National Suicide Prevention Hotlines   National Suicide Prevention Lifeline 785-913-JYCI (6947)  [place] [facility] Network 800-SUICIDE (232-8351)    Hypokalemia  Hypokalemia means a low potassium level in the blood. Symptoms may include muscle weakness and cramping, fatigue, abdominal pain, vomiting, constipation, or irregularities of the heartbeat. Sometimes hypokalemia is discovered by your caregiver if you are taking certain medicines for high blood pressure or kidney disease.   Potassium is an electrolyte that helps regulate the amount of fluid in the body. It also stimulates muscle contraction and maintains a stable acid-base balance. If potassium levels go too low or too high, your health may be in danger. You are at risk for developing shock, heart, and lung problems. Hypokalemia can occur if you have excessive diarrhea, vomiting, or sweating. Potassium can be lost through your kidneys in the urine. Certain common medicines can also cause potassium loss, especially water pills (diuretics). The same is possible with cortisone medications or certain types of antibiotics. Low potassium can be dangerous if you are taking certain heart medicines. In diabetes, your potassium may fall after you take insulin, especially if your diabetes had been out of control for a while. In rare cases, potassium may be low because you are not getting enough in your diet.   In adults, a potassium level below 3.5 mEq/L is usually considered low.  Hypokalemia can be treated with potassium supplements taken by mouth and a diet that is high in potassium. Foods with high potassium content are:  Peas, lentils, lima beans, nuts, and dried fruit.   Whole grain and bran cereals and breads.    Fresh fruit and vegetables. Examples include:   Bananas.   Cantaloupe.   Grapefruit.   Oranges.   Tomatoes.   Honeydew melons.   Potatoes.   Peaches.   Orange and tomato juices.   Meats.   See your caregiver as instructed for a follow-up blood test to be sure your potassium is back to normal.  SEEK MEDICAL CARE IF:   You have nausea, vomiting, constipation, or abdominal pain.   You have palpitations or irregular heartbeats, chest pain or shortness of breath.   You have muscle cramps or weakness or fatigue.   You have lethargy.   SEEK IMMEDIATE MEDICAL CARE IF:   You have paralysis.   You have confusion or other mental status changes.   Document Released: 12/18/2006 Document Revised: 03/11/2013 Document Reviewed: 04/13/2011  [employer]® Patient Information ©2013 [employer].    Acute Kidney Injury, Adult    Acute kidney injury is a sudden worsening of kidney function. The kidneys are organs that have several jobs. They filter the blood to remove waste products and extra fluid. They also maintain a healthy balance of minerals and hormones in the body, which helps control blood pressure and keep bones strong. With this condition, your kidneys do not do their jobs as well as they should.  This condition ranges from mild to severe. Over time it may develop into long-lasting (chronic) kidney disease. Early detection and treatment may prevent acute kidney injury from developing into a chronic condition.  What are the causes?  Common causes of this condition include:  A problem with blood flow to the kidneys. This may be caused by:  Low blood pressure (hypotension) or shock.  Blood loss.  Heart and blood vessel (cardiovascular) disease.  Severe burns.  Liver disease.  Direct damage to the kidneys. This may be caused by:  Certain medicines.  A kidney infection.  Poisoning.  Being around or in contact with toxic substances.  A surgical wound.  A hard, direct hit to the kidney area.  A sudden blockage of urine flow. This may  be caused by:  Cancer.  Kidney stones.  An enlarged prostate in males.  What are the signs or symptoms?  Symptoms of this condition may not be obvious until the condition becomes severe. Symptoms of this condition can include:  Tiredness (lethargy), or difficulty staying awake.  Nausea or vomiting.  Swelling (edema) of the face, legs, ankles, or feet.  Problems with urination, such as:  Abdominal pain, or pain along the side of your stomach (flank).  Decreased urine production.  Decrease in the force of urine flow.  Muscle twitches and cramps, especially in the legs.  Confusion or trouble concentrating.  Loss of appetite.  Fever.  How is this diagnosed?  This condition may be diagnosed with tests, including:  Blood tests.  Urine tests.  Imaging tests.  A test in which a sample of tissue is removed from the kidneys to be examined under a microscope (kidney biopsy).  How is this treated?  Treatment for this condition depends on the cause and how severe the condition is. In mild cases, treatment may not be needed. The kidneys may heal on their own. In more severe cases, treatment will involve:  Treating the cause of the kidney injury. This may involve changing any medicines you are taking or adjusting your dosage.  Fluids. You may need specialized IV fluids to balance your body's needs.  Having a catheter placed to drain urine and prevent blockages.  Preventing problems from occurring. This may mean avoiding certain medicines or procedures that can cause further injury to the kidneys.  In some cases treatment may also require:  A procedure to remove toxic wastes from the body (dialysis or continuous renal replacement therapy - CRRT).  Surgery. This may be done to repair a torn kidney, or to remove the blockage from the urinary system.  Follow these instructions at home:  Medicines  Take over-the-counter and prescription medicines only as told by your health care provider.  Do not take any new medicines without your  health care provider's approval. Many medicines can worsen your kidney damage.  Do not take any vitamin and mineral supplements without your health care provider's approval. Many nutritional supplements can worsen your kidney damage.  Lifestyle  If your health care provider prescribed changes to your diet, follow them. You may need to decrease the amount of protein you eat.  Achieve and maintain a healthy weight. If you need help with this, ask your health care provider.  Start or continue an exercise plan. Try to exercise at least 30 minutes a day, 5 days a week.  Do not use any tobacco products, such as cigarettes, chewing tobacco, and e-cigarettes. If you need help quitting, ask your health care provider.  General instructions  Keep track of your blood pressure. Report changes in your blood pressure as told by your health care provider.  Stay up to date with immunizations. Ask your health care provider which immunizations you need.  Keep all follow-up visits as told by your health care provider. This is important.  Where to find more information  American Association of Kidney Patients: www.aakp.org  National Kidney Foundation: www.kidney.org  American Kidney Fund: www.akfinc.org  Life [employer] [facility]:  www.lifeoptions.org  www.kidneyschool.org  Contact a health care provider if:  Your symptoms get worse.  You develop new symptoms.  Get help right away if:  You develop symptoms of worsening kidney disease, which include:  Headaches.  Abnormally dark or light skin.  Easy bruising.  Frequent hiccups.  Chest pain.  Shortness of breath.  End of menstruation in women.  Seizures.  Confusion or altered mental status.  Abdominal or back pain.  Itchiness.  You have a fever.  Your body is producing less urine.  You have pain or bleeding when you urinate.  Summary  Acute kidney injury is a sudden worsening of kidney function.  Acute kidney injury can be caused by problems with blood flow to the kidneys,  direct damage to the kidneys, and sudden blockage of urine flow.  Symptoms of this condition may not be obvious until it becomes severe. Symptoms may include edema, lethargy, confusion, nausea or vomiting, and problems passing urine.  This condition can usually be diagnosed with blood tests, urine tests, and imaging tests. Sometimes a kidney biopsy is done to diagnose this condition.  Treatment for this condition often involves treating the underlying cause. It is treated with fluids, medicines, dialysis, diet changes, or surgery.  This information is not intended to replace advice given to you by your health care provider. Make sure you discuss any questions you have with your health care provider.  Document Released: 07/02/2012 Document Revised: 11/30/2018 Document Reviewed: 12/08/2017  [employer] Patient Education © 2020 [employer].

## 2022-06-09 NOTE — CARE PLAN
The patient is Stable - Low risk of patient condition declining or worsening         Progress made toward(s) clinical / shift goals:    Problem: Knowledge Deficit - Standard  Goal: Patient and family/care givers will demonstrate understanding of plan of care, disease process/condition, diagnostic tests and medications  Outcome: Progressing  Note: Discussed POC with patient denies questions at this time.      Problem: Urinary Elimination  Goal: Establish and maintain regular urinary output  Outcome: Progressing  Note: Monitoring I/Os in flowsheets        Patient is not progressing towards the following goals:

## 2022-06-09 NOTE — TELEPHONE ENCOUNTER
[facility] received today. [facility] sent through [facility] to be scanned into chart.

## 2022-06-09 NOTE — PROGRESS NOTES
Telemetry Shift Summary     Rhythm: SR  HR: 70-86  Ectopy: rPVC, Coup    Measurements: 0.18/0.14/0.40    Normal Values  Rhythm: SR  HR:   Measurements: 0.12-0.20/0.08-0.10/0.30-0.52

## 2022-06-10 PROBLEM — E87.6 HYPOKALEMIA: Status: RESOLVED | Noted: 2022-01-01 | Resolved: 2022-01-01

## 2022-06-10 NOTE — CARE PLAN
The patient is Stable - Low risk of patient condition declining or worsening    Shift Goals  Clinical Goals: Monitor potassium levels, blood glucose, and urine output.  Patient Goals: sleep    Progress made toward(s) clinical / shift goals:      Problem: Knowledge Deficit - Standard  Goal: Patient and family/care givers will demonstrate understanding of plan of care, disease process/condition, diagnostic tests and medications  Outcome: Progressing  Note: Pt updated on POC, blood glucose monitoring, use of condom catheter, and orthostatic hypotension.      Problem: Fall Risk  Goal: Patient will remain free from falls  Outcome: Progressing  Note: Pt positive for orthostatic hypotension, calls when he needs assistance.      Problem: Communication  Goal: The ability to communicate needs accurately and effectively will improve  Outcome: Progressing  Note: Pt communicates needs, and his feeling about hospitalization.      Problem: Respiratory  Goal: Patient will achieve/maintain optimum respiratory ventilation and gas exchange  Outcome: Progressing  Flowsheets (Taken 6/9/2022 2311)  O2 Delivery Device: Silicone Nasal Cannula  Note: Pt will need home O2 upon D/C.     Problem: Fluid Volume  Goal: Fluid volume balance will be maintained  Outcome: Progressing  Note: I/Os tracked.      Problem: Urinary Elimination  Goal: Establish and maintain regular urinary output  Outcome: Progressing  Note: I/O monitored, condom catheter in place.        Patient is not progressing towards the following goals:

## 2022-06-10 NOTE — DISCHARGE PLANNING
ATTN: Case Management  RE: Referral for Home Health    As of 6/10/2022, we have accepted the Home Health referral for the patient listed above.    A [facility] clinician will be out to see the patient within 48 hours. If you have any questions or concerns regarding the patient's transition to Home Health, please do not hesitate to contact us at [phone].      We look forward to collaborating with you,  [facility] Health Team

## 2022-06-10 NOTE — CASE COMMUNICATION
I agree with changes  ----- Message -----  From: Yudelka Perez R.N.  Sent: 6/9/2022  10:28 AM PDT  To: Dorothy Mccabe R.N.  Subject: RE:                                                Oren De La Cruz, please try again. It did not save to chart. Thank you.  ----- Message -----  From: Dorohty Mccabe R.N.  Sent: 6/9/2022  10:24 AM PDT  To: Yudelka Perez R.N.  Subject: RE:                                              I agree with change  ----- Messa ge -----  From: Yudelka Perez R.N.  Sent: 6/9/2022   8:08 AM PDT  To: Dorothy Mccabe R.N.      Quality Review for 6.7.22 Transfer OASIS performed on by GAGANDEEP Perez RN on 6.92022:    Edits completed by GAGANDEEP Perez RN:  1. Changed  a to na per POC

## 2022-06-10 NOTE — PROGRESS NOTES
"Hospital Medicine Daily Progress Note    Date of Service  6/9/2022    Chief Complaint  Selvin Braga is a 86 y.o. male admitted 6/7/2022 with abnormal labs     Hospital Course  Per notes, \"86 y.o. male who presented 6/7/2022 with abnormal labs.  Patient was called earlier and told he needed to come to the emergency department due to his low potassium as well as his worsening kidney function.  Patient did have significant lower extremity edema, this is chronic but has been worsening weeks ago and so he was transition from 40 mg of oral Lasix twice a day to 80 mg twice a day.  Patient has chronic kidney disease.  Upon repeat lab work he was noted to have significant worsening of his kidney function as well as significant hypokalemia.  Patient does complain of fatigue/weakness.  I did discuss the case including labs and imaging with the ER physician.\"      Interval Problem Update  6/8: No complaints at time of examination, patient still having hypokalemia.  Will need to continue to monitor overnight with close monitoring of telemetry.     6/9: Having symptomatic orthostatic hypotension, still with hypokalemia.      I have personally seen and examined the patient at bedside. I discussed the plan of care with patient.    Consultants/Specialty  None    Code Status  DNAR/DNI    Disposition  Patient is not medically cleared for discharge.   Anticipate discharge to to home with close outpatient follow-up.  I have placed the appropriate orders for post-discharge needs.    Review of Systems  Review of Systems   Respiratory: Positive for shortness of breath.    Cardiovascular: Positive for leg swelling.   All other systems reviewed and are negative.       Physical Exam  Temp:  [36.2 °C (97.1 °F)-36.9 °C (98.4 °F)] 36.2 °C (97.1 °F)  Pulse:  [74-90] 89  Resp:  [17-18] 18  BP: ()/(30-97) 136/44  SpO2:  [84 %-100 %] 97 %    Physical Exam  Vitals and nursing note reviewed.   Constitutional:       Appearance: Normal " appearance.   Cardiovascular:      Rate and Rhythm: Normal rate and regular rhythm.      Pulses: Normal pulses.      Heart sounds: Normal heart sounds.   Pulmonary:      Effort: Pulmonary effort is normal.      Breath sounds: Normal breath sounds.   Abdominal:      General: Abdomen is flat. Bowel sounds are normal.      Palpations: Abdomen is soft.   Musculoskeletal:      Right lower leg: No edema.      Left lower leg: No edema.   Skin:     General: Skin is warm and dry.   Neurological:      General: No focal deficit present.      Mental Status: He is alert and oriented to person, place, and time. Mental status is at baseline.         Fluids    Intake/Output Summary (Last 24 hours) at 6/9/2022 1924  Last data filed at 6/9/2022 1845  Gross per 24 hour   Intake 1510 ml   Output 2265 ml   Net -755 ml       Laboratory  Recent Labs     06/07/22  0925 06/08/22  1308   WBC 8.7 8.6   RBC 3.69* 3.40*   HEMOGLOBIN 8.8* 8.2*   HEMATOCRIT 28.2* 26.7*   MCV 76.4* 78.5*   MCH 23.8* 24.1*   MCHC 31.2* 30.7*   RDW 49.9 50.1*   PLATELETCT 291 226   MPV 9.7 8.8*     Recent Labs     06/07/22  2250 06/08/22  1308 06/09/22  0044   SODIUM 135 140 141   POTASSIUM 2.7* 2.8* 3.0*   CHLORIDE 89* 97 98   CO2 30 28 31   GLUCOSE 298* 172* 113*   BUN 92* 79* 81*   CREATININE 2.34* 1.86* 1.79*   CALCIUM 10.0 9.2 9.6                   Imaging  No orders to display        Assessment/Plan  * Hypokalemia- (present on admission)  Assessment & Plan  - Significant, due to increased Lasix dose  -I am unsure how much potassium he will need since his kidney function is worsening  -I am going to give 40 mill equivalents of IV Lasix  -Repeat BMP later this morning to further evaluate how much potassium he will need and how much Lasix he can be placed on  -I do feel the patient needs to be admitted at this time, if he was to be sent home, his kidney function will continue to worsen and is hypokalemia may as well or he may develop hyperkalemia causing cardiac  disturbance eventually  -I do anticipate patient requiring more than 2 midnights in the hospital    Peripheral edema- (present on admission)  Assessment & Plan  - Significant peripheral edema patient says it is markedly improved with at least a 10 pound water weight loss  -I do think it is okay to avoid this morning's dose of Lasix  -He did have an echocardiogram done in January with a preserved ejection fraction and grade 1 diastolic dysfunction, I do not think this needs to be repeated at this time    Acute renal failure superimposed on stage 3a chronic kidney disease (HCC)- (present on admission)  Assessment & Plan  - Due to increase in Lasix  -For now, I am going to hold his Lasix and give one-time dose of IV potassium and reevaluate his labs later this morning  -He will likely need his home Lasix restarted and may need additional potassium but I would like to see improvement before that is done  -Patient continues to have good urine output  -Avoid nephrotoxins      Type 2 diabetes mellitus with diabetic polyneuropathy, without long-term current use of insulin ([facility])- (present on admission)  Assessment & Plan  - With hyperglycemia  -Continue home dose of long-acting  -Start insulin sliding scale  -Adjust as needed    Dyslipidemia- (present on admission)  Assessment & Plan  - Continue home statin    Microcytic anemia- (present on admission)  Assessment & Plan  - No sign of gross bleeding  -Anemia is chronic  -Repeat CBC in the morning    Depression- (present on admission)  Assessment & Plan  - Continue home Prozac    Essential hypertension- (present on admission)  Assessment & Plan  - Continue home Coreg  -Start as needed hydralazine  -Adjust as needed       VTE prophylaxis: SCDs/TEDs    I have performed a physical exam and reviewed and updated ROS and Plan today (6/9/2022). In review of yesterday's note (6/8/2022), there are no changes except as documented above.

## 2022-06-10 NOTE — DISCHARGE PLANNING
Received Choice form at 1254  Agency/Facility Name: [facility]  Referral sent per Choice form @ 1358     Received Choice form at 1259  Ag[facility]Facility [facility]  Referral sent per Choice form @ 1353     Agency[facility]Facility Name[facility]  Spoke To: Smita  Outcome: Per Smita, O2 is being delivered at this time.  RN CM notified

## 2022-06-10 NOTE — DISCHARGE SUMMARY
"Discharge Summary    CHIEF COMPLAINT ON ADMISSION  Chief Complaint   Patient presents with   • Weakness   • Abnormal Labs     Low K 2.4       Reason for Admission  EMS     Admission Date  6/7/2022    CODE STATUS  DNAR/DNI    HPI & HOSPITAL COURSE  Per notes, \"86 y.o. male who presented 6/7/2022 with abnormal labs.  Patient was called earlier and told he needed to come to the emergency department due to his low potassium as well as his worsening kidney function.  Patient did have significant lower extremity edema, this is chronic but has been worsening weeks ago and so he was transition from 40 mg of oral Lasix twice a day to 80 mg twice a day.  Patient has chronic kidney disease.  Upon repeat lab work he was noted to have significant worsening of his kidney function as well as significant hypokalemia.  Patient does complain of fatigue/weakness.  I did discuss the case including labs and imaging with the ER physician.\"    Patient was admitted and managed for hypokalemia.  He required very close monitoring and replacement of IV electrolytes.  He did also initially have some symptomatic orthostatic hypotension, which improved with fluids.  He was cleared for monitor hospitalization.  He did have some hypoxic episodes at night, likely related to sleep apnea and underlying edema, he underwent a walking oxygen test and did require supplemental O2 with ambulation.  This was arranged.  Recommended patient follow-up with PCP for close monitoring.    Therefore, he is discharged in good and stable condition to home with organized home healthcare and close outpatient follow-up.    The patient met 2-midnight criteria for an inpatient stay at the time of discharge.    Discharge Date  6/10/2022    FOLLOW UP ITEMS POST DISCHARGE  FU with pcp     DISCHARGE DIAGNOSES  Principal Problem (Resolved):    Hypokalemia POA: Yes  Active Problems:    Essential hypertension POA: Yes    Depression POA: Yes    Microcytic anemia POA: Yes    " Dyslipidemia POA: Yes    Type 2 diabetes mellitus with diabetic polyneuropathy, without long-term current use of insulin (HCC) POA: Yes    Acute renal failure superimposed on stage 3a chronic kidney disease (HCC) POA: Yes    Peripheral edema POA: Yes      FOLLOW UP  Future Appointments   Date Time Provider Department Center   7/6/2022  3:30 PM Fadi Najjar, M.D. [facility] [address]   11/4/2022 11:20 AM Jackie Oquendo M.D. [address]   11/14/2022 12:45 PM EUGENIE Summers [facility] None     Jackie Oquendo M.D.  [address]  [address]  [place] [address]  [phone]    Schedule an appointment as soon as possible for a visit in 1 week(s)  Hospital Follow-up      MEDICATIONS ON DISCHARGE     Medication List      CHANGE how you take these medications      Instructions   fluocinonide 0.05 % Crea  What changed: See the new instructions.  Commonly known as: LIDEX   APPLY TO AFFECTED AREA TO LEFT SHOULDER AND BACK TWICE A DAY . DO NOT USE ON FACE/ARMPITS/ GROIN.     furosemide 80 MG Tabs  What changed: additional instructions  Commonly known as: LASIX   Take 1 Tablet by mouth 2 times a day.  Dose: 80 mg     potassium chloride ER 10 MEQ tablet  What changed:   · how much to take  · when to take this  Commonly known as: KLOR-CON   Take 2 Tablets by mouth every day for 30 days.  Dose: 20 mEq        CONTINUE taking these medications      Instructions   Alogliptin Benzoate 12.5 MG Tabs   Take 12.5 mg by mouth every day.  Dose: 12.5 mg     ASPIRIN 81 PO   Take 81 mg by mouth every day. Indications: Preventative for heart clots.  Dose: 81 mg     atorvastatin 40 MG Tabs  Commonly known as: LIPITOR   Take 1 Tablet by mouth every evening.  Dose: 40 mg     carvedilol 6.25 MG Tabs  Commonly known as: COREG   TAKE 1 TABLET BY MOUTH TWICE A DAY WITH MEALS     ferrous gluconate 324 (38 Fe) MG Tabs  Commonly known as: FERGON   Take 1 Tablet by mouth every morning with breakfast.  Dose: 324 mg     FLUoxetine 20 MG  Caps  Commonly known as: PROZAC   Take 20 mg by mouth every day.  Dose: 20 mg     glipiZIDE 10 MG Tabs  Commonly known as: GLUCOTROL   Take 10 mg by mouth 2 times a day.  Dose: 10 mg     HYDROCORTISONE (TOPICAL) 1 % Soln   Apply 1 Application topically 1 time a day as needed. PRN for scalp ithc, scalpicin.  Dose: 1 Application     insulin glargine 100 UNIT/ML Soln  Commonly known as: Lantus   Inject 10 Units under the skin every evening.  Dose: 10 Units     loperamide 2 MG Caps  Commonly known as: IMODIUM   Take 2 mg by mouth 4 times a day as needed for Diarrhea.  Dose: 2 mg     metFORMIN 500 MG Tabs  Commonly known as: GLUCOPHAGE   Take 500 mg by mouth 2 times a day with meals.  Dose: 500 mg     methenamine hip 1 GM Tabs  Commonly known as: HIPPREX   Take 1 g by mouth 2 times a day.  Dose: 1 g     metOLazone 5 MG Tabs  Commonly known as: ZAROXOLYN   Take 1 Tablet by mouth every day.  Dose: 5 mg     pantoprazole 20 MG tablet  Commonly known as: PROTONIX   Take 1 Tab by mouth every day.  Dose: 20 mg     PRESERVISION AREDS 2+MULTI VIT PO   Take 1 Cap by mouth 2 Times a Day.  Dose: 1 Capsule     tolnaftate 1 % Crea  Commonly known as: TINACTIN   Apply 1 Application topically 2 times a day as needed (Apply's on athlete's foot).  Dose: 1 Application     traZODone 50 MG Tabs  Commonly known as: DESYREL   Take 25 mg by mouth every evening.  Dose: 25 mg     vitamin B-12 1000 MCG Tabs   Take 1,000 mcg by mouth every day.  Dose: 1,000 mcg     vitamin D 2000 UNIT Tabs   Take 2,000 Units by mouth every day.  Dose: 2,000 Units            Allergies  Allergies   Allergen Reactions   • Augmentin Unspecified     Bleeding (rectal)  RXN=5 years ago   • Latex Unspecified     Blisters  RXN=ongoing   • Tape Rash     Rash-blisters-paper tape okay  RXN=ongoing       DIET  Orders Placed This Encounter   Procedures   • Diet Order Diet: Consistent CHO (Diabetic)     Standing Status:   Standing     Number of Occurrences:   1     Order Specific  Question:   Diet:     Answer:   Consistent CHO (Diabetic) [4]       ACTIVITY  As tolerated.  Weight bearing as tolerated    CONSULTATIONS  None    PROCEDURES  None    LABORATORY  Lab Results   Component Value Date    SODIUM 143 06/10/2022    POTASSIUM 3.3 (L) 06/10/2022    CHLORIDE 102 06/10/2022    CO2 30 06/10/2022    GLUCOSE 140 (H) 06/10/2022    BUN 62 (H) 06/10/2022    CREATININE 1.52 (H) 06/10/2022    CREATININE 1.0 02/08/2005        Lab Results   Component Value Date    WBC 7.4 06/10/2022    HEMOGLOBIN 8.7 (L) 06/10/2022    HEMATOCRIT 29.7 (L) 06/10/2022    PLATELETCT 256 06/10/2022        Total time of the discharge process exceeds 45 minutes.

## 2022-06-10 NOTE — PROGRESS NOTES
Telemetry Shift Summary     Rhythm: SR with BBB  HR Range:  70-85  Ectopy: all  Measurements: 0.20/0.14/0.40           Normal Values  Rhythm SR  HR Range    Measurements 0.12-0.20 / 0.06-0.10  / 0.30-0.52

## 2022-06-10 NOTE — PROGRESS NOTES
Received bedside report from ONEIDA Tierney, pt care assumed. VSS, pt assessment complete. Pt A&Ox4, no c/o  pain at this time. POC discussed with pt and verbalizes no questions. Pt denies any additional needs at this time. Bed locked and in lowest position, bed alarm active. Pt educated on fall risk and verbalized understanding, call light within reach, hourly rounding initiated.

## 2022-06-10 NOTE — FACE TO FACE
Face to Face Supporting Documentation - Home Health    The encounter with this patient was in whole or in part the primary reason for home health admission.    Date of encounter:   Patient:                    MRN:                       YOB: 2021  Selvin Braga  [id number]  1935     Home health to see patient for:  Skilled Nursing care for assessment, interventions & education, Physical Therapy evaluation and treatment and Occupational therapy evaluation and treatment    Skilled need for:  Exacerbation of Chronic Disease State CHF    Skilled nursing interventions to include:  Comment: pt/ot    Homebound status evidenced by:  Require the use of special transportation or Have a condition such that leaving his or her home is medically contraindicated. Leaving home requires a considerable and taxing effort. There is a normal inability to leave the home.    Community Physician to provide follow up care: Jackie Oquendo M.D.     Optional Interventions? No      I certify the face to face encounter for this home health care referral meets the CMS requirements and the encounter/clinical assessment with the patient was, in whole, or in part, for the medical condition(s) listed above, which is the primary reason for home health care. Based on my clinical findings: the service(s) are medically necessary, support the need for home health care, and the homebound criteria are met.  I certify that this patient has had a face to face encounter by myself.  Ranjit Sim M.D. - NPI: [id number]

## 2022-06-10 NOTE — FACE TO FACE
"Face to Face Note  -  Durable Medical Equipment    Ranjit Sim M.D. - NPI: [id number]  I certify that this patient is under my care and that they had a durable medical equipment(DME)face to face encounter by myself that meets the physician DME face-to-face encounter requirements with this patient on:    Date of encounter:   Patient:                    MRN:                       YOB: 2021  Selvin Braga  [id number]  1935     The encounter with the patient was in whole, or in part, for the following medical condition, which is the primary reason for durable medical equipment:  CHF    I certify that, based on my findings, the following durable medical equipment is medically necessary:  Oxygen.    HOME O2 Saturation Measurements:(Values must be present for Home Oxygen orders)  Room air sat at rest: 93  Room air sat with amb: 77  With liters of O2: 2, O2 sat at rest with O2: 97  With Liters of O2: 2, O2 sat with amb with O2 : 92  Is the patient mobile?: Yes    My Clinical findings support the need for the above equipment due to:  Hypoxia    Supporting Symptoms: The patient requires supplemental oxygen, as the following interventions have been tried with limited or no improvement: \"Ambulation with oximetry and \"Incentive spirometry    If patient feels more short of breath, they can go up to 6 liters per minute and contact healthcare provider.  "

## 2022-06-10 NOTE — PROGRESS NOTES
Telemetry Shift Summary     Rhythm SR  HR Range 77- 90  Ectopy R- O PVC, R-O PAC  Measurements  0.20/ 0.14/ 0.40  Per strip printed 0400     Normal Values  Rhythm SR  HR Range    Measurements 0.12-0.20 / 0.06-0.10  / 0.30-0.52

## 2022-06-10 NOTE — PROGRESS NOTES
Telemetry Shift Summary    Rhythm SR  HR Range 75-90  Ectopy OCC-R PVC, rPAC, 3.5SEC RUN PSVT UP   Measurements 0.18/0.14/0.40          Normal Values  Rhythm SR  HR Range    Measurements 0.12-0.20 / 0.06-0.10  / 0.30-0.52

## 2022-06-10 NOTE — DISCHARGE PLANNING
Case Management Discharge Planning    Admission Date: 6/7/2022  GMLOS: 2.6  ALOS: 2    6-Clicks ADL Score: 24  6-Clicks Mobility Score: 17  PT and/or OT Eval ordered: No  Post-acute Referrals Ordered: Yes  Post-acute Choice Obtained: Yes  Has referral(s) been sent to post-acute provider:  NA      Anticipated Discharge Dispo: Discharge Disposition: D/T to home under HHA care in anticipation of covered skilled care (06)    DME Needed: Yes    DME Ordered: Yes    Action(s) Taken: Spoke to pt at bedside. Per pt he is on service with [facility]. Received verbal approval from pt for HH with [facility] and DME O2 with [facility]. Choice forms completed and faxed to [facility].    Escalations Completed: None    Medically Clear: Yes    Next Steps: f/u with [facility] regarding HH resumption/acceptance and O2 acceptance    Barriers to Discharge: DME and Outpatient referrals pending    Is the patient up for discharge tomorrow: No, today

## 2022-06-12 NOTE — CASE COMMUNICATION
Agree with changes.  ----- Message -----  From: Yudelka Perez R.N.  Sent: 6/6/2022   3:10 PM PDT  To: Miguel Cross R.N.      Quality Review for 5.29.22 SOC OASIS performed on by GAGANDEEP Perez RN on 6.6.22022:    Edits completed by GAGANDEEP Perez RN:  1. Changed  to 5.29.22 per LSOC order  2. Changed flu question to na, it is not between Oct and March  3. Changed  to 5 per narrative  4. Changed  to 5, pt would need showering DME  to safely perform. Changed , ,  and  to 2 per [id number] C-H response of 4  5. Changed  and  to 3 per ambulation score   6. Added fall risk, high risk medications to safety measures. Added heart healthy diet to nutritional requirements per dxs  7.  Updated F2F data  8. Resolved pressure ulcer prevention from care plan. Ilia was 20.

## 2022-06-12 NOTE — Clinical Note
Primary dx/Skilled need: Venous insufficiency, debility   SN frequency: 2w5, 3PRN   Zip code: [address]  Disciplines ordered: SN, PT, OT, SLP   Insurance & authorization: SCP   Certification period: 5/29-----7/27   Special considerations: RESUMPTION

## 2022-06-12 NOTE — Clinical Note
Opened patient to [facility], [facility] rec completed, there is ONE MAJOR DRUG TO DRUG INTERACTIONS: Drug-Drug: FLUoxetine and traZODone   Additive serotonergic effects may occur during coadministration of selective serotonin reuptake inhibitors (SSRIs) and traZODone, and the risk of developing serotonin syndrome may be increased.   Details Major   fluoxetine (PROZAC) 20 MG CAPS     traZODone (DESYREL) 50 MG Tab

## 2022-06-13 NOTE — PROGRESS NOTES
6/13/22 9:45am:  Nurse CM post discharge outreach call first attempt.  No answer at home number. Nurse unable to leave a message as a screening service was on telephone and nurse unable to leave a VM.    6/14/22 10:25am:  CM post discharge outreach call second attempt.  VM left on home number requesting call back to nurse ENDY at [phone].

## 2022-06-13 NOTE — PROGRESS NOTES
Medication chart review for [facility] services        Current medication list     Current Outpatient Medications:   •  Home Care Oxygen, 2 L/min, Inhalation, Continuous  •  potassium chloride ER, 20 mEq, Oral, DAILY  •  insulin glargine, 10 Units, Subcutaneous, Q EVENING  •  ferrous gluconate, 324 mg, Oral, QDAY with Breakfast  •  HYDROCORTISONE (TOPICAL), 1 Application, Topical, QDAY PRN  •  tolnaftate, 1 Application, Topical, BID PRN  •  methenamine hip, 1 g, Oral, BID  •  furosemide, 80 mg, Oral, BID (Patient taking differently: 80 mg, Oral, 2 TIMES DAILY, Pt takes @ 0600 and 1200)  •  metOLazone, 5 mg, Oral, DAILY  •  traZODone, 25 mg, Oral, Nightly  •  atorvastatin, 40 mg, Oral, Q EVENING  •  carvedilol, TAKE 1 TABLET BY MOUTH TWICE A DAY WITH MEALS (Patient taking differently: 6.25 mg, Oral, 2 TIMES DAILY WITH MEALS)  •  fluocinonide, APPLY TO AFFECTED AREA TO LEFT SHOULDER AND BACK TWICE A DAY . DO NOT USE ON FACE/ARMPITS/ GROIN. (Patient taking differently: 1 Application, Topical, 2 TIMES DAILY, APPLY TO AFFECTED AREA TO LEFT SHOULDER AND BACK TWICE A DAY . DO NOT USE ON FACE/ARMPITS/ GROIN.)  •  ASPIRIN 81 PO, 81 mg, Oral, DAILY  •  metFORMIN, 500 mg, Oral, BID WITH MEALS  •  Alogliptin Benzoate, 12.5 mg, Oral, DAILY  •  loperamide, 2 mg, Oral, 4X/DAY PRN  •  pantoprazole, 20 mg, Oral, DAILY  •  vitamin B-12, 1,000 mcg, Oral, DAILY  •  Multiple Vitamins-Minerals (PRESERVISION AREDS 2+MULTI VIT PO), 1 Capsule, Oral, BID  •  glipiZIDE, 10 mg, Oral, BID  •  vitamin D, 2,000 Units, Oral, DAILY  •  FLUoxetine, 20 mg, Oral, DAILY    Pt recently discharged from:   [facility], discharge summary date 6/10/2022  Discharge medication summary:    Medication List           CHANGE how you take these medications      Instructions   fluocinonide 0.05 % Crea  What changed: See the new instructions.  Commonly known as: LIDEX    APPLY TO AFFECTED AREA TO LEFT SHOULDER AND BACK TWICE A DAY . DO NOT USE ON  FACE/ARMPITS/ GROIN.      furosemide 80 MG Tabs  What changed: additional instructions  Commonly known as: LASIX    Take 1 Tablet by mouth 2 times a day.  Dose: 80 mg      potassium chloride ER 10 MEQ tablet  What changed:   · how much to take  · when to take this  Commonly known as: KLOR-CON    Take 2 Tablets by mouth every day for 30 days.  Dose: 20 mEq                  CONTINUE taking these medications      Instructions   Alogliptin Benzoate 12.5 MG Tabs    Take 12.5 mg by mouth every day.  Dose: 12.5 mg      ASPIRIN 81 PO    Take 81 mg by mouth every day. Indications: Preventative for heart clots.  Dose: 81 mg      atorvastatin 40 MG Tabs  Commonly known as: LIPITOR    Take 1 Tablet by mouth every evening.  Dose: 40 mg      carvedilol 6.25 MG Tabs  Commonly known as: COREG    TAKE 1 TABLET BY MOUTH TWICE A DAY WITH MEALS      ferrous gluconate 324 (38 Fe) MG Tabs  Commonly known as: FERGON    Take 1 Tablet by mouth every morning with breakfast.  Dose: 324 mg      FLUoxetine 20 MG Caps  Commonly known as: PROZAC    Take 20 mg by mouth every day.  Dose: 20 mg      glipiZIDE 10 MG Tabs  Commonly known as: GLUCOTROL    Take 10 mg by mouth 2 times a day.  Dose: 10 mg      HYDROCORTISONE (TOPICAL) 1 % Soln    Apply 1 Application topically 1 time a day as needed. PRN for scalp ithc, scalpicin.  Dose: 1 Application      insulin glargine 100 UNIT/ML Soln  Commonly known as: Lantus    Inject 10 Units under the skin every evening.  Dose: 10 Units      loperamide 2 MG Caps  Commonly known as: IMODIUM    Take 2 mg by mouth 4 times a day as needed for Diarrhea.  Dose: 2 mg      metFORMIN 500 MG Tabs  Commonly known as: GLUCOPHAGE    Take 500 mg by mouth 2 times a day with meals.  Dose: 500 mg      methenamine hip 1 GM Tabs  Commonly known as: HIPPREX    Take 1 g by mouth 2 times a day.  Dose: 1 g      metOLazone 5 MG Tabs  Commonly known as: ZAROXOLYN    Take 1 Tablet by mouth every day.  Dose: 5 mg      pantoprazole 20 MG  tablet  Commonly known as: PROTONIX    Take 1 Tab by mouth every day.  Dose: 20 mg      PRESERVISION AREDS 2+MULTI VIT PO    Take 1 Cap by mouth 2 Times a Day.  Dose: 1 Capsule      tolnaftate 1 % Crea  Commonly known as: TINACTIN    Apply 1 Application topically 2 times a day as needed (Apply's on athlete's foot).  Dose: 1 Application      traZODone 50 MG Tabs  Commonly known as: DESYREL    Take 25 mg by mouth every evening.  Dose: 25 mg      vitamin B-12 1000 MCG Tabs    Take 1,000 mcg by mouth every day.  Dose: 1,000 mcg      vitamin D 2000 UNIT Tabs    Take 2,000 Units by mouth every day.  Dose: 2,000 Units                 Allergies   Allergen Reactions   • Augmentin Unspecified     Bleeding (rectal)  RXN=5 years ago   • Latex Unspecified     Blisters  RXN=ongoing   • Tape Rash     Rash-blisters-paper tape okay  RXN=ongoing         Labs     Lab Results   Component Value Date/Time    SODIUM 143 06/10/2022 02:05 AM    POTASSIUM 3.3 (L) 06/10/2022 02:05 AM    CHLORIDE 102 06/10/2022 02:05 AM    CO2 30 06/10/2022 02:05 AM    GLUCOSE 140 (H) 06/10/2022 02:05 AM    BUN 62 (H) 06/10/2022 02:05 AM    CREATININE 1.52 (H) 06/10/2022 02:05 AM    CREATININE 1.0 02/08/2005 02:20 PM     Lab Results   Component Value Date/Time    ALKPHOSPHAT 128 (H) 06/07/2022 09:25 AM    ASTSGOT 18 06/07/2022 09:25 AM    ALTSGPT 11 06/07/2022 09:25 AM    TBILIRUBIN 0.5 06/07/2022 09:25 AM    INR 1.09 04/21/2017 04:45 AM    ALBUMIN 4.0 06/07/2022 09:25 AM        Assessment and Plan:   • Received referral from [facility]. Medications reviewed, and compared with discharge summary if available.        FREDI Oquendo M.D.  [address]  [place] [address]  Fax: [phone]    [facility]  Phone [phone] fax [phone]    This note was created using voice recognition software (Dragon). The accuracy of the dictation is limited by the abilities of the software. I have reviewed the note prior to  signing, however some errors in grammar and context are still possible. If you have any questions related to this note please do not hesitate to contact our office.

## 2022-06-14 NOTE — PROGRESS NOTES
Subjective:     Selvin Braga is a 86 y.o. male who presents for Hospital Follow-up.    POST DISCHARGE CALL:  Discharge Date:6/10/2022   Date of Outreach Call: 6/14/2022 10:29 AM  Now that you're home, how are you doing? Very Good  Do you have questions about your medications? No  Did you fill your medications? No  Comment:Patient states he didn't need any pain  medication  Do you have a follow-up appointment scheduled?Yes  Comment:Danny 4/9[birth year]19  Discharging Department: [facility]  Number of Attempts: 2  Current or previous attempts completed within two business days of discharge? Yes  Comment:Message left with patient requesting return  call. Two attempts at outreach have been completed.  Provided education regarding treatment plan, medication, self-management, ADLs? *  Has patient completed Advance Directive? If yes, advise them to bring to appointment. Yes  Care Manager phone number provided? Yes  Is there anything else I can help you with? No    HPI:   Recently hospitalized for ***    Current medicines (including reconciliation performed today)  Current Outpatient Medications   Medication Sig Dispense Refill   • Home Care Oxygen Inhale 2 L/min continuous.     • potassium chloride ER (KLOR-CON) 10 MEQ tablet Take 2 Tablets by mouth every day for 30 days. 60 Tablet 0   • insulin glargine (LANTUS) 100 UNIT/ML Solution Inject 10 Units under the skin every evening.     • ferrous gluconate (FERGON) 324 (38 Fe) MG Tab Take 1 Tablet by mouth every morning with breakfast. 90 Tablet 1   • HYDROCORTISONE, TOPICAL, 1 % Solution Apply 1 Application topically 1 time a day as needed. PRN for scalp ithc, scalpicin.      • tolnaftate (TINACTIN) 1 % Cream Apply 1 Application topically 2 times a day as needed (Apply's on athlete's foot).     • methenamine hip (HIPPREX) 1 GM Tab Take 1 g by mouth 2 times a day.     • furosemide (LASIX) 80 MG Tab Take 1 Tablet by mouth 2 times a day. (Patient taking differently: Take  80 mg by mouth 2 times a day. Pt takes @ 0600 and 1200) 180 Tablet 3   • metOLazone (ZAROXOLYN) 5 MG Tab Take 1 Tablet by mouth every day. 90 Tablet 3   • traZODone (DESYREL) 50 MG Tab Take 25 mg by mouth every evening.     • atorvastatin (LIPITOR) 40 MG Tab Take 1 Tablet by mouth every evening. 100 Tablet 3   • carvedilol (COREG) 6.25 MG Tab TAKE 1 TABLET BY MOUTH TWICE A DAY WITH MEALS (Patient taking differently: Take 6.25 mg by mouth 2 times a day with meals.) 200 Tablet 2   • fluocinonide (LIDEX) 0.05 % Cream APPLY TO AFFECTED AREA TO LEFT SHOULDER AND BACK TWICE A DAY . DO NOT USE ON FACE/ARMPITS/ GROIN. (Patient taking differently: Apply 1 Application topically 2 times a day. APPLY TO AFFECTED AREA TO LEFT SHOULDER AND BACK TWICE A DAY . DO NOT USE ON FACE/ARMPITS/ GROIN.) 60 g 0   • ASPIRIN 81 PO Take 81 mg by mouth every day. Indications: Preventative for heart clots.     • metFORMIN (GLUCOPHAGE) 500 MG Tab Take 500 mg by mouth 2 times a day with meals.     • Alogliptin Benzoate 12.5 MG Tab Take 12.5 mg by mouth every day.     • loperamide (IMODIUM) 2 MG Cap Take 2 mg by mouth 4 times a day as needed for Diarrhea.     • pantoprazole (PROTONIX) 20 MG tablet Take 1 Tab by mouth every day.     • Cyanocobalamin (VITAMIN B-12) 1000 MCG Tab Take 1,000 mcg by mouth every day.     • Multiple Vitamins-Minerals (PRESERVISION AREDS 2+MULTI VIT PO) Take 1 Cap by mouth 2 Times a Day.     • glipiZIDE (GLUCOTROL) 10 MG Tab Take 10 mg by mouth 2 times a day.     • Cholecalciferol (VITAMIN D) 2000 UNIT TABS Take 2,000 Units by mouth every day.     • fluoxetine (PROZAC) 20 MG CAPS Take 20 mg by mouth every day.       No current facility-administered medications for this visit.       Allergies:   Augmentin, Latex, and Tape    Social History     Tobacco Use   • Smoking status: Former Smoker     Packs/day: 2.00     Years: 30.00     Pack years: 60.00     Types: Cigarettes     Quit date: 3/5/1995     Years since quittin.2    • Smokeless tobacco: Never Used   • Tobacco comment: 1.5 ppd 25 yrs,quit 1995   Vaping Use   • Vaping Use: Never used   Substance Use Topics   • Alcohol use: No     Alcohol/week: 0.0 oz   • Drug use: No       ROS:  ***    Objective:     There were no vitals filed for this visit.  There is no height or weight on file to calculate BMI.    Physical Exam:  ***    Assessment and Plan:   There are no diagnoses linked to this encounter.    - Chart and discharge summary were reviewed.   - Hospitalization and results reviewed with patient.   - Medications reviewed including instructions regarding high risk medications, dosing and side effects.  - Recommended Services: {COORDINATION OF SERVICES:20405}  - Advance directive/POLST on file?  {Yes/No:20266}    Follow-up:No follow-ups on file.    Face-to-face transitional care management services with {TCM Complexity:73442} medical decision complexity were provided.     LACE+ Historical Score Over Time (0-28: Low, 29-58: Medium, 59+: High): 79      {                                                      reference text will not save to note  Coding guide   83663        - face-to-face within 14 day        - moderate decision complexity (LACE+ score of 28-58)  48123       - face-to-face within 7 days       - high medical decision complexity (LACE+ score 59+)    :23872}

## 2022-06-14 NOTE — CASE COMMUNICATION
ACTION NEEDED  Selvin is requesting lab be repeated ASAP for CMP. He states he feels like he did when he went into the hospital.  FSBS 203, urine slightly cloudy but denies severe odor or discomfort he usually has with UTI    Would you mind discussing either a Palliative Care consult or Hospice during his next office appt? He has rehomed his little dog.

## 2022-06-14 NOTE — Clinical Note
PT evaluation completed, requesting follow up visits with frequency of 1w3 effective as of 06/14/2022.

## 2022-06-14 NOTE — CASE COMMUNICATION
noted  ----- Message -----  From: Miguel Cross R.N.  Sent: 6/12/2022   1:51 PM PDT  To: Dorothy Mccabe R.N., Danielle Bass R.N., *      Primary dx/Skilled need: Venous insufficiency, debility   SN frequency: 2w5, 3PRN   Zip code: [address]  Disciplines ordered: SN, PT, OT, SLP   Insurance & authorization: SCP   Certification period: 5/29-----7/27   Special considerations: RESUMPTION

## 2022-06-15 NOTE — CASE COMMUNICATION
thanks  ----- Message -----  From: Jackie Oquendo M.D.  Sent: 6/14/2022  12:48 PM PDT  To: Dorothy Mccabe R.N.      Sure. cmp ordered.  Will do  Thanks  c  ----- Message -----  From: Dorothy Mccabe R.N.  Sent: 6/14/2022  11:58 AM PDT  To: Jackie Oquendo M.D.    ACTION NEEDED  Selvin is requesting lab be repeated ASAP for CMP. He states he feels like he did when he went into the hospital.  FSBS 203, urine slightly cloudy but denies abdelrahman re odor or discomfort he usually has with UTI    Would you mind discussing either a Palliative Care consult or Hospice during his next office appt? He has rehomed his little dog.

## 2022-06-15 NOTE — Clinical Note
pt resting in chair watching tv , states he went to dining area for breakfast, ambulates using walker. . on continous oxygen at 2l per nasal cannula. pt said he has no pain , just feels tired, weak sometimes. reports no change in medications, denies problem. states he already took his morning meds. no open areas to lower extremities, lotion applied . instructed to elevate lower extremities due to edema 1+.. reported fasting blood sugar 232 mg/dl. denies s/s of hyperglycemia. on green zone reports no new symptoms.  pt tod this nurse he missed his dog he had to give it away because he cannot take care of him. . states he has an appt at 12 noon today  , plans to go an assisted facility. pt informed of lab draw order and agreed. venipuncture to right anticubital with ga 23 butterfly needle x1.done. pressure dressing applied . no active bleeding noted.  lab specimen transported to [facility] lab.  instructed to keep PCP appt on friday  Pt/Cg response to the services provided: denies chest pains/palpitations, respiratory distress, or falls .

## 2022-06-16 NOTE — CASE COMMUNICATION
noted  ----- Message -----  From: Chen Laura, OT  Sent: 6/15/2022   5:49 PM PDT  To: Dorothy Mccabe R.N., Chevy Gagnon, *      OT evaluation completed 6/14/22. Requesting auth for 2w2.

## 2022-06-16 NOTE — CASE COMMUNICATION
noted  ----- Message -----  From: Gia Hinojosa R.N.  Sent: 6/16/2022   7:19 AM PDT  To: Dorothy Mccabe R.N., Danielle Bass R.N., *      pt resting in chair watching tv , states he went to dining area for breakfast, ambulates using walker. . on continous oxygen at 2l per nasal cannula. pt said he has no pain , just feels tired, weak sometimes. reports no change in medications, denies problem. states he already took his morning meds.  no open areas to lower extremities, lotion applied . instructed to elevate lower extremities due to edema 1+.. reported fasting blood sugar 232 mg/dl. denies s/s of hyperglycemia. on green zone reports no new symptoms.  pt tod this nurse he missed his dog he had to give it away because he cannot take care of him. . states he has an appt at 12 noon today  , plans to go an assisted facility. pt informed of lab draw order and agreed. ave puncture to right anticubital with ga 23 butterfly needle x1.done. pressure dressing applied . no active bleeding noted.  lab specimen transported to [facility] lab.  instructed to keep PCP appt on friday  Pt/Cg response to the services provided: denies chest pains/palpitations, respiratory distress, or falls .

## 2022-06-17 NOTE — CASE COMMUNICATION
noted  ----- Message -----  From: Martine Byers, PT  Sent: 6/17/2022  12:25 AM PDT  To: Dorothy Mccabe R.N., Jeremy Perez      PT evaluation completed, requesting follow up visits with frequency of 1w3 effective as of 06/14/2022.

## 2022-06-17 NOTE — PROGRESS NOTES
Subjective:     Selvin Braga is a 86 y.o. male presenting for a follow-up.  He reports generalized weakness, feeling unwell overall.  He did not feel like he improved from the hospital visit.  On his recent labs, his GFR dropped to the 20s.  He continues on his medications regularly.  He is interested in hospice.  Leg swelling has improved        Current Outpatient Medications:   •  potassium chloride SA (K-DUR) 10 MEQ Tab CR, TAKE 1 TABLET BY MOUTH TWICE A DAY, Disp: 60 Tablet, Rfl: 3  •  insulin glargine (LANTUS) 100 UNIT/ML Solution, Inject 15 Units under the skin every evening., Disp: 10 mL, Rfl:   •  furosemide (LASIX) 80 MG Tab, Take 0.5 Tablets by mouth 2 times a day. Pt takes @ 0600 and 1200, Disp: , Rfl:   •  sulfamethoxazole-trimethoprim (BACTRIM) 400-80 MG Tab, Take 1 Tablet by mouth 2 times a day for 7 days., Disp: 14 Tablet, Rfl: 0  •  Misc. Devices Misc, 1 compression pump from [employer] for leg swelling, Disp: 1 Each, Rfl: 0  •  Home Care Oxygen, Inhale 2 L/min continuous., Disp: , Rfl:   •  ferrous gluconate (FERGON) 324 (38 Fe) MG Tab, Take 1 Tablet by mouth every morning with breakfast., Disp: 90 Tablet, Rfl: 1  •  HYDROCORTISONE, TOPICAL, 1 % Solution, Apply 1 Application topically 1 time a day as needed. PRN for scalp ithc, scalpicin. , Disp: , Rfl:   •  metOLazone (ZAROXOLYN) 5 MG Tab, Take 1 Tablet by mouth every day., Disp: 90 Tablet, Rfl: 3  •  atorvastatin (LIPITOR) 40 MG Tab, Take 1 Tablet by mouth every evening., Disp: 100 Tablet, Rfl: 3  •  carvedilol (COREG) 6.25 MG Tab, TAKE 1 TABLET BY MOUTH TWICE A DAY WITH MEALS (Patient taking differently: Take 6.25 mg by mouth 2 times a day with meals.), Disp: 200 Tablet, Rfl: 2  •  fluocinonide (LIDEX) 0.05 % Cream, APPLY TO AFFECTED AREA TO LEFT SHOULDER AND BACK TWICE A DAY . DO NOT USE ON FACE/ARMPITS/ GROIN. (Patient taking differently: Apply 1 Application topically 2 times a day. APPLY TO AFFECTED AREA TO LEFT SHOULDER AND BACK  "TWICE A DAY . DO NOT USE ON FACE/ARMPITS/ GROIN.), Disp: 60 g, Rfl: 0  •  ASPIRIN 81 PO, Take 81 mg by mouth every day. Indications: Preventative for heart clots., Disp: , Rfl:   •  loperamide (IMODIUM) 2 MG Cap, Take 2 mg by mouth 4 times a day as needed for Diarrhea., Disp: , Rfl:   •  pantoprazole (PROTONIX) 20 MG tablet, Take 1 Tab by mouth every day., Disp: , Rfl:   •  Cyanocobalamin (VITAMIN B-12) 1000 MCG Tab, Take 1,000 mcg by mouth every day., Disp: , Rfl:   •  Multiple Vitamins-Minerals (PRESERVISION AREDS 2+MULTI VIT PO), Take 1 Cap by mouth 2 Times a Day., Disp: , Rfl:   •  Cholecalciferol (VITAMIN D) 2000 UNIT TABS, Take 2,000 Units by mouth every day., Disp: , Rfl:   •  fluoxetine (PROZAC) 20 MG CAPS, Take 20 mg by mouth every day., Disp: , Rfl:   •  tolnaftate (TINACTIN) 1 % Cream, Apply 1 Application topically 2 times a day as needed (Apply's on athlete's foot)., Disp: , Rfl:     Objective:     Vitals: /72   Pulse 80   Temp 36.4 °C (97.5 °F)   Resp 16   Ht 1.803 m (5' 11\")   Wt 68.5 kg (151 lb)   SpO2 95%   BMI 21.06 kg/m²   General: Alert  HEENT: Normocephalic.  Extremities: Soft, 1+ pitting edema in the feet bilaterally    Assessment/Plan:     Selvin was seen today for follow-up.    Diagnoses and all orders for this visit:    KAVITA (acute kidney injury) (HCC)  Acute, complicated issue.  He did not get our message due to staffing issues.  We discussed reducing his furosemide to 40 mg twice a day or 80 mg once a day.  We will also stop his metformin, alogliptin, glipizide, and titrate up his Lantus.  We will recheck labs next week, lab has already been ordered.  -     Referral to Hospice    Acute cystitis with hematuria  Dysuria  Acute, complicated issue.  His urine is suspicious for UTI, which may be contributing to his overall malaise.  We will start a course of Bactrim and send the urine for culture in the meantime  -     POCT Urinalysis  -     URINE CULTURE(NEW); Future  -     " sulfamethoxazole-trimethoprim (BACTRIM) 400-80 MG Tab; Take 1 Tablet by mouth 2 times a day for 7 days.  -     Referral to Hospice    Weakness  Weight loss  Failure to thrive in adult   We discussed hospice, goals, etc.  He would like an evaluation  -     Referral to Hospice    Peripheral edema  Chronic, stable, has improved somewhat  -     Referral to Hospice    Chronic diastolic congestive heart failure (HCC)  Chronic, possibly stable.  Has a follow-up appointment with cardiology in November .  -     Referral to Hospice    Type 2 diabetes mellitus with diabetic polyneuropathy, without long-term current use of insulin (HCC)  Chronic, uncontrolled.  We will stop his metformin, alogliptin, glipizide and increase his Lantus to 15 units.  We discussed titrating up gradually as needed.  -     Referral to Hospice    History of prostate cancer  -     Referral to Hospice

## 2022-06-20 NOTE — Clinical Note
pt resting in chair when sn came. daughter is present , and said another one is arriving tonight from [place]. .pt reported that he has appointment with hospice tomorrow. medications reviewed, states he takes lasix 40 mg twice a day ,potassium twice a day. pt also on bactrim for UTI , reports no problem. pt was seen by Dr. EDUARDA Oquendo last week. states his insulin was increased and discontinued oral hypoghycemics( metformin, alogliptin, glipizide ). pt reported that his fasting blood glcuose was 350mg/dl. this morning and yesterday it was 250mg/dl. denies s/s of hyperglycemia. pt said he started taking his oral blood sugar  medications ( metformin, alogliptin, glipizide ) this morning. pt also not using oxygen , states he stopped it since last week. O2 sat 100 % during this visit on room air. pt also wants to return his oxygen because he will be charge 25 dollars /month as reported..to continue elevate lower extremities to help alleviate edema.  pt informed of lab draw and agreed. venipuncture to right anticubital with ga 23 butterfly done x1 for BMP. pressure dressing applied, no active bleeding noted. specimen transported. to [facility].  Pt/Cg response to the services provided: denies chest pains/palpitations, new or increased breathing problems or falls

## 2022-06-21 NOTE — CASE COMMUNICATION
Quality Review Completed for [facility] 6/12 OASIS by EDUARDA Dee RN on 6/21/2022:  Edits completed by EDUARDA Dee RN:  1. Sepsis screen corrected to yes due to wounds and other responses are NO per EMR data  2. . changed to #4-all the time due to interferes with sleep per pain assessment data  3. Changed  , 1810, 1820 A, B, C to #2 per narrative min level of assist required.  to 5, needs DME for safety. Changed  to 2 per GG0 130 C through H response is 4   4.  and 2030 changed to 3 per guidelines when ambulation is supervised  5.  F is CG assist per narrative  6.  changed to 6 per therapy

## 2022-06-21 NOTE — PROGRESS NOTES
New admit discussed with Yolande Villalta RN. Medications ordered for comfort; MSIR and Lorazepam.

## 2022-06-22 NOTE — CASE COMMUNICATION
noted  ----- Message -----  From: Gia Hinojosa R.N.  Sent: 6/20/2022   7:54 PM PDT  To: Dorothy Mccabe R.N., Danielle Bass R.N., *      pt resting in chair when sn came. daughter is present , and said another one is arriving tonight from [place]. .pt reported that he has appointment with hospice tomorrow. medications reviewed, states he takes lasix 40 mg twice a day ,potassium twice a day. pt also on bactrim for UTI , reports no  problem. pt was seen by Dr. EDUARDA Oquendo last week. states his insulin was increased and discontinued oral hypoghycemics( metformin, alogliptin, glipizide ). pt reported that his fasting blood glcuose was 350mg/dl. this morning and yesterday it was 250mg/dl. denies s/s of hyperglycemia. pt said he started taking his oral blood sugar  medications ( metformin, alogliptin, glipizide ) this morning. pt also not using oxygen , states he stop ped it since last week. O2 sat 100 % during this visit on room air. pt also wants to return his oxygen because he will be charge 25 dollars /month as reported..to continue elevate lower extremities to help alleviate edema.  pt informed of lab draw and agreed. venipuncture to right anticubital with ga 23 butterfly done x1 for BMP. pressure dressing applied, no active bleeding noted. specimen transported. to [facility].  Pt/Cg respo nse to the services provided: denies chest pains/palpitations, new or increased breathing problems or falls

## 2022-06-26 NOTE — CASE COMMUNICATION
Agree with changes.     ----- Message -----  From: Concepcion Dee R.N.  Sent: 6/21/2022  10:02 AM PDT  To: Miguel Cross R.N.      Quality Review Completed for [facility] 6/12 OASIS by EDUARDA Dee, RN on 6/21/2022:  Edits completed by EDUARDA Dee RN:  1. Sepsis screen corrected to yes due to wounds and other responses are NO per EMR data  2. . changed to #4-all the time due to interferes with sleep per pain assessment data  3. Changed M180 0 , 1810, 1820 A, B, C to #2 per narrative min level of assist required.  to 5, needs DME for safety. Changed  to 2 per HS7777 C through H response is 4   4.  and 2030 changed to 3 per guidelines when ambulation is supervised  5.  F is CG assist per narrative  6.  changed to 6 per therapy

## 2022-07-26 PROBLEM — S32.810A MULTIPLE CLOSED PELVIC FRACTURES WITH DISRUPTION OF PELVIC CIRCLE, INITIAL ENCOUNTER (HCC): Status: ACTIVE | Noted: 2022-01-01

## 2022-07-26 PROBLEM — S32.592A PUBIC RAMUS FRACTURE, LEFT, CLOSED, INITIAL ENCOUNTER (HCC): Status: ACTIVE | Noted: 2022-01-01

## 2022-07-26 NOTE — HOSPICE
Seen in tandem with ONEIDA Gabriel. Patient has bilateral pelvic fractures. He reports pain 9/10 to 10/10 with any movement. D/W regarding GIP for pain management with DC plan to higher LOC. He is agreeable to this plan. D/W Dr. Cleary also agreeable.

## 2022-07-26 NOTE — ED PROVIDER NOTES
ED Provider Note    CHIEF COMPLAINT  Chief Complaint   Patient presents with   • GLF     At 0330, +head hit, -LOC, +aspirin, L side forehead abrasion/contusion, L hip pain, given 5mg morphine PO at nursing facility, EMS on scene with 78% on RA, 6L 91%       HPI  Selvin Braga is a 86 y.o. male who presents to the emergency department from [facility] facility following head injury.  Patient had a mechanical fall at 3:30 AM, evidence for trauma at the left side of the head.  He adamantly denies loss of consciousness.  He has an abrasion at the forehead.  No neck pain or back pain although describes chronic low back pain.  He is complaining of new left hip pain, morphine given at nursing facility.  Patient did have some hypoxia for EMS, no history of the same, no home oxygen.  Patient states he was placed on home oxygen after in April admission, but has since not been continued on it.    Denies chest pain, shortness of breath.  Denies new cough or congestion.  Denies fever or chills.    Patient does take aspirin daily.    REVIEW OF SYSTEMS  See HPI for further details. All other systems are negative.     PAST MEDICAL HISTORY   has a past medical history of AK (actinic keratosis) (7/21/2016), Anemia, Arrhythmia, Arthritis, Lombardi esophagus, Cancer ([facility]) (2001), CATARACT, Chronic kidney disease (CKD), stage III (moderate) ([facility]), Colon polyps, Dental disorder, Depression (7/21/2016), Diabetes (1984), GERD (gastroesophageal reflux disease), Hemorrhagic disorder ([facility]), Hyperlipidemia, Hypertension, Infectious disease, Neuropathy ([facility]), Other specified disorder of intestines, Personal history of venous thrombosis and embolism (2010/1995), Prostate cancer ([facility]) (2001), PVD (peripheral vascular disease) ([facility]), Recurrent UTI, Sleep apnea, Unspecified urinary incontinence, and Urinary bladder disorder.    SOCIAL HISTORY  Social History     Tobacco Use   • Smoking status: Former Smoker     Packs/day: 2.00     Years: 30.00      Pack years: 60.00     Types: Cigarettes     Quit date: 3/5/1995     Years since quittin.4   • Smokeless tobacco: Never Used   • Tobacco comment: 1.5 ppd 25 yrs,quit    Vaping Use   • Vaping Use: Never used   Substance and Sexual Activity   • Alcohol use: No     Alcohol/week: 0.0 oz   • Drug use: No   • Sexual activity: Not on file       SURGICAL HISTORY   has a past surgical history that includes angiogram (2010); aortogram (2010); prostatectomy robotic (2001); insert,inflatable sphincter (); sphincter prosthesis removal (6/3/2010); cystoscopy (2011); sphincter prosthesis placement (2011); cystoscopy (2011); groin exploration (3/13/2012); sphincter prosthesis placement (2012); cataract phaco with iol (2013); cataract phaco with iol (2013); colonoscopy with apc (2013); laparoscopy (2014); fusion, spine, lumbar, plif (2012); sphincter prosthesis removal (8/10/2015); femoral endarterectomy (Right, 3/13/2019); iliac angioplasty with stent (Right, 3/13/2019); bowel resection (2014); lumbar decompression (2012); other orthopedic surgery (Left); orif, ankle (Right, 10/28/2015); and femoral endarterectomy (Right, 2/10/2020).    CURRENT MEDICATIONS  Home Medications     Reviewed by Saima Delaney (Pharmacy Tech) on 22 at 1136  Med List Status: Complete   Medication Last Dose Status   acetaminophen (TYLENOL) 500 MG Tab UNK Active   Alogliptin Benzoate 12.5 MG Tab UNK Active   ASPIRIN 81 PO UNK Active   atorvastatin (LIPITOR) 40 MG Tab UNK Active   carvedilol (COREG) 6.25 MG Tab UNK Active   Cholecalciferol (VITAMIN D) 2000 UNIT TABS UNK Active   clobetasol (TEMOVATE) 0.05 % external solution UNK Active   Cyanocobalamin (VITAMIN B-12) 1000 MCG Tab UNK Active   ferrous gluconate (FERGON) 324 (38 Fe) MG Tab UNK Active   fluocinonide (LIDEX) 0.05 % Cream UNK Active   fluoxetine (PROZAC) 20 MG CAPS UNK Active   furosemide (LASIX) 80 MG  "Tab UNK Active   HYDROCORTISONE, TOPICAL, 1 % Solution UNK Active   insulin glargine (LANTUS) 100 UNIT/ML Solution UNK Active   loperamide (IMODIUM) 2 MG Cap UNK Active   LORazepam (ATIVAN) 2 MG/ML Conc UNK Active   metOLazone (ZAROXOLYN) 5 MG Tab UNK Active   morphine (ROXANOL) 20 MG/ML Solution UNK Active   Multiple Vitamins-Minerals (PRESERVISION AREDS 2+MULTI VIT PO) UNK Active   pantoprazole (PROTONIX) 20 MG tablet UNK Active   potassium chloride SA (K-DUR) 10 MEQ Tab CR UNK Active   senna-docusate (PERICOLACE OR SENOKOT S) 8.6-50 MG Tab UNK Active   tolnaftate (TINACTIN) 1 % Cream UNK Active                ALLERGIES  Allergies   Allergen Reactions   • Augmentin Unspecified     Bleeding (rectal)  RXN=5 years ago   • Latex Unspecified     Blisters  RXN=ongoing   • Tape Rash     Rash-blisters-paper tape okay  RXN=ongoing       PHYSICAL EXAM  VITAL SIGNS: /58   Pulse 73   Temp 36.6 °C (97.8 °F) (Temporal)   Resp 18   Ht 1.803 m (5' 11\")   Wt 68 kg (150 lb)   SpO2 98%   BMI 20.92 kg/m²   Pulse ox interpretation: I interpret this pulse ox as normal.  Constitutional: Alert in no apparent distress.  HENT: Normocephalic, left lateral forehead abrasion without hematoma.. Bilateral external ears normal, Nose normal. Moist mucous membranes.  No oral trauma  Eyes: Pupils are equal and reactive, Conjunctiva normal.   Neck: No midline tenderness palpation, no step-offs.  Full range of motion without pain or resistance.  Lymphatic: No lymphadenopathy noted.   Cardiovascular: Regular rate and rhythm, no murmurs. Distal pulses intact.  Trace peripheral edema.  Thorax & Lungs: Normal breath sounds.  No wheezing/rales/ronchi. No increased work of breathing, clipped speech or retractions.  No chest wall tenderness, crepitus.  Abdomen: Soft, non-distended, non-tender to palpation. No palpable or pulsatile masses. No peritoneal signs. .  Skin: Warm, Dry, No erythema, No rash.   Musculoskeletal: Pain with palpation " laterally, AP compression of the pelvis and range of motion at the left hip.  No shortening or rotation.  2+ DP bilaterally.  Neurologic: Alert and oriented x4.  Speech is slow but clear.  Moves 4 extremity spontaneously although pain with movement of the left lower extremity.  Psychiatric: Affect normal, Judgment normal, Mood normal.       DIAGNOSTIC STUDIES / PROCEDURES  RADIOLOGY  DX-PELVIS-1 OR 2 VIEWS   Final Result      Acute comminuted fractures of the left superior and inferior pubic rami.      DX-CHEST-LIMITED (1 VIEW)   Final Result         1. No acute findings.   2. Chronic interstitial changes in the lungs.   3. Cardiomegaly.      DX-FEMUR-2+ LEFT   Final Result      Acute fractures of the left superior and inferior pubic rami.      Chronic postsurgical changes involving the left hip and pelvis.      CT-HEAD W/O   Final Result         1.  No acute intracranial abnormality is identified, there are nonspecific white matter changes, commonly associated with small vessel ischemic disease.  Associated mild cerebral atrophy is noted.   2.  Atherosclerosis.             COURSE & MEDICAL DECISION MAKING  Nursing notes and vital signs were reviewed. (See chart for details)  The patients records were reviewed, history was obtained from the patient;     Patient was seen evaluated at charge desk on arrival per TBI protocol.  Head injury, on aspirin, no loss of consciousness.  No neck pain.  Neurologically intact and nonfocal.  At CT head noncontrast.  Patient also complaining of left hip pain.  We will further evaluate with more thorough exam after CT.    CT negative.  Patient seen evaluated bedside.  Left hip pain, worse with any range of motion.  No midline lumbar discomfort.  Patient describes chronic low back pain.  Add x-ray of the pelvis and left hip.    There is radiographic evidence for acute fractures of the left superior and inferior pubic rami.  No periprosthetic fracture.    1020 -I have been notified  that patient is a [facility] patient.  This may change final disposition.  Case management aware and will try to find details of hospice placement, provider and contact family members.    1040 -I have spoken with patient's daughter, Vibha.  She states that there is not a single POA, but multiple siblings help make these decisions.  She is unsure whether patient should be removed from hospice for treatment/pain control/physical therapy or whether he will remain on hospice and be discharged home.  She will contact family and return my phone call.  Case management is aware.    1100 -Case management has been able to get in contact with [facility] hospice, awaiting input.    1210 -[facility] hospice, Nataly Palma and Harvey RNs, are aware of this patient and have discussed with patient's son and daughter who are POA's.  Patient will be admitted to the general inpatient hospice service.  No orthopedic consult warranted.  No indication for PT OT.  They do now request IV placement and IV pain medications as patient will be staying at this facility, and has been otherwise noncompliant with home pain medications.      FINAL IMPRESSION  (S32.592A) Closed fracture of multiple rami of left pubis, initial encounter ([facility])  (W19.XXXA) Fall, initial encounter  (Z51.5) Hospice care patient      Electronically signed by: Kelle Moreno D.O., 7/26/2022 12:07 PM      This dictation was created using voice recognition software. The accuracy of the dictation is limited to the abilities of the software. I expect there may be some errors of grammar and possibly content. The nursing notes were reviewed and certain aspects of this information were incorporated into this note.

## 2022-07-26 NOTE — ED TRIAGE NOTES
"Chief Complaint   Patient presents with   • GLF     At 0330, +head hit, -LOC, +aspirin, L side forehead abrasion/contusion, L hip pain, given 5mg morphine PO at nursing facility, EMS on scene with 78% on RA, 6L 91%      BIB [facility] for above complaint. EMS vitals 127/61 HR 80 gcs 15    DNR/DNI form with pt.       BP (!) 150/64   Pulse 82   Temp 36.6 °C (97.8 °F) (Temporal)   Resp 16   Ht 1.803 m (5' 11\")   Wt 68 kg (150 lb)   SpO2 100%   BMI 20.92 kg/m²      "

## 2022-07-26 NOTE — ED NOTES
Pt reporting hospice care at this time, speaking in full sentences, A&Ox3, small abrasion to L side of forehead.

## 2022-07-26 NOTE — ED NOTES
Pt resting in room, easy, equal chest rise and fall. Pt remains calm and cooperative.  at bedside working on plans regarding Pt's current hospice conditions.

## 2022-07-26 NOTE — HOSPICE
Hospice H&P  Author: EUGENIE Harry     Date & Time note created:    7/26/2022   12:35 PM       Date of Consult: 7/26/22  Requesting Physician: Kelle Moreno MD  Consulting Physician: EUGENIE Harry   Reason for Consult: s/p fall pelvic fx, current hospice patient.     History of Present Illness:    Selvin Braga is 86 y.o. male with with a terminal diagnosis of ESRD.   Co-morbid conditions include Type 2 DM with insulin use and hyperglycemia,   HTN, spinal stenosis, prostate CA, DVT, osteoporosis, recurrent cystitis,   Lombardi's esophagus, and MDD.     He has been on service with [facility] since 6/21/22. He has had difficulty with hypoglycemia, insulin management, and falls. Last evening, he fell and c/o pain to his pelvis/hips. He was taken to [facility] ER via [facility]. He was found to have acute comminuted fractures of the left superior and inferior pubic rami.    During visit with nurse and APRN today, he reports his pain is 9-10/10 with any movement. He is unable to stand and has been incontinent d/t this. Discussed options of IP hospice to manage pain with goal of DC to higher level of care given ongoing decline and recent fall with fracture. He is agreeable. Nurse discussed with family who are in agreement.     Discussed pain management with ER provider, Dr. Moreno who will place an IV/pain med orders. He will be admitted to GIP service and we will initiate a Morphine drip with titration orders. SW has been notified of possible placement to higher LOC.           Review of Systems:     ROS positive for pain to both hips left greater than right; unable to move s/t same.     Physical Exam:  Physical Exam   Frail, facial grimace. Hematoma with superficial laceration to left forehead..  Alert oriented, makes needs known. Clear about wishes to remain on hospice. Verbalizes understanding he may pass in acute while on GIP.       Past Medical History:   Past Medical History:   Diagnosis  Date   • AK (actinic keratosis) 7/21/2016   • Anemia    • Arrhythmia     PVC's   • Arthritis     lower back   • Lombardi esophagus    • Cancer (HCC) 2001    prostate (radiation/sx)   • CATARACT     bilateral IOL   • Chronic kidney disease (CKD), stage III (moderate) ([facility])    • Colon polyps    • Dental disorder     Dentures   • Depression 7/21/2016   • Diabetes 1984    oral medication   • GERD (gastroesophageal reflux disease)    • Hemorrhagic disorder (HCC)     bleeds easily    • Hyperlipidemia    • Hypertension    • Infectious disease     history of c-diff 2016   • Neuropathy ([facility])    • Other specified disorder of intestines     alters between normal and diarrhea since radiation tx   • Personal history of venous thrombosis and embolism 2010/1995    leg   • Prostate cancer (HCC) 2001    Status post prostatectomy   • PVD (peripheral vascular disease) ([facility])     s/p femoral endarterectomy   • Recurrent UTI    • Sleep apnea     Does not use CPAP   • Unspecified urinary incontinence     spill from artifical urinary sphincter   • Urinary bladder disorder     uses external catheter at night, and clamp during day       Past Surgical History:  Past Surgical History:   Procedure Laterality Date   • FEMORAL ENDARTERECTOMY Right 2/10/2020    Procedure: ENDARTERECTOMY, FEMORAL- COMMON FEMORAL ARTERY BYPASS WITH VEIN;  Surgeon: Alek Delgado M.D.;  Location: [facility];  Service: General   • FEMORAL ENDARTERECTOMY Right 3/13/2019    Procedure: FEMORAL ENDARTERECTOMY- COMMON;  Surgeon: Alek Delgado M.D.;  Location: [facility];  Service: General   • ILIAC ANGIOPLASTY WITH STENT Right 3/13/2019    Procedure: ILIAC ANGIOPLASTY WITH STENT- RETROGRADES;  Surgeon: Alek Delgado M.D.;  Location: [facility];  Service: General   • ORIF, ANKLE Right 10/28/2015    Procedure: ANKLE ORIF;  Surgeon: Venu Elizabeth M.D.;  Location: [facility];  Service:    • SPHINCTER PROSTHESIS REMOVAL   8/10/2015    Procedure: SPHINCTER PROSTHESIS REMOVAL;  Surgeon: Tuan Marie M.D.;  Location: SURGERY [facility];  Service:    • LAPAROSCOPY  5/21/2014    Performed by Graham Peña M.D. at [facility]   • BOWEL RESECTION  5/21/2014    Performed by Graham Peña M.D. at [facility]   • COLONOSCOPY WITH APC  6/5/2013    Performed by Deepa Vela M.D. at [facility]   • CATARACT PHACO WITH IOL  2/27/2013    Performed by Clayton Noonan M.D. at SURGERY SAME DAY [facility]   • CATARACT PHACO WITH IOL  2/13/2013    Performed by Clayton Noonan M.D. at SURGERY SAME DAY [facility]   • FUSION, SPINE, LUMBAR, PLIF  12/13/2012    Procedure: L5-S1 removal of Jennings Fragment NON-INSTRUMENTAL;  Surgeon: Wil Martinez M.D.;  Location: SURGERY [facility];  Service:    • LUMBAR DECOMPRESSION  12/13/2012    Procedure: 4-5;  Surgeon: Wil Martinez M.D.;  Location: [facility];  Service:    • SPHINCTER PROSTHESIS PLACEMENT  9/26/2012    Performed by GERSON Car M.D. at [facility]   • GROIN EXPLORATION  3/13/2012    Performed by DEEPA PATRICIA at [facility]   • SPHINCTER PROSTHESIS PLACEMENT  2/24/2011    Performed by GERSON CAR at [facility]   • CYSTOSCOPY  2/24/2011    Performed by GERSON CAR at [facility]   • CYSTOSCOPY  2/2/2011    Performed by GERSON CAR at [facility]   • SPHINCTER PROSTHESIS REMOVAL  6/3/2010    Performed by JOSH APARICIO at [facility]   • ANGIOGRAM  1/25/2010    Performed by PETER NORMAN at [facility]   • AORTOGRAM  1/25/2010    Performed by PETER NORMAN at [facility]   • PROSTATECTOMY ROBOTIC  2/2001   • MD INSERT,INFLATABLE SPHINCTER  2001   • OTHER ORTHOPEDIC SURGERY Left     hip & femur fx       Current Outpatient Medications:  Home Medications    **Home medications  have not yet been reviewed for this encounter**         Medication Allergy/Sensitivities:  Allergies   Allergen Reactions   • Augmentin Unspecified     Bleeding (rectal)  RXN=5 years ago   • Latex Unspecified     Blisters  RXN=ongoing   • Tape Rash     Rash-blisters-paper tape okay  RXN=ongoing       Family History:  Family History   Problem Relation Age of Onset   • Cancer Mother         ovarian   • Heart Disease Father    • Diabetes Father    • Diabetes Sister    • Hypertension Other    • Cancer Other         multiple cousins with prostate cancer.        Social History:  Social History     Tobacco Use   • Smoking status: Former Smoker     Packs/day: 2.00     Years: 30.00     Pack years: 60.00     Types: Cigarettes     Quit date: 3/5/1995     Years since quittin.4   • Smokeless tobacco: Never Used   • Tobacco comment: 1.5 ppd 25 yrs,quit    Vaping Use   • Vaping Use: Never used   Substance Use Topics   • Alcohol use: No     Alcohol/week: 0.0 oz   • Drug use: No         Vitals:  Weight/BMI: There is no height or weight on file to calculate BMI.  There were no vitals taken for this visit.  There were no vitals filed for this visit.  Oxygen Therapy:         Lab Data Review:  No results found for this or any previous visit (from the past 24 hour(s)).    Imaging/Procedures Review:    No orders to display          Problem List:  1. End stage renal disease-worsening disease with recent hypoglycemic episodes-stop all insulin.   2.GLF with bilateral pelvic fractures-admit to inpatient hospice for pain management-morphine drip for immediate pain management. Haldol/Lorazepam PRN.     Hospice general inpatient DNR/DNI      Discussion: This patient requires a high level of nursing care for pain control and symptom management. The patient is being admitted to hospice general inpatient to manage symptoms of pain, dyspnea and agitation precipitated by ongoing disease progression and recent fall with fracture. He will be  placed on a Morphine drip. We will continue to assess appropriateness of IV pain meds vs oral and elicit SW to assist with DC to higher level of care if needed.        The patient will be followed by [facility] team on a daily basis to assess for symptom control as well as appropriateness for GIP level of care.       HILLARY Haryr.  Hospice and Palliative Medicine  07/26/22

## 2022-07-26 NOTE — HOSPICE
Met with pt with CLARISSA Mccrary.  Pt wants to have better pain control, regardless of outcome.  He rates his pain a 7/10 currently.    Call placed to Ramirez & he requested that we call Danielle.  Danielle called & she is driving to [place] from [place].  She is agreeable to GIP admit to focus on his comfort.  She understands that he may pass in the hospital.

## 2022-07-26 NOTE — DISCHARGE PLANNING
"Reviewed ACP document and saw dtr Bushra is primary designated decision maker and son Ramirez is secondary. Called dtr Bushra and left v/m asking for return call as MD needs decision from family regarding tx.  Called son Ramirez's phone number as listed on the face sheet, his wife Karlee picked up the phone and started telling me that they spoke to hospice and want patient admitted to \"hospice unit and kept comfortable\" . She said her  is unavailable, he's sleeping and she won't wake him, she's tessa speaking with his sister Danielle.  Explained that per legal document dtadonis Tomlinson is the designated primary decision maker then Ramirez. Karlee expressed understanding and will have Bushra call the [facility] to talk with MD about medical decisions for her father.    Received call from dtr Bushra who advised me she's on her way, she lives out of the area. Provided her with [facility]'s number to call to speak with MD.  "

## 2022-07-27 NOTE — ED NOTES
Patient cleaned up from incontinent urine. Used hovermat to slide patient over to hospital bed from [facility]. Pt has bed sore on left buttocks. RN aware.

## 2022-07-27 NOTE — PROGRESS NOTES
Pt sleeping comfortably upon arrival to unit, however found a penis clamp on patient. Bladder seemed firm and distended, undetermined how long clamp had been on. Skin on penis was purple and developed a tear.     Bladder scan showed only 128ml however. Urine slow trickle. Will re-bladder scan in a couple hours.    Contacted Yina MOHAMUD who gave report, she states she did not know he had clamp on and was unaware who had maybe put it on him.     Clamp has been removed and discarded in sharps container. Pt placed in brief. Avoid condom cath for now.

## 2022-07-27 NOTE — HOSPICE
Pt snoring. No facial grimacing, or furrowed eyebrows.  Respiratory pattern starting to change to Cheyne-shabazz.

## 2022-07-27 NOTE — PROGRESS NOTES
Pt arrived to [facility] via gurney at 1730. Pt oriented to room, unit, and plan of care. Pt on comfort care, transferred to hospital bed. All questions answered at this time. Call light within reach; fall precautions in place.

## 2022-07-27 NOTE — PROGRESS NOTES
Pt is resting comfortably in bed, he is easily arousalble, A&Ox3. Pt is refusing pain medication at this time. All needs met, call light within reach.

## 2022-07-27 NOTE — DIETARY
Nutrition Services: Brief Update    Per Nutrition Representative, pt's family is requesting Boost oral nutrition supplement.  RD will add supplement order per protocol and adjust menu accordingly.  Pt is comfort care.  RD to screen pt weekly. RD available PRN.

## 2022-07-27 NOTE — PROGRESS NOTES
Patient declined pain medication overnight despite severe pain to pelvis with any movement. He is now agreeable to trial of low-dose morphine, which I scheduled every 4 hours. Patient continues to meet GIP status as he had uncontrolled pain requiring active medication titration.     He is hungry requesting breakfast and water.     Awaiting arrival of family. Will likely need placement, or family to care for patient at home once pain is better controlled.     Hospice team to check on him throughout the day.     KAREN Cleary DO  Hospice Medical Director

## 2022-07-27 NOTE — CARE PLAN
The patient is Stable - Low risk of patient condition declining or worsening    Shift Goals  Clinical Goals: Urine output, bladder scan, hip precautions  Patient Goals: Pain management, comfort, rest  Family Goals: MARILUZ    Progress made toward(s) clinical / shift goals:  Skin integrity and prevent falls.

## 2022-07-27 NOTE — CARE PLAN
The patient is Stable - Low risk of patient condition declining or worsening    Shift Goals  Clinical Goals: Urine output, bladder scan, hip precautions  Patient Goals: Pain management, comfort, rest  Family Goals: MARILUZ    Progress made toward(s) clinical / shift goals:  Problem: Fall Risk  Goal: Patient will remain free from falls  Outcome: Progressing     Problem: Skin Integrity  Goal: Skin integrity is maintained or improved  Outcome: Progressing    Patient is not progressing towards the following goals:

## 2022-07-27 NOTE — PROGRESS NOTES
4 Eyes Skin Assessment Completed by ONEIDA Mendoza and ONEIDA Helms.     Head L forehead bruise  Ears WDL  Nose WDL  Mouth WDL  Neck WDL  Breast/Chest WDL  Shoulder Blades WDL  Spine WDL  (R) Arm/Elbow/Hand Bruise L Elbow  (L) Arm/Elbow/Hand WDL  Abdomen WDL  Groin Penis is purple, skin tear on shaft  Scrotum/Coccyx/Buttocks L buttock skin tear - barrier cream applied  (R) Leg Shins red abrasions healed  (L) Leg Shins red abrasions healed  (R) Heel/Foot/Toe WDL  (L) Heel/Foot/Toe WDL              Devices In Places N/A       Interventions In Place Pressure Redistribution Mattress, Waffle, Turned to R side with wedge and pillow between legs for comfort     Pictures Uploaded Into Epic Yes             Wound Consult Placed Yes  RN Wound Prevention Protocol Ordered Yes

## 2022-07-28 NOTE — CARE PLAN
The patient is Watcher - Medium risk of patient condition declining or worsening    Shift Goals  Clinical Goals: MORPHINE DRIP  Patient Goals: REST  Family Goals: COMFORT    Progress made toward(s) clinical / shift goals: remains on oxygen, morphine gtt, comfort measures    Patient is not progressing towards the following goals:  pt in incon x2 and yells with turning from hip fx, will ask for hennessy. Not candidate for external hennessy due to skin issues       Problem: Fall Risk  Goal: Patient will remain free from falls  Outcome: Progressing     Problem: Skin Integrity  Goal: Skin integrity is maintained or improved  Outcome: Progressing

## 2022-07-28 NOTE — DISCHARGE SUMMARY
DISCHARGE SUMMARY     Patient ID:    Name:             Selvin Braga   YOB: 1935  Age:                 86 y.o.  male   MRN:               [id number]      [facility] Admit Date: 2022       Discharge Date: 22    Service: [facility] Hospice Team    Admission/Discharge Diagnoses:   1. End-stage renal disease  2. Acute hypoxic respiratory failure  3. Comminuted closed pelvic fractures as a result of a ground level fall  4. Type 2 diabetes mellitus      BRIEF SUMMARY OF [facility] HOSPITAL ADMISSION/STAY:     Mr. Braga is a 86 year old male patient on hospice service for end-stage renal failure. He had suffered a ground level fall in the home sustaining comminuted closed fractures of inferior and superior pubic rami, deemed non-surgical. In addition he was found to have acute hypoxic respiratory failure requiring supplemental oxygen. In the emergency department, patient and family decided on hospice [facility] admission for pain control and possible end-of-life care. Patient was initially started on scheduled oral morphine, but due to continued severe pain, was placed on a morphine gtt for comfort and to ease dyspnea. Patient was surrounding by family most of yesterday afternoon, and  early this morning. Family was notified of Selvin's passing and they will be entered into our bereavement services for follow-up.      Discharge Medications:      Medication List      ASK your doctor about these medications      Instructions   Acetaminophen Extra Strength 500 MG Tabs  Generic drug: acetaminophen   Take 1 Tablet by mouth every 6 hours as needed for mild pain.  Dose: 500 mg     Alogliptin Benzoate 12.5 MG Tabs   Take 12.5 mg by mouth every day.  Dose: 12.5 mg     ASPIRIN 81 PO   Take 81 mg by mouth every day. Indications: Preventative for heart clots.  Dose: 81 mg     atorvastatin 40 MG Tabs  Commonly known as: LIPITOR   Take 1 Tablet by mouth every evening.  Dose: 40 mg     carvedilol 6.25 MG Tabs  Commonly  known as: COREG   TAKE 1 TABLET BY MOUTH TWICE A DAY WITH MEALS     clobetasol 0.05 % external solution  Commonly known as: TEMOVATE   Apply to scalp once daily as directed.     ferrous gluconate 324 (38 Fe) MG Tabs  Commonly known as: FERGON   Take 1 Tablet by mouth every morning with breakfast.  Dose: 324 mg     fluocinonide 0.05 % Crea  Commonly known as: LIDEX   APPLY TO AFFECTED AREA TO LEFT SHOULDER AND BACK TWICE A DAY . DO NOT USE ON FACE/ARMPITS/ GROIN.     FLUoxetine 20 MG Caps  Commonly known as: PROZAC   Take 20 mg by mouth every day.  Dose: 20 mg     furosemide 80 MG Tabs  Commonly known as: LASIX   Take 80 mg by mouth 2 times a day. Indications: Edema  Dose: 80 mg     HYDROCORTISONE (TOPICAL) 1 % Soln   Apply 1 Application topically 1 time a day as needed. PRN for scalp ithc, scalpicin.  Dose: 1 Application     insulin glargine 100 UNIT/ML Soln  Commonly known as: Lantus   Inject 15 Units under the skin every evening.  Dose: 15 Units     loperamide 2 MG Caps  Commonly known as: IMODIUM   Take 2 Capsules by mouth with first loose stool, followed by 1 cap after each unformed stool (max of 8 caps per day).  Dose: 4 mg     LORazepam 2 MG/ML Conc  Commonly known as: ATIVAN   Doctor's comments: Patient has a terminal diagnosis. Please dispense 30 ml Q 14 days. 180 day RX.  Take 0.25 mL by mouth every four hours as needed (Anxiety/Agitation/.Nausea).  Dose: 0.5 mg     metOLazone 5 MG Tabs  Commonly known as: ZAROXOLYN   Take 1 Tablet by mouth every day.  Dose: 5 mg     morphine 100 MG/5ML Soln   Doctor's comments: Patient has a terminal diagnosis. Please dispense 30 ml Q 14 days. 60 day RX.  Take 0.25 mL by mouth every 2 hours as needed (Moderate to Severe Pain or Shortness of Breath).  Dose: 5 mg     pantoprazole 20 MG tablet  Commonly known as: PROTONIX   Take 1 Tab by mouth every day.  Dose: 20 mg     potassium chloride SA 10 MEQ Tbcr  Commonly known as: K-DUR   Take 1 Tablet by mouth 2 times a day.  Dose:  10 mEq     PRESERVISION AREDS 2+MULTI VIT PO   Take 1 Cap by mouth 2 Times a Day.  Dose: 1 Capsule     Stool Softener Plus Laxative 8.6-50 MG Tabs  Generic drug: senna-docusate   Take 2 Tablets by mouth at bedtime as needed for constipation.  Dose: 2 Tablet     tolnaftate 1 % Crea  Commonly known as: TINACTIN   Apply 1 Application topically 2 times a day as needed (Apply's on athlete's foot).  Dose: 1 Application     vitamin B-12 1000 MCG Tabs   Take 1,000 mcg by mouth every day.  Dose: 1,000 mcg     vitamin D 2000 UNIT Tabs   Take 2,000 Units by mouth every day.  Dose: 2,000 Units             DISPOSITION:     DIET: None    CONDITION:     Vitals:    22 0220 22 0722 22 2041 22 0447   BP:  114/41 128/48    Pulse:  94 90 (!) 0   Resp:  17 18 (!) 0   Temp:  36.8 °C (98.2 °F) 36.5 °C (97.7 °F)    TempSrc:  Temporal Temporal    SpO2:  97% 98% (!) 0%   Weight: 65.8 kg (145 lb)      Height:             FOLLOW UP:  Bereavement       Hermes Cleary DO  Hospice and Palliative Medicine  Medical Director, [facility]  22

## 2022-07-28 NOTE — DISCHARGE PLANNING
Medical Social Work     MELANIA received a call from the RN advising SW that the pt was comfort care and . The RN was able to make contact with the pt daughter Bushra and advise her of the pt death. The pt daughter Bushra has questions on next steps.     SW was able to make contact with Bushra via phone and SW provided information on next steps. SW emailed Bushra the difficult times packet and mortuary list. SW provided emotional support over the phone and condolences.     Bushra (daughter) [phone]    Email: [email or web address]

## 2022-07-28 NOTE — PROGRESS NOTES
0710- Wasted remaining 32ml/32mg morphine from morphine drip with night shift ONEIDA Mendoza as witness and charge ONEIDA Aldridge assisting.

## 2023-10-19 ENCOUNTER — DOCUMENTATION (OUTPATIENT)
Dept: HEALTH INFORMATION MANAGEMENT | Facility: OTHER | Age: 88
End: 2023-10-19
Payer: MEDICARE

## 2024-11-21 NOTE — PATIENT INSTRUCTIONS
After hyperbaric oxygen therapy treatment, it is normal to experience some congestion to the ears, muffling of sounds, or abnormal sounds to one or both ears.    If you experience pain or discomfort to one or both ears that does not resolve spontaneously, please contact the hyperbaric department at [phone].      
187.96

## (undated) DEVICE — SODIUM CHL IRRIGATION 0.9% 1000ML (12EA/CA)

## (undated) DEVICE — GOWN SURGEONS LARGE - (32/CA)

## (undated) DEVICE — KIT ANESTHESIA W/CIRCUIT & 3/LT BAG W/FILTER (20EA/CA)

## (undated) DEVICE — NEPTUNE 4 PORT MANIFOLD - (20/PK)

## (undated) DEVICE — FOLEY SLIPPERY 5CC 16 FR LF ALL SILICONE (12EA/CA)

## (undated) DEVICE — SET EXTENSION WITH 2 PORTS (48EA/CA) ***PART #2C8610 IS A SUBSTITUTE*****

## (undated) DEVICE — SUTURE 2-0 VICRYL PLUS CT-1 36 (36PK/BX)"

## (undated) DEVICE — GLOVE SZ 6 BIOGEL PI MICRO - PF LF (50PR/BX 4BX/CA)

## (undated) DEVICE — VESSELOOP MAXI BLUE STERILE- SURG-I-LOOP (10EA/BX)

## (undated) DEVICE — SUTURE 2-0 VICRYL PLUS CT-1 - 8 X 18 INCH(12/BX)

## (undated) DEVICE — SUTURE 2-0 SILK 12 X 18" (36PK/BX)"

## (undated) DEVICE — STAPLER SKIN DISP - (6/BX 10BX/CA) VISISTAT

## (undated) DEVICE — SUCTION INSTRUMENT YANKAUER BULBOUS TIP W/O VENT (50EA/CA)

## (undated) DEVICE — SUTURE 4-0 30CM STRATAFIX SPIRAL PS-2 (12EA/BX)

## (undated) DEVICE — VESSELOOP MINI BLUE STERILE - SURG-I-LOOP (10EA/BX)

## (undated) DEVICE — GUIDEWIRE STARTER J CURVED FIXED CORE .035 260CM 10 3MM PTFE/HEPARIN COATED (5EA/BX)

## (undated) DEVICE — PROTECTOR ULNA NERVE - (36PR/CA)

## (undated) DEVICE — SODIUM CHL. INJ. 0.9% 500ML (24EA/CA 50CA/PF)

## (undated) DEVICE — TOWELS CLOTH SURGICAL - (4/PK 20PK/CA)

## (undated) DEVICE — HEAD HOLDER JUNIOR/ADULT

## (undated) DEVICE — BLADE SURGICAL #11 - (50/BX)

## (undated) DEVICE — GLOVE BIOGEL SZ 8 SURGICAL PF LTX - (50PR/BX 4BX/CA)

## (undated) DEVICE — CLIP MED LG INTNL HRZN TI ESCP - (20/BX)

## (undated) DEVICE — WIPE INSTRUMENT MICROWIPE (20EA/SP)

## (undated) DEVICE — SUTURE 6-0 PROLENE BV-1 D/A 24 (36PK/BX)"

## (undated) DEVICE — GOWN SURGEONS X-LARGE - DISP. (30/CA)

## (undated) DEVICE — ELECTRODE DUAL RETURN W/ CORD - (50/PK)

## (undated) DEVICE — GLOVE SZ 8 BIOGEL PI MICRO - PF LF (50PR/BX)

## (undated) DEVICE — MASK AIRWAY FLEXIBLE SINGLE-USE SIZE 5 ADULTS (10EA/BX)

## (undated) DEVICE — SUTURE GENERAL

## (undated) DEVICE — LACTATED RINGERS INJ 1000 ML - (14EA/CA 60CA/PF)

## (undated) DEVICE — BLADE SURGICAL CLIPPER - (50EA/CA)

## (undated) DEVICE — GOWN WARMING STANDARD FLEX - (30/CA)

## (undated) DEVICE — SYRINGE 30 ML LL (56/BX)

## (undated) DEVICE — GLOVE BIOGEL PI INDICATOR SZ 8.0 SURGICAL PF LF -(50/BX 4BX/CA)

## (undated) DEVICE — SET LEADWIRE 5 LEAD BEDSIDE DISPOSABLE ECG (1SET OF 5/EA)

## (undated) DEVICE — DRAPE SURGICAL U 77X120 - (10/CA)

## (undated) DEVICE — SENSOR SPO2 NEO LNCS ADHESIVE (20/BX) SEE USER NOTES

## (undated) DEVICE — MASK ANESTHESIA ADULT  - (100/CA)

## (undated) DEVICE — CANNULA W/ SUPPLY TUBING O2 - (50/CA)

## (undated) DEVICE — DRAPE LARGE 3 QUARTER - (20/CA)

## (undated) DEVICE — DECANTER FLD BLS - (50/CA)

## (undated) DEVICE — GLIDECATH ANGLE 4F X 70CM (5EA/BX)

## (undated) DEVICE — ELECTRODE 850 FOAM ADHESIVE - HYDROGEL RADIOTRNSPRNT (50/PK)

## (undated) DEVICE — GLOVE BIOGEL SZ 6 PF LATEX - (50EA/BX 4BX/CA)

## (undated) DEVICE — PACK MAJOR BASIN - (2EA/CA)

## (undated) DEVICE — STERI STRIP COMPOUND BENZOIN - TINCTURE 0.6ML WITH APPLICATOR (40EA/BX)

## (undated) DEVICE — SYSTEM PEEL & PLACE 13CM INCISIONS

## (undated) DEVICE — TUBE E-T HI-LO CUFF 7.0MM (10EA/PK)

## (undated) DEVICE — GLIDEWIRE ANGLED .035 X 180 (5EA/BX)

## (undated) DEVICE — DERMABOND ADVANCED - (12EA/BX)

## (undated) DEVICE — SPONGE GAUZESTER. 2X2 4-PL - (2/PK 50PK/BX 30BX/CS)

## (undated) DEVICE — CLIP MED INTNL HRZN TI ESCP - (25/BX)

## (undated) DEVICE — GLOVE BIOGEL PI ORTHO SZ 6 SURGICAL PF LF (40PR/BX)

## (undated) DEVICE — BAG DECANTER (50EA/CS)

## (undated) DEVICE — DISC, CD-R PLAT 700MB MEMOREX

## (undated) DEVICE — TUBING CLEARLINK DUO-VENT - C-FLO (48EA/CA)

## (undated) DEVICE — SYRINGE SAFETY 10 ML 18 GA X 1 1/2 BLUNT LL (100/BX 4BX/CA)

## (undated) DEVICE — CLOSURE SKIN STRIP 1/2 X 4 IN - (STERI STRIP) (50/BX 4BX/CA)

## (undated) DEVICE — KIT ROOM DECONTAMINATION

## (undated) DEVICE — CANISTER SUCTION 3000ML MECHANICAL FILTER AUTO SHUTOFF MEDI-VAC NONSTERILE LF DISP  (40EA/CA)

## (undated) DEVICE — NEEDLE THINWALL 19GA X 7CM

## (undated) DEVICE — TRAY CATHETER FOLEY URINE METER W/STATLOCK 350ML (10EA/CA)

## (undated) DEVICE — CLIP SM INTNL HRZN TI ESCP LGT - (24EA/PK 25PK/BX)

## (undated) DEVICE — SLEEVE, VASO, THIGH, MED

## (undated) DEVICE — CHLORAPREP 26 ML APPLICATOR - ORANGE TINT(25/CA)

## (undated) DEVICE — DRESSING TRANSPARENT FILM TEGADERM 4 X 4.75" (50EA/BX)"

## (undated) DEVICE — DEVICE INFLATION DIGITAL BLUE DIAMOND (5EA/BX)

## (undated) DEVICE — SUTURE CV

## (undated) DEVICE — SPONGE GAUZESTER 4 X 4 4PLY - (128PK/CA)

## (undated) DEVICE — Device

## (undated) DEVICE — SOD. CHL. INJ. 0.9% 1000 ML - (14EA/CA 60CA/PF)